# Patient Record
Sex: MALE | Race: ASIAN | Employment: OTHER | URBAN - METROPOLITAN AREA
[De-identification: names, ages, dates, MRNs, and addresses within clinical notes are randomized per-mention and may not be internally consistent; named-entity substitution may affect disease eponyms.]

---

## 2017-02-08 ENCOUNTER — OFFICE VISIT (OUTPATIENT)
Dept: FAMILY MEDICINE | Facility: CLINIC | Age: 57
End: 2017-02-08
Payer: COMMERCIAL

## 2017-02-08 VITALS
TEMPERATURE: 97.8 F | OXYGEN SATURATION: 100 % | HEART RATE: 72 BPM | DIASTOLIC BLOOD PRESSURE: 76 MMHG | WEIGHT: 167 LBS | SYSTOLIC BLOOD PRESSURE: 128 MMHG | HEIGHT: 66 IN | BODY MASS INDEX: 26.84 KG/M2

## 2017-02-08 DIAGNOSIS — F32.1 MAJOR DEPRESSIVE DISORDER, SINGLE EPISODE, MODERATE (H): ICD-10-CM

## 2017-02-08 DIAGNOSIS — J30.2 SEASONAL ALLERGIC RHINITIS, UNSPECIFIED ALLERGIC RHINITIS TRIGGER: ICD-10-CM

## 2017-02-08 DIAGNOSIS — R42 DIZZINESS: ICD-10-CM

## 2017-02-08 DIAGNOSIS — I10 ESSENTIAL HYPERTENSION WITH GOAL BLOOD PRESSURE LESS THAN 140/90: ICD-10-CM

## 2017-02-08 DIAGNOSIS — E78.5 HYPERLIPIDEMIA WITH TARGET LDL LESS THAN 130: Primary | ICD-10-CM

## 2017-02-08 DIAGNOSIS — N18.30 CKD (CHRONIC KIDNEY DISEASE) STAGE 3, GFR 30-59 ML/MIN (H): ICD-10-CM

## 2017-02-08 DIAGNOSIS — F41.9 ANXIETY: ICD-10-CM

## 2017-02-08 LAB — HGB BLD-MCNC: 13.1 G/DL (ref 13.3–17.7)

## 2017-02-08 PROCEDURE — 85018 HEMOGLOBIN: CPT | Performed by: FAMILY MEDICINE

## 2017-02-08 PROCEDURE — 80061 LIPID PANEL: CPT | Performed by: FAMILY MEDICINE

## 2017-02-08 PROCEDURE — 80053 COMPREHEN METABOLIC PANEL: CPT | Performed by: FAMILY MEDICINE

## 2017-02-08 PROCEDURE — 36415 COLL VENOUS BLD VENIPUNCTURE: CPT | Performed by: FAMILY MEDICINE

## 2017-02-08 PROCEDURE — 99214 OFFICE O/P EST MOD 30 MIN: CPT | Performed by: FAMILY MEDICINE

## 2017-02-08 RX ORDER — MECLIZINE HCL 12.5 MG 12.5 MG/1
12.5-25 TABLET ORAL 4 TIMES DAILY PRN
Qty: 30 TABLET | Refills: 3 | Status: SHIPPED | OUTPATIENT
Start: 2017-02-08 | End: 2017-06-20

## 2017-02-08 RX ORDER — TRAZODONE HYDROCHLORIDE 50 MG/1
TABLET, FILM COATED ORAL
Qty: 180 TABLET | Refills: 1 | Status: SHIPPED | OUTPATIENT
Start: 2017-02-08 | End: 2017-07-25

## 2017-02-08 RX ORDER — LISINOPRIL AND HYDROCHLOROTHIAZIDE 12.5; 2 MG/1; MG/1
2 TABLET ORAL DAILY
Qty: 180 TABLET | Refills: 3 | Status: SHIPPED | OUTPATIENT
Start: 2017-02-08 | End: 2018-01-29

## 2017-02-08 RX ORDER — PAROXETINE 30 MG/1
30 TABLET, FILM COATED ORAL AT BEDTIME
Qty: 90 TABLET | Refills: 1 | Status: SHIPPED | OUTPATIENT
Start: 2017-02-08 | End: 2017-08-22

## 2017-02-08 RX ORDER — BUSPIRONE HYDROCHLORIDE 15 MG/1
15 TABLET ORAL 2 TIMES DAILY
Qty: 180 TABLET | Refills: 1 | Status: SHIPPED | OUTPATIENT
Start: 2017-02-08 | End: 2017-08-22

## 2017-02-08 RX ORDER — SIMVASTATIN 40 MG
40 TABLET ORAL AT BEDTIME
Qty: 90 TABLET | Refills: 3 | Status: SHIPPED | OUTPATIENT
Start: 2017-02-08 | End: 2018-01-29

## 2017-02-08 RX ORDER — LISINOPRIL 40 MG/1
TABLET ORAL
COMMUNITY
Start: 2015-02-04 | End: 2017-02-08 | Stop reason: ALTCHOICE

## 2017-02-08 RX ORDER — FEXOFENADINE HCL 180 MG/1
180 TABLET ORAL DAILY
Qty: 90 TABLET | Refills: 3 | Status: SHIPPED | OUTPATIENT
Start: 2017-02-08

## 2017-02-08 ASSESSMENT — ANXIETY QUESTIONNAIRES
GAD7 TOTAL SCORE: 15
3. WORRYING TOO MUCH ABOUT DIFFERENT THINGS: MORE THAN HALF THE DAYS
6. BECOMING EASILY ANNOYED OR IRRITABLE: MORE THAN HALF THE DAYS
2. NOT BEING ABLE TO STOP OR CONTROL WORRYING: MORE THAN HALF THE DAYS
1. FEELING NERVOUS, ANXIOUS, OR ON EDGE: NEARLY EVERY DAY
5. BEING SO RESTLESS THAT IT IS HARD TO SIT STILL: MORE THAN HALF THE DAYS
IF YOU CHECKED OFF ANY PROBLEMS ON THIS QUESTIONNAIRE, HOW DIFFICULT HAVE THESE PROBLEMS MADE IT FOR YOU TO DO YOUR WORK, TAKE CARE OF THINGS AT HOME, OR GET ALONG WITH OTHER PEOPLE: EXTREMELY DIFFICULT
7. FEELING AFRAID AS IF SOMETHING AWFUL MIGHT HAPPEN: MORE THAN HALF THE DAYS

## 2017-02-08 ASSESSMENT — PATIENT HEALTH QUESTIONNAIRE - PHQ9: 5. POOR APPETITE OR OVEREATING: MORE THAN HALF THE DAYS

## 2017-02-08 NOTE — MR AVS SNAPSHOT
"              After Visit Summary   2/8/2017    Gunner Velázquez    MRN: [id number]           Patient Information     Date Of Birth          1960        Visit Information        Provider Department      2/8/2017 3:45 PM Tim Howrad Jr., MD; [place] [employer] [facility]        Today's Diagnoses     Hyperlipidemia with target LDL less than 130    -  1     Major depressive disorder, single episode, moderate (H)         Anxiety         CKD (chronic kidney disease) stage 3, GFR 30-59 ml/min         Essential hypertension with goal blood pressure less than 140/90         Dizziness         Seasonal allergic rhinitis, unspecified allergic rhinitis trigger            Follow-ups after your visit        Follow-up notes from your care team     Return in about 6 months (around 8/8/2017) for recheck depression/anxiety.      Who to contact     If you have questions or need follow up information about today's clinic visit or your schedule please contact [facility] [place] directly at [phone].  Normal or non-critical lab and imaging results will be communicated to you by MyChart, letter or phone within 4 business days after the clinic has received the results. If you do not hear from us within 7 days, please contact the clinic through [employer] or phone. If you have a critical or abnormal lab result, we will notify you by phone as soon as possible.  Submit refill requests through [employer] or call your pharmacy and they will forward the refill request to us. Please allow 3 business days for your refill to be completed.          Additional Information About Your Visit        [employer] Information     [employer] lets you send messages to your doctor, view your test results, renew your prescriptions, schedule appointments and more. To sign up, go to [email or web address] . Click on \"Log in\" on the left side of the screen, which will take you to the Welcome page. Then click on \"Sign up Now\" on " "the right side of the page.     You will be asked to enter the access code listed below, as well as some personal information. Please follow the directions to create your username and password.     Your access code is: [email or web address]  Expires: 2017  4:28 PM     Your access code will  in 90 days. If you need help or a new code, please call your [place] clinic or [phone].        Care EveryWhere ID     This is your Care EveryWhere ID. This could be used by other organizations to access your [place] medical records  [id number]        Your Vitals Were     Pulse Temperature Height BMI (Body Mass Index) Pulse Oximetry       72 97.8  F (36.6  C) (Oral) 5' 6\" (1.676 m) 26.97 kg/m2 100%        Blood Pressure from Last 3 Encounters:   17 128/76   16 128/82   16 132/82    Weight from Last 3 Encounters:   17 167 lb (75.751 kg)   16 163 lb (73.936 kg)   16 163 lb (73.936 kg)              We Performed the Following     Comprehensive metabolic panel (BMP + Alb, Alk Phos, ALT, AST, Total. Bili, TP)     Hemoglobin     Lipid panel reflex to direct LDL          Today's Medication Changes          These changes are accurate as of: 17  4:28 PM.  If you have any questions, ask your nurse or doctor.               Start taking these medicines.        Dose/Directions    fexofenadine 180 MG tablet   Commonly known as:  ALLEGRA   Used for:  Seasonal allergic rhinitis, unspecified allergic rhinitis trigger   Started by:  Tim Howard Jr., MD        Dose:  180 mg   Take 1 tablet (180 mg) by mouth daily   Quantity:  90 tablet   Refills:  3         Stop taking these medicines if you haven't already. Please contact your care team if you have questions.     lisinopril 40 MG tablet   Commonly known as:  PRINIVIL/ZESTRIL   Stopped by:  Tim Howard Jr., MD                Where to get your medicines      These medications were sent to [id number] - [place], [place] - [address] [facility] " [address] [place] [address] [facility] [address]     Phone:  [phone]    - busPIRone 15 MG tablet  - fexofenadine 180 MG tablet  - lisinopril-hydrochlorothiazide 20-12.5 MG per tablet  - meclizine 12.5 MG tablet  - PARoxetine 30 MG tablet  - simvastatin 40 MG tablet  - traZODone 50 MG tablet             Primary Care Provider Office Phone # Fax #    iTm Howard Jr., [phone]       [facility] [address]  [place] [address]        Thank you!     Thank you for choosing [facility]  for your care. Our goal is always to provide you with excellent care. Hearing back from our patients is one way we can continue to improve our services. Please take a few minutes to complete the written survey that you may receive in the mail after your visit with us. Thank you!             Your Updated Medication List - Protect others around you: Learn how to safely use, store and throw away your medicines at www.disposemymeds.org.          This list is accurate as of: 2/8/17  4:28 PM.  Always use your most recent med list.                   Brand Name Dispense Instructions for use    busPIRone 15 MG tablet    BUSPAR    180 tablet    Take 1 tablet (15 mg) by mouth 2 times daily       cholecalciferol 5000 UNITS Caps capsule    vitamin D3    100 capsule    Take 1 per day       fexofenadine 180 MG tablet    ALLEGRA    90 tablet    Take 1 tablet (180 mg) by mouth daily       fish oil-omega-3 fatty acids 1000 MG capsule     180 capsule    Take 1 capsule (1 g) by mouth 2 times daily       lisinopril-hydrochlorothiazide 20-12.5 MG per tablet    PRINZIDE/ZESTORETIC    180 tablet    Take 2 tablets by mouth daily       meclizine 12.5 MG tablet    ANTIVERT    30 tablet    Take 1-2 tablets (12.5-25 mg) by mouth 4 times daily as needed for dizziness       PARoxetine 30 MG tablet    PAXIL    90 tablet    Take 1 tablet (30 mg) by mouth At Bedtime       simvastatin 40 MG tablet    ZOCOR    90  tablet    Take 1 tablet (40 mg) by mouth At Bedtime       traZODone 50 MG tablet    DESYREL    180 tablet    Take 1 to 3 tablets 1/2 to 1 hour before bed on an empty stomach

## 2017-02-08 NOTE — NURSING NOTE
"Chief Complaint   Patient presents with     Hypertension     Depression     Anxiety     Lipids       Initial /76 mmHg  Pulse 72  Temp(Src) 97.8  F (36.6  C) (Oral)  Ht 5' 6\" (1.676 m)  Wt 167 lb (75.751 kg)  BMI 26.97 kg/m2  SpO2 100% Estimated body mass index is 26.97 kg/(m^2) as calculated from the following:    Height as of this encounter: 5' 6\" (1.676 m).    Weight as of this encounter: 167 lb (75.751 kg).  Medication Reconciliation: complete  "

## 2017-02-08 NOTE — Clinical Note
[facility] [place]     [address] [place] [address]                                           [phone]   February 10, 2017    Gunner Velázquez  [address] [place] [address]      Mr. Velázquez,    -Liver and gallbladder tests (ALT,AST, Alk phos,bilirubin) are normal.  -Kidney function (GFR) is decreased.  ADVISE: Follow up with Dr. Anderson at [facility]  -Sodium is normal.  -Potassium is normal.  -Glucose (diabetic screening test) is normal.  -Cholesterol levels are at your goal levels.  ADVISE: Continuing your medication, a regular exercise program with at least 30 minutes of aerobic exercise 3-4 days/week ( 45 minutes 4-6 days/week if weight loss needed), and a low saturated fat,/low carbohydrate diet are helpful to maintain this.  Rechecking your fasting cholesterol panel in 12 months is recommended (Lipid w/ LDL reflex).  -Hemoglobin is decreased indicating anemia.  Anemia can cause fatigue and, occasionally, light-headedness.  ADVISE: This is likely due to your kidney disease, Gunner.  Please follow up with Dr. Anderson.    If you have further questions about the interpretation of your labs, [email or web address] is a good website to check out for further information.      Please contact the clinic if you have additional questions.  Thank you.    Sincerely,    Tim Howard MD       Results for orders placed or performed in visit on 02/08/17   Comprehensive metabolic panel (BMP + Alb, Alk Phos, ALT, AST, Total. Bili, TP)   Result Value Ref Range    Sodium 138 133 - 144 mmol/L    Potassium 4.5 3.4 - 5.3 mmol/L    Chloride 102 94 - 109 mmol/L    Carbon Dioxide 27 20 - 32 mmol/L    Anion Gap 9 3 - 14 mmol/L    Glucose 109 (H) 70 - 99 mg/dL    Urea Nitrogen 36 (H) 7 - 30 mg/dL    Creatinine 2.37 (H) 0.66 - 1.25 mg/dL    GFR Estimate 28 (L) >60 mL/min/1.7m2    GFR Estimate If Black 34 (L) >60 mL/min/1.7m2    Calcium 9.8 8.5 - 10.1 mg/dL    Bilirubin Total 0.4 0.2 - 1.3 mg/dL     Albumin 4.1 3.4 - 5.0 g/dL    Protein Total 8.9 (H) 6.8 - 8.8 g/dL    Alkaline Phosphatase 83 40 - 150 U/L    ALT 53 0 - 70 U/L    AST 43 0 - 45 U/L   Lipid panel reflex to direct LDL   Result Value Ref Range    Cholesterol 206 (H) <200 mg/dL    Triglycerides 151 (H) <150 mg/dL    HDL Cholesterol 49 >39 mg/dL    LDL Cholesterol Calculated 127 (H) <100 mg/dL    Non HDL Cholesterol 157 (H) <130 mg/dL   Hemoglobin   Result Value Ref Range    Hemoglobin 13.1 (L) 13.3 - 17.7 g/dL

## 2017-02-08 NOTE — PROGRESS NOTES
"  SUBJECTIVE:                                                    Gunner Velázquez is a 56 year old male who presents to clinic today for the following health issues:      Hyperlipidemia Follow-Up      Rate your low fat/cholesterol diet?: good    Taking statin?  Yes, no muscle aches from statin    Other lipid medications/supplements?:  Fish oil/Omega 3     Hypertension Follow-up      Outpatient blood pressures are being checked at home.  Results are 117-120-135 systolic .    Low Salt Diet: low salt       Depression and Anxiety Follow-Up    Status since last visit: still up and down, nightmares/ PTSD, sleep issues both sleeping to much and sometimes can't sleep at all, lack of appetite, no motivation, still having dizziness but Meclizine helps       Complicating factors:     Significant life event: Yes, brother passed away      Current substance abuse: None    PHQ-9 SCORE 7/14/2015   Total Score 21     MOE-7 SCORE 7/14/2015   Total Score 17        PHQ-9  English      PHQ-9   Any Language     GAD7         Amount of exercise or physical activity: just a little bit    Problems taking medications regularly: needs refills    Medication side effects: wondering if one medication is giving drowsiness but unable to sleep     Diet: low salt and low fat/cholesterol        Problem list and histories reviewed & adjusted, as indicated.  Additional history: as documented    Labs reviewed in EPIC  Problem list, Medication list, Allergies, and Medical/Social/Surgical histories reviewed in [facility] and updated as appropriate.    ROS:  Constitutional, HEENT, cardiovascular, pulmonary, gi and gu systems are negative, except as otherwise noted.    OBJECTIVE:                                                    /76 mmHg  Pulse 72  Temp(Src) 97.8  F (36.6  C) (Oral)  Ht 5' 6\" (1.676 m)  Wt 167 lb (75.751 kg)  BMI 26.97 kg/m2  SpO2 100%  Body mass index is 26.97 kg/(m^2).  GENERAL: healthy, alert and no distress  NECK: no adenopathy, no " asymmetry, masses, or scars and thyroid normal to palpation  RESP: lungs clear to auscultation - no rales, rhonchi or wheezes  CV: regular rate and rhythm, normal S1 S2, no S3 or S4, no murmur, click or rub, no peripheral edema and peripheral pulses strong  ABDOMEN: soft, nontender, no hepatosplenomegaly, no masses and bowel sounds normal  MS: no gross musculoskeletal defects noted, no edema  PSYCH: mentation appears normal and affect flat    Diagnostic Test Results:  none      ASSESSMENT/PLAN:                                                            1. Hyperlipidemia with target LDL less than 130  Await lipid panel today. Tolerating simvastatin without side effects. Encouraged weight loss through regular exercise as he is able.  - simvastatin (ZOCOR) 40 MG tablet; Take 1 tablet (40 mg) by mouth At Bedtime  Dispense: 90 tablet; Refill: 3  - Lipid panel reflex to direct LDL    2. Major depressive disorder, single episode, moderate (H)  This is stable. He is tolerating his peroxide teen without side effects. He is getting improved sleep with trazodone. These are all refilled as he is no longer following with Dr. Mckay.  If he continues having problems with waxing and waning depression, could consider adding a mood stabilizer such as Lamictal or Zyprexa. Recheck this in 6 months.  - traZODone (DESYREL) 50 MG tablet; Take 1 to 3 tablets 1/2 to 1 hour before bed on an empty stomach  Dispense: 180 tablet; Refill: 1    3. Anxiety  This is likewise stable. We discussed the possibility of increasing his Paxil to 40 mg, but he would prefer to stay at the 30 mg dose.  - busPIRone (BUSPAR) 15 MG tablet; Take 1 tablet (15 mg) by mouth 2 times daily  Dispense: 180 tablet; Refill: 1  - PARoxetine (PAXIL) 30 MG tablet; Take 1 tablet (30 mg) by mouth At Bedtime  Dispense: 90 tablet; Refill: 1    4. CKD (chronic kidney disease) stage 3, GFR 30-59 ml/min  He is avoiding nonsteroidal anti-inflammatory drugs. Blood pressure is at goal  today. We will recheck his kidney function and hemoglobin as below. Continue his ACE inhibitor as he is doing.  - Comprehensive metabolic panel (BMP + Alb, Alk Phos, ALT, AST, Total. Bili, TP)  - Hemoglobin  - lisinopril-hydrochlorothiazide (PRINZIDE/ZESTORETIC) 20-12.5 MG per tablet; Take 2 tablets by mouth daily  Dispense: 180 tablet; Refill: 3    5. Essential hypertension with goal blood pressure less than 140/90  As with chronic kidney disease above.  - Comprehensive metabolic panel (BMP + Alb, Alk Phos, ALT, AST, Total. Bili, TP)  - lisinopril-hydrochlorothiazide (PRINZIDE/ZESTORETIC) 20-12.5 MG per tablet; Take 2 tablets by mouth daily  Dispense: 180 tablet; Refill: 3    6. Dizziness  This is likely a manifestation of his anxiety. This has gotten better as his anxiety control has improved. He still takes meclizine on a p.r.n. basis which is refilled today.  - meclizine (ANTIVERT) 12.5 MG tablet; Take 1-2 tablets (12.5-25 mg) by mouth 4 times daily as needed for dizziness  Dispense: 30 tablet; Refill: 3    7. Seasonal allergic rhinitis, unspecified allergic rhinitis trigger  His Allegra is renewed today for a year. This is been fairly effective in controlling his seasonal allergic rhinitis.  - fexofenadine (ALLEGRA) 180 MG tablet; Take 1 tablet (180 mg) by mouth daily  Dispense: 90 tablet; Refill: 3    Work on weight loss  Regular exercise  Follow-up in 6 months.    Jr Tim Howard MD  [facility]

## 2017-02-09 LAB
ALBUMIN SERPL-MCNC: 4.1 G/DL (ref 3.4–5)
ALP SERPL-CCNC: 83 U/L (ref 40–150)
ALT SERPL W P-5'-P-CCNC: 53 U/L (ref 0–70)
ANION GAP SERPL CALCULATED.3IONS-SCNC: 9 MMOL/L (ref 3–14)
AST SERPL W P-5'-P-CCNC: 43 U/L (ref 0–45)
BILIRUB SERPL-MCNC: 0.4 MG/DL (ref 0.2–1.3)
BUN SERPL-MCNC: 36 MG/DL (ref 7–30)
CALCIUM SERPL-MCNC: 9.8 MG/DL (ref 8.5–10.1)
CHLORIDE SERPL-SCNC: 102 MMOL/L (ref 94–109)
CHOLEST SERPL-MCNC: 206 MG/DL
CO2 SERPL-SCNC: 27 MMOL/L (ref 20–32)
CREAT SERPL-MCNC: 2.37 MG/DL (ref 0.66–1.25)
GFR SERPL CREATININE-BSD FRML MDRD: 28 ML/MIN/1.7M2
GLUCOSE SERPL-MCNC: 109 MG/DL (ref 70–99)
HDLC SERPL-MCNC: 49 MG/DL
LDLC SERPL CALC-MCNC: 127 MG/DL
NONHDLC SERPL-MCNC: 157 MG/DL
POTASSIUM SERPL-SCNC: 4.5 MMOL/L (ref 3.4–5.3)
PROT SERPL-MCNC: 8.9 G/DL (ref 6.8–8.8)
SODIUM SERPL-SCNC: 138 MMOL/L (ref 133–144)
TRIGL SERPL-MCNC: 151 MG/DL

## 2017-02-09 ASSESSMENT — PATIENT HEALTH QUESTIONNAIRE - PHQ9: SUM OF ALL RESPONSES TO PHQ QUESTIONS 1-9: 23

## 2017-02-09 ASSESSMENT — ANXIETY QUESTIONNAIRES: GAD7 TOTAL SCORE: 15

## 2017-02-10 NOTE — PROGRESS NOTES
Quick Note:    Please send the following letter:    Mr. Velázquez,    -Liver and gallbladder tests (ALT,AST, Alk phos,bilirubin) are normal.  -Kidney function (GFR) is decreased. ADVISE: Follow up with Dr. Anderson at [facility]  -Sodium is normal.  -Potassium is normal.  -Glucose (diabetic screening test) is normal.  -Cholesterol levels are at your goal levels. ADVISE: Continuing your medication, a regular exercise program with at least 30 minutes of aerobic exercise 3-4 days/week ( 45 minutes 4-6 days/week if weight loss needed), and a low saturated fat,/low carbohydrate diet are helpful to maintain this. Rechecking your fasting cholesterol panel in 12 months is recommended (Lipid w/ LDL reflex).  -Hemoglobin is decreased indicating anemia. Anemia can cause fatigue and, occasionally, light-headedness. ADVISE: This is likely due to your kidney disease, [place]. Please follow up with Dr. Anderson.    If you have further questions about the interpretation of your labs, [email or web address] is a good website to check out for further information.      Please contact the clinic if you have additional questions. Thank you.    Sincerely,    Tim Howard MD         ______

## 2017-03-17 DIAGNOSIS — F32.1 MAJOR DEPRESSIVE DISORDER, SINGLE EPISODE, MODERATE (H): ICD-10-CM

## 2017-03-17 NOTE — TELEPHONE ENCOUNTER
cholecalciferol (VITAMIN D3) 5000 UNITS CAPS capsule      Last Written Prescription Date: 5/27/2016  Last Fill Quantity: 100 capsule,  # refills: 2   Last Office Visit with [facility], [facility] or [facility] prescribing provider: 2/8/2017

## 2017-03-20 NOTE — TELEPHONE ENCOUNTER
Prescription approved per [facility] Refill Protocol.  Corrina Nayak, RN, BSN  [facility]

## 2017-03-31 ENCOUNTER — APPOINTMENT (OUTPATIENT)
Dept: GENERAL RADIOLOGY | Facility: CLINIC | Age: 57
End: 2017-03-31
Attending: EMERGENCY MEDICINE
Payer: MEDICAID

## 2017-03-31 ENCOUNTER — APPOINTMENT (OUTPATIENT)
Dept: CT IMAGING | Facility: CLINIC | Age: 57
End: 2017-03-31
Attending: EMERGENCY MEDICINE
Payer: MEDICAID

## 2017-03-31 ENCOUNTER — HOSPITAL ENCOUNTER (EMERGENCY)
Facility: CLINIC | Age: 57
Discharge: HOME | End: 2017-03-31
Attending: EMERGENCY MEDICINE | Admitting: EMERGENCY MEDICINE
Payer: MEDICAID

## 2017-03-31 VITALS
DIASTOLIC BLOOD PRESSURE: 75 MMHG | HEART RATE: 73 BPM | TEMPERATURE: 97.5 F | SYSTOLIC BLOOD PRESSURE: 115 MMHG | RESPIRATION RATE: 18 BRPM | OXYGEN SATURATION: 98 %

## 2017-03-31 DIAGNOSIS — R42 VERTIGO: ICD-10-CM

## 2017-03-31 LAB
ANION GAP SERPL CALCULATED.3IONS-SCNC: 10 MMOL/L (ref 3–14)
BASOPHILS # BLD AUTO: 0 10E9/L (ref 0–0.2)
BASOPHILS NFR BLD AUTO: 0.4 %
BUN SERPL-MCNC: 47 MG/DL (ref 7–30)
CALCIUM SERPL-MCNC: 9 MG/DL (ref 8.5–10.1)
CHLORIDE SERPL-SCNC: 103 MMOL/L (ref 94–109)
CO2 SERPL-SCNC: 24 MMOL/L (ref 20–32)
CREAT SERPL-MCNC: 2.45 MG/DL (ref 0.66–1.25)
DIFFERENTIAL METHOD BLD: ABNORMAL
EOSINOPHIL # BLD AUTO: 0.3 10E9/L (ref 0–0.7)
EOSINOPHIL NFR BLD AUTO: 4.3 %
ERYTHROCYTE [DISTWIDTH] IN BLOOD BY AUTOMATED COUNT: 13.1 % (ref 10–15)
GFR SERPL CREATININE-BSD FRML MDRD: 27 ML/MIN/1.7M2
GLUCOSE SERPL-MCNC: 99 MG/DL (ref 70–99)
HCT VFR BLD AUTO: 37.7 % (ref 40–53)
HGB BLD-MCNC: 12.4 G/DL (ref 13.3–17.7)
IMM GRANULOCYTES # BLD: 0 10E9/L (ref 0–0.4)
IMM GRANULOCYTES NFR BLD: 0.1 %
INTERPRETATION ECG - MUSE: NORMAL
LYMPHOCYTES # BLD AUTO: 3 10E9/L (ref 0.8–5.3)
LYMPHOCYTES NFR BLD AUTO: 43.3 %
MCH RBC QN AUTO: 27.6 PG (ref 26.5–33)
MCHC RBC AUTO-ENTMCNC: 32.9 G/DL (ref 31.5–36.5)
MCV RBC AUTO: 84 FL (ref 78–100)
MONOCYTES # BLD AUTO: 0.7 10E9/L (ref 0–1.3)
MONOCYTES NFR BLD AUTO: 9.4 %
NEUTROPHILS # BLD AUTO: 2.9 10E9/L (ref 1.6–8.3)
NEUTROPHILS NFR BLD AUTO: 42.5 %
NRBC # BLD AUTO: 0 10*3/UL
NRBC BLD AUTO-RTO: 0 /100
PLATELET # BLD AUTO: 219 10E9/L (ref 150–450)
POTASSIUM SERPL-SCNC: 3.8 MMOL/L (ref 3.4–5.3)
RBC # BLD AUTO: 4.49 10E12/L (ref 4.4–5.9)
SODIUM SERPL-SCNC: 137 MMOL/L (ref 133–144)
TROPONIN I BLD-MCNC: 0.01 UG/L (ref 0–0.1)
TROPONIN I SERPL-MCNC: NORMAL UG/L (ref 0–0.04)
TROPONIN I SERPL-MCNC: NORMAL UG/L (ref 0–0.04)
WBC # BLD AUTO: 6.9 10E9/L (ref 4–11)

## 2017-03-31 PROCEDURE — 85025 COMPLETE CBC W/AUTO DIFF WBC: CPT | Performed by: EMERGENCY MEDICINE

## 2017-03-31 PROCEDURE — 71020 XR CHEST 2 VW: CPT

## 2017-03-31 PROCEDURE — 96374 THER/PROPH/DIAG INJ IV PUSH: CPT

## 2017-03-31 PROCEDURE — 25000128 H RX IP 250 OP 636: Performed by: EMERGENCY MEDICINE

## 2017-03-31 PROCEDURE — 96375 TX/PRO/DX INJ NEW DRUG ADDON: CPT

## 2017-03-31 PROCEDURE — 96361 HYDRATE IV INFUSION ADD-ON: CPT

## 2017-03-31 PROCEDURE — 80048 BASIC METABOLIC PNL TOTAL CA: CPT | Performed by: EMERGENCY MEDICINE

## 2017-03-31 PROCEDURE — 70450 CT HEAD/BRAIN W/O DYE: CPT

## 2017-03-31 PROCEDURE — 84484 ASSAY OF TROPONIN QUANT: CPT | Performed by: EMERGENCY MEDICINE

## 2017-03-31 PROCEDURE — 84484 ASSAY OF TROPONIN QUANT: CPT

## 2017-03-31 PROCEDURE — 93005 ELECTROCARDIOGRAM TRACING: CPT

## 2017-03-31 PROCEDURE — 99285 EMERGENCY DEPT VISIT HI MDM: CPT | Mod: 25

## 2017-03-31 RX ORDER — SODIUM CHLORIDE 9 MG/ML
1000 INJECTION, SOLUTION INTRAVENOUS CONTINUOUS
Status: DISCONTINUED | OUTPATIENT
Start: 2017-03-31 | End: 2017-03-31 | Stop reason: HOSPADM

## 2017-03-31 RX ORDER — DIPHENHYDRAMINE HYDROCHLORIDE 50 MG/ML
25 INJECTION INTRAMUSCULAR; INTRAVENOUS ONCE
Status: COMPLETED | OUTPATIENT
Start: 2017-03-31 | End: 2017-03-31

## 2017-03-31 RX ADMIN — SODIUM CHLORIDE 1000 ML: 9 INJECTION, SOLUTION INTRAVENOUS at 10:18

## 2017-03-31 RX ADMIN — DIPHENHYDRAMINE HYDROCHLORIDE 25 MG: 50 INJECTION, SOLUTION INTRAMUSCULAR; INTRAVENOUS at 08:18

## 2017-03-31 RX ADMIN — PROCHLORPERAZINE EDISYLATE 10 MG: 5 INJECTION INTRAMUSCULAR; INTRAVENOUS at 08:18

## 2017-03-31 ASSESSMENT — ENCOUNTER SYMPTOMS
HEADACHES: 1
DIZZINESS: 1

## 2017-03-31 NOTE — ED NOTES
Patient arrives here for evaluation of headache that started a few days ago. He reports getting vertigo this morning and falling and hitting head on the floor. He is unsure if there was LOC, it was unwitnessed. Patient is having 4/10 headache, vertigo, blurry vision. Denies nausea or vomiting. Patient is AOX4, ABC's intact

## 2017-03-31 NOTE — ED PROVIDER NOTES
History     Chief Complaint:  Dizzy, Headache    HPI   Gunner Velázquez is a 56 year old male who presents to the emergency department today for evaluation of a headache, dizziness, and chest pain. The patient felt mildly dizzy yesterday but this was worse this morning with room spinning. He has falling due to his dizziness and reports a right sided headache with bilateral ear ringing. He complains of chest pain which started this morning but states it has improved since onset.     Allergies:  Rifampin     Medications:    Cholecalciferol  Zocor  Desyrel  Buspar  Lisinopril-Hydrochlorothiazide   Paxil  Antivert  Allegra      Past Medical History:    CKD  Dizziness  Dyslipidemia  Hypertension  Hyperlipidemia   Shortness of breath     Past Surgical History:    No past surgical history on file.    Family History:    Father: Heart Disease     Social History:  The patient was accompanied to the ED by family.  Smoking Status:Former Smoker  Smokeless Tobacco: Never Used  Alcohol Use: Positive  Marital Status:       Review of Systems   Cardiovascular: Positive for chest pain.   Neurological: Positive for dizziness and headaches.   All other systems reviewed and are negative.      Physical Exam     Patient Vitals for the past 24 hrs:   BP Temp Temp src Pulse Heart Rate Resp SpO2   03/31/17 1000 100/62 - - - 56 - -   03/31/17 0945 100/68 - - - 59 - -   03/31/17 0930 118/72 - - - 70 - -   03/31/17 0915 112/75 - - - 58 - -   03/31/17 0835 - - - - 79 - 100 %   03/31/17 0830 - - - - 64 - 99 %   03/31/17 0806 (!) 171/113 97.5  F (36.4  C) Oral 73 - 16 100 %   03/31/17 0758 (!) 171/113 - - - - - -        Physical Exam  Constitutional: Patient is well appearing. No distress.  Head: Atraumatic.  Mouth/Throat: Oropharynx is clear and moist. No oropharyngeal exudate.  Eyes: Conjunctivae and EOM are normal. No scleral icterus.  Neck: Normal range of motion. Neck supple.   Cardiovascular: Normal rate, regular rhythm, normal heart  sounds and intact distal pulses.   Pulmonary/Chest: Breath sounds normal. No respiratory distress.  Abdominal: Soft. Bowel sounds are normal. No distension. No tenderness. No rebound or guarding.   Musculoskeletal: Normal range of motion. No edema or tenderness.   Neurological: Alert and orientated to person, place, and time. No observable focal neuro deficit  Neuro: Alert, oriented x3, PERRL, EOMI, CN 2-7 and 9-12 intact, 5/5 grasp BUE, 5/5 elbow flexion and extension BUE, 5/5 shoulder abduction BUE, 5/5 hip flexion, knee flexion, knee extension, plantar and dorsiflexion BLE, no pronator drift, normal gait, negative romberg, no dysdiadochokinesia, normal finger-nose-finger testing     Skin: Warm and dry. No rash noted. Not diaphoretic.      Emergency Department Course   ECG:  @ 0809  Indication: headache   Vent. Rate 67 bpm. TX interval 188 ms. QRS duration 94 ms. QT/QTc 410/433 ms. P-R-T axis 29 -3 36.   Normal sinus rhythm. Normal ECG.    Read @ 0818 by Dr. Diamond.     Imaging:  CT Head w/o Contrast  Preliminary Result  IMPRESSION:  Normal head CT.    XR Chest 2 Views  Preliminary Result  IMPRESSION:  Negative.      Radiographic findings were communicated with the patient who voiced understanding of the findings.    Laboratory:  CBC:  WBC 6.9, HGB 12.4 (L),   BMP: BUN 47 (H), creatinine 2.45 (H), GFR 27 (L), otherwise WNL   (0810) Troponin I: <0.015  (1246) Troponin POCT: 0.01  (1248) Troponin I: <0.015    Interventions:  Normal Saline 1000 mL IV   Compazine 10 mg IV  Benadryl 25 mg IV    Emergency Department Course:  Nursing notes and vitals reviewed.  I performed an exam of the patient as documented above.    The above workup was undertaken.    (1345) Patient is feeling symptom free and wants to go home. Requested a work note. He has meclizine at home.   Findings and plan explained to the patient. Patient discharged home with instructions regarding supportive care, medications, and reasons to return.  The importance of close follow-up was reviewed.     Impression & Plan      Medical Decision Making:  Reviewing EMR has hx of dizziness longstanding.  Sx free in the ER and extensive workup as above normal.  Prolonged observation feels great and wants to go home.  Work note given.    All questions and concerns were answered. The patient was discharged home and recommended to follow up with his primary physician and return with any new or worsening symptoms.      Diagnosis:    ICD-10-CM    1. Vertigo R42        Disposition:  Discharge to home.     3/31/2017   [facility] EMERGENCY DEPARTMENT    Pina HERNANDEZ am serving as a scribe on 3/31/2017 at 8:18 AM to personally document services performed by Dr. Diamond based on my observations and the provider's statements to me.       Jeremie Diamond MD  03/31/17 4937

## 2017-03-31 NOTE — LETTER
[facility] EMERGENCY DEPARTMENT  [address]  [facility] [address]  [phone]  Dept: [phone]      3/31/2017    Re: Gunner Velázquez      TO WHOM IT MAY CONCERN:    Gunner Velázquez  was seen on 3.31.17.  Please excuse him  until 4.1.17 due to dizziness.    Cordially  Jeremie Diamond MD  [facility] EMERGENCY DEPARTMENT

## 2017-03-31 NOTE — ED AVS SNAPSHOT
[facility] Department    [address]    [facility] [address]    Phone:  [phone]    Fax:  [phone]                                       Gunner Velázquez   MRN: [id number]    Department:  [facility] Emergency Department   Date of Visit:  3/31/2017           After Visit Summary Signature Page     I have received my discharge instructions, and my questions have been answered. I have discussed any challenges I see with this plan with the nurse or doctor.    ..........................................................................................................................................  Patient/Patient Representative Signature      ..........................................................................................................................................  Patient Representative Print Name and Relationship to Patient    ..................................................               ................................................  Date                                            Time    ..........................................................................................................................................  Reviewed by Signature/Title    ...................................................              ..............................................  Date                                                            Time

## 2017-03-31 NOTE — ED NOTES
Patient is resting comfortably, appears to be sleeping. Breathing is unlabored and family is at bedside,.

## 2017-03-31 NOTE — ED AVS SNAPSHOT
[facility]    [address] [place] [address]    Phone:  [phone]    Fax:  [phone]                                       Gunner Velázquez   MRN: [id number]    Department:  [facility]   Date of Visit:  3/31/2017           Patient Information     Date Of Birth          1960        Your diagnoses for this visit were:     Vertigo        You were seen by Jeremie Diamond MD.      Follow-up Information     Schedule an appointment as soon as possible for a visit with Tim Howard Jr., MD.    Specialties:  Family Practice, Obstetrics    Why:  As needed, If symptoms worsen    Contact information:    [facility]  [address]  [facility] [id number] [phone]          Discharge Instructions         Managing Dizziness (Vertigo) with Medications  Although medications can't cure your problem, they can help control symptoms. Your doctor may prescribe medications for a few weeks and then taper them off.  If you have side effects from your medications, contact your healthcare provider right away.   How medications can help      Treat infection or inflammation. If you have a bacterial infection, your doctor can prescribe antibiotics.    Limit conflicting balance signals. These medications are often in pill form.    Ease nausea. Suppositories, pills, or shots may be used to reduce vomiting.    Reduce pressure in the canals. Diuretics can be used to treat Meniere's disease. These medications help rid the body of excess fluid.    Ease other symptoms. Other medications can help ease depression and anxiety caused by living with dizziness or fainting.    1452-5344 [employer]. [address] [place] [address], [place] [address]. All rights reserved. This information is not intended as a substitute for professional medical care. Always follow your healthcare professional's instructions.          Dizziness (Uncertain  Cause)  Dizziness is a common symptom. It may be described as lightheadedness, spinning, or feeling like you are going to faint. Dizziness can have many causes.  Be sure to tell the healthcare provider about:    All medicines you take, including prescription, over-the-counter, herbs, and supplements    Any other symptoms you have    Any health problems you are being treated for    Anything that causes the dizziness to get worse or better  Today's exam did not show an exact cause for your dizziness. Other tests may be needed. Follow up with your healthcare provider.  Home care    Dizziness that occurs with sudden standing may be a sign of mild dehydration. Drink extra fluids for the next few days.    If you recently started a new medicine, stopped a medicine, or had the dose of a current medicine changed, talk with the prescribing healthcare provider. Your medicine plan may need adjustment.    If dizziness lasts more than a few seconds, sit or lie down until it passes. This may help prevent injury in case you pass out.    Do not drive or use power tools or dangerous equipment until you have had no dizziness for at least 48 hours.  Follow-up care  Follow up with your healthcare provider for further evaluation within the next 7 days or as advised.  When to seek medical advice  Call your healthcare provider for any of the following:    Worsening of symptoms or new symptoms    Passing out or seizure    Repeated vomiting    Headache    Palpitations (the sense that your heart is fluttering or beating fast or hard)    Shortness of breath    Blood in vomit or stool (black or red color)    Weakness of an arm or leg or one side of the face    Vision or hearing changes    Trouble walking or speaking    Chest, arm, neck, back, or jaw pain       1976-1457 [employer]. [address] [place] [address], [place] [address]. All rights reserved. This information is not intended as a substitute for professional medical care. Always  follow your healthcare professional's instructions.          24 Hour Appointment Hotline       To make an appointment at any [facility], call [phone] ([phone]). If you don't have a family doctor or clinic, we will help you find one. [facility] clinics are conveniently located to serve the needs of you and your family.             Review of your medicines      Our records show that you are taking the medicines listed below. If these are incorrect, please call your family doctor or clinic.        Dose / Directions Last dose taken    busPIRone 15 MG tablet   Commonly known as:  BUSPAR   Dose:  15 mg   Quantity:  180 tablet        Take 1 tablet (15 mg) by mouth 2 times daily   Refills:  1        cholecalciferol 5000 UNITS Caps capsule   Commonly known as:  vitamin D3   Quantity:  100 capsule        Take 1 per day   Refills:  2        fexofenadine 180 MG tablet   Commonly known as:  ALLEGRA   Dose:  180 mg   Quantity:  90 tablet        Take 1 tablet (180 mg) by mouth daily   Refills:  3        fish oil-omega-3 fatty acids 1000 MG capsule   Dose:  1 g   Quantity:  180 capsule        Take 1 capsule (1 g) by mouth 2 times daily   Refills:  3        lisinopril-hydrochlorothiazide 20-12.5 MG per tablet   Commonly known as:  PRINZIDE/ZESTORETIC   Dose:  2 tablet   Quantity:  180 tablet        Take 2 tablets by mouth daily   Refills:  3        meclizine 12.5 MG tablet   Commonly known as:  ANTIVERT   Dose:  12.5-25 mg   Quantity:  30 tablet        Take 1-2 tablets (12.5-25 mg) by mouth 4 times daily as needed for dizziness   Refills:  3        PARoxetine 30 MG tablet   Commonly known as:  PAXIL   Dose:  30 mg   Quantity:  90 tablet        Take 1 tablet (30 mg) by mouth At Bedtime   Refills:  1        simvastatin 40 MG tablet   Commonly known as:  ZOCOR   Dose:  40 mg   Quantity:  90 tablet        Take 1 tablet (40 mg) by mouth At Bedtime   Refills:  3        traZODone 50 MG tablet   Commonly known as:  DESYREL    Quantity:  180 tablet        Take 1 to 3 tablets 1/2 to 1 hour before bed on an empty stomach   Refills:  1                Procedures and tests performed during your visit     Procedure/Test Number of Times Performed    Basic metabolic panel 1    CBC with platelets differential 1    CT Head w/o Contrast 1    EKG 12-lead, tracing only 1    Troponin I 2    Troponin POCT 1    XR Chest 2 Views 1      Orders Needing Specimen Collection     None      Pending Results     No orders found from 3/29/2017 to 4/1/2017.            Pending Culture Results     No orders found from 3/29/2017 to 4/1/2017.             Test Results from your hospital stay     3/31/2017  8:28 AM - Interface, Flexilab Results      Component Results     Component Value Ref Range & Units Status    WBC 6.9 4.0 - 11.0 10e9/L Final    RBC Count 4.49 4.4 - 5.9 10e12/L Final    Hemoglobin 12.4 (L) 13.3 - 17.7 g/dL Final    Hematocrit 37.7 (L) 40.0 - 53.0 % Final    MCV 84 78 - 100 fl Final    MCH 27.6 26.5 - 33.0 pg Final    MCHC 32.9 31.5 - 36.5 g/dL Final    RDW 13.1 10.0 - 15.0 % Final    Platelet Count 219 150 - 450 10e9/L Final    Diff Method Automated Method  Final    % Neutrophils 42.5 % Final    % Lymphocytes 43.3 % Final    % Monocytes 9.4 % Final    % Eosinophils 4.3 % Final    % Basophils 0.4 % Final    % Immature Granulocytes 0.1 % Final    Nucleated RBCs 0 0 /100 Final    Absolute Neutrophil 2.9 1.6 - 8.3 10e9/L Final    Absolute Lymphocytes 3.0 0.8 - 5.3 10e9/L Final    Absolute Monocytes 0.7 0.0 - 1.3 10e9/L Final    Absolute Eosinophils 0.3 0.0 - 0.7 10e9/L Final    Absolute Basophils 0.0 0.0 - 0.2 10e9/L Final    Abs Immature Granulocytes 0.0 0 - 0.4 10e9/L Final    Absolute Nucleated RBC 0.0  Final         3/31/2017  8:45 AM - Interface, Flexilab Results      Component Results     Component Value Ref Range & Units Status    Sodium 137 133 - 144 mmol/L Final    Potassium 3.8 3.4 - 5.3 mmol/L Final    Chloride 103 94 - 109 mmol/L Final     Carbon Dioxide 24 20 - 32 mmol/L Final    Anion Gap 10 3 - 14 mmol/L Final    Glucose 99 70 - 99 mg/dL Final    Urea Nitrogen 47 (H) 7 - 30 mg/dL Final    Creatinine 2.45 (H) 0.66 - 1.25 mg/dL Final    GFR Estimate 27 (L) >60 mL/min/1.7m2 Final    Non  GFR Calc    GFR Estimate If Black 33 (L) >60 mL/min/1.7m2 Final    African American GFR Calc    Calcium 9.0 8.5 - 10.1 mg/dL Final         3/31/2017 11:21 AM - Interface, Radiant Ib      Narrative     CHEST TWO VIEWS  3/31/2017 9:06 AM    HISTORY:  Vertigo.    COMPARISON:  None.        Impression     IMPRESSION:  Negative.     JAS ROBERTS MD         3/31/2017  8:45 AM - Interface, Flexilab Results      Component Results     Component Value Ref Range & Units Status    Troponin I ES  0.000 - 0.045 ug/L Final    <0.015  The 99th percentile for upper reference range is 0.045 ug/L.  Troponin values in   the range of 0.045 - 0.120 ug/L may be associated with risks of adverse   clinical events.           3/31/2017  9:59 AM - Interface, Radiant Ib      Narrative     CT OF THE HEAD WITHOUT CONTRAST   3/31/2017 8:59 AM     COMPARISON: Brain MR 7/10/2014.    HISTORY:  Headache, vertigo.    TECHNIQUE: Axial CT images of the head from the skull base to the  vertex were acquired without IV contrast.    FINDINGS:  The ventricles and basal cisterns are within normal limits  in configuration. There is no midline shift. There are no extra-axial  fluid collections.  Gray-white differentiation is well maintained.    No intracranial hemorrhage, mass or recent infarct.    The visualized paranasal sinuses are well-aerated. There is no  mastoiditis. There are no fractures of the visualized bones.        Impression     IMPRESSION:  Normal head CT.      Radiation dose for this scan was reduced using automated exposure  control, adjustment of the mA and/or kV according to patient size, or  iterative reconstruction technique.    ELIJAH HALL MD         3/31/2017  1:24 PM -  Interface, Flexilab Results      Component Results     Component Value Ref Range & Units Status    Troponin I ES  0.000 - 0.045 ug/L Final    <0.015  The 99th percentile for upper reference range is 0.045 ug/L.  Troponin values in   the range of 0.045 - 0.120 ug/L may be associated with risks of adverse   clinical events.           3/31/2017  1:11 PM - Interface, Flexilab Results      Component Results     Component Value Ref Range & Units Status    Troponin I 0.01 0.00 - 0.10 ug/L Final                Clinical Quality Measure: Blood Pressure Screening     Your blood pressure was checked while you were in the emergency department today. The last reading we obtained was  BP: 100/62 . Please read the guidelines below about what these numbers mean and what you should do about them.  If your systolic blood pressure (the top number) is less than 120 and your diastolic blood pressure (the bottom number) is less than 80, then your blood pressure is normal. There is nothing more that you need to do about it.  If your systolic blood pressure (the top number) is 120-139 or your diastolic blood pressure (the bottom number) is 80-89, your blood pressure may be higher than it should be. You should have your blood pressure rechecked within a year by a primary care provider.  If your systolic blood pressure (the top number) is 140 or greater or your diastolic blood pressure (the bottom number) is 90 or greater, you may have high blood pressure. High blood pressure is treatable, but if left untreated over time it can put you at risk for heart attack, stroke, or kidney failure. You should have your blood pressure rechecked by a primary care provider within the next 4 weeks.  If your provider in the emergency department today gave you specific instructions to follow-up with your doctor or provider even sooner than that, you should follow that instruction and not wait for up to 4 weeks for your follow-up visit.        Thank you for  "choosing [facility]       Thank you for choosing [facility] for your care. Our goal is always to provide you with excellent care. Hearing back from our patients is one way we can continue to improve our services. Please take a few minutes to complete the written survey that you may receive in the mail after you visit with us. Thank you!        [employer] Information     [employer] lets you send messages to your doctor, view your test results, renew your prescriptions, schedule appointments and more. To sign up, go to [email or web address] . Click on \"Log in\" on the left side of the screen, which will take you to the Welcome page. Then click on \"Sign up Now\" on the right side of the page.     You will be asked to enter the access code listed below, as well as some personal information. Please follow the directions to create your username and password.     Your access code is: [email or web address]  Expires: 2017  5:28 PM     Your access code will  in 90 days. If you need help or a new code, please call your [facility] clinic or [phone].        Care EveryWhere ID     This is your Care EveryWhere ID. This could be used by other organizations to access your [facility] medical records  [id number]        After Visit Summary       This is your record. Keep this with you and show to your community pharmacist(s) and doctor(s) at your next visit.                  "

## 2017-03-31 NOTE — ED NOTES
Discharge instructions reviewed with patient.  Patient verbalizes his understanding.   Followup care also reviewed.   DC to home per order.  All questions answered.

## 2017-03-31 NOTE — DISCHARGE INSTRUCTIONS
Managing Dizziness (Vertigo) with Medications  Although medications can't cure your problem, they can help control symptoms. Your doctor may prescribe medications for a few weeks and then taper them off.  If you have side effects from your medications, contact your healthcare provider right away.   How medications can help      Treat infection or inflammation. If you have a bacterial infection, your doctor can prescribe antibiotics.    Limit conflicting balance signals. These medications are often in pill form.    Ease nausea. Suppositories, pills, or shots may be used to reduce vomiting.    Reduce pressure in the canals. Diuretics can be used to treat Meniere's disease. These medications help rid the body of excess fluid.    Ease other symptoms. Other medications can help ease depression and anxiety caused by living with dizziness or fainting.    [id number] [employer]. [address] [place] [address], [place] [address]. All rights reserved. This information is not intended as a substitute for professional medical care. Always follow your healthcare professional's instructions.          Dizziness (Uncertain Cause)  Dizziness is a common symptom. It may be described as lightheadedness, spinning, or feeling like you are going to faint. Dizziness can have many causes.  Be sure to tell the healthcare provider about:    All medicines you take, including prescription, over-the-counter, herbs, and supplements    Any other symptoms you have    Any health problems you are being treated for    Anything that causes the dizziness to get worse or better  Today's exam did not show an exact cause for your dizziness. Other tests may be needed. Follow up with your healthcare provider.  Home care    Dizziness that occurs with sudden standing may be a sign of mild dehydration. Drink extra fluids for the next few days.    If you recently started a new medicine, stopped a medicine, or had the dose of a current medicine  changed, talk with the prescribing healthcare provider. Your medicine plan may need adjustment.    If dizziness lasts more than a few seconds, sit or lie down until it passes. This may help prevent injury in case you pass out.    Do not drive or use power tools or dangerous equipment until you have had no dizziness for at least 48 hours.  Follow-up care  Follow up with your healthcare provider for further evaluation within the next 7 days or as advised.  When to seek medical advice  Call your healthcare provider for any of the following:    Worsening of symptoms or new symptoms    Passing out or seizure    Repeated vomiting    Headache    Palpitations (the sense that your heart is fluttering or beating fast or hard)    Shortness of breath    Blood in vomit or stool (black or red color)    Weakness of an arm or leg or one side of the face    Vision or hearing changes    Trouble walking or speaking    Chest, arm, neck, back, or jaw pain       [id number] [employer]. [address] [place] [address], [place] [address]. All rights reserved. This information is not intended as a substitute for professional medical care. Always follow your healthcare professional's instructions.

## 2017-04-17 ENCOUNTER — OFFICE VISIT (OUTPATIENT)
Dept: FAMILY MEDICINE | Facility: CLINIC | Age: 57
End: 2017-04-17
Payer: MEDICAID

## 2017-04-17 VITALS
SYSTOLIC BLOOD PRESSURE: 128 MMHG | TEMPERATURE: 98.1 F | OXYGEN SATURATION: 100 % | BODY MASS INDEX: 27.12 KG/M2 | WEIGHT: 168 LBS | HEART RATE: 69 BPM | DIASTOLIC BLOOD PRESSURE: 78 MMHG

## 2017-04-17 DIAGNOSIS — R42 DIZZINESS: Primary | ICD-10-CM

## 2017-04-17 DIAGNOSIS — N18.30 CKD (CHRONIC KIDNEY DISEASE) STAGE 3, GFR 30-59 ML/MIN (H): ICD-10-CM

## 2017-04-17 DIAGNOSIS — N40.1 BENIGN PROSTATIC HYPERPLASIA WITH LOWER URINARY TRACT SYMPTOMS, UNSPECIFIED MORPHOLOGY: ICD-10-CM

## 2017-04-17 PROCEDURE — 82043 UR ALBUMIN QUANTITATIVE: CPT | Performed by: FAMILY MEDICINE

## 2017-04-17 PROCEDURE — 99213 OFFICE O/P EST LOW 20 MIN: CPT | Performed by: FAMILY MEDICINE

## 2017-04-17 PROCEDURE — 36415 COLL VENOUS BLD VENIPUNCTURE: CPT | Performed by: FAMILY MEDICINE

## 2017-04-17 PROCEDURE — 80069 RENAL FUNCTION PANEL: CPT | Performed by: FAMILY MEDICINE

## 2017-04-17 PROCEDURE — T1013 SIGN LANG/ORAL INTERPRETER: HCPCS | Mod: U3 | Performed by: FAMILY MEDICINE

## 2017-04-17 RX ORDER — TAMSULOSIN HYDROCHLORIDE 0.4 MG/1
0.4 CAPSULE ORAL
COMMUNITY
Start: 2017-02-27 | End: 2018-02-27

## 2017-04-17 NOTE — NURSING NOTE
"Chief Complaint   Patient presents with     ER F/U       Initial /78  Pulse 69  Temp 98.1  F (36.7  C) (Oral)  Wt 168 lb (76.2 kg)  SpO2 100%  BMI 27.12 kg/m2 Estimated body mass index is 27.12 kg/(m^2) as calculated from the following:    Height as of 2/8/17: 5' 6\" (1.676 m).    Weight as of this encounter: 168 lb (76.2 kg).  Medication Reconciliation: complete  "

## 2017-04-17 NOTE — MR AVS SNAPSHOT
"              After Visit Summary   4/17/2017    Gunner Velázquez    MRN: [id number]           Patient Information     Date Of Birth          1960        Visit Information        Provider Department      4/17/2017 3:45 PM Tim Howard Jr., MD; [facility] [employer] [facility]        Today's Diagnoses     Dizziness    -  1    CKD (chronic kidney disease) stage 3, GFR 30-59 ml/min        Benign prostatic hyperplasia with lower urinary tract symptoms, unspecified morphology           Follow-ups after your visit        Follow-up notes from your care team     Return in about 6 months (around 10/17/2017), or if symptoms worsen or fail to improve.      Who to contact     If you have questions or need follow up information about today's clinic visit or your schedule please contact [facility] directly at [phone].  Normal or non-critical lab and imaging results will be communicated to you by MyChart, letter or phone within 4 business days after the clinic has received the results. If you do not hear from us within 7 days, please contact the clinic through MyChart or phone. If you have a critical or abnormal lab result, we will notify you by phone as soon as possible.  Submit refill requests through [employer] or call your pharmacy and they will forward the refill request to us. Please allow 3 business days for your refill to be completed.          Additional Information About Your Visit        MyChart Information     [employer] lets you send messages to your doctor, view your test results, renew your prescriptions, schedule appointments and more. To sign up, go to [email or web address] . Click on \"Log in\" on the left side of the screen, which will take you to the Welcome page. Then click on \"Sign up Now\" on the right side of the page.     You will be asked to enter the access code listed below, as well as some personal information. Please follow the directions to create your " username and password.     Your access code is: [email or web address]  Expires: 2017  5:28 PM     Your access code will  in 90 days. If you need help or a new code, please call your [facility] clinic or [phone].        Care EveryWhere ID     This is your Care EveryWhere ID. This could be used by other organizations to access your [facility] medical records  [id number]        Your Vitals Were     Pulse Temperature Pulse Oximetry BMI (Body Mass Index)          69 98.1  F (36.7  C) (Oral) 100% 27.12 kg/m2         Blood Pressure from Last 3 Encounters:   17 128/78   17 115/75   17 128/76    Weight from Last 3 Encounters:   17 168 lb (76.2 kg)   17 167 lb (75.8 kg)   16 163 lb (73.9 kg)              We Performed the Following     Albumin Random Urine Quantitative     Renal panel        Primary Care Provider Office Phone # Fax #    Tim Howard Jr., [phone]       [facility] [address] [facility] [address]        Thank you!     Thank you for choosing [facility]  for your care. Our goal is always to provide you with excellent care. Hearing back from our patients is one way we can continue to improve our services. Please take a few minutes to complete the written survey that you may receive in the mail after your visit with us. Thank you!             Your Updated Medication List - Protect others around you: Learn how to safely use, store and throw away your medicines at www.disposemymeds.org.          This list is accurate as of: 17  4:33 PM.  Always use your most recent med list.                   Brand Name Dispense Instructions for use    busPIRone 15 MG tablet    BUSPAR    180 tablet    Take 1 tablet (15 mg) by mouth 2 times daily       cholecalciferol 5000 UNITS Caps capsule    vitamin D3    100 capsule    Take 1 per day       fexofenadine 180 MG tablet    ALLEGRA    90 tablet    Take 1 tablet (180 mg) by mouth daily        fish oil-omega-3 fatty acids 1000 MG capsule     180 capsule    Take 1 capsule (1 g) by mouth 2 times daily       lisinopril-hydrochlorothiazide 20-12.5 MG per tablet    PRINZIDE/ZESTORETIC    180 tablet    Take 2 tablets by mouth daily       meclizine 12.5 MG tablet    ANTIVERT    30 tablet    Take 1-2 tablets (12.5-25 mg) by mouth 4 times daily as needed for dizziness       PARoxetine 30 MG tablet    PAXIL    90 tablet    Take 1 tablet (30 mg) by mouth At Bedtime       simvastatin 40 MG tablet    ZOCOR    90 tablet    Take 1 tablet (40 mg) by mouth At Bedtime       tamsulosin 0.4 MG capsule    FLOMAX     Take 0.4 mg by mouth       traZODone 50 MG tablet    DESYREL    180 tablet    Take 1 to 3 tablets 1/2 to 1 hour before bed on an empty stomach

## 2017-04-17 NOTE — PROGRESS NOTES
SUBJECTIVE:                                                    Gunner Velázquez is a 56 year old male who presents to clinic today for the following health issues:      ED/UC Followup:    Facility:  [facility]   Date of visit: 3/31/17  Reason for visit: Dizziness and fall   Current Status: comes and goes, some days dizziness is very bad       This is not a new problem for Gunner, having suffered from this for years.  He's struggled to remain employed because of this and has had an extensive workup as outlined in his problem list.  Most agree that this is significantly impacted by his anxiety.  We have struggled to get his anxiety and depression well controlled despite a psychiatry consultation and trials of several different medications.    He continues to see his kidney doctor at [facility] and is avoiding NSAIDs.  His blood pressure is well controlled today and he's on an ACE inhibitor.        Problem list and histories reviewed & adjusted, as indicated.  Additional history: as documented    Labs reviewed in EPIC    Reviewed and updated as needed this visit by clinical staff  Allergies  Meds       Reviewed and updated as needed this visit by Provider         ROS:  Constitutional, HEENT, cardiovascular, pulmonary, gi and gu systems are negative, except as otherwise noted.    OBJECTIVE:                                                    /78  Pulse 69  Temp 98.1  F (36.7  C) (Oral)  Wt 168 lb (76.2 kg)  SpO2 100%  BMI 27.12 kg/m2  Body mass index is 27.12 kg/(m^2).  GENERAL: healthy, alert and no distress  NECK: no adenopathy, no asymmetry, masses, or scars and thyroid normal to palpation  RESP: lungs clear to auscultation - no rales, rhonchi or wheezes  CV: regular rate and rhythm, normal S1 S2, no S3 or S4, no murmur, click or rub, no peripheral edema and peripheral pulses strong  MS: no gross musculoskeletal defects noted, no edema    Diagnostic Test Results:  Results for orders placed or performed in  visit on 04/17/17   Renal panel   Result Value Ref Range    Sodium 139 133 - 144 mmol/L    Potassium 4.4 3.4 - 5.3 mmol/L    Chloride 105 94 - 109 mmol/L    Carbon Dioxide 25 20 - 32 mmol/L    Anion Gap 9 3 - 14 mmol/L    Glucose 88 70 - 99 mg/dL    Urea Nitrogen 37 (H) 7 - 30 mg/dL    Creatinine 2.11 (H) 0.66 - 1.25 mg/dL    GFR Estimate 33 (L) >60 mL/min/1.7m2    GFR Estimate If Black 39 (L) >60 mL/min/1.7m2    Calcium 9.4 8.5 - 10.1 mg/dL    Phosphorus 3.4 2.5 - 4.5 mg/dL    Albumin 3.8 3.4 - 5.0 g/dL   Albumin Random Urine Quantitative   Result Value Ref Range    Creatinine Urine 79 mg/dL    Albumin Urine mg/L 752 mg/L    Albumin Urine mg/g Cr 950.70 (H) 0 - 17 mg/g Cr        ASSESSMENT/PLAN:                                                            1. Dizziness  Largely a symptom of underlying depression and anxiety.  Continue meds as he's been using them.    2. CKD (chronic kidney disease) stage 3, GFR 30-59 ml/min  Renal function about the same level as it has been the past 2-3 years.  Continue present cares and outpatient Follow up with nephrology.  - Renal panel  - Albumin Random Urine Quantitative    3. Benign prostatic hyperplasia with lower urinary tract symptoms, unspecified morphology  This is better with the addition of Flomax.       See Patient Instructions    Jr Tim Howard MD  [facility]

## 2017-04-17 NOTE — LETTER
[facility]          [address] [place] [address]                                           [phone]  April 19, 2017     Gunner Lopezuangchanh  [address] [place] [address]      Dear Gunner,    The results of your recent tests were reviewed and are as follows:    -Kidney function (GFR) is decreased.  ADVISE: a little better than the prior sample Gunner.  Please continue to avoid NSAIDs like ibuprofen and continue to Follow up with your kidney doctor as scheduled.   -Sodium is normal.   -Potassium is normal.   -Glucose (diabetic screening test) is normal.   -Microalbumin (urine protein) level is elevated. This is suggestive of early damage to your kidneys from high blood pressure.  ADVISE: avoiding anti-inflamatory agents such as ibuprofen (Advil, Motrin) or naproxen (Aleve) as much as possible, keeping your blood pressure in a normal range, and continuing your medication that helps protect your kidneys.  Recheck in 1 year.     Enclosed is a copy of the results.     Thank you for choosing [facility].  We appreciate the opportunity to serve you and look forward to supporting your healthcare needs in the future.    If you have any questions or concerns, please call me or my staff at [phone].      Sincerely,    Tim Howard MD

## 2017-04-18 LAB
ALBUMIN SERPL-MCNC: 3.8 G/DL (ref 3.4–5)
ANION GAP SERPL CALCULATED.3IONS-SCNC: 9 MMOL/L (ref 3–14)
BUN SERPL-MCNC: 37 MG/DL (ref 7–30)
CALCIUM SERPL-MCNC: 9.4 MG/DL (ref 8.5–10.1)
CHLORIDE SERPL-SCNC: 105 MMOL/L (ref 94–109)
CO2 SERPL-SCNC: 25 MMOL/L (ref 20–32)
CREAT SERPL-MCNC: 2.11 MG/DL (ref 0.66–1.25)
CREAT UR-MCNC: 79 MG/DL
GFR SERPL CREATININE-BSD FRML MDRD: 33 ML/MIN/1.7M2
GLUCOSE SERPL-MCNC: 88 MG/DL (ref 70–99)
MICROALBUMIN UR-MCNC: 752 MG/L
MICROALBUMIN/CREAT UR: 950.7 MG/G CR (ref 0–17)
PHOSPHATE SERPL-MCNC: 3.4 MG/DL (ref 2.5–4.5)
POTASSIUM SERPL-SCNC: 4.4 MMOL/L (ref 3.4–5.3)
SODIUM SERPL-SCNC: 139 MMOL/L (ref 133–144)

## 2017-04-19 NOTE — PROGRESS NOTES
Please send the following letter:    Mr. Velázquez,    -Kidney function (GFR) is decreased.  ADVISE: a little better than the prior sample [place].  Please continue to avoid NSAIDs like ibuprofen and continue to Follow up with your kidney doctor as scheduled.  -Sodium is normal.  -Potassium is normal.  -Glucose (diabetic screening test) is normal.  -Microalbumin (urine protein) level is elevated. This is suggestive of early damage to your kidneys from high blood pressure.  ADVISE: avoiding anti-inflamatory agents such as ibuprofen (Advil, Motrin) or naproxen (Aleve) as much as possible, keeping your blood pressure in a normal range, and continuing your medication that helps protect your kidneys.  Recheck in 1 year.    If you have further questions about the interpretation of your labs, labtestsonline.org is a good website to check out for further information.      Please contact the clinic if you have additional questions.  Thank you.    Sincerely,    Tim Howard MD

## 2017-06-20 DIAGNOSIS — R42 DIZZINESS: ICD-10-CM

## 2017-06-20 RX ORDER — MECLIZINE HCL 12.5 MG 12.5 MG/1
12.5-25 TABLET ORAL 4 TIMES DAILY PRN
Qty: 30 TABLET | Refills: 3 | Status: SHIPPED | OUTPATIENT
Start: 2017-06-20 | End: 2017-10-26

## 2017-06-20 NOTE — TELEPHONE ENCOUNTER
Prescription approved per [facility], [facility] or [facility] refill protocol.  Lee Ann De La Rosa RN  [facility] [employer]

## 2017-06-20 NOTE — TELEPHONE ENCOUNTER
meclizine (ANTIVERT) 12.5 MG tablet      Last Written Prescription Date: 2/8/2017  Last Fill Quantity: 30 tablet,  # refills: 3   Last Office Visit with [facility], [facility] or [facility] prescribing provider: 4/17/2017

## 2017-07-25 DIAGNOSIS — F33.0 MAJOR DEPRESSIVE DISORDER, RECURRENT EPISODE, MILD (H): Primary | ICD-10-CM

## 2017-07-25 NOTE — TELEPHONE ENCOUNTER
traZODone (DESYREL) 50 MG tablet       Last Written Prescription Date: 2/8/2017  Last Fill Quantity: 180 tablet; # refills: 1  Last Office Visit with [facility], [facility] or [facility] prescribing provider:  4/17/2017        Last PHQ-9 score on record=   PHQ-9 SCORE 2/8/2017   Total Score -   Total Score 23       Lab Results   Component Value Date    AST 43 02/08/2017     Lab Results   Component Value Date    ALT 53 02/08/2017

## 2017-07-27 NOTE — TELEPHONE ENCOUNTER
PHQ-9 SCORE 7/14/2015 2/8/2017 7/27/2017   Total Score 21 - -   Total Score - 23 11       Patient states that the medications help him with depression and sleep    Has thoughts that he would be better off dead, no plan- just sad that he cannot provide for family.    Alida OwensRN  [facility]  [phone]

## 2017-07-27 NOTE — TELEPHONE ENCOUNTER
Patient returning call. Could not get ahold of a nurse. Please call patient back.    Cheyenne Colindres  Patient Rep

## 2017-07-27 NOTE — TELEPHONE ENCOUNTER
Need phq9 updated  Called home number- no answer- left message to call triage   Alida GUTIERREZ RN  [facility]  [phone]    Routing refill request to provider for review/approval because:  Patient needs to be seen because it has been more than 1 year since last office visit for depression and anxiety    05/2016:      1. Anxiety F41.9     2. Major depressive disorder, recurrent episode, mild (H) F33.0        I advised Gunner of his probable posttraumatic stress disorder related to wartime experiences in [place] as well as his anxiety and depression. We reviewed that Dr. Mckay would like him to continue on his paroxetine and BuSpar and that he added trazodone to be taken at bedtime to improve sleep. I reassured Gunner that they're otherwise did not appear to be any other explanation for his recurrent vertigo. He asked me if this was a permanent condition and I advised him that many times this will improve with medication and psychotherapy but that it may take a number of months. I recommended that he recheck out to other members of the [place] community in the [facility] to see if there may be a psychologist who specializes in posttraumatic stress disorder related to the war in [place] that occurred in the 1970s.  He advised me he would do this. He has a follow-up appointment scheduled with Dr. Mckay in about 6 weeks.

## 2017-07-28 RX ORDER — TRAZODONE HYDROCHLORIDE 50 MG/1
TABLET, FILM COATED ORAL
Qty: 180 TABLET | Refills: 1 | Status: SHIPPED | OUTPATIENT
Start: 2017-07-28 | End: 2017-12-20

## 2017-07-28 ASSESSMENT — PATIENT HEALTH QUESTIONNAIRE - PHQ9: SUM OF ALL RESPONSES TO PHQ QUESTIONS 1-9: 11

## 2017-07-28 NOTE — TELEPHONE ENCOUNTER
Gunner has longstanding anxiety & depression and symptoms such as those mentioned below are not new for him.  Chart reviewed.  Rx sent to pt's preferred pharmacy.    [id number] - [place], [place] - [address] [address]    Tim Howard MD

## 2017-08-22 ENCOUNTER — OFFICE VISIT (OUTPATIENT)
Dept: FAMILY MEDICINE | Facility: CLINIC | Age: 57
End: 2017-08-22
Payer: COMMERCIAL

## 2017-08-22 VITALS
DIASTOLIC BLOOD PRESSURE: 78 MMHG | TEMPERATURE: 97.6 F | OXYGEN SATURATION: 100 % | BODY MASS INDEX: 24.69 KG/M2 | WEIGHT: 153 LBS | HEART RATE: 74 BPM | SYSTOLIC BLOOD PRESSURE: 138 MMHG

## 2017-08-22 DIAGNOSIS — F33.0 MAJOR DEPRESSIVE DISORDER, RECURRENT EPISODE, MILD (H): Primary | ICD-10-CM

## 2017-08-22 DIAGNOSIS — F41.9 ANXIETY: ICD-10-CM

## 2017-08-22 PROCEDURE — 99214 OFFICE O/P EST MOD 30 MIN: CPT | Performed by: FAMILY MEDICINE

## 2017-08-22 RX ORDER — BUSPIRONE HYDROCHLORIDE 15 MG/1
15 TABLET ORAL 2 TIMES DAILY
Qty: 180 TABLET | Refills: 1 | Status: SHIPPED | OUTPATIENT
Start: 2017-08-22 | End: 2018-01-29 | Stop reason: ALTCHOICE

## 2017-08-22 RX ORDER — PAROXETINE 30 MG/1
30 TABLET, FILM COATED ORAL AT BEDTIME
Qty: 90 TABLET | Refills: 1 | Status: SHIPPED | OUTPATIENT
Start: 2017-08-22 | End: 2018-01-29

## 2017-08-22 ASSESSMENT — ANXIETY QUESTIONNAIRES
5. BEING SO RESTLESS THAT IT IS HARD TO SIT STILL: NEARLY EVERY DAY
6. BECOMING EASILY ANNOYED OR IRRITABLE: NEARLY EVERY DAY
7. FEELING AFRAID AS IF SOMETHING AWFUL MIGHT HAPPEN: SEVERAL DAYS
6. BECOMING EASILY ANNOYED OR IRRITABLE: NEARLY EVERY DAY
5. BEING SO RESTLESS THAT IT IS HARD TO SIT STILL: NEARLY EVERY DAY
GAD7 TOTAL SCORE: 14
IF YOU CHECKED OFF ANY PROBLEMS ON THIS QUESTIONNAIRE, HOW DIFFICULT HAVE THESE PROBLEMS MADE IT FOR YOU TO DO YOUR WORK, TAKE CARE OF THINGS AT HOME, OR GET ALONG WITH OTHER PEOPLE: SOMEWHAT DIFFICULT
1. FEELING NERVOUS, ANXIOUS, OR ON EDGE: NEARLY EVERY DAY
GAD7 TOTAL SCORE: 14
7. FEELING AFRAID AS IF SOMETHING AWFUL MIGHT HAPPEN: SEVERAL DAYS
3. WORRYING TOO MUCH ABOUT DIFFERENT THINGS: NEARLY EVERY DAY
3. WORRYING TOO MUCH ABOUT DIFFERENT THINGS: NEARLY EVERY DAY
IF YOU CHECKED OFF ANY PROBLEMS ON THIS QUESTIONNAIRE, HOW DIFFICULT HAVE THESE PROBLEMS MADE IT FOR YOU TO DO YOUR WORK, TAKE CARE OF THINGS AT HOME, OR GET ALONG WITH OTHER PEOPLE: SOMEWHAT DIFFICULT
1. FEELING NERVOUS, ANXIOUS, OR ON EDGE: NEARLY EVERY DAY
2. NOT BEING ABLE TO STOP OR CONTROL WORRYING: NOT AT ALL
2. NOT BEING ABLE TO STOP OR CONTROL WORRYING: NOT AT ALL

## 2017-08-22 ASSESSMENT — PATIENT HEALTH QUESTIONNAIRE - PHQ9
SUM OF ALL RESPONSES TO PHQ QUESTIONS 1-9: 20
5. POOR APPETITE OR OVEREATING: SEVERAL DAYS
5. POOR APPETITE OR OVEREATING: SEVERAL DAYS

## 2017-08-22 NOTE — Clinical Note
Nithin Faustin - do you have any thoughts on how we can optimize Gunner's anxiety & depression?  Abilify?  ECT candidate?  Let me know what you think.  --Brett

## 2017-08-22 NOTE — PROGRESS NOTES
SUBJECTIVE:   Gunner Velázquez is a 57 year old male who presents to clinic today for the following health issues:      Depression and Anxiety Follow-Up    Status since last visit: worse lost appetite and having trouble sleeping, hard to fall and sleep and stay a sleep, and some days wants to sleep all day, and great sadness      Significant life event: No     Current substance abuse: None    PHQ-9 SCORE 7/14/2015 2/8/2017 7/27/2017   Total Score 21 - -   Total Score - 23 11     MOE-7 SCORE 7/14/2015 2/8/2017   Total Score 17 -   Total Score - 15       PHQ-9  English  PHQ-9   Any Language  GAD7      For the most part, neither depression or anxiety has improved much. He had a prior consultation with Dr. Ramin Mckay where we had started a tricyclic antidepressant in the form of trazodone which is helped a little bit with sleep. Overall he still notes a fair amount of anxiety which sometimes manifests itself as dizziness.  He continues to remain unemployed and has not had any form of employment since I met him about 4 years ago. He also reports at times his anxiety is so paralyzing that he is unable to leave the house with his family for outings.  He tries to get some exercise by walking around the house and on occasion he is even able to walk short distances outside. He reports he is apprehensive to go for longer walks in case he develops an episode of dizziness.    Problem list and histories reviewed & adjusted, as indicated.  Additional history: as documented    Labs reviewed in EPIC    [facility] and [facility]  Meds       Reviewed and updated as needed this visit by Provider         ROS:  Constitutional, HEENT, cardiovascular, pulmonary, gi and gu systems are negative, except as otherwise noted.      OBJECTIVE:   /78  Pulse 74  Temp 97.6  F (36.4  C) (Oral)  Wt 153 lb (69.4 kg)  SpO2 100%  BMI 24.69 kg/m2  Body mass index is 24.69 kg/(m^2).  GENERAL:  healthy, alert and no distress  PSYCH: mentation appears normal, affect flat, judgement and insight intact and appearance well groomed    Diagnostic Test Results:  none     ASSESSMENT/PLAN:               ICD-10-CM    1. Major depressive disorder, recurrent episode, mild (H) F33.0    2. Anxiety F41.9 busPIRone (BUSPAR) 15 MG tablet     PARoxetine (PAXIL) 30 MG tablet     Overall no significant improvement in either depression or anxiety despite peroxide teen at 30 mg daily, BuSpar 15 mg twice daily and trazodone 50 mg at bedtime. Overall, he reports he is relatively happy with how things are going in his life, but admits that if he could have less anxiety or depression that would likely be an improvement.    I will consult with my colleague Dr. Mckay to see what further pharmaceutical changes he recommends for Gunner.  I advised Gunner that I would contact him once I heard back from Dr. Mckay with his recommendations.    See Patient Instructions    Tim Howard Jr, MD  [facility]

## 2017-08-22 NOTE — NURSING NOTE
"Chief Complaint   Patient presents with     Depression     Anxiety       Initial /78  Pulse 74  Temp 97.6  F (36.4  C) (Oral)  Wt 153 lb (69.4 kg)  SpO2 100%  BMI 24.69 kg/m2 Estimated body mass index is 24.69 kg/(m^2) as calculated from the following:    Height as of 2/8/17: 5' 6\" (1.676 m).    Weight as of this encounter: 153 lb (69.4 kg).  Medication Reconciliation: complete  "

## 2017-08-23 ASSESSMENT — ANXIETY QUESTIONNAIRES: GAD7 TOTAL SCORE: 14

## 2017-08-25 ENCOUNTER — TELEPHONE (OUTPATIENT)
Dept: PSYCHIATRY | Facility: CLINIC | Age: 57
End: 2017-08-25

## 2017-08-25 NOTE — TELEPHONE ENCOUNTER
Brett,  You could try increasing one dose of Buspar to 30 mg and other one also to 30 mg a week later (I usually go to this dose before giving up on it)  If his heart is ok you could try supplementing with a small dose of Ritalin; 5-10 mg am and noon  Would check vitamin D status     You could talk to them about ECT......but a bit tricky due to language barrier   If interested they can contact [facility] psych clinic at [phone] to make appt with Dr Aguilar. They have to make it clear that it is for ECT, not meds         Message  Received: 2 days ago       Tim Howard Jr., MD [place], MD Nithin Arroyo - do you have any thoughts on how we can optimize Gunner's anxiety & depression?  Abilify?  ECT candidate?  Let me know what you think.     --Brett

## 2017-08-28 ENCOUNTER — TELEPHONE (OUTPATIENT)
Dept: FAMILY MEDICINE | Facility: CLINIC | Age: 57
End: 2017-08-28

## 2017-08-28 DIAGNOSIS — F41.9 ANXIETY: Primary | ICD-10-CM

## 2017-08-28 RX ORDER — BUSPIRONE HYDROCHLORIDE 30 MG/1
1 TABLET ORAL 2 TIMES DAILY
Qty: 60 TABLET | Refills: 3 | Status: SHIPPED | OUTPATIENT
Start: 2017-08-28 | End: 2018-01-29

## 2017-08-28 NOTE — TELEPHONE ENCOUNTER
I have explained below to Gunner and had him explain back to me so I was sure he understood. Explained he can call his pharmacy for refill when he runs out. Informed new bottle will have new dosage and to be careful and take as directed. Again I had Gunner explain back to me to ensure understanding.     Advised Gunner to call us back in a month if after increasing his medication if he is not feeling better. He was thankful for the help and information. Peggy Tijerina R.N.

## 2017-08-28 NOTE — TELEPHONE ENCOUNTER
Please call patient. I heard back from his psychiatrist Dr. Mckay who suggested that we first try increasing his Buspar to see if this helps his anxiety.  Dr. Mckay recommended that Gunner increase his Buspar to two tablets in the morning (30 mg) for a week and then increase the afternoon dose to two tablets (30 mg total) in the afternoon.  Please instruct him to do this. He'll likely run out of this sooner than anticipated given the dosage change.  I've sent a refill on the new dosage to his pharmacy, but have instructed them not to fill it until Gunner calls and tells them he's out.    Chart reviewed.  Rx sent to pt's preferred pharmacy.    [id number] [address] [address]    Tim Howard MD

## 2017-10-26 DIAGNOSIS — R42 DIZZINESS: ICD-10-CM

## 2017-10-27 RX ORDER — MECLIZINE HCL 12.5 MG 12.5 MG/1
12.5-25 TABLET ORAL 4 TIMES DAILY PRN
Qty: 30 TABLET | Refills: 3 | Status: SHIPPED | OUTPATIENT
Start: 2017-10-27 | End: 2018-03-15

## 2017-10-27 NOTE — TELEPHONE ENCOUNTER
meclizine      Last Written Prescription Date: 6/20/17  Last Fill Quantity: 30,  # refills: 3   Last Office Visit with [facility], [facility] or [facility] prescribing provider: 8/22/17    Prescription approved per [facility] Refill Protocol.      Nighat Grady RN- Triage [employer]

## 2017-11-24 DIAGNOSIS — F32.1 MAJOR DEPRESSIVE DISORDER, SINGLE EPISODE, MODERATE (H): ICD-10-CM

## 2017-11-24 NOTE — TELEPHONE ENCOUNTER
Requested Prescriptions   Pending Prescriptions Disp Refills     cholecalciferol (VITAMIN D3) 5000 UNITS CAPS capsule  Last Written Prescription Date:  3/20/2017  Last Fill Quantity: 100 capsule,  # refills: 2   Last Office Visit with [facility] or [facility] prescribing provider:  8/22/2017   Future Office Visit:    Next 5 appointments (look out 90 days)     Feb 20, 2018  2:00 PM CST   Office Visit with Tim Howard Jr., MD   [facility]    [address] [facility] [address]   [phone]                  100 capsule 2     Sig: Take 1 per day    Vitamin Supplements (Adult) Protocol Passed    11/24/2017  9:13 AM       Passed - High dose Vitamin D not ordered       Passed - Recent or future visit with authorizing provider's specialty    Patient had office visit in the last year or has a visit in the next 30 days with authorizing provider.  See chart review.              Passed - Patient is age 18 or older

## 2017-11-28 NOTE — TELEPHONE ENCOUNTER
Prescription approved per [facility] Refill Protocol.  Corrina Nayak, RN, BSN  [facility]

## 2017-12-20 DIAGNOSIS — F33.0 MAJOR DEPRESSIVE DISORDER, RECURRENT EPISODE, MILD (H): ICD-10-CM

## 2017-12-20 NOTE — TELEPHONE ENCOUNTER
Reason for Call:  Other prescription    Detailed comments: Pt calling wondering if he will need to come in or if dr is going to refill trazadone    Phone Number Patient can be reached at: Home number on file [phone] (home)    Best Time: anytime     Can we leave a detailed message on this number? YES    Call taken on 12/20/2017 at 11:00 AM by Corrie Dickerson

## 2017-12-20 NOTE — TELEPHONE ENCOUNTER
Requested Prescriptions   Pending Prescriptions Disp Refills     traZODone (DESYREL) 50 MG tablet  Last Written Prescription Date:  7/28/2017  Last Fill Quantity: 180 tablet,  # refills: 1   Last Office Visit with [facility], [facility] or [facility] prescribing provider:  8/22/2017   Future Office Visit:    Next 5 appointments (look out 90 days)     Feb 20, 2018  2:00 PM CST   Office Visit with Tim Howard Jr., MD   [facility]    [address] [facility] [address] [phone]                180 tablet 1     Sig: Take 1 to 3 tablets 1/2 to 1 hour before bed on an empty stomach    Serotonin Modulators Passed    12/20/2017 11:01 AM       Passed - Recent or future visit with authorizing provider's specialty    Patient had office visit in the last year or has a visit in the next 30 days with authorizing provider.  See chart review.          Passed - Patient is age 18 or older

## 2017-12-21 RX ORDER — TRAZODONE HYDROCHLORIDE 50 MG/1
TABLET, FILM COATED ORAL
Qty: 180 TABLET | Refills: 0 | Status: SHIPPED | OUTPATIENT
Start: 2017-12-21 | End: 2018-03-19

## 2017-12-21 NOTE — TELEPHONE ENCOUNTER
Prescription approved per [facility] Refill Protocol.  Corrina Nayak, RN, BSN  [facility]

## 2018-01-29 ENCOUNTER — OFFICE VISIT (OUTPATIENT)
Dept: FAMILY MEDICINE | Facility: CLINIC | Age: 58
End: 2018-01-29
Payer: COMMERCIAL

## 2018-01-29 VITALS
WEIGHT: 157 LBS | HEIGHT: 66 IN | SYSTOLIC BLOOD PRESSURE: 132 MMHG | TEMPERATURE: 97.8 F | BODY MASS INDEX: 25.23 KG/M2 | OXYGEN SATURATION: 100 % | HEART RATE: 79 BPM | DIASTOLIC BLOOD PRESSURE: 82 MMHG

## 2018-01-29 DIAGNOSIS — F41.9 ANXIETY: Primary | ICD-10-CM

## 2018-01-29 DIAGNOSIS — Z23 NEED FOR PROPHYLACTIC VACCINATION AND INOCULATION AGAINST INFLUENZA: ICD-10-CM

## 2018-01-29 DIAGNOSIS — E78.5 HYPERLIPIDEMIA WITH TARGET LDL LESS THAN 130: ICD-10-CM

## 2018-01-29 DIAGNOSIS — N18.30 CKD (CHRONIC KIDNEY DISEASE) STAGE 3, GFR 30-59 ML/MIN (H): ICD-10-CM

## 2018-01-29 DIAGNOSIS — I10 ESSENTIAL HYPERTENSION WITH GOAL BLOOD PRESSURE LESS THAN 140/90: ICD-10-CM

## 2018-01-29 DIAGNOSIS — F33.0 MAJOR DEPRESSIVE DISORDER, RECURRENT EPISODE, MILD (H): ICD-10-CM

## 2018-01-29 LAB
ALBUMIN SERPL-MCNC: 3.5 G/DL (ref 3.4–5)
ALP SERPL-CCNC: 76 U/L (ref 40–150)
ALT SERPL W P-5'-P-CCNC: 36 U/L (ref 0–70)
ANION GAP SERPL CALCULATED.3IONS-SCNC: 6 MMOL/L (ref 3–14)
AST SERPL W P-5'-P-CCNC: 31 U/L (ref 0–45)
BILIRUB SERPL-MCNC: 0.3 MG/DL (ref 0.2–1.3)
BUN SERPL-MCNC: 33 MG/DL (ref 7–30)
CALCIUM SERPL-MCNC: 9.4 MG/DL (ref 8.5–10.1)
CHLORIDE SERPL-SCNC: 104 MMOL/L (ref 94–109)
CHOLEST SERPL-MCNC: 224 MG/DL
CO2 SERPL-SCNC: 26 MMOL/L (ref 20–32)
CREAT SERPL-MCNC: 2.27 MG/DL (ref 0.66–1.25)
GFR SERPL CREATININE-BSD FRML MDRD: 30 ML/MIN/1.7M2
GLUCOSE SERPL-MCNC: 92 MG/DL (ref 70–99)
HDLC SERPL-MCNC: 79 MG/DL
LDLC SERPL CALC-MCNC: 114 MG/DL
NONHDLC SERPL-MCNC: 145 MG/DL
POTASSIUM SERPL-SCNC: 4.1 MMOL/L (ref 3.4–5.3)
PROT SERPL-MCNC: 8 G/DL (ref 6.8–8.8)
SODIUM SERPL-SCNC: 136 MMOL/L (ref 133–144)
TRIGL SERPL-MCNC: 155 MG/DL

## 2018-01-29 PROCEDURE — 99214 OFFICE O/P EST MOD 30 MIN: CPT | Mod: 25 | Performed by: FAMILY MEDICINE

## 2018-01-29 PROCEDURE — 36415 COLL VENOUS BLD VENIPUNCTURE: CPT | Performed by: FAMILY MEDICINE

## 2018-01-29 PROCEDURE — 90471 IMMUNIZATION ADMIN: CPT | Performed by: FAMILY MEDICINE

## 2018-01-29 PROCEDURE — 80061 LIPID PANEL: CPT | Performed by: FAMILY MEDICINE

## 2018-01-29 PROCEDURE — 80053 COMPREHEN METABOLIC PANEL: CPT | Performed by: FAMILY MEDICINE

## 2018-01-29 PROCEDURE — 90686 IIV4 VACC NO PRSV 0.5 ML IM: CPT | Performed by: FAMILY MEDICINE

## 2018-01-29 RX ORDER — SIMVASTATIN 40 MG
40 TABLET ORAL AT BEDTIME
Qty: 90 TABLET | Refills: 3 | Status: SHIPPED | OUTPATIENT
Start: 2018-01-29 | End: 2019-02-11

## 2018-01-29 RX ORDER — BUSPIRONE HYDROCHLORIDE 30 MG/1
1 TABLET ORAL 2 TIMES DAILY
Qty: 180 TABLET | Refills: 1 | Status: SHIPPED | OUTPATIENT
Start: 2018-01-29 | End: 2018-08-15

## 2018-01-29 RX ORDER — LISINOPRIL AND HYDROCHLOROTHIAZIDE 12.5; 2 MG/1; MG/1
2 TABLET ORAL DAILY
Qty: 180 TABLET | Refills: 3 | Status: SHIPPED | OUTPATIENT
Start: 2018-01-29 | End: 2018-06-15 | Stop reason: ALTCHOICE

## 2018-01-29 RX ORDER — PAROXETINE 30 MG/1
30 TABLET, FILM COATED ORAL AT BEDTIME
Qty: 90 TABLET | Refills: 1 | Status: SHIPPED | OUTPATIENT
Start: 2018-01-29 | End: 2018-05-03 | Stop reason: ALTCHOICE

## 2018-01-29 ASSESSMENT — ANXIETY QUESTIONNAIRES
IF YOU CHECKED OFF ANY PROBLEMS ON THIS QUESTIONNAIRE, HOW DIFFICULT HAVE THESE PROBLEMS MADE IT FOR YOU TO DO YOUR WORK, TAKE CARE OF THINGS AT HOME, OR GET ALONG WITH OTHER PEOPLE: VERY DIFFICULT
5. BEING SO RESTLESS THAT IT IS HARD TO SIT STILL: NEARLY EVERY DAY
3. WORRYING TOO MUCH ABOUT DIFFERENT THINGS: NEARLY EVERY DAY
7. FEELING AFRAID AS IF SOMETHING AWFUL MIGHT HAPPEN: NEARLY EVERY DAY
GAD7 TOTAL SCORE: 21
6. BECOMING EASILY ANNOYED OR IRRITABLE: NEARLY EVERY DAY
2. NOT BEING ABLE TO STOP OR CONTROL WORRYING: NEARLY EVERY DAY
1. FEELING NERVOUS, ANXIOUS, OR ON EDGE: NEARLY EVERY DAY

## 2018-01-29 ASSESSMENT — PATIENT HEALTH QUESTIONNAIRE - PHQ9: 5. POOR APPETITE OR OVEREATING: NEARLY EVERY DAY

## 2018-01-29 NOTE — MR AVS SNAPSHOT
After Visit Summary   1/29/2018    Gunner Velázquez    MRN: [id number]           Patient Information     Date Of Birth          1960        Visit Information        Provider Department      1/29/2018 1:15 PM Tim Howard Jr., MD; [employer] [facility] [place]        Today's Diagnoses     Anxiety    -  1    Hyperlipidemia with target LDL less than 130        Essential hypertension with goal blood pressure less than 140/90        CKD (chronic kidney disease) stage 3, GFR 30-59 ml/min        Major depressive disorder, recurrent episode, mild (H)           Follow-ups after your visit        Additional Services     MENTAL HEALTH REFERRAL  - Adult; Psychiatry and Medication Management; Psychiatry; [facility] [place] Collaborative Care Psychiatry Service   [place], [place] [phone]  Medication management & future refills will be returned to [facility] PCP upon compl...       All scheduling is subject to the client's specific insurance plan & benefits, provider/location availability, and provider clinical specialities.  Please arrive 15 minutes early for your first appointment and bring your completed paperwork.    Please be aware that coverage of these services is subject to the terms and limitations of your health insurance plan.  Call member services at your health plan with any benefit or coverage questions.                            Follow-up notes from your care team     Return in about 6 months (around 7/29/2018) for recheck anxiety & depression.      Who to contact     If you have questions or need follow up information about today's clinic visit or your schedule please contact [facility] [place] directly at [phone].  Normal or non-critical lab and imaging results will be communicated to you by MyChart, letter or phone within 4 business days after the clinic has received the results. If you do not hear from us within 7 days, please contact the clinic through  [email or web address] or phone. If you have a critical or abnormal lab result, we will notify you by phone as soon as possible.  Submit refill requests through hovelstay or call your pharmacy and they will forward the refill request to us. Please allow 3 business days for your refill to be completed.          Additional Information About Your Visit        [email or web address] Information     hovelstay lets you send messages to your doctor, view your test results, renew your prescriptions, schedule appointments and more. To sign up, go to [email or web address] . Click on \"Log in\" on the left side of the screen, which will take you to the Welcome page. Then click on \"Sign up Now\" on the right side of the page.     You will be asked to enter the access code listed below, as well as some personal information. Please follow the directions to create your username and password.     Your access code is: [email or web address]  Expires: 2018  2:02 PM     Your access code will  in 90 days. If you need help or a new code, please call your [facility] clinic or [phone].        Care EveryWhere ID     This is your Care EveryWhere ID. This could be used by other organizations to access your [facility] medical records  [id number]        Your Vitals Were     Pulse Temperature Height Pulse Oximetry BMI (Body Mass Index)       79 97.8  F (36.6  C) (Oral) 5' 6\" (1.676 m) 100% 25.34 kg/m2        Blood Pressure from Last 3 Encounters:   18 132/82   17 138/78   17 128/78    Weight from Last 3 Encounters:   18 157 lb (71.2 kg)   17 153 lb (69.4 kg)   17 168 lb (76.2 kg)              We Performed the Following     Comprehensive metabolic panel (BMP + Alb, Alk Phos, ALT, AST, Total. Bili, TP)     DEPRESSION ACTION PLAN (DAP)     Lipid panel reflex to direct LDL Non-fasting     MENTAL HEALTH REFERRAL  - Adult; Psychiatry and Medication Management; [place] [facility] [place] [facility]   [place], [place] " [phone]  Medication management & future refills will be returned to [facility] PCP upon compl...          Today's Medication Changes          These changes are accurate as of 1/29/18  2:02 PM.  If you have any questions, ask your nurse or doctor.               These medicines have changed or have updated prescriptions.        Dose/Directions    BusPIRone HCl 30 MG Tabs   This may have changed:  Another medication with the same name was removed. Continue taking this medication, and follow the directions you see here.   Used for:  Anxiety        Dose:  1 tablet   Take 1 tablet by mouth 2 times daily   Quantity:  180 tablet   Refills:  1            Where to get your medicines      These medications were sent to [id number] - [place], [place] - [address] [facility] [address] [facility]  [address] [address] [address]     Phone:  [phone]     BusPIRone HCl 30 MG Tabs    lisinopril-hydrochlorothiazide 20-12.5 MG per tablet    PARoxetine 30 MG tablet    simvastatin 40 MG tablet                Primary Care Provider Office Phone # Fax #    Tim Howard Jr., [phone] [address]  [place] [address]        Equal Access to Services     [facility] AH: Hadii aad ku hadasho Soomaali, waaxda luqadaha, qaybta kaalmada adeegyada, britni abraham haysusan mccarthy . So [facility] [place] [phone].    ATENCIÓN: Si habla español, tiene a sung disposición servicios gratuitos de asistencia lingüística. Llame al [phone].    We comply with applicable federal civil rights laws and [place] laws. We do not discriminate on the basis of race, color, national origin, age, disability, sex, sexual orientation, or gender identity.            Thank you!     Thank you for choosing [facility]  for your care. Our goal is always to provide you with excellent care. Hearing back from our patients is one way we can continue to improve our services. Please take a few minutes to complete the written survey that you  may receive in the mail after your visit with us. Thank you!             Your Updated Medication List - Protect others around you: Learn how to safely use, store and throw away your medicines at www.disposemymeds.org.          This list is accurate as of 1/29/18  2:02 PM.  Always use your most recent med list.                   Brand Name Dispense Instructions for use Diagnosis    BusPIRone HCl 30 MG Tabs     180 tablet    Take 1 tablet by mouth 2 times daily    Anxiety       cholecalciferol 5000 UNITS Caps capsule    vitamin D3    100 capsule    Take 1 per day    Major depressive disorder, single episode, moderate (H)       fexofenadine 180 MG tablet    ALLEGRA    90 tablet    Take 1 tablet (180 mg) by mouth daily    Seasonal allergic rhinitis, unspecified allergic rhinitis trigger       fish oil-omega-3 fatty acids 1000 MG capsule     180 capsule    Take 1 capsule (1 g) by mouth 2 times daily    High triglycerides       lisinopril-hydrochlorothiazide 20-12.5 MG per tablet    PRINZIDE/ZESTORETIC    180 tablet    Take 2 tablets by mouth daily    Essential hypertension with goal blood pressure less than 140/90, CKD (chronic kidney disease) stage 3, GFR 30-59 ml/min       meclizine 12.5 MG tablet    ANTIVERT    30 tablet    Take 1-2 tablets (12.5-25 mg) by mouth 4 times daily as needed for dizziness    Dizziness       PARoxetine 30 MG tablet    PAXIL    90 tablet    Take 1 tablet (30 mg) by mouth At Bedtime    Anxiety, Major depressive disorder, recurrent episode, mild (H)       simvastatin 40 MG tablet    ZOCOR    90 tablet    Take 1 tablet (40 mg) by mouth At Bedtime    Hyperlipidemia with target LDL less than 130       tamsulosin 0.4 MG capsule    FLOMAX     Take 0.4 mg by mouth        traZODone 50 MG tablet    DESYREL    180 tablet    Take 1 to 3 tablets 1/2 to 1 hour before bed on an empty stomach    Major depressive disorder, recurrent episode, mild (H)

## 2018-01-29 NOTE — LETTER
January 30, 2018      Gunner Velázquez  [address] [place] [address]        Dear ,    We are writing to inform you of your test results.    -Liver and gallbladder tests (ALT,AST, Alk phos,bilirubin) are normal.   -Kidney function (GFR) is decreased.  ADVISE: This is stable for you, Gunner.  We'll continue to control your blood pressure as we have been doing.   -Sodium is normal.   -Potassium is normal.   -Glucose (diabetic screening test) is normal.   -Cholesterol levels are at your goal levels.  ADVISE: Continuing your medication, a regular exercise program with at least 30 minutes of aerobic exercise 3-4 days/week ( 45 minutes 4-6 days/week if weight loss needed), and a low saturated fat,/low carbohydrate diet are helpful to maintain this.  Rechecking your fasting cholesterol panel in 12 months is recommended (Lipid w/ LDL reflex).     If you have further questions about the interpretation of your labs, labtestBeijing iCh[employer] Online [employer].org is a good website to check out for further information.     Resulted Orders   Comprehensive metabolic panel (BMP + Alb, Alk Phos, ALT, AST, Total. Bili, TP)   Result Value Ref Range    Sodium 136 133 - 144 mmol/L    Potassium 4.1 3.4 - 5.3 mmol/L    Chloride 104 94 - 109 mmol/L    Carbon Dioxide 26 20 - 32 mmol/L    Anion Gap 6 3 - 14 mmol/L    Glucose 92 70 - 99 mg/dL    Urea Nitrogen 33 (H) 7 - 30 mg/dL    Creatinine 2.27 (H) 0.66 - 1.25 mg/dL    GFR Estimate 30 (L) >60 mL/min/1.7m2      Comment:      Non  GFR Calc    GFR Estimate If Black 36 (L) >60 mL/min/1.7m2      Comment:       GFR Calc    Calcium 9.4 8.5 - 10.1 mg/dL    Bilirubin Total 0.3 0.2 - 1.3 mg/dL    Albumin 3.5 3.4 - 5.0 g/dL    Protein Total 8.0 6.8 - 8.8 g/dL    Alkaline Phosphatase 76 40 - 150 U/L    ALT 36 0 - 70 U/L    AST 31 0 - 45 U/L   Lipid panel reflex to direct LDL Non-fasting   Result Value Ref Range    Cholesterol 224 (H) <200 mg/dL      Comment:      Desirable:        <200 mg/dl    Triglycerides 155 (H) <150 mg/dL      Comment:      Borderline high:  150-199 mg/dl  High:             200-499 mg/dl  Very high:       >499 mg/dl      HDL Cholesterol 79 >39 mg/dL    LDL Cholesterol Calculated 114 (H) <100 mg/dL      Comment:      Above desirable:  100-129 mg/dl  Borderline High:  130-159 mg/dL  High:             160-189 mg/dL  Very high:       >189 mg/dl      Non HDL Cholesterol 145 (H) <130 mg/dL      Comment:      Above Desirable:  130-159 mg/dl  Borderline high:  160-189 mg/dl  High:             190-219 mg/dl  Very high:       >219 mg/dl         If you have any questions or concerns, please call the clinic at the number listed above.       Sincerely,        Tim Howard Jr, MD

## 2018-01-29 NOTE — LETTER
My Depression Action Plan  Name: Gunner Lopezuangchanh   Date of Birth 1960  Date: 1/29/2018    My doctor: Tim Howard Jr.   My clinic: [facility] [place]  [address]  [place] [address] [phone]          GREEN    ZONE   Good Control    What it looks like:     Things are going generally well. You have normal up s and down s. You may even feel depressed from time to time, but bad moods usually last less than a day.   What you need to do:  1. Continue to care for yourself (see self care plan)  2. Check your depression survival kit and update it as needed  3. Follow your physician s recommendations including any medication.  4. Do not stop taking medication unless you consult with your physician first.           YELLOW         ZONE Getting Worse    What it looks like:     Depression is starting to interfere with your life.     It may be hard to get out of bed; you may be starting to isolate yourself from others.    Symptoms of depression are starting to last most all day and this has happened for several days.     You may have suicidal thoughts but they are not constant.   What you need to do:     1. Call your care team, your response to treatment will improve if you keep your care team informed of your progress. Yellow periods are signs an adjustment may need to be made.     2. Continue your self-care, even if you have to fake it!    3. Talk to someone in your support network    4. Open up your depression survival kit           RED    ZONE Medical Alert - Get Help    What it looks like:     Depression is seriously interfering with your life.     You may experience these or other symptoms: You can t get out of bed most days, can t work or engage in other necessary activities, you have trouble taking care of basic hygiene, or basic responsibilities, thoughts of suicide or death that will not go away, self-injurious behavior.     What you need to do:  1. Call your care team and  request a same-day appointment. If they are not available (weekends or after hours) call your local crisis line, emergency room or 911.      Electronically signed by: Tim Howard Jr, January 29, 2018    Depression Self Care Plan / Survival Kit    Self-Care for Depression  Here s the deal. Your body and mind are really not as separate as most people think.  What you do and think affects how you feel and how you feel influences what you do and think. This means if you do things that people who feel good do, it will help you feel better.  Sometimes this is all it takes.  There is also a place for medication and therapy depending on how severe your depression is, so be sure to consult with your medical provider and/ or Behavioral Health Consultant if your symptoms are worsening or not improving.     In order to better manage my stress, I will:    Exercise  Get some form of exercise, every day. This will help reduce pain and release endorphins, the  feel good  chemicals in your brain. This is almost as good as taking antidepressants!  This is not the same as joining a gym and then never going! (they count on that by the way ) It can be as simple as just going for a walk or doing some gardening, anything that will get you moving.      Hygiene   Maintain good hygiene (Get out of bed in the morning, Make your bed, Brush your teeth, Take a shower, and Get dressed like you were going to work, even if you are unemployed).  If your clothes don't fit try to get ones that do.    Diet  I will strive to eat foods that are good for me, drink plenty of water, and avoid excessive sugar, caffeine, alcohol, and other mood-altering substances.  Some foods that are helpful in depression are: complex carbohydrates, B vitamins, flaxseed, fish or fish oil, fresh fruits and vegetables.    Psychotherapy  I agree to participate in Individual Therapy (if recommended).    Medication  If prescribed medications, I agree to take them.  Missing  doses can result in serious side effects.  I understand that drinking alcohol, or other illicit drug use, may cause potential side effects.  I will not stop my medication abruptly without first discussing it with my provider.    Staying Connected With Others  I will stay in touch with my friends, family members, and my primary care provider/team.    Use your imagination  Be creative.  We all have a creative side; it doesn t matter if it s oil painting, sand castles, or mud pies! This will also kick up the endorphins.    Witness Beauty  (AKA stop and smell the roses) Take a look outside, even in mid-winter. Notice colors, textures. Watch the squirrels and birds.     Service to others  Be of service to others.  There is always someone else in need.  By helping others we can  get out of ourselves  and remember the really important things.  This also provides opportunities for practicing all the other parts of the program.    Humor  Laugh and be silly!  Adjust your TV habits for less news and crime-drama and more comedy.    Control your stress  Try breathing deep, massage therapy, biofeedback, and meditation. Find time to relax each day.     My support system    Clinic Contact:  Phone number:    Contact 1:  Phone number:    Contact 2:  Phone number:    Taoism/:  Phone number:    Therapist:  Phone number:    Local crisis center:    Phone number:    Other community support:  Phone number:

## 2018-01-29 NOTE — NURSING NOTE
"Chief Complaint   Patient presents with     Lipids     Hypertension     Anxiety     Depression       Initial /82  Pulse 79  Temp 97.8  F (36.6  C) (Oral)  Ht 5' 6\" (1.676 m)  Wt 157 lb (71.2 kg)  SpO2 100%  BMI 25.34 kg/m2 Estimated body mass index is 25.34 kg/(m^2) as calculated from the following:    Height as of this encounter: 5' 6\" (1.676 m).    Weight as of this encounter: 157 lb (71.2 kg).  Medication Reconciliation: complete  "

## 2018-01-29 NOTE — PROGRESS NOTES
SUBJECTIVE:   Gunner Velázquez is a 57 year old male who presents to clinic today for the following health issues:    Hyperlipidemia Follow-Up      Rate your low fat/cholesterol diet?: good    Taking statin?  Yes, no muscle aches from statin    Other lipid medications/supplements?:  none    Hypertension Follow-up      Outpatient blood pressures are being checked at home.  Results are 140-130 systolic and 85-95 diastolic .    Low Salt Diet: low salt    Anxiety/ Depression Follow-Up    Status since last visit: up and down there are days it is pretty bad, wants to be alone      Other associated symptoms:None    Complicating factors:   Significant life event: Yes  Current substance abuse: None  Depression symptoms: Yes    MOE-7 SCORE 2/8/2017 8/22/2017 8/22/2017   Total Score - - -   Total Score 15 14 14       MOE-7    Amount of exercise or physical activity: tries to walk around    Problems taking medications regularly: No    Medication side effects: fatigue and cloudiness     Diet: low salt and low fat/cholesterol      Anxiety/depression- Pt notes he has intermittent anxiety attacks, is scared during these episodes. His anxiety is more present than absent. His wife tries to help calm him. Pt saw psychiatrist Dr. Mckay 2 years ago, wants to see him again. Pt is on same dose of Paroxetine as he was when he was seeing Dr. Mckay, but is on increased dose of Buspirone from when he was seeing Dr. Mckay. Pt does not care where he goes, just wants to get better. He has never seen a therapist, has sought help through Youtube, but he would be open to seeing therapist.    Pt has lost almost 10 lbs since last year. He is watching his diet very carefully. He agreed to receive flu shot today.        Problem list and histories reviewed & adjusted, as indicated.  Additional history: as documented    BP Readings from Last 3 Encounters:   01/29/18 132/82   08/22/17 138/78   04/17/17 128/78    Wt Readings from Last 3 Encounters:  "  01/29/18 71.2 kg (157 lb)   08/22/17 69.4 kg (153 lb)   04/17/17 76.2 kg (168 lb)        Reviewed and updated as needed this visit by clinical staff  Allergies       Reviewed and updated as needed this visit by Provider       ROS:  Constitutional, HEENT, cardiovascular, pulmonary, gi and gu systems are negative, except as otherwise noted.    This document serves as a record of the services and decisions personally performed and made by Tim Howard MD. It was created on his behalf by Sang Gambino, a trained medical scribe. The creation of this document is based on the provider's statements to the medical scribe.  Sang Gambino 1:43 PM January 29, 2018    OBJECTIVE:     /82  Pulse 79  Temp 97.8  F (36.6  C) (Oral)  Ht 1.676 m (5' 6\")  Wt 71.2 kg (157 lb)  SpO2 100%  BMI 25.34 kg/m2  Body mass index is 25.34 kg/(m^2).  GENERAL: healthy, alert and no distress  NECK: no adenopathy, no asymmetry, masses, or scars and thyroid normal to palpation  RESP: lungs clear to auscultation - no rales, rhonchi or wheezes  CV: regular rate and rhythm, normal S1 S2, no S3 or S4, no murmur, click or rub, no peripheral edema and peripheral pulses strong  PSYCH: mentation appears normal, affect normal/bright    Diagnostic Test Results:  No results found for this or any previous visit (from the past 24 hour(s)).    ASSESSMENT/PLAN:     (F41.9) Anxiety  (primary encounter diagnosis)  Comment: His anxiety has not shown much improvement. Discussed with pt that Dr. Mckay is psychiatrist through the [facility] now, would have to refer him to one of his colleagues that works in a clinic setting. Discussed with pt that it would be beneficial to have him see psychiatrist in [place] to evaluate his anxiety/depression and decide which medications are best to pursue for pt. I also discussed with pt that it would be beneficial for him to see Dr. Oconnor here at [facility]; she will be able to offer additional resources. Pt agreed " with the plan to refill his Buspirone and Paxil, as well as schedule appointments to see psychiatrist and therapist.  Plan: BusPIRone HCl 30 MG TABS, PARoxetine (PAXIL) 30        MG tablet        Will have pt follow up in 6 months to recheck his anxiety/depression.    (E78.5) Hyperlipidemia with target LDL less than 130  Comment: Pt not fasting, but agreed to recheck his lipids today.  Plan: simvastatin (ZOCOR) 40 MG tablet, Lipid panel         reflex to direct LDL Non-fasting        Follow up based on labs.    (I10) Essential hypertension with goal blood pressure less than 140/90  Comment: BP was good today (132/82), will refill his lisinopril-HCTZ and recheck blood work today for further evaluation.  Plan: lisinopril-hydrochlorothiazide         (PRINZIDE/ZESTORETIC) 20-12.5 MG per tablet,         Comprehensive metabolic panel (BMP + Alb, Alk         Phos, ALT, AST, Total. Bili, TP)        Follow up based on labs.    (N18.3) CKD (chronic kidney disease) stage 3, GFR 30-59 ml/min  Comment: Will recheck his kidney function today for further evaluation.  Plan: lisinopril-hydrochlorothiazide         (PRINZIDE/ZESTORETIC) 20-12.5 MG per tablet,         Comprehensive metabolic panel (BMP + Alb, Alk         Phos, ALT, AST, Total. Bili, TP)        Follow up based on labs.    (F33.0) Major depressive disorder, recurrent episode, mild (H)  Comment: Will see what psychiatry in [place] and then Dr. Oconnor here at [facility] have to say about his anxiety and depression.  Plan: PARoxetine (PAXIL) 30 MG tablet        Follow up in 6 months for recheck.    The information in this document, created by the medical scribe for me, accurately reflects the services I personally performed and the decisions made by me. I have reviewed and approved this document for accuracy prior to leaving the patient care area.  January 29, 2018 1:43 PM    Tim Howard Jr, MD  [facility]    Injectable Influenza Immunization  Documentation    1.  Is the person to be vaccinated sick today?   No    2. Does the person to be vaccinated have an allergy to a component   of the vaccine?   No  Egg Allergy Algorithm Link    3. Has the person to be vaccinated ever had a serious reaction   to influenza vaccine in the past?   No    4. Has the person to be vaccinated ever had Guillain-Barré syndrome?   No    Form completed by pt with   Yanely Osei CMA

## 2018-01-30 ASSESSMENT — PATIENT HEALTH QUESTIONNAIRE - PHQ9: SUM OF ALL RESPONSES TO PHQ QUESTIONS 1-9: 20

## 2018-01-30 ASSESSMENT — ANXIETY QUESTIONNAIRES: GAD7 TOTAL SCORE: 21

## 2018-01-30 NOTE — PROGRESS NOTES
Please send the following letter:    Mr. Velázquez,    -Liver and gallbladder tests (ALT,AST, Alk phos,bilirubin) are normal.  -Kidney function (GFR) is decreased.  ADVISE: This is stable for you, Gunner.  We'll continue to control your blood pressure as we have been doing.  -Sodium is normal.  -Potassium is normal.  -Glucose (diabetic screening test) is normal.  -Cholesterol levels are at your goal levels.  ADVISE: Continuing your medication, a regular exercise program with at least 30 minutes of aerobic exercise 3-4 days/week ( 45 minutes 4-6 days/week if weight loss needed), and a low saturated fat,/low carbohydrate diet are helpful to maintain this.  Rechecking your fasting cholesterol panel in 12 months is recommended (Lipid w/ LDL reflex).    If you have further questions about the interpretation of your labs, labtestsonline.org is a good website to check out for further information.      Please contact the clinic if you have additional questions.  Thank you.    Sincerely,    Tim Howard MD

## 2018-03-15 DIAGNOSIS — R42 DIZZINESS: ICD-10-CM

## 2018-03-15 NOTE — TELEPHONE ENCOUNTER
meclizine (ANTIVERT) 12.5 MG tablet  Last Written Prescription Date:  10/27/17  Last Fill Quantity: 30,  # refills: 3   Last office visit:  with prescribing provider:  1/29/18   Future Office Visit:

## 2018-03-16 NOTE — TELEPHONE ENCOUNTER
"Requested Prescriptions   Pending Prescriptions Disp Refills     meclizine (ANTIVERT) 12.5 MG tablet  Last Written Prescription Date:  10/27/2017  Last Fill Quantity: 30 tablet,  # refills: 3   Last office visit: 1/29/2018 with prescribing provider:  Lee   Future Office Visit:       30 tablet 3     Sig: Take 1-2 tablets (12.5-25 mg) by mouth 4 times daily as needed for dizziness     Antivertigo/Antiemetic Agents Passed    3/15/2018  3:40 PM       Passed - Recent (12 mo) or future (30 days) visit within the authorizing provider's specialty    Patient had office visit in the last 12 months or has a visit in the next 30 days with authorizing provider or within the authorizing provider's specialty.  See \"Patient Info\" tab in inbasket, or \"Choose Columns\" in Meds & Orders section of the refill encounter.           Passed - Patient is 18 years of age or older          "

## 2018-03-19 DIAGNOSIS — F33.0 MAJOR DEPRESSIVE DISORDER, RECURRENT EPISODE, MILD (H): ICD-10-CM

## 2018-03-19 RX ORDER — MECLIZINE HCL 12.5 MG 12.5 MG/1
12.5-25 TABLET ORAL 4 TIMES DAILY PRN
Qty: 30 TABLET | Refills: 3 | Status: SHIPPED | OUTPATIENT
Start: 2018-03-19 | End: 2018-07-23

## 2018-03-19 NOTE — TELEPHONE ENCOUNTER
"  Requested Prescriptions   Pending Prescriptions Disp Refills     traZODone (DESYREL) 50 MG tablet  Last Written Prescription Date:  12/21/2017  Last Fill Quantity: 180 tablet,  # refills: 0   Last office visit: 1/29/2018 with prescribing provider:  Lee   Future Office Visit:       180 tablet 0     Sig: Take 1 to 3 tablets 1/2 to 1 hour before bed on an empty stomach    Serotonin Modulators Passed    3/19/2018  9:52 AM       Passed - Recent (12 mo) or future (30 days) visit within the authorizing provider's specialty    Patient had office visit in the last 12 months or has a visit in the next 30 days with authorizing provider or within the authorizing provider's specialty.  See \"Patient Info\" tab in inbasket, or \"Choose Columns\" in Meds & Orders section of the refill encounter.           Passed - Patient is age 18 or older          "

## 2018-03-19 NOTE — TELEPHONE ENCOUNTER
Prescription approved per [facility] Refill Protocol.  Corrina Nayak, RN, BSN  [facility]

## 2018-03-21 RX ORDER — TRAZODONE HYDROCHLORIDE 50 MG/1
TABLET, FILM COATED ORAL
Qty: 180 TABLET | Refills: 0 | Status: SHIPPED | OUTPATIENT
Start: 2018-03-21 | End: 2018-05-03

## 2018-03-21 NOTE — TELEPHONE ENCOUNTER
PHQ-9 SCORE 7/27/2017 8/22/2017 1/29/2018   Total Score - - -   Total Score 11 20 20       Routing refill request to provider for review/approval because:  phq9 out of range    Alida Owens,RN BSN  [facility]  [phone]

## 2018-05-03 ENCOUNTER — OFFICE VISIT (OUTPATIENT)
Dept: PSYCHIATRY | Facility: CLINIC | Age: 58
End: 2018-05-03
Attending: FAMILY MEDICINE
Payer: COMMERCIAL

## 2018-05-03 VITALS
WEIGHT: 153 LBS | HEIGHT: 66 IN | TEMPERATURE: 97.8 F | SYSTOLIC BLOOD PRESSURE: 148 MMHG | DIASTOLIC BLOOD PRESSURE: 90 MMHG | OXYGEN SATURATION: 99 % | BODY MASS INDEX: 24.59 KG/M2 | HEART RATE: 66 BPM | RESPIRATION RATE: 22 BRPM

## 2018-05-03 DIAGNOSIS — F41.1 GAD (GENERALIZED ANXIETY DISORDER): ICD-10-CM

## 2018-05-03 DIAGNOSIS — F43.10 PTSD (POST-TRAUMATIC STRESS DISORDER): Primary | ICD-10-CM

## 2018-05-03 DIAGNOSIS — F32.2 SEVERE DEPRESSION (H): ICD-10-CM

## 2018-05-03 PROCEDURE — 90792 PSYCH DIAG EVAL W/MED SRVCS: CPT | Performed by: NURSE PRACTITIONER

## 2018-05-03 PROCEDURE — 90785 PSYTX COMPLEX INTERACTIVE: CPT | Performed by: NURSE PRACTITIONER

## 2018-05-03 RX ORDER — MIRTAZAPINE 15 MG/1
15 TABLET, FILM COATED ORAL AT BEDTIME
Qty: 30 TABLET | Refills: 1 | Status: SHIPPED | OUTPATIENT
Start: 2018-05-03 | End: 2018-06-15

## 2018-05-03 ASSESSMENT — ANXIETY QUESTIONNAIRES
6. BECOMING EASILY ANNOYED OR IRRITABLE: NEARLY EVERY DAY
5. BEING SO RESTLESS THAT IT IS HARD TO SIT STILL: NEARLY EVERY DAY
3. WORRYING TOO MUCH ABOUT DIFFERENT THINGS: NEARLY EVERY DAY
2. NOT BEING ABLE TO STOP OR CONTROL WORRYING: NEARLY EVERY DAY
IF YOU CHECKED OFF ANY PROBLEMS ON THIS QUESTIONNAIRE, HOW DIFFICULT HAVE THESE PROBLEMS MADE IT FOR YOU TO DO YOUR WORK, TAKE CARE OF THINGS AT HOME, OR GET ALONG WITH OTHER PEOPLE: EXTREMELY DIFFICULT
GAD7 TOTAL SCORE: 21
7. FEELING AFRAID AS IF SOMETHING AWFUL MIGHT HAPPEN: NEARLY EVERY DAY
1. FEELING NERVOUS, ANXIOUS, OR ON EDGE: NEARLY EVERY DAY

## 2018-05-03 ASSESSMENT — PATIENT HEALTH QUESTIONNAIRE - PHQ9: 5. POOR APPETITE OR OVEREATING: NEARLY EVERY DAY

## 2018-05-03 NOTE — NURSING NOTE
"Chief Complaint   Patient presents with     Consult     referred by Dr Howard- depression and anxiety, very little help with medication        Initial /90 (BP Location: Left arm, Patient Position: Chair, Cuff Size: Adult Regular)  Pulse 66  Temp 97.8  F (36.6  C) (Oral)  Resp 22  Ht 5' 6.25\" (1.683 m)  Wt 153 lb (69.4 kg)  SpO2 99%  BMI 24.51 kg/m2 Estimated body mass index is 24.51 kg/(m^2) as calculated from the following:    Height as of this encounter: 5' 6.25\" (1.683 m).    Weight as of this encounter: 153 lb (69.4 kg).  Medication Reconciliation: complete    "

## 2018-05-03 NOTE — MR AVS SNAPSHOT
After Visit Summary   5/3/2018    Gunner Velázquez    MRN: [id number]           Patient Information     Date Of Birth          1960        Visit Information        Provider Department      5/3/2018 10:30 AM Promise Aranda NP; [facility]        Today's Diagnoses     PTSD (post-traumatic stress disorder)    -  1      Care Instructions    Treatment Plan:    Stop Desyrel/Olepto (trazodone).     Start Remeron (mirtazapine) 15 mg by mouth daily at bedtime for sleep, mood, anxiety.     Reduce Paxil (paroxetine) to 15 mg by mouth daily at bedtime for 2 weeks then stop.     Continue Buspar (buspirone) 30 mg by mouth daily in AM and PM.     Continue all other medications as reviewed per electronic medical record today.     Safety plan reviewed. To the Emergency Department as needed or call after hours crisis line at [phone] or [phone].     To schedule individual therapy, call [facility] at [phone]. Dr. Howard recommends Dr. Faye Oconnor at the [facility].     Schedule an appointment with me in 6 weeks or sooner as needed.     Follow up with primary care provider as planned or for acute medical concerns.    Call the psychiatric nurse line with medication questions or concerns at [phone].    MyChart may be used to communicate with your provider, but this is not intended to be used for emergencies.          Follow-ups after your visit        Your next 10 appointments already scheduled     Jul 23, 2018  1:20 PM CDT   SHORT with Tim Howard Jr., MD   [facility]    [address]  [facility] [address] [phone]              Who to contact     If you have questions or need follow up information about today's clinic visit or your schedule please contact [facility] directly at [phone].  Normal or non-critical lab and imaging results will be communicated to you by  "MyChart, letter or phone within 4 business days after the clinic has received the results. If you do not hear from us within 7 days, please contact the clinic through [employer] or phone. If you have a critical or abnormal lab result, we will notify you by phone as soon as possible.  Submit refill requests through [employer] or call your pharmacy and they will forward the refill request to us. Please allow 3 business days for your refill to be completed.          Additional Information About Your Visit        [employer] Information     Learn[employer] lets you send messages to your doctor, view your test results, renew your prescriptions, schedule appointments and more. To sign up, go to [email or web address] . Click on \"Log in\" on the left side of the screen, which will take you to the Welcome page. Then click on \"Sign up Now\" on the right side of the page.     You will be asked to enter the access code listed below, as well as some personal information. Please follow the directions to create your username and password.     Your access code is: [email or web address]  Expires: 2018 12:11 PM     Your access code will  in 90 days. If you need help or a new code, please call your [facility] clinic or [phone].        Care EveryWhere ID     This is your Care EveryWhere ID. This could be used by other organizations to access your [facility] medical records  [id number]        Your Vitals Were     Pulse Temperature Respirations Height Pulse Oximetry BMI (Body Mass Index)    66 97.8  F (36.6  C) (Oral) 22 5' 6.25\" (1.683 m) 99% 24.51 kg/m2       Blood Pressure from Last 3 Encounters:   18 148/90   18 132/82   17 138/78    Weight from Last 3 Encounters:   18 153 lb (69.4 kg)   18 157 lb (71.2 kg)   17 153 lb (69.4 kg)              Today, you had the following     No orders found for display         Today's Medication Changes          These changes are accurate as of 5/3/18 12:12 PM.  If you have any " questions, ask your nurse or doctor.               Start taking these medicines.        Dose/Directions    mirtazapine 15 MG tablet   Commonly known as:  REMERON   Used for:  PTSD (post-traumatic stress disorder)   Started by:  Promise Aranda NP        Dose:  15 mg   Take 1 tablet (15 mg) by mouth At Bedtime Change from Paxil (paroxetine)   Quantity:  30 tablet   Refills:  1         Stop taking these medicines if you haven't already. Please contact your care team if you have questions.     PARoxetine 30 MG tablet   Commonly known as:  PAXIL   Stopped by:  Promise Aranda NP           traZODone 50 MG tablet   Commonly known as:  DESYREL   Stopped by:  Promise Aranda NP                Where to get your medicines      These medications were sent to [id number] - [place], [place] - [address]  [address] [address] [address]     Phone:  [phone]     mirtazapine 15 MG tablet                Primary Care Provider Office Phone # Fax #    Tim Howard Jr., [phone] [address] [facility]  [place] [address]        Equal Access to Services     [facility]: Hadii aad ku hadasho Soomaali, waaxda luqadaha, qaybta kaalmada adereneeyacharbel, britni mccarthy . So [facility] [phone].    ATENCIÓN: Si habla español, tiene a sung disposición servicios gratuitos de asistencia lingüística. Carlitos al [phone].    We comply with applicable federal civil rights laws and [place] laws. We do not discriminate on the basis of race, color, national origin, age, disability, sex, sexual orientation, or gender identity.            Thank you!     Thank you for choosing [facility]  for your care. Our goal is always to provide you with excellent care. Hearing back from our patients is one way we can continue to improve our services. Please take a few minutes to complete the written survey that you may receive in the mail after your visit with us. Thank you!              Your Updated Medication List - Protect others around you: Learn how to safely use, store and throw away your medicines at www.disposemymeds.org.          This list is accurate as of 5/3/18 12:12 PM.  Always use your most recent med list.                   Brand Name Dispense Instructions for use Diagnosis    BusPIRone HCl 30 MG Tabs     180 tablet    Take 1 tablet by mouth 2 times daily    Anxiety       cholecalciferol 5000 units Caps capsule    vitamin D3    100 capsule    Take 1 per day    Major depressive disorder, single episode, moderate (H)       fexofenadine 180 MG tablet    ALLEGRA    90 tablet    Take 1 tablet (180 mg) by mouth daily    Seasonal allergic rhinitis, unspecified allergic rhinitis trigger       fish oil-omega-3 fatty acids 1000 MG capsule     180 capsule    Take 1 capsule (1 g) by mouth 2 times daily    High triglycerides       lisinopril-hydrochlorothiazide 20-12.5 MG per tablet    PRINZIDE/ZESTORETIC    180 tablet    Take 2 tablets by mouth daily    Essential hypertension with goal blood pressure less than 140/90, CKD (chronic kidney disease) stage 3, GFR 30-59 ml/min       meclizine 12.5 MG tablet    ANTIVERT    30 tablet    Take 1-2 tablets (12.5-25 mg) by mouth 4 times daily as needed for dizziness    Dizziness       mirtazapine 15 MG tablet    REMERON    30 tablet    Take 1 tablet (15 mg) by mouth At Bedtime Change from Paxil (paroxetine)    PTSD (post-traumatic stress disorder)       simvastatin 40 MG tablet    ZOCOR    90 tablet    Take 1 tablet (40 mg) by mouth At Bedtime    Hyperlipidemia with target LDL less than 130
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
## 2018-05-03 NOTE — PATIENT INSTRUCTIONS
Treatment Plan:    Stop Desyrel/Olepto (trazodone).     Start Remeron (mirtazapine) 15 mg by mouth daily at bedtime for sleep, mood, anxiety.     Reduce Paxil (paroxetine) to 15 mg by mouth daily at bedtime for 2 weeks then stop.     Continue Buspar (buspirone) 30 mg by mouth daily in AM and PM.     Continue all other medications as reviewed per electronic medical record today.     Safety plan reviewed. To the Emergency Department as needed or call after hours crisis line at [phone] or [phone].     To schedule individual therapy, call [facility] at [phone]. Dr. Howard recommends Dr. Faye [facility]connor at the [facility].     Schedule an appointment with me in 6 weeks or sooner as needed.     Follow up with primary care provider as planned or for acute medical concerns.    Call the psychiatric nurse line with medication questions or concerns at [phone].    Talentwirehart may be used to communicate with your provider, but this is not intended to be used for emergencies.

## 2018-05-03 NOTE — Clinical Note
Thank you for the Psychiatry referral to the [facility] ([facility]). Our psychiatry providers act as a specialty service for Primary Care Providers in the [facility] that seek to optimize medications for unstable patients.  Once medications have been optimized, our providers discharge the patient back to the referring Primary Care Provider for ongoing medication management.  This type of system allows our providers to serve a high volume of patients.   Please see my Impression and Plan.  If you have any questions or concerns, please let me know.  Promise

## 2018-05-04 ASSESSMENT — PATIENT HEALTH QUESTIONNAIRE - PHQ9: SUM OF ALL RESPONSES TO PHQ QUESTIONS 1-9: 25

## 2018-05-04 ASSESSMENT — ANXIETY QUESTIONNAIRES: GAD7 TOTAL SCORE: 21

## 2018-06-04 ENCOUNTER — TELEPHONE (OUTPATIENT)
Dept: FAMILY MEDICINE | Facility: CLINIC | Age: 58
End: 2018-06-04

## 2018-06-04 NOTE — TELEPHONE ENCOUNTER
Forms received by: Patient Drop Off    Last office visit: 1/29/18    Purpose of Form:  Medical Cerification    When the form is due:  Within 2 weeks    How the form needs to be returned for patient:  Patient , call [phone] or [phone]    Form currently placed: Dr Howard's box up front.    Mary Kramer  Patient Representative

## 2018-06-05 NOTE — TELEPHONE ENCOUNTER
Would you like pt to schedule an appointment to complete form. No reason on form for need for FMLA. Yanely Osei CMA

## 2018-06-06 NOTE — TELEPHONE ENCOUNTER
Given that [place] often times needs an , a clinic visit may be more appropriate compared to a phone visit.  Needs to be seen.  Please have patient make appointment to complete form.     Tim Howard MD

## 2018-06-13 ENCOUNTER — OFFICE VISIT (OUTPATIENT)
Dept: FAMILY MEDICINE | Facility: CLINIC | Age: 58
End: 2018-06-13
Payer: COMMERCIAL

## 2018-06-13 VITALS
SYSTOLIC BLOOD PRESSURE: 115 MMHG | DIASTOLIC BLOOD PRESSURE: 75 MMHG | OXYGEN SATURATION: 100 % | BODY MASS INDEX: 24.44 KG/M2 | WEIGHT: 152.6 LBS | TEMPERATURE: 97.9 F | HEART RATE: 74 BPM

## 2018-06-13 DIAGNOSIS — F43.10 PTSD (POST-TRAUMATIC STRESS DISORDER): ICD-10-CM

## 2018-06-13 DIAGNOSIS — F32.2 SEVERE DEPRESSION (H): Primary | ICD-10-CM

## 2018-06-13 DIAGNOSIS — N18.30 CKD (CHRONIC KIDNEY DISEASE) STAGE 3, GFR 30-59 ML/MIN (H): ICD-10-CM

## 2018-06-13 DIAGNOSIS — N18.4 CKD (CHRONIC KIDNEY DISEASE) STAGE 4, GFR 15-29 ML/MIN (H): ICD-10-CM

## 2018-06-13 DIAGNOSIS — I10 HTN, GOAL BELOW 140/90: ICD-10-CM

## 2018-06-13 DIAGNOSIS — F41.9 ANXIETY: ICD-10-CM

## 2018-06-13 DIAGNOSIS — Z71.89 ADVANCED DIRECTIVES, COUNSELING/DISCUSSION: ICD-10-CM

## 2018-06-13 PROCEDURE — 85025 COMPLETE CBC W/AUTO DIFF WBC: CPT | Performed by: FAMILY MEDICINE

## 2018-06-13 PROCEDURE — 99214 OFFICE O/P EST MOD 30 MIN: CPT | Performed by: FAMILY MEDICINE

## 2018-06-13 PROCEDURE — 80069 RENAL FUNCTION PANEL: CPT | Performed by: FAMILY MEDICINE

## 2018-06-13 PROCEDURE — 82043 UR ALBUMIN QUANTITATIVE: CPT | Performed by: FAMILY MEDICINE

## 2018-06-13 PROCEDURE — 36415 COLL VENOUS BLD VENIPUNCTURE: CPT | Performed by: FAMILY MEDICINE

## 2018-06-13 NOTE — LETTER
Lynne 15, 2018      Gunner Velázquez  [address] [place] [address]        Dear ,    We are writing to inform you of your test results.      -Kidney function (GFR) is decreased.  ADVISE: Follow up with Dr. Arenas  -Sodium is normal.  -Potassium is normal.  -Glucose (diabetic screening test) is normal.  -Hemoglobin is decreased indicating anemia.  Anemia can cause fatigue and, occasionally, light-headedness.  ADVISE: I think this may be due to your kidney disease, Gunner.  I advise you follow up with Dr. Arenas.  -White blood cell and platelet counts are normal.  -Microalbumin (urine protein) level is elevated. This is suggestive of early damage to your kidneys from high blood pressure.  ADVISE: avoiding anti-inflamatory agents such as ibuprofen (Advil, Motrin) or naproxen (Aleve) as much as possible, keeping your blood pressure in a normal range, and continuing your medication that helps protect your kidneys.  Recheck in 1 year.    If you have further questions about the interpretation of your labs, [email or web address] is a good website to check out for further information.    Resulted Orders   CBC with platelets differential   Result Value Ref Range    WBC 5.6 4.0 - 11.0 10e9/L    RBC Count 4.25 (L) 4.4 - 5.9 10e12/L    Hemoglobin 12.0 (L) 13.3 - 17.7 g/dL    Hematocrit 35.2 (L) 40.0 - 53.0 %    MCV 83 78 - 100 fl    MCH 28.2 26.5 - 33.0 pg    MCHC 34.1 31.5 - 36.5 g/dL    RDW 13.3 10.0 - 15.0 %    Platelet Count 253 150 - 450 10e9/L    Diff Method Automated Method     % Neutrophils 53.9 %    % Lymphocytes 32.9 %    % Monocytes 9.6 %    % Eosinophils 3.4 %    % Basophils 0.2 %    Absolute Neutrophil 3.0 1.6 - 8.3 10e9/L    Absolute Lymphocytes 1.9 0.8 - 5.3 10e9/L    Absolute Monocytes 0.5 0.0 - 1.3 10e9/L    Absolute Eosinophils 0.2 0.0 - 0.7 10e9/L    Absolute Basophils 0.0 0.0 - 0.2 10e9/L   Albumin Random Urine Quantitative with Creat Ratio   Result Value Ref Range    Creatinine Urine 87 mg/dL     Albumin Urine mg/L 309 mg/L    Albumin Urine mg/g Cr 353.55 (H) 0 - 17 mg/g Cr   Renal panel   Result Value Ref Range    Sodium 138 133 - 144 mmol/L    Potassium 4.7 3.4 - 5.3 mmol/L    Chloride 105 94 - 109 mmol/L    Carbon Dioxide 23 20 - 32 mmol/L    Anion Gap 10 3 - 14 mmol/L    Glucose 98 70 - 99 mg/dL    Urea Nitrogen 57 (H) 7 - 30 mg/dL    Creatinine 2.94 (H) 0.66 - 1.25 mg/dL    GFR Estimate 22 (L) >60 mL/min/1.7m2      Comment:      Non  GFR Calc    GFR Estimate If Black 27 (L) >60 mL/min/1.7m2      Comment:       GFR Calc    Calcium 9.2 8.5 - 10.1 mg/dL    Phosphorus 4.4 2.5 - 4.5 mg/dL    Albumin 3.3 (L) 3.4 - 5.0 g/dL       If you have any questions or concerns, please call the clinic at the number listed above.       Sincerely,        Tim Howard Jr, MD

## 2018-06-13 NOTE — PROGRESS NOTES
SUBJECTIVE:   Gunner Velázquez is a 58 year old male who presents to clinic today for the following health issues, accompanied by :    Pt is here to have forms filled out:  Purpose of Form: FMLA form for wife to be completed here in office.  How the form needs to be returned for patient: Fax  Form currently placed: SR inbox    PTSD- Pt notes this FMLA form is for his anxiety and dizziness secondary to his PTSD. He is worried about falling. Pt notes his wife is of significant psychological & emotional support for him.  She cooks for him and takes care of him because he feels tired frequently and is unable to stand for extended periods of time. He notes his anxiety has worsened over the past 1 month. Pt has seen psychiatrist a couple of months ago and last saw psychiatrist 1 month ago. Pt had his Trazodone and Paroxetine discontinued. Pt notes his wife works 40 hours per week, and his wife comes home from work periodically throughout the day when pt calls for something he needs. Pt notes his daughter also helps him. He finds that he needs help almost everyday, and his right now his daughter is not working and has greater availability to help him at home. Last time pt worked was 7/9/14. Pt is also currently applying for disability.    BP- Pt is currently on lisinopril-HCTZ for his BP and has been controlling his BP well.    CKD- Pt has appointment in September 2018 with kidney specialist for his CKD.      Problem list and histories reviewed & adjusted, as indicated.  Additional history: as documented    Reviewed and updated as needed this visit by clinical staff  Tobacco  Allergies  Meds  Problems  Med Hx  Surg Hx  Fam Hx  Soc Hx        Reviewed and updated as needed this visit by Provider  Allergies  Meds  Problems       ROS:  Constitutional, HEENT, cardiovascular, pulmonary, gi and gu systems are negative, except as otherwise noted.    This document serves as a record of the services and  decisions personally performed and made by Tim Howard MD. It was created on his behalf by Sang Gambino, a trained medical scribe. The creation of this document is based on the provider's statements to the medical scribe.  Sang Gambino 4:03 PM June 13, 2018    OBJECTIVE:     /75 (BP Location: Right arm, Patient Position: Sitting, Cuff Size: Adult Regular)  Pulse 74  Temp 97.9  F (36.6  C) (Oral)  Wt 69.2 kg (152 lb 9.6 oz)  SpO2 100%  BMI 24.44 kg/m2  Body mass index is 24.44 kg/(m^2).  GENERAL: healthy, alert and no distress  PSYCH: mentation appears normal, affect somewhat flat which is normal for Gunner.    Diagnostic Test Results:  No results found for this or any previous visit (from the past 24 hour(s)).    ASSESSMENT/PLAN:     (F32.2) Severe depression (H)  (primary encounter diagnosis)  Comment: Pt last saw psychiatry with Dr. Aranda on 5/3/18 and stopped patient's Trazodone and Paroxetine. Pt is currently on Buspirone and Remeron for his anxiety and depression; will have pt continue with both of these as prescribed. Encouraged pt follow up with psychiatry as scheduled as they are managing his anxiety, depression, and PTSD.  Plan: Follow up if needed.    (F43.10) PTSD (post-traumatic stress disorder)  Comment: See above. FMLA forms were filled out for pt today. Pt is also applying for disability; discussed with pt that in order to be approved for disability, he will need to see a physician that specializes in disability applications. Therefore, I discussed with pt that his 's office should have list of these physicians that he should see for his disability application. Lastly, encouraged pt to continue seeing psychiatry as scheduled as they are managing his anxiety, depression, and PTSD.  Plan: Follow up if needed.    (N18.3) CKD (chronic kidney disease) stage 3, GFR 30-59 ml/min  Comment: Per patient's request, will recheck blood work for further evaluation. Pt has appointment in  9/2018 with Nephrology for further evaluation of his CKD. His blood pressure also remains well controlled with his lisinopril-HCTZ.  Plan: CBC with platelets differential, Albumin Random        Urine Quantitative with Creat Ratio, Renal         panel        Follow up based on labs.    (Z71.89) Advanced directives, counseling/discussion  Comment: Information was given today for pt to develop an advanced directive; he will bring in a copy of this to the clinic when he has an advanced directive established.  Plan: Follow up if needed.    The information in this document, created by the medical scribe for me, accurately reflects the services I personally performed and the decisions made by me. I have reviewed and approved this document for accuracy prior to leaving the patient care area.  June 13, 2018 4:03 PM    Tim Howard Jr, MD  [facility]

## 2018-06-13 NOTE — MR AVS SNAPSHOT
After Visit Summary   6/13/2018    Gunner Velázquez    MRN: [id number]           Patient Information     Date Of Birth          1960        Visit Information        Provider Department      6/13/2018 3:45 PM Tim Howard Jr., MD; [employer] [facility]        Today's Diagnoses     Severe depression (H)    -  1    PTSD (post-traumatic stress disorder)        CKD (chronic kidney disease) stage 3, GFR 30-59 ml/min        Advanced directives, counseling/discussion           Follow-ups after your visit        Follow-up notes from your care team     Return in about 6 months (around 12/13/2018) for Depression/anxiety/PTSD.      Your next 10 appointments already scheduled     Rogelio 15, 2018 12:30 PM CDT   Return Visit with Promise Aranda NP   [facility]    [address]  [address]  [facility] [id number] [phone]            Jul 23, 2018  1:20 PM CDT   SHORT with Tim Howard Jr., MD   [facility]    [address] [facility] [address] [phone]              Who to contact     If you have questions or need follow up information about today's clinic visit or your schedule please contact [facility] [place] directly at [phone].  Normal or non-critical lab and imaging results will be communicated to you by MyChart, letter or phone within 4 business days after the clinic has received the results. If you do not hear from us within 7 days, please contact the clinic through MyChart or phone. If you have a critical or abnormal lab result, we will notify you by phone as soon as possible.  Submit refill requests through [employer] or call your pharmacy and they will forward the refill request to us. Please allow 3 business days for your refill to be completed.          Additional Information About Your Visit        Care EveryWhere ID     This is your Care  EveryWhere ID. This could be used by other organizations to access your [place] medical records  [id number]        Your Vitals Were     Pulse Temperature Pulse Oximetry BMI (Body Mass Index)          74 97.9  F (36.6  C) (Oral) 100% 24.44 kg/m2         Blood Pressure from Last 3 Encounters:   06/13/18 115/75   05/03/18 148/90   01/29/18 132/82    Weight from Last 3 Encounters:   06/13/18 152 lb 9.6 oz (69.2 kg)   05/03/18 153 lb (69.4 kg)   01/29/18 157 lb (71.2 kg)              We Performed the Following     Albumin Random Urine Quantitative with Creat Ratio     CBC with platelets differential     Renal panel        Primary Care Provider Office Phone # Fax #    Tim Howard Jr., [phone] [address]  [place] [address]        Equal Access to Services     [facility]: Hadii aad ku hadasho Soomaali, waaxda luqadaha, qaybta kaalmada adeegyada, britni kaurin haymary annn yara mccarthy . So [facility] [phone].    ATENCIÓN: Si habla español, tiene a sung disposición servicios gratuitos de asistencia lingüística. Llame al [phone].    We comply with applicable federal civil rights laws and [place] laws. We do not discriminate on the basis of race, color, national origin, age, disability, sex, sexual orientation, or gender identity.            Thank you!     Thank you for choosing [facility]  for your care. Our goal is always to provide you with excellent care. Hearing back from our patients is one way we can continue to improve our services. Please take a few minutes to complete the written survey that you may receive in the mail after your visit with us. Thank you!             Your Updated Medication List - Protect others around you: Learn how to safely use, store and throw away your medicines at www.disposemymeds.org.          This list is accurate as of 6/13/18  4:34 PM.  Always use your most recent med list.                   Brand Name Dispense Instructions for  use Diagnosis    BusPIRone HCl 30 MG Tabs     180 tablet    Take 1 tablet by mouth 2 times daily    Anxiety       cholecalciferol 5000 units Caps capsule    vitamin D3    100 capsule    Take 1 per day    Major depressive disorder, single episode, moderate (H)       fexofenadine 180 MG tablet    ALLEGRA    90 tablet    Take 1 tablet (180 mg) by mouth daily    Seasonal allergic rhinitis, unspecified allergic rhinitis trigger       fish oil-omega-3 fatty acids 1000 MG capsule     180 capsule    Take 1 capsule (1 g) by mouth 2 times daily    High triglycerides       lisinopril-hydrochlorothiazide 20-12.5 MG per tablet    PRINZIDE/ZESTORETIC    180 tablet    Take 2 tablets by mouth daily    Essential hypertension with goal blood pressure less than 140/90, CKD (chronic kidney disease) stage 3, GFR 30-59 ml/min       meclizine 12.5 MG tablet    ANTIVERT    30 tablet    Take 1-2 tablets (12.5-25 mg) by mouth 4 times daily as needed for dizziness    Dizziness       mirtazapine 15 MG tablet    REMERON    30 tablet    Take 1 tablet (15 mg) by mouth At Bedtime Change from Paxil (paroxetine)    PTSD (post-traumatic stress disorder)       simvastatin 40 MG tablet    ZOCOR    90 tablet    Take 1 tablet (40 mg) by mouth At Bedtime    Hyperlipidemia with target LDL less than 130

## 2018-06-14 LAB
ALBUMIN SERPL-MCNC: 3.3 G/DL (ref 3.4–5)
ANION GAP SERPL CALCULATED.3IONS-SCNC: 10 MMOL/L (ref 3–14)
BASOPHILS # BLD AUTO: 0 10E9/L (ref 0–0.2)
BASOPHILS NFR BLD AUTO: 0.2 %
BUN SERPL-MCNC: 57 MG/DL (ref 7–30)
CALCIUM SERPL-MCNC: 9.2 MG/DL (ref 8.5–10.1)
CHLORIDE SERPL-SCNC: 105 MMOL/L (ref 94–109)
CO2 SERPL-SCNC: 23 MMOL/L (ref 20–32)
CREAT SERPL-MCNC: 2.94 MG/DL (ref 0.66–1.25)
DIFFERENTIAL METHOD BLD: ABNORMAL
EOSINOPHIL # BLD AUTO: 0.2 10E9/L (ref 0–0.7)
EOSINOPHIL NFR BLD AUTO: 3.4 %
ERYTHROCYTE [DISTWIDTH] IN BLOOD BY AUTOMATED COUNT: 13.3 % (ref 10–15)
GFR SERPL CREATININE-BSD FRML MDRD: 22 ML/MIN/1.7M2
GLUCOSE SERPL-MCNC: 98 MG/DL (ref 70–99)
HCT VFR BLD AUTO: 35.2 % (ref 40–53)
HGB BLD-MCNC: 12 G/DL (ref 13.3–17.7)
LYMPHOCYTES # BLD AUTO: 1.9 10E9/L (ref 0.8–5.3)
LYMPHOCYTES NFR BLD AUTO: 32.9 %
MCH RBC QN AUTO: 28.2 PG (ref 26.5–33)
MCHC RBC AUTO-ENTMCNC: 34.1 G/DL (ref 31.5–36.5)
MCV RBC AUTO: 83 FL (ref 78–100)
MONOCYTES # BLD AUTO: 0.5 10E9/L (ref 0–1.3)
MONOCYTES NFR BLD AUTO: 9.6 %
NEUTROPHILS # BLD AUTO: 3 10E9/L (ref 1.6–8.3)
NEUTROPHILS NFR BLD AUTO: 53.9 %
PHOSPHATE SERPL-MCNC: 4.4 MG/DL (ref 2.5–4.5)
PLATELET # BLD AUTO: 253 10E9/L (ref 150–450)
POTASSIUM SERPL-SCNC: 4.7 MMOL/L (ref 3.4–5.3)
RBC # BLD AUTO: 4.25 10E12/L (ref 4.4–5.9)
SODIUM SERPL-SCNC: 138 MMOL/L (ref 133–144)
WBC # BLD AUTO: 5.6 10E9/L (ref 4–11)

## 2018-06-15 ENCOUNTER — OFFICE VISIT (OUTPATIENT)
Dept: PSYCHIATRY | Facility: CLINIC | Age: 58
End: 2018-06-15
Payer: COMMERCIAL

## 2018-06-15 VITALS
TEMPERATURE: 97.4 F | SYSTOLIC BLOOD PRESSURE: 120 MMHG | BODY MASS INDEX: 24.67 KG/M2 | WEIGHT: 154 LBS | OXYGEN SATURATION: 98 % | DIASTOLIC BLOOD PRESSURE: 70 MMHG | RESPIRATION RATE: 20 BRPM | HEART RATE: 90 BPM

## 2018-06-15 DIAGNOSIS — F43.10 PTSD (POST-TRAUMATIC STRESS DISORDER): ICD-10-CM

## 2018-06-15 LAB
CREAT UR-MCNC: 87 MG/DL
MICROALBUMIN UR-MCNC: 309 MG/L
MICROALBUMIN/CREAT UR: 353.55 MG/G CR (ref 0–17)

## 2018-06-15 PROCEDURE — 99214 OFFICE O/P EST MOD 30 MIN: CPT | Performed by: NURSE PRACTITIONER

## 2018-06-15 RX ORDER — MIRTAZAPINE 30 MG/1
30 TABLET, FILM COATED ORAL AT BEDTIME
Qty: 30 TABLET | Refills: 2 | Status: SHIPPED | OUTPATIENT
Start: 2018-06-15 | End: 2018-09-11

## 2018-06-15 RX ORDER — AMLODIPINE BESYLATE 10 MG/1
10 TABLET ORAL DAILY
Qty: 90 TABLET | Refills: 3 | COMMUNITY
Start: 2018-06-15 | End: 2019-03-15

## 2018-06-15 RX ORDER — METOPROLOL SUCCINATE 25 MG/1
25 TABLET, EXTENDED RELEASE ORAL DAILY
Qty: 90 TABLET | Refills: 1 | COMMUNITY
Start: 2018-06-15 | End: 2018-12-14

## 2018-06-15 ASSESSMENT — ANXIETY QUESTIONNAIRES
GAD7 TOTAL SCORE: 15
GAD7 TOTAL SCORE: 15
2. NOT BEING ABLE TO STOP OR CONTROL WORRYING: NEARLY EVERY DAY
3. WORRYING TOO MUCH ABOUT DIFFERENT THINGS: MORE THAN HALF THE DAYS
GAD7 TOTAL SCORE: 15
4. TROUBLE RELAXING: SEVERAL DAYS
1. FEELING NERVOUS, ANXIOUS, OR ON EDGE: NEARLY EVERY DAY
5. BEING SO RESTLESS THAT IT IS HARD TO SIT STILL: NEARLY EVERY DAY
7. FEELING AFRAID AS IF SOMETHING AWFUL MIGHT HAPPEN: MORE THAN HALF THE DAYS
6. BECOMING EASILY ANNOYED OR IRRITABLE: SEVERAL DAYS
7. FEELING AFRAID AS IF SOMETHING AWFUL MIGHT HAPPEN: MORE THAN HALF THE DAYS

## 2018-06-15 ASSESSMENT — PATIENT HEALTH QUESTIONNAIRE - PHQ9
SUM OF ALL RESPONSES TO PHQ QUESTIONS 1-9: 24
SUM OF ALL RESPONSES TO PHQ QUESTIONS 1-9: 24
10. IF YOU CHECKED OFF ANY PROBLEMS, HOW DIFFICULT HAVE THESE PROBLEMS MADE IT FOR YOU TO DO YOUR WORK, TAKE CARE OF THINGS AT HOME, OR GET ALONG WITH OTHER PEOPLE: EXTREMELY DIFFICULT

## 2018-06-15 NOTE — PROGRESS NOTES
"    Outpatient Psychiatric Progress Note    Name: Gunner Velázquez   : 1960                    Primary Care Provider: Tim Howard Jr, MD - last visit 2018  Therapist: None  Nephrologist- last visit April, next visit in August  Urologist - last visit April     PHQ-9 scores:  PHQ-9 SCORE 2018 5/3/2018 6/15/2018   Total Score 20 25 24       MOE-7 scores:  MOE-7 SCORE 2018 5/3/2018 6/15/2018   Total Score 21 21 15     Answers for HPI/ROS submitted by the patient on 6/15/2018   If you checked off any problems, how difficult have these problems made it for you to do your work, take care of things at home, or get along with other people?: Extremely difficult    Patient Identification:  Patient is a 58 year old year old,    Laotian male  who presents for return visit with me.  Patient is currently unemployed. Patient attended the session with - Tino , who they agreed to have interview with. Patient prefers to be called: \"Gunner\".    Interim History:    I last saw Gunner Velázquez for outpatient psychiatry Consultation on 5/3/2018.     During that appointment, we Stop Desyrel/Olepto (trazodone).     Start Remeron (mirtazapine) 15 mg by mouth daily at bedtime for sleep, mood, anxiety.     Reduce Paxil (paroxetine) to 15 mg by mouth daily at bedtime for 2 weeks then stop.     Continue Buspar (buspirone) 30 mg by mouth daily in AM and PM.     Current medications include:   Current Outpatient Prescriptions   Medication Sig     BusPIRone HCl 30 MG TABS Take 1 tablet by mouth 2 times daily     cholecalciferol (VITAMIN D3) 5000 UNITS CAPS capsule Take 1 per day     fexofenadine (ALLEGRA) 180 MG tablet Take 1 tablet (180 mg) by mouth daily     fish oil-omega-3 fatty acids 1000 MG capsule Take 1 capsule (1 g) by mouth 2 times daily     lisinopril-hydrochlorothiazide (PRINZIDE/ZESTORETIC) 20-12.5 MG per tablet Take 2 tablets by mouth daily     meclizine (ANTIVERT) 12.5 MG tablet Take " "1-2 tablets (12.5-25 mg) by mouth 4 times daily as needed for dizziness     mirtazapine (REMERON) 15 MG tablet Take 1 tablet (15 mg) by mouth At Bedtime Change from Paxil (paroxetine)     simvastatin (ZOCOR) 40 MG tablet Take 1 tablet (40 mg) by mouth At Bedtime     No current facility-administered medications for this visit.        The [place] Prescription Monitoring Program has been reviewed and there are no concerns about diversionary activity for controlled substances at this time.  No data for controlled substances over the last one year.     I was able to review most recent Primary Care Provider, specialty provider, and therapy visit notes that I have access to.     Gunner Lopezagustin reports mood has been: \"feel about the same- still feeling down and sad\" no hypomania or roxanne. No anger or aggression. Some episodes of irritability related to stressors- a few times per day- short lived. No psychosis noted or reported.   Focus and concentration has been a struggle at times. Memory has been \"still forgetful sometimes\".  Family has not noticed many changes in patient recently.   Anxiety has been: \"come and go\"  Sleep has been: \"more- some days can sleep during the day and some days sleep during the night\" sleeping more than usual. Nightmares are better. Appetite has been better.   PHQ9 and GAD7 scores were reviewed today. Depression score is still elevated.   Medication side effects: Denies  Current stressors include: Symptoms, Medical Comorbidities  Coping mechanisms and supports include: Self help, Family    Previous medication trials include but not limited to:  Buspar (buspirone) 30 mg BID  Paxil (paroxetine) 30 mg   Desyrel/Olepto (trazodone) 50 -150 mg   Celexa (citalopram)   Zoloft (sertraline)   Remeron (mirtazapine)     Past Medical History:   Diagnosis Date     CKD (chronic kidney disease)      Dizziness      Dyslipidemia      HTN (hypertension)      Hyperlipidaemia      Shortness of breath       has a " past medical history of CKD (chronic kidney disease); Dizziness; Dyslipidemia; HTN (hypertension); Hyperlipidaemia; and Shortness of breath.    Social History:  Current Living situation: [place], [place] with Family. Feels safe at home.  Current use of drugs or alcohol: Denies   Tobacco use: History of cigarette use- over 10 years ago quit using  Caffeine:  No    Vital Signs:   /70 (BP Location: Right arm, Patient Position: Chair, Cuff Size: Adult Regular)  Pulse 90  Temp 97.4  F (36.3  C) (Oral)  Resp 20  Wt 154 lb (69.9 kg)  SpO2 98%  BMI 24.67 kg/m2    Labs:  Most recent laboratory results reviewed and the pertinent results include:   Office Visit on 06/13/2018   Component Date Value Ref Range Status     WBC 06/13/2018 5.6  4.0 - 11.0 10e9/L Final     RBC Count 06/13/2018 4.25* 4.4 - 5.9 10e12/L Final     Hemoglobin 06/13/2018 12.0* 13.3 - 17.7 g/dL Final     Hematocrit 06/13/2018 35.2* 40.0 - 53.0 % Final     MCV 06/13/2018 83  78 - 100 fl Final     MCH 06/13/2018 28.2  26.5 - 33.0 pg Final     MCHC 06/13/2018 34.1  31.5 - 36.5 g/dL Final     RDW 06/13/2018 13.3  10.0 - 15.0 % Final     Platelet Count 06/13/2018 253  150 - 450 10e9/L Final     Diff Method 06/13/2018 Automated Method   Final     % Neutrophils 06/13/2018 53.9  % Final     % Lymphocytes 06/13/2018 32.9  % Final     % Monocytes 06/13/2018 9.6  % Final     % Eosinophils 06/13/2018 3.4  % Final     % Basophils 06/13/2018 0.2  % Final     Absolute Neutrophil 06/13/2018 3.0  1.6 - 8.3 10e9/L Final     Absolute Lymphocytes 06/13/2018 1.9  0.8 - 5.3 10e9/L Final     Absolute Monocytes 06/13/2018 0.5  0.0 - 1.3 10e9/L Final     Absolute Eosinophils 06/13/2018 0.2  0.0 - 0.7 10e9/L Final     Absolute Basophils 06/13/2018 0.0  0.0 - 0.2 10e9/L Final     Creatinine Urine 06/13/2018 87  mg/dL Final     Albumin Urine mg/L 06/13/2018 PENDING  mg/L Incomplete     Albumin Urine mg/g Cr 06/13/2018 PENDING  0 - 17 mg/g Cr Incomplete     Sodium 06/13/2018 138   133 - 144 mmol/L Final     Potassium 06/13/2018 4.7  3.4 - 5.3 mmol/L Final     Chloride 06/13/2018 105  94 - 109 mmol/L Final     Carbon Dioxide 06/13/2018 23  20 - 32 mmol/L Final     Anion Gap 06/13/2018 10  3 - 14 mmol/L Final     Glucose 06/13/2018 98  70 - 99 mg/dL Final     Urea Nitrogen 06/13/2018 57* 7 - 30 mg/dL Final     Creatinine 06/13/2018 2.94* 0.66 - 1.25 mg/dL Final     GFR Estimate 06/13/2018 22* >60 mL/min/1.7m2 Final    Non  GFR Calc     GFR Estimate If Black 06/13/2018 27* >60 mL/min/1.7m2 Final    African American GFR Calc     Calcium 06/13/2018 9.2  8.5 - 10.1 mg/dL Final     Phosphorus 06/13/2018 4.4  2.5 - 4.5 mg/dL Final     Albumin 06/13/2018 3.3* 3.4 - 5.0 g/dL Final       Review of Systems:  10 systems (general, cardiovascular, respiratory, eyes, ENT, endocrine, GI, , M/S, neurological) were reviewed. Most pertinent finding(s) is/are: chronic kidney disease, heart problems, hypertension, headaches about 2 times per week alleviated by home remedies, dizziness for the last 3-4 years still 2-3 times per day- no falls, some low back pain after eating spicy or salty foods, no blood in urine, no dry mouth. The remaining systems are all unremarkable.    Mental Status Examination:  Appearance:  awake, alert, adequately groomed, on time, and appeared stated age  Attitude:  cooperative   Eye Contact:  fair and has reading glasses  Gait and Station: Normal, No assistive Devices used, dizziness and most recent fall last year  Psychomotor Behavior:  no evidence of tardive dyskinesia, dystonia, or tics  Oriented to:  time, person, and place  Attention Span and Concentration:  Adequate  Speech:  speaks Red and uses   Mood:  anxious and depressed  Affect:  mood congruent and intensity is blunted, brighter than last visit  Associations:  no loose associations  Thought Process:  logical, linear and goal oriented  Thought Content:  no evidence of suicidal ideation or homicidal  ideation and no evidence of psychotic thought  Recent and Remote Memory:  Short term memory concerns. Continues to be forgetful. Denies long term memory concerns. Not formally assessed. No amnesia.  Fund of Knowledge: appropriate  Insight:  intact  Judgment:  intact and adequate for safety  Impulse Control:  intact     Suicide Risk Assessment:  Today Gunner Velázquez reports still struggling with depression and low mood. Reports that he has felt that life is not worth living, but denies that he wants to kill self and denies a plan. Still worries often about his health. In addition, there are notable risk factors for self-harm, including age, anxiety, comorbid medical condition of kidney disease and withdrawing. However, risk is mitigated by commitment to family, sobriety, absence of past attempts, ability to volunteer a safety plan, history of seeking help when needed, future oriented, no access to firearms or weapons, denies suicidal intent or plan, no family history of suicide and denies homicidal ideation, intent, or plan gets hope from family and reports he would be able to reach out to them or his doctor if needed in the future if he had thoughts he would harm himself. Therefore, based on all available evidence including the factors cited above, Gunner Velázquez does not appear to be at imminent risk for self-harm, does not meet criteria for a 72-hr hold, and therefore remains appropriate for ongoing outpatient level of care.  A thorough assessment of risk factors related to suicide and self-harm have been reviewed and are noted above. The patient convincingly denies acute suicidality on several occasions. Local community safety resources reviewed and printed for patient to use if needed. There was no deceit detected, and the patient presented in a manner that was believable.      DSM5  Diagnosis:  296.33 (F33.2) Major Depressive Disorder, Recurrent Episode, Severe   300.02 (F41.1) Generalized Anxiety  Disorder  309.81 (F43.10) Posttraumatic Stress Disorder Without dissociative symptoms    Medical comorbidities include:   Patient Active Problem List    Diagnosis Date Noted     Anxiety 06/13/2018     Priority: Medium     PTSD (post-traumatic stress disorder) 05/03/2018     Priority: Medium     Severe depression (H) 05/31/2016     Priority: Medium     High triglycerides 08/11/2015     Priority: Medium     Seasonal allergies 05/04/2015     Priority: Medium     MOE (generalized anxiety disorder) 08/01/2014     Priority: Medium     GERD (gastroesophageal reflux disease) 08/01/2014     Priority: Medium     Dizziness 08/01/2014     Priority: Medium     Thought secondary to anxiety.  Workup with cardiology, ENT, audiology, [facility], physical therapy, MRI, MRA all negative       Hyperlipidemia with target LDL less than 130 02/21/2014     Priority: Medium     HTN, goal below 140/90 02/21/2014     Priority: Medium     CKD (chronic kidney disease) stage 3, GFR 30-59 ml/min 02/21/2014     Priority: Medium       Psychosocial & Contextual Factors:  Occupational Difficulties, Financial Difficulties and Medical Comorbidites    Assessment:  Gunner Velázquez reports ongoing depression and anxiety.   It has been only one month on new medications.   Recommend more time for adequate trial and dosing of medications.   Another option may be an SNRI.   Medication side effects and alternatives were reviewed.   Health promotion activities recommended and reviewed today.   Symptoms continue to affect his functioning at home and has not been able to work for years.   All questions addressed. Education and counseling completed regarding risks and benefits of medications and psychotherapy options.  Continue to monitor memory.   Patient has chronic kidney disease stage 3 (most recent lab results indicate stage 4) so will need to closely monitor with medication changes. Most recent lab results showed diminished kidney  functioning. Continue to monitor.   Recommend therapy for additional support. PCP has referred for this through [facility]. Dr. Oconnor in [place].   Reviewed this again with patient as he has not scheduled yet.   May need to add Minipress (prazosin) for nightmares related to Post-traumatic stress disorder (PTSD).    There is room to increase the Remeron (mirtazapine) dosing.   Primary Care Provider has recently completed [facility] paperwork.   Patient is in the process of applying for disability due to his condition and he should continue to work with those legal specialists and disability assessors.   Reviewed I will be out on medical leave starting in a few weeks and reviewed the Collaborative Care Psychiatry Service model.   Discussed return to Primary Care Provider or referral to long term community psychiatry.   Primary Care Provider has been working with the patient after Patient initially saw psychiatry for consult in 2016.    Patient would likely benefit from working with a community psychiatry provider to continue to work on reducing symptoms that patient has been struggling with for years. Patient is hesitant to establish with another provider today. Will refer back to Primary Care Provider.     Treatment Plan:    Increase Remeron (mirtazapine) to 30 mg by mouth daily at bedtime for sleep, mood, anxiety. May consider dose increase to 45 mg daily if needed.     Continue Buspar (buspirone) 30 mg by mouth daily in AM and PM for anxiety.     Continue all other medications as reviewed per electronic medical record today.     Safety plan reviewed. To the Emergency Department as needed or call after hours crisis line at [phone] or [phone]. [place] Crisis Text Line. Text [place] to 232157 or Suicide LifeLine Chat: suicidepreventionlifeline.org/chat    To schedule individual therapy, call [facility] at [phone]. Dr. Howard recommends Dr. Faye Oconnor at the [facility].   Thank you for  our work together in the Psychiatry Collaborative Care Model at [facility]. This is our last visit and I am returning your care back to your Primary Care Provider Tim Howard Jr, MD . If you are not doing well, please contact your Primary Care Provider office.     Follow up with primary care provider as planned in July or for acute medical concerns.    Administrative Billing:   Time spent with patient and  Tino was 30 minutes and greater than 50% of time or 30 minutes was spent in counseling and coordination of care regarding above diagnoses and treatment plan. Interactive complexity due to communicating with patient using  services.     Patient Status:  The patient is being returned to the referring provider for ongoing care and medication prescribing.  The patient can be referred back to this service for further consultation as needed.    Signed:   Promise Aranda, PhD, APRN, CNP   Psychiatry

## 2018-06-15 NOTE — MR AVS SNAPSHOT
After Visit Summary   6/15/2018    Gunner Velázquez    MRN: [id number]           Patient Information     Date Of Birth          1960        Visit Information        Provider Department      6/15/2018 12:30 PM Promise Aranda NP; [facility]        Today's Diagnoses     PTSD (post-traumatic stress disorder)          Care Instructions    Treatment Plan:    Increase Remeron (mirtazapine) to 30 mg by mouth daily at bedtime for sleep, mood, anxiety. May consider dose increase if needed.     Continue Buspar (buspirone) 30 mg by mouth daily in AM and PM for anxiety.     Continue all other medications as reviewed per electronic medical record today.     Safety plan reviewed. To the Emergency Department as needed or call after hours crisis line at [phone] or [phone]. [place] Crisis Text Line. Text [place] to 931179 or Suicide LifeLine Chat: suicidepreventionMobissimoline.org/chat    To schedule individual therapy, call [facility] at [phone]. Dr. Howard recommends Dr. Faye Oconnor at the [facility].   Thank you for our work together in the Psychiatry Collaborative Care Model at [facility]. This is our last visit and I am returning your care back to your Primary Care Provider Tim Howard Jr, MD . If you are not doing well, please contact your Primary Care Provider office.     Follow up with primary care provider as planned in July or for acute medical concerns.          Follow-ups after your visit        Your next 10 appointments already scheduled     Jul 23, 2018  1:20 PM CDT   [facility] Jr., [facility]    [address]  [facility] [id number] [phone]            Dec 12, 2018  1:20 PM CST   SHORT with Tim Howard Jr., MD   [facility]    [address]  [facility] [place] [id number] [phone]              Who to  contact     If you have questions or need follow up information about today's clinic visit or your schedule please contact [facility] directly at [phone].  Normal or non-critical lab and imaging results will be communicated to you by MyChart, letter or phone within 4 business days after the clinic has received the results. If you do not hear from us within 7 days, please contact the clinic through MyChart or phone. If you have a critical or abnormal lab result, we will notify you by phone as soon as possible.  Submit refill requests through [employer] or call your pharmacy and they will forward the refill request to us. Please allow 3 business days for your refill to be completed.          Additional Information About Your Visit        Care EveryWhere ID     This is your Care EveryWhere ID. This could be used by other organizations to access your [facility] medical records  [id number]        Your Vitals Were     Pulse Temperature Respirations Pulse Oximetry BMI (Body Mass Index)       90 97.4  F (36.3  C) (Oral) 20 98% 24.67 kg/m2        Blood Pressure from Last 3 Encounters:   06/15/18 120/70   06/13/18 115/75   05/03/18 148/90    Weight from Last 3 Encounters:   06/15/18 154 lb (69.9 kg)   06/13/18 152 lb 9.6 oz (69.2 kg)   05/03/18 153 lb (69.4 kg)              Today, you had the following     No orders found for display         Today's Medication Changes          These changes are accurate as of 6/15/18  1:19 PM.  If you have any questions, ask your nurse or doctor.               These medicines have changed or have updated prescriptions.        Dose/Directions    mirtazapine 30 MG tablet   Commonly known as:  REMERON   This may have changed:    - medication strength  - how much to take  - additional instructions   Used for:  PTSD (post-traumatic stress disorder)   Changed by:  Promise Aranda NP        Dose:  30 mg   Take 1 tablet (30 mg) by mouth At Bedtime Increased dose.   Quantity:  30  tablet   Refills:  2            Where to get your medicines      These medications were sent to [id number] - [place], [place] - [address] [facility]  [address] [facility], [place] [address]     Phone:  [phone]     mirtazapine 30 MG tablet                Primary Care Provider Office Phone # Fax #    Tim Howard Jr., [phone] [address]  [place] [address]        Equal Access to Services     [facility]: Hadii aad ku hadasho Soomaali, waaxda luqadaha, qaybta kaalmada adeegyada, waxay idiin hayaan adeeg kharash la'mary annn . So [facility] [phone].    ATENCIÓN: Si habla español, tiene a sung disposición servicios gratuitos de asistencia lingüística. [facility] [phone].    We comply with applicable federal civil rights laws and [place] laws. We do not discriminate on the basis of race, color, national origin, age, disability, sex, sexual orientation, or gender identity.            Thank you!     Thank you for choosing [facility]  for your care. Our goal is always to provide you with excellent care. Hearing back from our patients is one way we can continue to improve our services. Please take a few minutes to complete the written survey that you may receive in the mail after your visit with us. Thank you!             Your Updated Medication List - Protect others around you: Learn how to safely use, store and throw away your medicines at www.disposemymeds.org.          This list is accurate as of 6/15/18  1:19 PM.  Always use your most recent med list.                   Brand Name Dispense Instructions for use Diagnosis    BusPIRone HCl 30 MG Tabs     180 tablet    Take 1 tablet by mouth 2 times daily    Anxiety       cholecalciferol 5000 units Caps capsule    vitamin D3    100 capsule    Take 1 per day    Major depressive disorder, single episode, moderate (H)       fexofenadine 180 MG tablet    ALLEGRA    90 tablet    Take 1 tablet (180 mg) by mouth daily     Seasonal allergic rhinitis, unspecified allergic rhinitis trigger       fish oil-omega-3 fatty acids 1000 MG capsule     180 capsule    Take 1 capsule (1 g) by mouth 2 times daily    High triglycerides       lisinopril-hydrochlorothiazide 20-12.5 MG per tablet    PRINZIDE/ZESTORETIC    180 tablet    Take 2 tablets by mouth daily    Essential hypertension with goal blood pressure less than 140/90, CKD (chronic kidney disease) stage 3, GFR 30-59 ml/min       meclizine 12.5 MG tablet    ANTIVERT    30 tablet    Take 1-2 tablets (12.5-25 mg) by mouth 4 times daily as needed for dizziness    Dizziness       mirtazapine 30 MG tablet    REMERON    30 tablet    Take 1 tablet (30 mg) by mouth At Bedtime Increased dose.    PTSD (post-traumatic stress disorder)       simvastatin 40 MG tablet    ZOCOR    90 tablet    Take 1 tablet (40 mg) by mouth At Bedtime    Hyperlipidemia with target LDL less than 130

## 2018-06-15 NOTE — PROGRESS NOTES
Please send the following letter:    Mr. Velázquez,    -Kidney function (GFR) is decreased.  ADVISE: Follow up with Dr. Arenas  -Sodium is normal.  -Potassium is normal.  -Glucose (diabetic screening test) is normal.  -Hemoglobin is decreased indicating anemia.  Anemia can cause fatigue and, occasionally, light-headedness.  ADVISE: I think this may be due to your kidney disease, Gunner.  I advise you follow up with Dr. Arenas.  -White blood cell and platelet counts are normal.  -Microalbumin (urine protein) level is elevated. This is suggestive of early damage to your kidneys from high blood pressure.  ADVISE: avoiding anti-inflamatory agents such as ibuprofen (Advil, Motrin) or naproxen (Aleve) as much as possible, keeping your blood pressure in a normal range, and continuing your medication that helps protect your kidneys.  Recheck in 1 year.    If you have further questions about the interpretation of your labs, labtestsonline.org is a good website to check out for further information.      Please contact the clinic if you have additional questions.  Thank you.    Sincerely,    Tim Howard MD

## 2018-06-15 NOTE — NURSING NOTE
"Chief Complaint   Patient presents with     RECHECK     pt here with , still having anxiety/depression symptoms come and go, not much change per pt.      initial /70 (BP Location: Right arm, Patient Position: Chair, Cuff Size: Adult Regular)  Pulse 90  Temp 97.4  F (36.3  C) (Oral)  Resp 20  Wt 154 lb (69.9 kg)  SpO2 98%  BMI 24.67 kg/m2 Estimated body mass index is 24.67 kg/(m^2) as calculated from the following:    Height as of 5/3/18: 5' 6.25\" (1.683 m).    Weight as of this encounter: 154 lb (69.9 kg)..  bp completed using cuff size regular    "

## 2018-06-15 NOTE — PATIENT INSTRUCTIONS
Treatment Plan:    Increase Remeron (mirtazapine) to 30 mg by mouth daily at bedtime for sleep, mood, anxiety. May consider dose increase if needed.     Continue Buspar (buspirone) 30 mg by mouth daily in AM and PM for anxiety.     Continue all other medications as reviewed per electronic medical record today.     Safety plan reviewed. To the Emergency Department as needed or call after hours crisis line at [phone] or [phone]. [place] Crisis Text Line. Text [place] to 253601 or Suicide LifeLine Chat: suicidepreventionlifeline.org/chat    To schedule individual therapy, call [facility] at [phone]. Dr. Howard recommends Dr. Faye Oconnor at the [facility].   Thank you for our work together in the Psychiatry Collaborative Care Model at [facility]. This is our last visit and I am returning your care back to your Primary Care Provider Tim Howard Jr, MD . If you are not doing well, please contact your Primary Care Provider office.     Follow up with primary care provider as planned in July or for acute medical concerns.

## 2018-06-16 ASSESSMENT — ANXIETY QUESTIONNAIRES: GAD7 TOTAL SCORE: 15

## 2018-06-16 ASSESSMENT — PATIENT HEALTH QUESTIONNAIRE - PHQ9: SUM OF ALL RESPONSES TO PHQ QUESTIONS 1-9: 24

## 2018-07-23 DIAGNOSIS — R42 DIZZINESS: ICD-10-CM

## 2018-07-23 NOTE — TELEPHONE ENCOUNTER
"Requested Prescriptions   Pending Prescriptions Disp Refills     meclizine (ANTIVERT) 12.5 MG tablet  Last Written Prescription Date:  3/19/2018  Last Fill Quantity: 30 tablet,  # refills: 3   Last office visit: 6/13/2018 with prescribing provider:  Lee   Future Office Visit:       30 tablet 3     Sig: Take 1-2 tablets (12.5-25 mg) by mouth 4 times daily as needed for dizziness     Antivertigo/Antiemetic Agents Passed    7/23/2018 10:23 AM       Passed - Recent (12 mo) or future (30 days) visit within the authorizing provider's specialty    Patient had office visit in the last 12 months or has a visit in the next 30 days with authorizing provider or within the authorizing provider's specialty.  See \"Patient Info\" tab in inbasket, or \"Choose Columns\" in Meds & Orders section of the refill encounter.           Passed - Patient is 18 years of age or older          "

## 2018-07-25 RX ORDER — MECLIZINE HCL 12.5 MG 12.5 MG/1
12.5-25 TABLET ORAL 4 TIMES DAILY PRN
Qty: 30 TABLET | Refills: 3 | Status: SHIPPED | OUTPATIENT
Start: 2018-07-25 | End: 2018-12-03

## 2018-07-25 NOTE — TELEPHONE ENCOUNTER
Prescription approved per [facility] RN Refill Protocol. Peggy Tijerina R.N.

## 2018-07-27 ENCOUNTER — OFFICE VISIT (OUTPATIENT)
Dept: BEHAVIORAL HEALTH | Facility: CLINIC | Age: 58
End: 2018-07-27
Payer: COMMERCIAL

## 2018-07-27 DIAGNOSIS — F34.1 DYSTHYMIA: ICD-10-CM

## 2018-07-27 DIAGNOSIS — F41.1 GAD (GENERALIZED ANXIETY DISORDER): Primary | ICD-10-CM

## 2018-07-27 PROCEDURE — 90837 PSYTX W PT 60 MINUTES: CPT | Performed by: COUNSELOR

## 2018-07-27 NOTE — Clinical Note
Nithin Howard-  My name is Corrina Farooq and I am a new therapist with [facility]. Today I saw Gunner for an initial therapy intake. Gunner presented with symptoms of depression. We created a safety plan in session due to him score a 1 on the PHQ9, #9. I encouraged him to make a follow up appointment with his . I look forward to collaborating with you on this client and any in the future. Thank you for the referral!

## 2018-07-27 NOTE — MR AVS SNAPSHOT
"                  MRN:[id number]                      After Visit Summary   7/27/2018    Gunner Velázquez    MRN: [id number]           Visit Information        Provider Department      7/27/2018 1:00 PM Corrina Farooq [facility]; [employer] [facility] Service [facility] Generic      Your next 10 appointments already scheduled     Dec 12, 2018  1:20 PM CST   SHORT with Tim Howard Jr., MD   [facility]    [address]  [facility] [place], [place] [id number] [phone]              Care Instructions                                               Gunner Velázquez     SAFETY PLAN:  Step 1: Warning signs / cues (Thoughts, images, mood, situation, behavior) that a crisis may be developing:    Thoughts: \"I don't know how to kill myself but I just want to die\"\"I want to die because I have lost hope\" \"I cannot work or help my family\" \" I have no income\"    Images: obsessive thoughts of death or dying: rumination    Thinking Processes: ruminations (can't stop thinking about my problems): family, money, work, mood, racing thoughts, intrusive thoughts (bothersome, unwanted thoughts that come out of nowhere): wanting to die and highly critical and negative thoughts: negative thought patterns    Mood: worsening depression, hopelessness, helplessness, intense anger and intense worry    Behaviors: isolating/withdrawing , can't stop crying, not taking care of myself and not taking care of my responsibilities    Situations: trauma , financial stress and medical condition / diagnosis: kidney disease   Step 2: Coping strategies - Things I can do to take my mind off of my problems without contacting another person (relaxation technique, physical activity):    Distress Tolerance Strategies:  listen to positive and upbeat music: ., watch a funny movie: . and talking with my wife and daughter    Physical Activities: go for a walk, meditation, deep breathing and stretching " "    Focus on helpful thoughts:  \"I have a supportive family\" \"I have three young kids that need me\"   Step 3: People and social settings that provide distraction:   Name: Wife Phone: [phone]   Name: Daughter Phone:    Name: Daughter Phone:     park and Holiness   Step 4: Remind myself of people and things that are important to me and worth living for:  I want to live for my family, my wife and daughters, 23 month old son.       Step 5: When I am in crisis, I can ask these people to help me use my safety plan:   Name: Wife Phone: [phone]   Name: Daughter Phone:    Name: Mauricio Phone:   Step 6: Making the environment safe:     remove alcohol, remove drugs, secure medications: put them somewhere high  and be around others  Step 7: Professionals or agencies I can contact during a crisis:    [facility] Daytime and After Hours Crisis Number: [phone]    Suicide Prevention Lifeline: 0-632-853-TALK (4577)    Crisis Text Line Service (available 24 hours a day, 7 days a week): Text [place] to 875560  [facility] Crisis Services: [phone]    Call 911 or go to my nearest emergency department.   I helped develop this safety plan and agree to use it when needed.  I have been given a copy of this plan.      Client signature _________________________________________________________________  Today s date:  7/27/2018  Adapted from Safety Plan Template 2008 Laura Jacobson and Eb Ureña is reprinted with the express permission of the authors.  No portion of the Safety Plan Template may be reproduced without the express, written permission.  You can contact the authors at [email or web address] or [email or web address].                                                         Gunner Lopezuangchanh     SAFETY PLAN:  Step 1: Warning signs / cues (Thoughts, images, mood, situation, behavior) that a crisis may be developing:    Thoughts: \"I don't know how to kill myself but I just want to die\"\"I want to die " "because I have lost hope\" \"I cannot work or help my family\" \" I have no income\"    Images: obsessive thoughts of death or dying: rumination    Thinking Processes: ruminations (can't stop thinking about my problems): family, money, work, mood, racing thoughts, intrusive thoughts (bothersome, unwanted thoughts that come out of nowhere): wanting to die and highly critical and negative thoughts: negative thought patterns    Mood: worsening depression, hopelessness, helplessness, intense anger and intense worry    Behaviors: isolating/withdrawing , can't stop crying, not taking care of myself and not taking care of my responsibilities    Situations: trauma , financial stress and medical condition / diagnosis: kidney disease   Step 2: Coping strategies - Things I can do to take my mind off of my problems without contacting another person (relaxation technique, physical activity):    Distress Tolerance Strategies:  listen to positive and upbeat music: ., watch a funny movie: . and talking with my wife and daughter    Physical Activities: go for a walk, meditation, deep breathing and stretching     Focus on helpful thoughts:  \"I have a supportive family\" \"I have three young kids that need me\"   Step 3: People and social settings that provide distraction:   Name: Wife Phone: [phone]   Name: Daughter Phone:    Name: Daughter Phone:     park and Scientologist   Step 4: Remind myself of people and things that are important to me and worth living for:  I want to live for my family, my wife and daughters, 23 month old son.       Step 5: When I am in crisis, I can ask these people to help me use my safety plan:   Name: Wife Phone: [phone]   Name: Daughter Phone:    Name: Mariana Phone:   Step 6: Making the environment safe:     remove alcohol, remove drugs, secure medications: put them somewhere high  and be around others  Step 7: Professionals or agencies I can contact during a crisis:    [facility] Daytime " and After Hours Crisis Number: [phone]    Suicide Prevention Lifeline: 7-285-982-TALK (6931)    Crisis Text Line Service (available 24 hours a day, 7 days a week): Text [place] to [address]  [facility] Crisis Services: [phone]    Call 911 or go to my nearest emergency department.   I helped develop this safety plan and agree to use it when needed.  I have been given a copy of this plan.      Client signature _________________________________________________________________  Today s date:  7/27/2018  Adapted from Safety Plan Template 2008 Laura Jacobson and Eb Ureña is reprinted with the express permission of the authors.  No portion of the Safety Plan Template may be reproduced without the express, written permission.  You can contact the authors at [email or web address] or [email or web address].             Care EveryWhere ID     This is your Care EveryWhere ID. This could be used by other organizations to access your [place] medical records  [id number]        Equal Access to Services     MOUNIKA WEEKS : Enzo Paz, christiana willett, rosa maria chapman, britni godinez. So [facility] [phone].    ATENCIÓN: Si habla español, tiene a sung disposición servicios gratuitos de asistencia lingüística. Llame al [phone].    We comply with applicable federal civil rights laws and [place] laws. We do not discriminate on the basis of race, color, national origin, age, disability, sex, sexual orientation, or gender identity.

## 2018-07-27 NOTE — PATIENT INSTRUCTIONS
"                                           Gunner Lopezuangchanh     SAFETY PLAN:  Step 1: Warning signs / cues (Thoughts, images, mood, situation, behavior) that a crisis may be developing:    Thoughts: \"I don't know how to kill myself but I just want to die\"\"I want to die because I have lost hope\" \"I cannot work or help my family\" \" I have no income\"    Images: obsessive thoughts of death or dying: rumination    Thinking Processes: ruminations (can't stop thinking about my problems): family, money, work, mood, racing thoughts, intrusive thoughts (bothersome, unwanted thoughts that come out of nowhere): wanting to die and highly critical and negative thoughts: negative thought patterns    Mood: worsening depression, hopelessness, helplessness, intense anger and intense worry    Behaviors: isolating/withdrawing , can't stop crying, not taking care of myself and not taking care of my responsibilities    Situations: trauma , financial stress and medical condition / diagnosis: kidney disease   Step 2: Coping strategies - Things I can do to take my mind off of my problems without contacting another person (relaxation technique, physical activity):    Distress Tolerance Strategies:  listen to positive and upbeat music: ., watch a funny movie: . and talking with my wife and daughter    Physical Activities: go for a walk, meditation, deep breathing and stretching     Focus on helpful thoughts:  \"I have a supportive family\" \"I have three young kids that need me\"   Step 3: People and social settings that provide distraction:   Name: Wife Phone: [phone]   Name: Daughter Phone:    Name: Daughter Phone:     park and Confucianism   Step 4: Remind myself of people and things that are important to me and worth living for:  I want to live for my family, my wife and daughters, 23 month old son.       Step 5: When I am in crisis, I can ask these people to help me use my safety plan:   Name: Wife Phone: [phone]   Name: Daughter Phone: " "   Name: Mauricio Phone:   Step 6: Making the environment safe:     remove alcohol, remove drugs, secure medications: put them somewhere high  and be around others  Step 7: Professionals or agencies I can contact during a crisis:    [facility] Daytime and After Hours Crisis Number: [phone]    Suicide Prevention Lifeline: 3-514-599-TALK (4492)    Crisis Text Line Service (available 24 hours a day, 7 days a week): Text [place] to 348373  [facility] Crisis Services: [phone]    Call 911 or go to my nearest emergency department.   I helped develop this safety plan and agree to use it when needed.  I have been given a copy of this plan.      Client signature _________________________________________________________________  Today s date:  7/27/2018  Adapted from Safety Plan Template 2008 Laura Jacobson and Eb Ureña is reprinted with the express permission of the authors.  No portion of the Safety Plan Template may be reproduced without the express, written permission.  You can contact the authors at [email or web address] or [email or web address].                                                         Gunner Lopezuangchanh     SAFETY PLAN:  Step 1: Warning signs / cues (Thoughts, images, mood, situation, behavior) that a crisis may be developing:    Thoughts: \"I don't know how to kill myself but I just want to die\"\"I want to die because I have lost hope\" \"I cannot work or help my family\" \" I have no income\"    Images: obsessive thoughts of death or dying: rumination    Thinking Processes: ruminations (can't stop thinking about my problems): family, money, work, mood, racing thoughts, intrusive thoughts (bothersome, unwanted thoughts that come out of nowhere): wanting to die and highly critical and negative thoughts: negative thought patterns    Mood: worsening depression, hopelessness, helplessness, intense anger and intense worry    Behaviors: isolating/withdrawing , can't stop crying, not taking " "care of myself and not taking care of my responsibilities    Situations: trauma , financial stress and medical condition / diagnosis: kidney disease   Step 2: Coping strategies - Things I can do to take my mind off of my problems without contacting another person (relaxation technique, physical activity):    Distress Tolerance Strategies:  listen to positive and upbeat music: ., watch a funny movie: . and talking with my wife and daughter    Physical Activities: go for a walk, meditation, deep breathing and stretching     Focus on helpful thoughts:  \"I have a supportive family\" \"I have three young kids that need me\"   Step 3: People and social settings that provide distraction:   Name: Wife Phone: [phone]   Name: Daughter Phone:    Name: Daughter Phone:     park and Hoahaoism   Step 4: Remind myself of people and things that are important to me and worth living for:  I want to live for my family, my wife and daughters, 23 month old son.       Step 5: When I am in crisis, I can ask these people to help me use my safety plan:   Name: Wife Phone: [phone]   Name: Daughter Phone:    Name: Mauricio Phone:   Step 6: Making the environment safe:     remove alcohol, remove drugs, secure medications: put them somewhere high  and be around others  Step 7: Professionals or agencies I can contact during a crisis:    [facility] Daytime and After Hours Crisis Number: [phone]    Suicide Prevention Lifeline: 2-329-127-TALK (8255)    Crisis Text Line Service (available 24 hours a day, 7 days a week): Text [place] to 394906  Local Crisis Services: [phone]) [phone]    Call 911 or go to my nearest emergency department.   I helped develop this safety plan and agree to use it when needed.  I have been given a copy of this plan.      Client signature _________________________________________________________________  Today s date:  7/27/2018  Adapted from Safety Plan Template 2008 Laura Jacobson and Eb Ureña " is reprinted with the express permission of the authors.  No portion of the Safety Plan Template may be reproduced without the express, written permission.  You can contact the authors at [email or web address] or [email or web address].

## 2018-07-27 NOTE — PROGRESS NOTES
Progress Note - Initial Session    Client Name:  Gunner Velázquez Date: 7/27/2018           Service Type: Individual      Session Start Time: 1:30pm  Session End Time: 2:30 pm      Session Length: 53 - 60      Session #: 1     Attendees: Client and          Diagnostic Assessment in progress.  Unable to complete documentation at the conclusion of the first session due to needing to make a safety plan and interactive complexity of using an .       Mental Status Assessment:  Appearance:   Appropriate   Eye Contact:   Fair   Psychomotor Behavior: Retarded (Slowed)   Attitude:   Cooperative   Orientation:   All  Speech   Rate / Production: Normal    Volume:  Normal   Mood:    Depressed   Affect:    Flat   Thought Content:  Clear   Thought Form:  Coherent  Logical   Insight:    Poor       Safety Issues and Plan for Safety and Risk Management:  Client denies current fears or concerns for personal safety.  Client reports the following current or recent suicidal ideation or behaviors: thoughts of death but no plan to harm himself.  Client denies current or recent homicidal ideation or behaviors.  Client denies current or recent self injurious behavior or ideation.  Client denies other safety concerns.  A safety and risk management plan has been developed including: Client consented to co-developed safety plan.  [facility]'s safety and risk management plan was completed.  Client agreed to use safety plan should any safety concerns arise.  A copy was given to the patient.  Client reports there are no firearms in the house.      Diagnostic Criteria:  A. Excessive anxiety and worry about a number of events or activities (such as work or school performance).   B. The person finds it difficult to control the worry.  C. Select 3 or more symptoms (required for diagnosis). Only one item is required in children.   - Restlessness or feeling keyed up or on edge.    - Being easily fatigued.    - Difficulty  concentrating or mind going blank.    - Sleep disturbance (difficulty falling or staying asleep, or restless unsatisfying sleep).   D. The focus of the anxiety and worry is not confined to features of an Axis I disorder.  E. The anxiety, worry, or physical symptoms cause clinically significant distress or impairment in social, occupational, or other important areas of functioning.   F. The disturbance is not due to the direct physiological effects of a substance (e.g., a drug of abuse, a medication) or a general medical condition (e.g., hyperthyroidism) and does not occur exclusively during a Mood Disorder, a Psychotic Disorder, or a Pervasive Developmental Disorder.  A. Depressed mood for most of the day, for more days than not, as indicated either by subjective account or observation by others, for at least 2 years. Note: In children and adolescents, mood can be irritable and duration must be at least 1 year.   B. Presence, while depressed, of two (or more) of the following:        - poor appetite or overeating        - insomnia or hypersomnia       - low energy or fatigue        - low self-esteem        - poor concentration or difficulty making decisions        - feelings of hopelessness   C. During the 2-year period (1 year for children or adolescents) of the disturbance, the person has never been without the symptoms in Criteria A and B for more than 2 months at a time.  D. Criteria for a major depressive disorder may be continously present for 2 years  E. There has never been a Manic Episode, a Mixed Episode, or a Hypomanic Episode, and criteria have never been met for Cyclothymic Disorder.   F. The disturbance is not better explained by a persistent schizoaffective disorder, schizophrenia, delusional disorder, or other specified or unspecified schizophrenia spectrum and other psychotic disorder  G. The symptoms are not attributable to the physiological effects of a substance (e.g., a drug of abuse, a  medication) or another medical condition (e.g., hypothyroidism).   H. The symptoms cause clinically significant distress or impairment in social, occupational, or other important areas of functioning.        - Late Onset: if onset is age 21 years or older         DSM5 Diagnoses: (Sustained by DSM5 Criteria Listed Above)  Diagnoses: 300.4 (F34.1) Persistent Depressive Disorder, Late onset, With persistent major depressive episode and Severe  300.02 (F41.1) Generalized Anxiety Disorder  Psychosocial & Contextual Factors: health problems that have lead to anxiety and depression  WHODAS 2.0 (12 item)To be completed next session               Collateral Reports Completed:  Routed note to PCP      PLAN: (Homework, other):  Client stated that he may follow up for ongoing services with [facility].  Client will call to make a follow up appointment with his  as he would like to have her again.       Corrina Farooq, [facility] 7/27/2018

## 2018-07-30 ASSESSMENT — ANXIETY QUESTIONNAIRES
5. BEING SO RESTLESS THAT IT IS HARD TO SIT STILL: SEVERAL DAYS
6. BECOMING EASILY ANNOYED OR IRRITABLE: SEVERAL DAYS
7. FEELING AFRAID AS IF SOMETHING AWFUL MIGHT HAPPEN: SEVERAL DAYS
1. FEELING NERVOUS, ANXIOUS, OR ON EDGE: SEVERAL DAYS
3. WORRYING TOO MUCH ABOUT DIFFERENT THINGS: NEARLY EVERY DAY
2. NOT BEING ABLE TO STOP OR CONTROL WORRYING: SEVERAL DAYS
IF YOU CHECKED OFF ANY PROBLEMS ON THIS QUESTIONNAIRE, HOW DIFFICULT HAVE THESE PROBLEMS MADE IT FOR YOU TO DO YOUR WORK, TAKE CARE OF THINGS AT HOME, OR GET ALONG WITH OTHER PEOPLE: SOMEWHAT DIFFICULT
GAD7 TOTAL SCORE: 11

## 2018-07-30 ASSESSMENT — PATIENT HEALTH QUESTIONNAIRE - PHQ9: 5. POOR APPETITE OR OVEREATING: NEARLY EVERY DAY

## 2018-07-31 ASSESSMENT — PATIENT HEALTH QUESTIONNAIRE - PHQ9: SUM OF ALL RESPONSES TO PHQ QUESTIONS 1-9: 19

## 2018-07-31 ASSESSMENT — ANXIETY QUESTIONNAIRES: GAD7 TOTAL SCORE: 11

## 2018-08-15 DIAGNOSIS — F41.9 ANXIETY: ICD-10-CM

## 2018-08-15 RX ORDER — BUSPIRONE HYDROCHLORIDE 30 MG/1
TABLET ORAL 2 TIMES DAILY
Status: CANCELLED | OUTPATIENT
Start: 2018-08-15

## 2018-08-15 NOTE — TELEPHONE ENCOUNTER
Epic notes reviewed from last OV--to continue on this medication.     Routing refill request to provider for review/approval because:  Medication contraindicated based on patient information warning comes up.     Peggy Tijerina R.N.

## 2018-08-15 NOTE — TELEPHONE ENCOUNTER
"Requested Prescriptions   Pending Prescriptions Disp Refills     BusPIRone HCl 30 MG TABS  Last Written Prescription Date:  1/29/2018  Last Fill Quantity: 180 tablet,  # refills: 1   Last office visit: 6/13/2018 with prescribing provider:  Lee     Future Office Visit:   Next 5 appointments (look out 90 days)     Aug 17, 2018  2:15 PM CDT   Return Visit with Corrina Farooq, [facility], [employer]   [facility] ([facility])    [address]  [facility] [id number] [phone]                   PHQ-9 SCORE 5/3/2018 6/15/2018 7/30/2018   Total Score - - -   Total Score MyChart - 24 (Severe depression) -   Total Score 25 24 19     MOE-7 SCORE 5/3/2018 6/15/2018 7/30/2018   Total Score - - -   Total Score - 15 (severe anxiety) -   Total Score 21 15 11            Sig: Take by mouth 2 times daily    Atypical Antidepressants Protocol Passed    8/15/2018  9:18 AM       Passed - Recent (12 mo) or future (30 days) visit within the authorizing provider's specialty    Patient had office visit in the last 12 months or has a visit in the next 30 days with authorizing provider or within the authorizing provider's specialty.  See \"Patient Info\" tab in inbasket, or \"Choose Columns\" in Meds & Orders section of the refill encounter.           Passed - Patient is age 18 or older          "

## 2018-08-16 NOTE — TELEPHONE ENCOUNTER
Acute High Blood Pressure: Care Instructions  Your Care Instructions    Acute high blood pressure is very high blood pressure. It's a serious problem. Very high blood pressure can damage your brain, heart, eyes, and kidneys. You may have been given medicines to lower your blood pressure. You may have gotten them as pills or through a needle in one of your veins. This is called an IV. And maybe you were given other medicines too. These can be needed when high blood pressure causes other problems. To keep your blood pressure at a lower level, you may need to make healthy lifestyle changes. And you will probably need to take medicines. Be sure to follow up with your doctor about your blood pressure and what you can do about it. Follow-up care is a key part of your treatment and safety. Be sure to make and go to all appointments, and call your doctor if you are having problems. It's also a good idea to know your test results and keep a list of the medicines you take. How can you care for yourself at home? · See your doctor as often as he or she recommends. This is to make sure your blood pressure is under control. You will probably go at least 2 times a year. But it may be more often at first.  · Take your blood pressure medicine exactly as prescribed. You may take one or more types. They include diuretics, beta-blockers, ACE inhibitors, calcium channel blockers, and angiotensin II receptor blockers. Call your doctor if you think you are having a problem with your medicine. · If you take blood pressure medicine, talk to your doctor before you take decongestants or anti-inflammatory medicine, such as ibuprofen. These can raise blood pressure. · Learn how to check your blood pressure at home. Check it often. · Ask your doctor if it's okay to drink alcohol. · Talk to your doctor about lifestyle changes that can help blood pressure. These include being active and not smoking.   When should you call for Attempted to call  services to have Sami  assist with calling patient. No phone  was available at this time. Will need to attempt again at a later time.  Corrina Nayak RN, BSN  [facility]   help?  Call 911 anytime you think you may need emergency care. This may mean having symptoms that suggest that your blood pressure is causing a serious heart or blood vessel problem. Your blood pressure may be over 180/110. ? For example, call 911 if:  ? · You have symptoms of a heart attack. These may include:  ¨ Chest pain or pressure, or a strange feeling in the chest.  ¨ Sweating. ¨ Shortness of breath. ¨ Nausea or vomiting. ¨ Pain, pressure, or a strange feeling in the back, neck, jaw, or upper belly or in one or both shoulders or arms. ¨ Lightheadedness or sudden weakness. ¨ A fast or irregular heartbeat. ? · You have symptoms of a stroke. These may include:  ¨ Sudden numbness, tingling, weakness, or loss of movement in your face, arm, or leg, especially on only one side of your body. ¨ Sudden vision changes. ¨ Sudden trouble speaking. ¨ Sudden confusion or trouble understanding simple statements. ¨ Sudden problems with walking or balance. ¨ A sudden, severe headache that is different from past headaches. ? · You have severe back or belly pain. ?Do not wait until your blood pressure comes down on its own. Get help right away. ?Call your doctor now or seek immediate care if:  ? · Your blood pressure is much higher than normal (such as 180/110 or higher), but you don't have symptoms. ? · You think high blood pressure is causing symptoms, such as:  ¨ Severe headache. ¨ Blurry vision. ? Watch closely for changes in your health, and be sure to contact your doctor if:  ? · Your blood pressure measures 140/90 or higher at least 2 times. That means the top number is 140 or higher or the bottom number is 90 or higher, or both. ? · You think you may be having side effects from your blood pressure medicine. ? · Your blood pressure is usually normal, but it goes above normal at least 2 times. Where can you learn more? Go to http://cira-radha.info/.   Enter O749 in the search box to learn more about \"Acute High Blood Pressure: Care Instructions. \"  Current as of: September 21, 2016  Content Version: 11.4  © 4564-5778 [employer], [employer]. Care instructions adapted under license by [employer] (which disclaims liability or warranty for this information). If you have questions about a medical condition or this instruction, always ask your healthcare professional. [place] [address] any warranty or liability for your use of this information.

## 2018-08-16 NOTE — TELEPHONE ENCOUNTER
"Buspirone is contraindicated at any dose in a patient with \"severe\" renal insufficiency.  Gunner has stage 4 CKD and therefore falls into this category.  I will remove buspirone from his medications; please instruct him to stop taking this.  There is no need to taper off this medication - he can simply stop taking it.    Tim Howard MD'    "

## 2018-08-17 ENCOUNTER — OFFICE VISIT (OUTPATIENT)
Dept: BEHAVIORAL HEALTH | Facility: CLINIC | Age: 58
End: 2018-08-17
Payer: COMMERCIAL

## 2018-08-17 DIAGNOSIS — F43.10 PTSD (POST-TRAUMATIC STRESS DISORDER): Primary | ICD-10-CM

## 2018-08-17 DIAGNOSIS — F41.1 GAD (GENERALIZED ANXIETY DISORDER): ICD-10-CM

## 2018-08-17 DIAGNOSIS — F32.2 SEVERE DEPRESSION (H): ICD-10-CM

## 2018-08-17 PROCEDURE — 90791 PSYCH DIAGNOSTIC EVALUATION: CPT | Performed by: COUNSELOR

## 2018-08-17 NOTE — MR AVS SNAPSHOT
MRN:[id number]                      After Visit Summary   8/17/2018    Gunner Velázquez    MRN: [id number]           Visit Information        Provider Department      8/17/2018 2:15 PM [facility] [employer] [facility] Generic      Your next 10 appointments already scheduled     Sep 28, 2018 12:30 PM CDT   Return Visit with WHITNEY Matos   [facility] ([facility])    [address]  [facility] [id number] [phone]            Dec 12, 2018  1:20 PM CST   SHORT with Tim Howard Jr., MD   [facility]    [address]  [facility] [address] [phone]              Care EveryWhere ID     This is your Care EveryWhere ID. This could be used by other organizations to access your [place] medical records  [id number]        Equal Access to Services     MOUNIKA WEEKS AH: Hadii aad ku hadasho Soomaali, waaxda luqadaha, qaybta kaalmada adeegyada, waxay idiin haymary annn yara godinez. So [facility] [phone].    ATENCIÓN: Si habla español, tiene a sung disposición servicios gratuitos de asistencia lingüística. Llame al [phone].    We comply with applicable federal civil rights laws and [place] laws. We do not discriminate on the basis of race, color, national origin, age, disability, sex, sexual orientation, or gender identity.
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
## 2018-08-20 NOTE — TELEPHONE ENCOUNTER
[facility] was able to get a hold of patient along with [facility] . I spoke with patient and advised him to stop the Buspar due to his kidney function.    Patient verbalized understanding and agrees with plan.    Will call back if further questions or concerns.    Corrina Nayak RN, BSN

## 2018-08-21 ENCOUNTER — PATIENT OUTREACH (OUTPATIENT)
Dept: CARE COORDINATION | Facility: CLINIC | Age: 58
End: 2018-08-21

## 2018-08-21 ASSESSMENT — ACTIVITIES OF DAILY LIVING (ADL): DEPENDENT_IADLS:: TRANSPORTATION

## 2018-08-21 NOTE — PROGRESS NOTES
Clinic Care Coordination Contact    Clinic Care Coordination Contact  OUTREACH    Referral Information:  Referral Source: Behavioral Health Clinician - WHITNEY Lee. Request to assist patient with applying for disability.     Primary Diagnosis: Behavioral Health - Anxiety, PTSD, Severe Depression    Chief Complaint   Patient presents with     Clinic Care Coordination - Initial     Resource Management       Universal Utilization: Patient established at [facility] for psychiatry services with Promise Aranda NP, at [facility] [place] for counseling services with WHITNEY Lee, at [place] [facility] with Dr. Kathleen Arenas, and at [place] [facility] with DAVE Barber.   Difficulty keeping appointments:: No  Utilization    Last refreshed: 8/21/2018  8:07 AM:  No Show Count (past year) 0       Last refreshed: 8/21/2018  8:07 AM:  ED visits 0       Last refreshed: 8/21/2018  8:07 AM:  Hospital admissions 0          Current as of: 8/21/2018  8:07 AM           Clinical Concerns:  Current Medical and Behavioral Concerns:      Patient Active Problem List   Diagnosis     Hyperlipidemia with target LDL less than 130     HTN, goal below 140/90     CKD (chronic kidney disease) stage 4, GFR 15-29 ml/min (H)     MOE (generalized anxiety disorder)     GERD (gastroesophageal reflux disease)     Dizziness     Seasonal allergies     High triglycerides     Severe depression (H)     PTSD (post-traumatic stress disorder)     Anxiety     Education Provided to patient: None at this time.   Pain: No  Health Maintenance Reviewed: Due/Overdue     Medication Management:  Medications reviewed with the assistance of Corrina Nayak RN Triage during outreach with patient. Per Dr. Howard, patient to discontinue Buspirone. Patient verbalized understanding and no further concerns reported regarding medications.     Functional Status:  Dependent ADLs:: Independent  Dependent IADLs::  Transportation  Bed or wheelchair confined:: No  Mobility Status: Independent  Fallen 2 or more times in the past year?: No  Any fall with injury in the past year?: No    Living Situation:  Current living arrangement:: I live in a private home with family  Type of residence:: Private home - stairs    Diet/Exercise/Sleep:  Diet:: Regular  Inadequate nutrition: No  Food Insecurity: No  Tube Feeding: No  Inadequate activity/exercise: No  Significant changes in sleep pattern: No    Transportation:  Transportation concerns: Yes, patient stated that he does not have access to a vehicle. Patient requested information on how to get transportation. [employer] contact information provided to patient.   Transportation means:: Friend - patient stated that if an urgent matter arises, patient can utilize a friend if they are available at the time of need.     Psychosocial:  Jewish or spiritual beliefs that impact treatment:: No  Mental health DX:: Yes - Anxiety, PTSD, Severe Depression  Mental health DX how managed:: Medication, Outpatient Counseling, Psychiatrist (SEE ABOVE UNIVERSAL UTILIZATION)  Mental health management concern: No  Informal Support system:: Family  Financial/Insurance: BLUE [employer] [place]   Financial/Insurance concerns: Yes, patient attempted to apply for disability x 2 and was denied. Due to patient's mental health diagnoses, patient feels that he is unable to work due to stress. During outreach, patient also reported that he has concerns regarding his finances and would like assistance with them.      Resources and Interventions:  Current Resources: Patient established with psychiatry and counseling services through [facility].    Community Resources: None - Patient reported that he previously was receiving services through [facility]. Patient was unable to explain why services were discontinued.   Supplies used at home:: None  Equipment Currently Used at Home: none  Advanced Care  Plans/Directives on file:: No    Referrals Placed: [facility] to assist with referral to [facility].     Future Appointments              In 1 month Corrina Farooq, [facility]    In 3 months Vinnie Brown MD [place], [place]        Plan: Patient plans to appeal denial from SSD. Patient to contact his  and provide [facility] contact information to obtain necessary medical records for disability determinations. During outreach, patient reports need for assistance with finances, disability, home care, transportation, and medication assistance. Discussed referral to [facility] for assistance. Patient expressed confusion regarding referral process and requested [facility] assist with referral. Patient speaks Bahamian only and would need assistance from an  if self-referral to have been completed. [facility] to outreach to patient in 6-8 business days.     Patient/Caregiver understanding: Patient reports understanding of Clinic Care Coordination consult and denies any further concerns. Patient has [facility] contact information should any questions arise prior to next outreach.    PAYTON Young   Clinic Care Coordinator  [phone]  [email or web address]

## 2018-08-29 ENCOUNTER — TELEPHONE (OUTPATIENT)
Dept: BEHAVIORAL HEALTH | Facility: CLINIC | Age: 58
End: 2018-08-29

## 2018-08-29 NOTE — TELEPHONE ENCOUNTER
Provider used  service return clients calls. Client left two VM while provider was away on vacation. Client asked about medication in the Vm. Provider let client know I cannot prescribe medication. Client reported he is feeling very anxious and cannot cope. Provider encouraged client to present to ER if necessary and discuss meds with PCP. Client agreed.

## 2018-09-04 ENCOUNTER — OFFICE VISIT (OUTPATIENT)
Dept: BEHAVIORAL HEALTH | Facility: CLINIC | Age: 58
End: 2018-09-04
Payer: COMMERCIAL

## 2018-09-04 DIAGNOSIS — F34.1 DYSTHYMIA: Primary | ICD-10-CM

## 2018-09-04 DIAGNOSIS — F43.10 PTSD (POST-TRAUMATIC STRESS DISORDER): ICD-10-CM

## 2018-09-04 DIAGNOSIS — F41.1 GAD (GENERALIZED ANXIETY DISORDER): ICD-10-CM

## 2018-09-04 PROCEDURE — 90834 PSYTX W PT 45 MINUTES: CPT | Performed by: COUNSELOR

## 2018-09-04 ASSESSMENT — ANXIETY QUESTIONNAIRES
7. FEELING AFRAID AS IF SOMETHING AWFUL MIGHT HAPPEN: NEARLY EVERY DAY
2. NOT BEING ABLE TO STOP OR CONTROL WORRYING: NEARLY EVERY DAY
6. BECOMING EASILY ANNOYED OR IRRITABLE: NEARLY EVERY DAY
3. WORRYING TOO MUCH ABOUT DIFFERENT THINGS: NEARLY EVERY DAY
1. FEELING NERVOUS, ANXIOUS, OR ON EDGE: MORE THAN HALF THE DAYS
GAD7 TOTAL SCORE: 17
5. BEING SO RESTLESS THAT IT IS HARD TO SIT STILL: SEVERAL DAYS
IF YOU CHECKED OFF ANY PROBLEMS ON THIS QUESTIONNAIRE, HOW DIFFICULT HAVE THESE PROBLEMS MADE IT FOR YOU TO DO YOUR WORK, TAKE CARE OF THINGS AT HOME, OR GET ALONG WITH OTHER PEOPLE: EXTREMELY DIFFICULT

## 2018-09-04 ASSESSMENT — PATIENT HEALTH QUESTIONNAIRE - PHQ9: 5. POOR APPETITE OR OVEREATING: MORE THAN HALF THE DAYS

## 2018-09-04 NOTE — Clinical Note
Nithin Houser-  Today I had an appointment with Gunner. He reported being asked to discontinue use of his medications via Dr. Howard but the client was unsure why. Client reported his  would be appealing his denial of social security.

## 2018-09-04 NOTE — Clinical Note
Nithin Brown-  Today I saw Gunner for a therapy session. He stated he will be switching to you for primary care. Gunner was asked to stop taking his psychiatric medications via his current PCP but the client is unsure the reasoning. Client is in need of new psychiatric prescriptions and attempted to make an appointment with the psychiatric nurse he has seen in the past but they stated he needed a referral. Talk therapy is a slow progression due to language barriers. Client appears to have trouble navigating the health system due to this as well. I look forward to collaborating with you on this client to help meet his needs. Thank you!

## 2018-09-04 NOTE — MR AVS SNAPSHOT
MRN:[id number]                      After Visit Summary   9/4/2018    Gunner Velázquez    MRN: [id number]           Visit Information        Provider Department      9/4/2018 1:15 PM Corrina Farooq LPCC; [employer] [facility] Generic      Your next 10 appointments already scheduled     Sep 11, 2018  1:25 PM CDT   [facility] Brown MD   [facility]    [address]  [facility] [address]   [phone]            Sep 28, 2018 12:15 PM CDT   Return Visit with WHITNEY Matos   [facility] ([facility]    [address]  [facility] [address]   [phone]            Dec 14, 2018  1:25 PM CST   Office Visit with Vinnie Brown MD   [facility]    [address]  [facility] [address]   [phone]           Bring a current list of meds and any records pertaining to this visit. For Physicals, please bring immunization records and any forms needing to be filled out. Please arrive 10 minutes early to complete paperwork.              Care EveryWhere ID     This is your Care EveryWhere ID. This could be used by other organizations to access your [place] medical records  [id number]        Equal Access to Services     MOUNIKA WEEKS AH: Hadii susan ku hadasho Soomaali, waaxda luqadaha, qaybta kaalmada yarayada, britni godinez. So [facility] [phone].    ATENCIÓN: Si habla español, tiene a sung disposición servicios gratuitos de asistencia lingüística. Carlitos al [phone].    We comply with applicable federal civil rights laws and [place] laws. We do not discriminate on the basis of race, color, national origin, age, disability, sex, sexual orientation, or gender identity.

## 2018-09-04 NOTE — PROGRESS NOTES
Progress Note    Client Name: Gunner Whytegcdonald  Date: 9/4/2018           Service Type: Individual      Session Start Time: 1:30pm  Session End Time: 2:20 pm      Session Length: 50 mins      Session #: 2     Attendees: Client and     PHQ-9 / MOE-7 : PHQ9=21 and GAD7=17     DATA           Current / Ongoing Stressors and Concerns:   Finances, family, social security, severity of mental health, lack of resources and trouble navigating health system        Intervention:   CBT: thought labeling and CBT triangle         ASSESSMENT: Current Emotional / Mental Status (status of significant symptoms):   Risk status (Self / Other harm or suicidal ideation)   Client denies current fears or concerns for personal safety.   Client reports the following current or recent suicidal ideation or behaviors: thoughts of death.   Client denies current or recent homicidal ideation or behaviors.   Client denies current or recent self injurious behavior or ideation.   Client denies other safety concerns.   Client Client reports there has been no change in risk factors since their last session.     Client Client reports there has been no change in protective factors since their last session.     A safety and risk management plan has been developed including: Client consented to co-developed safety plan.  [facility]'s safety and risk management plan was completed.  Client agreed to use safety plan should any safety concerns arise.  A copy was given to the patient.     Appearance:   Appropriate  Disheveled    Eye Contact:   Fair    Psychomotor Behavior: Retarded (Slowed)    Attitude:   Cooperative    Orientation:   All   Speech    Rate / Production: Monotone     Volume:  Normal    Mood:    Depressed    Affect:    Flat    Thought Content:  Perservative  Rumination    Thought Form:  Circumstantial   Insight:    Poor      Medication Review:   Changes to psychiatric medications, see updated  Medication List in EPIC.  Unsure of the reason for discontinue      Medication Compliance:   Yes     Changes in Health Issues:   Yes: kidneys, Associated Psychological Distress     Chemical Use Review:   Substance Use: Chemical use reviewed, no active concerns identified      Tobacco Use: No current tobacco use.       Collateral Reports Completed:   Routed note to Care Team Member(s)  Routed note to PCP    PLAN: (Client Tasks / Therapist Tasks / Other)  Client reported he was taken off of all of his medications and put on something new. Client reported he is not sure why this happened. Client reported that he feels he needs medication. Client reported that his depression and anxiety are worse without the medication. Provider encouraged client to bring this up in his upcoming visit with a new PCP. Client reported his  will be appealing the social security and disability claim since it was denied a second time. Client reported he has been having a lot of nightmares related to trauma he experienced in [place]. Client reported that he attempted to call and make an appointment with his established psychiatrist but they stated he needs a referral from is PCP. Provider educated client on CBT triangle and how our thoughts are connected to our emotions and behaviors. Provider educated client on labeling thoughts helpful vs unhelpful to help him become more aware of thought patterns. Provider educated client on using deep breathing for pain and anxiety. Provider modeled deep breathing for client and then practiced together in session. Provider assigned client to practice thought labeling and breathing between sessions.         Corrina Farooq, [facility] 9/4/2018

## 2018-09-06 ASSESSMENT — PATIENT HEALTH QUESTIONNAIRE - PHQ9: SUM OF ALL RESPONSES TO PHQ QUESTIONS 1-9: 21

## 2018-09-06 ASSESSMENT — ANXIETY QUESTIONNAIRES: GAD7 TOTAL SCORE: 17

## 2018-09-09 DIAGNOSIS — F43.10 PTSD (POST-TRAUMATIC STRESS DISORDER): ICD-10-CM

## 2018-09-10 NOTE — TELEPHONE ENCOUNTER
Patient was returned to referring provider for ongoing medication management after appointment on 06.15.2018.  Will forward to PCP to review and address refill.

## 2018-09-11 ENCOUNTER — OFFICE VISIT (OUTPATIENT)
Dept: INTERNAL MEDICINE | Facility: CLINIC | Age: 58
End: 2018-09-11
Payer: COMMERCIAL

## 2018-09-11 VITALS
BODY MASS INDEX: 24.11 KG/M2 | WEIGHT: 150 LBS | HEART RATE: 72 BPM | TEMPERATURE: 97.7 F | SYSTOLIC BLOOD PRESSURE: 114 MMHG | HEIGHT: 66 IN | DIASTOLIC BLOOD PRESSURE: 64 MMHG | OXYGEN SATURATION: 100 %

## 2018-09-11 DIAGNOSIS — I10 HTN, GOAL BELOW 140/90: ICD-10-CM

## 2018-09-11 DIAGNOSIS — E78.5 HYPERLIPIDEMIA WITH TARGET LDL LESS THAN 130: ICD-10-CM

## 2018-09-11 DIAGNOSIS — F43.10 PTSD (POST-TRAUMATIC STRESS DISORDER): ICD-10-CM

## 2018-09-11 DIAGNOSIS — Z23 NEED FOR PROPHYLACTIC VACCINATION AND INOCULATION AGAINST INFLUENZA: ICD-10-CM

## 2018-09-11 DIAGNOSIS — Z12.5 SCREENING FOR PROSTATE CANCER: Primary | ICD-10-CM

## 2018-09-11 DIAGNOSIS — F51.01 PRIMARY INSOMNIA: ICD-10-CM

## 2018-09-11 DIAGNOSIS — F33.41 RECURRENT MAJOR DEPRESSIVE DISORDER, IN PARTIAL REMISSION (H): ICD-10-CM

## 2018-09-11 DIAGNOSIS — R30.0 DYSURIA: ICD-10-CM

## 2018-09-11 DIAGNOSIS — N18.4 CKD (CHRONIC KIDNEY DISEASE) STAGE 4, GFR 15-29 ML/MIN (H): ICD-10-CM

## 2018-09-11 LAB
ALBUMIN UR-MCNC: ABNORMAL MG/DL
APPEARANCE UR: CLEAR
BILIRUB UR QL STRIP: NEGATIVE
COLOR UR AUTO: YELLOW
GLUCOSE UR STRIP-MCNC: NEGATIVE MG/DL
HGB UR QL STRIP: ABNORMAL
KETONES UR STRIP-MCNC: NEGATIVE MG/DL
LEUKOCYTE ESTERASE UR QL STRIP: NEGATIVE
NITRATE UR QL: NEGATIVE
PH UR STRIP: 6 PH (ref 5–7)
RBC #/AREA URNS AUTO: NORMAL /HPF
SOURCE: ABNORMAL
SP GR UR STRIP: <=1.005 (ref 1–1.03)
UROBILINOGEN UR STRIP-ACNC: 0.2 EU/DL (ref 0.2–1)
WBC #/AREA URNS AUTO: NORMAL /HPF

## 2018-09-11 PROCEDURE — 90686 IIV4 VACC NO PRSV 0.5 ML IM: CPT | Performed by: INTERNAL MEDICINE

## 2018-09-11 PROCEDURE — 81001 URINALYSIS AUTO W/SCOPE: CPT | Performed by: INTERNAL MEDICINE

## 2018-09-11 PROCEDURE — G0103 PSA SCREENING: HCPCS | Performed by: INTERNAL MEDICINE

## 2018-09-11 PROCEDURE — 90471 IMMUNIZATION ADMIN: CPT | Performed by: INTERNAL MEDICINE

## 2018-09-11 PROCEDURE — 99214 OFFICE O/P EST MOD 30 MIN: CPT | Mod: 25 | Performed by: INTERNAL MEDICINE

## 2018-09-11 RX ORDER — TRAZODONE HYDROCHLORIDE 50 MG/1
50 TABLET, FILM COATED ORAL
Qty: 30 TABLET | Refills: 1 | Status: SHIPPED | OUTPATIENT
Start: 2018-09-11 | End: 2018-10-31

## 2018-09-11 RX ORDER — MIRTAZAPINE 30 MG/1
TABLET, FILM COATED ORAL
Qty: 90 TABLET | Refills: 1 | Status: SHIPPED | OUTPATIENT
Start: 2018-09-11 | End: 2018-10-31

## 2018-09-11 RX ORDER — MIRTAZAPINE 30 MG/1
30 TABLET, FILM COATED ORAL AT BEDTIME
Qty: 30 TABLET | Refills: 3 | Status: SHIPPED | OUTPATIENT
Start: 2018-09-11 | End: 2018-10-31

## 2018-09-11 NOTE — LETTER
September 13, 2018      Gunner Velázquez  [address] [place] [address]        Dear ,    We are writing to inform you of your test results.    Normal results.    Resulted Orders   Prostate spec antigen screen   Result Value Ref Range    PSA 0.40 0 - 4 ug/L      Comment:      Assay Method:  Chemiluminescence using Siemens Vista analyzer   **UA reflex to Microscopic FUTURE anytime   Result Value Ref Range    Color Urine Yellow     Appearance Urine Clear     Glucose Urine Negative NEG^Negative mg/dL    Bilirubin Urine Negative NEG^Negative    Ketones Urine Negative NEG^Negative mg/dL    Specific Gravity Urine <=1.005 1.003 - 1.035    Blood Urine Trace (A) NEG^Negative    pH Urine 6.0 5.0 - 7.0 pH    Protein Albumin Urine Trace (A) NEG^Negative mg/dL    Urobilinogen Urine 0.2 0.2 - 1.0 EU/dL    Nitrite Urine Negative NEG^Negative    Leukocyte Esterase Urine Negative NEG^Negative    Source Midstream Urine    Urine Microscopic   Result Value Ref Range    WBC Urine 0 - 5 OTO5^0 - 5 /HPF    RBC Urine O - 2 OTO2^O - 2 /HPF       If you have any questions or concerns, please call the clinic at the number listed above.       Sincerely,        Vinnie Brown MD

## 2018-09-11 NOTE — TELEPHONE ENCOUNTER
Chart reviewed.  Rx sent to pt's preferred pharmacy.    [id number] - [place], [place] - [address] [address]    Tim Howard MD

## 2018-09-11 NOTE — PROGRESS NOTES
SUBJECTIVE:   Gunner Velázquez is a 58 year old male who presents to clinic today for the following health issues:      Depression/anxiety attacks, insomnia:    Has h/o anxiety and depression. 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 Has paranoia, stress, insomnia, night chery. On medical treatment, not well controlled, no side effects. Treatment with Paxil was stopped because of worsening kidney function. Also stopped Trazone for insomnia. Has significant anxiety, irritability, hostile mood and depressive symptoms , no suicidal ideation. Seeing counseling. Has seen psychiatry in the past, needs follow up.     Has h/o CRF. Progressive to stage 4.  Symptoms include fatigue. Monitoring BP, BG, medications, avoiding OTC NSAIDs. Needs periodic recheck of kidney function. Seen by nephrology.   Has h/o HTN. on medical treatment. BP has been controlled. No side effects from medications. No CP, HA, dizziness. good compliance with medications and low salt diet.  Has H/O hyperlipidemia. On medical treatment and diet. No side effects. No muscle weakness, myalgias or upset stomach.     Discussed preventive measures.           Problem list and histories reviewed & adjusted, as indicated.  Additional history: as documented    Patient Active Problem List   Diagnosis     Hyperlipidemia with target LDL less than 130     HTN, goal below 140/90     CKD (chronic kidney disease) stage 4, GFR 15-29 ml/min (H)     MOE (generalized anxiety disorder)     GERD (gastroesophageal reflux disease)     Dizziness     Seasonal allergies     High triglycerides     Severe depression (H)     PTSD (post-traumatic stress disorder)     Anxiety     History reviewed. No pertinent surgical history.    Social History   Substance Use Topics     Smoking status: Former Smoker     Smokeless tobacco: Never Used     Alcohol use No     Family History   Problem Relation Age of Onset     HEART DISEASE Father 70     tb     Family History Negative No family hx of      "     Current Outpatient Prescriptions   Medication Sig Dispense Refill     ACE/ARB/ARNI NOT PRESCRIBED, INTENTIONAL, Please choose reason not prescribed, below       amLODIPine (NORVASC) 10 MG tablet Take 1 tablet (10 mg) by mouth daily 90 tablet 3     cholecalciferol (VITAMIN D3) 5000 UNITS CAPS capsule Take 1 per day 100 capsule 1     fexofenadine (ALLEGRA) 180 MG tablet Take 1 tablet (180 mg) by mouth daily 90 tablet 3     fish oil-omega-3 fatty acids 1000 MG capsule Take 1 capsule (1 g) by mouth 2 times daily 180 capsule 3     meclizine (ANTIVERT) 12.5 MG tablet Take 1-2 tablets (12.5-25 mg) by mouth 4 times daily as needed for dizziness 30 tablet 3     metoprolol succinate (TOPROL-XL) 25 MG 24 hr tablet Take 1 tablet (25 mg) by mouth daily 90 tablet 1     mirtazapine (REMERON) 30 MG tablet Take 1 tablet (30 mg) by mouth At Bedtime Increased dose. 30 tablet 3     simvastatin (ZOCOR) 40 MG tablet Take 1 tablet (40 mg) by mouth At Bedtime 90 tablet 3     traZODone (DESYREL) 50 MG tablet Take 1 tablet (50 mg) by mouth nightly as needed for sleep 30 tablet 1     mirtazapine (REMERON) 30 MG tablet Take 1 tablet (30 mg) by mouth At Bedtime 90 tablet 1     [DISCONTINUED] mirtazapine (REMERON) 30 MG tablet Take 1 tablet (30 mg) by mouth At Bedtime Increased dose. 30 tablet 2       Reviewed and updated as needed this visit by clinical staff  Tobacco  Allergies  Meds  Med Hx  Surg Hx  Fam Hx  Soc Hx      Reviewed and updated as needed this visit by Provider         ROS:  Constitutional, HEENT, cardiovascular, pulmonary, GI, , musculoskeletal, neuro, skin, endocrine and psych systems are negative, except as otherwise noted.    OBJECTIVE:     /64  Pulse 72  Temp 97.7  F (36.5  C) (Oral)  Ht 5' 6.25\" (1.683 m)  Wt 150 lb (68 kg)  SpO2 100%  BMI 24.03 kg/m2  Body mass index is 24.03 kg/(m^2).   GENERAL: healthy, alert and no distress  EYES: Eyes grossly normal to inspection, PERRL and conjunctivae and " sclerae normal  HENT: ear canals and TM's normal, nose and mouth without ulcers or lesions  NECK: no adenopathy, no asymmetry, masses, or scars and thyroid normal to palpation  RESP: lungs clear to auscultation - no rales, rhonchi or wheezes  CV: regular rate and rhythm, normal S1 S2, no S3 or S4, no murmur, click or rub, no peripheral edema and peripheral pulses strong  ABDOMEN: soft, nontender, no hepatosplenomegaly, no masses and bowel sounds normal  MS: no gross musculoskeletal defects noted, no edema  SKIN: no suspicious lesions or rashes  NEURO: Normal strength and tone, mentation intact and speech normal  PSYCH: mentation appears normal, affect withdrawn , lack of facial expression     Diagnostic Test Results:  none     ASSESSMENT/PLAN:     Problem List Items Addressed This Visit     Hyperlipidemia with target LDL less than 130    HTN, goal below 140/90    CKD (chronic kidney disease) stage 4, GFR 15-29 ml/min (H)    PTSD (post-traumatic stress disorder)    Relevant Medications    mirtazapine (REMERON) 30 MG tablet      Other Visit Diagnoses     Screening for prostate cancer    -  Primary    Relevant Orders    Prostate spec antigen screen (Completed)    **UA reflex to Microscopic FUTURE anytime (Completed)    Dysuria        Relevant Orders    **UA reflex to Microscopic FUTURE anytime (Completed)    Recurrent major depressive disorder, in partial remission (H)        Relevant Medications    mirtazapine (REMERON) 30 MG tablet    traZODone (DESYREL) 50 MG tablet    Other Relevant Orders    MENTAL HEALTH REFERRAL  - Adult; Psychiatry and Medication Management; Psychiatry; [facility]: [facility] [phone].  Medication management & future refills will be returned to [facility] PCP upon completion of evaluation; Donny shannon...    Primary insomnia        Relevant Medications    traZODone (DESYREL) 50 MG tablet    Need for prophylactic vaccination and inoculation against influenza        Relevant Orders     FLU VACCINE, SPLIT VIRUS, IM (QUADRIVALENT) [58768]- >3 YRS (Completed)    Vaccine Administration, Initial [86232] (Completed)           Assess lab work  Refer to psychiatry   Cont treatment   Monitor renal function   Start on low dose Trazodone for insomnia   Immunized     Follow-Up:in 3 months     Vinnie Brown MD  [facility]      Injectable Influenza Immunization Documentation    1.  Is the person to be vaccinated sick today?   No    2. Does the person to be vaccinated have an allergy to a component   of the vaccine?   No  Egg Allergy Algorithm Link    3. Has the person to be vaccinated ever had a serious reaction   to influenza vaccine in the past?   No    4. Has the person to be vaccinated ever had Guillain-Barré syndrome?   No    Form completed by Laura Oviedo MA             Injectable Influenza Immunization Documentation    1.  Is the person to be vaccinated sick today?   No    2. Does the person to be vaccinated have an allergy to a component   of the vaccine?   No  Egg Allergy Algorithm Link    3. Has the person to be vaccinated ever had a serious reaction   to influenza vaccine in the past?   No    4. Has the person to be vaccinated ever had Guillain-Barré syndrome?   No    Form completed by Vinnie Brown MD

## 2018-09-11 NOTE — NURSING NOTE
"Vital signs:  Temp: 97.7  F (36.5  C) Temp src: Oral BP: 114/64 Pulse: 72     SpO2: 100 %     Height: 5' 6.25\" (168.3 cm) Weight: 150 lb (68 kg)  Estimated body mass index is 24.03 kg/(m^2) as calculated from the following:    Height as of this encounter: 5' 6.25\" (1.683 m).    Weight as of this encounter: 150 lb (68 kg).          "

## 2018-09-11 NOTE — MR AVS SNAPSHOT
After Visit Summary   9/11/2018    Gunner Velázquez    MRN: [id number]           Patient Information     Date Of Birth          1960        Visit Information        Provider Department      9/11/2018 1:25 PM Vinnie Brown MD; [employer] [facility]        Today's Diagnoses     Screening for prostate cancer    -  1    PTSD (post-traumatic stress disorder)        Dysuria        Recurrent major depressive disorder, in partial remission (H)        Primary insomnia           Follow-ups after your visit        Additional Services     MENTAL HEALTH REFERRAL  - Adult; Psychiatry and Medication Management; Psychiatry; [facility]: [facility]ry [facility] [phone].  Medication management & future refills will be returned to G PCP upon completion of evaluation; We mirtha...       All scheduling is subject to the client's specific insurance plan & benefits, provider/location availability, and provider clinical specialities.  Please arrive 15 minutes early for your first appointment and bring your completed paperwork.    Please be aware that coverage of these services is subject to the terms and limitations of your health insurance plan.  Call member services at your health plan with any benefit or coverage questions.                            Follow-up notes from your care team     Return in about 3 months (around 12/11/2018).      Your next 10 appointments already scheduled     Sep 28, 2018 12:15 PM CDT   [facility]   [facility] ([facility])    [address]  [facility] [address] [phone]            Dec 14, 2018  1:25 PM CST   Office Visit with Vinnie Brown MD   [facility] ([facility])    [address]  [facility] [address] [phone]           Bring a current list of meds and any records pertaining to this visit. For  "Physicals, please bring immunization records and any forms needing to be filled out. Please arrive 10 minutes early to complete paperwork.              Who to contact     If you have questions or need follow up information about today's clinic visit or your schedule please contact [facility] directly at [phone].  Normal or non-critical lab and imaging results will be communicated to you by MyChart, letter or phone within 4 business days after the clinic has received the results. If you do not hear from us within 7 days, please contact the clinic through MyChart or phone. If you have a critical or abnormal lab result, we will notify you by phone as soon as possible.  Submit refill requests through mytrax or call your pharmacy and they will forward the refill request to us. Please allow 3 business days for your refill to be completed.          Additional Information About Your Visit        Care EveryWhere ID     This is your Care EveryWhere ID. This could be used by other organizations to access your [place] medical records  [id number]        Your Vitals Were     Pulse Temperature Height Pulse Oximetry BMI (Body Mass Index)       72 97.7  F (36.5  C) (Oral) 5' 6.25\" (1.683 m) 100% 24.03 kg/m2        Blood Pressure from Last 3 Encounters:   09/11/18 114/64   06/15/18 120/70   06/13/18 115/75    Weight from Last 3 Encounters:   09/11/18 150 lb (68 kg)   06/15/18 154 lb (69.9 kg)   06/13/18 152 lb 9.6 oz (69.2 kg)              We Performed the Following     **UA reflex to Microscopic FUTURE anytime     MENTAL HEALTH REFERRAL  - Adult; Psychiatry and Medication Management; Psychiatry; [facility]: [facility] [phone].  Medication management & future refills will be returned to G PCP upon completion of evaluation; We mirtha...     Prostate spec antigen screen          Today's Medication Changes          These changes are accurate as of 9/11/18  2:17 PM.  If you have any " questions, ask your nurse or doctor.               Start taking these medicines.        Dose/Directions    traZODone 50 MG tablet   Commonly known as:  DESYREL   Used for:  Primary insomnia   Started by:  Vinnie Brown MD        Dose:  50 mg   Take 1 tablet (50 mg) by mouth nightly as needed for sleep   Quantity:  30 tablet   Refills:  1            Where to get your medicines      These medications were sent to [id number] - [facility] [address]  [address] [address] [address]     Phone:  [phone]     mirtazapine 30 MG tablet    traZODone 50 MG tablet                Primary Care Provider Office Phone # Fax #    Tim Howard Jr., [phone] [address]  [place] [address]        Equal Access to Services     [facility] MICKY : Hadii susan rosas hadasho Soomaali, waaxda luqadaha, qaybta kaalmada adeegyada, britni mccarthy . So [facility] [phone].    ATENCIÓN: Si habla español, tiene a sung disposición servicios gratuitos de asistencia lingüística. Ll[facility] [phone].    We comply with applicable federal civil rights laws and [place] laws. We do not discriminate on the basis of race, color, national origin, age, disability, sex, sexual orientation, or gender identity.            Thank you!     Thank you for choosing [facility]  for your care. Our goal is always to provide you with excellent care. Hearing back from our patients is one way we can continue to improve our services. Please take a few minutes to complete the written survey that you may receive in the mail after your visit with us. Thank you!             Your Updated Medication List - Protect others around you: Learn how to safely use, store and throw away your medicines at www.disposemymeds.org.          This list is accurate as of 9/11/18  2:17 PM.  Always use your most recent med list.                   Brand Name Dispense Instructions for use  Diagnosis    ACE/ARB/ARNI NOT PRESCRIBED (INTENTIONAL)      Please choose reason not prescribed, below    CKD (chronic kidney disease) stage 4, GFR 15-29 ml/min (H), CKD (chronic kidney disease) stage 3, GFR 30-59 ml/min       amLODIPine 10 MG tablet    NORVASC    90 tablet    Take 1 tablet (10 mg) by mouth daily    HTN, goal below 140/90       cholecalciferol 5000 units Caps capsule    vitamin D3    100 capsule    Take 1 per day    Major depressive disorder, single episode, moderate (H)       fexofenadine 180 MG tablet    ALLEGRA    90 tablet    Take 1 tablet (180 mg) by mouth daily    Seasonal allergic rhinitis, unspecified allergic rhinitis trigger       fish oil-omega-3 fatty acids 1000 MG capsule     180 capsule    Take 1 capsule (1 g) by mouth 2 times daily    High triglycerides       meclizine 12.5 MG tablet    ANTIVERT    30 tablet    Take 1-2 tablets (12.5-25 mg) by mouth 4 times daily as needed for dizziness    Dizziness       metoprolol succinate 25 MG 24 hr tablet    TOPROL-XL    90 tablet    Take 1 tablet (25 mg) by mouth daily    HTN, goal below 140/90       mirtazapine 30 MG tablet    REMERON    30 tablet    Take 1 tablet (30 mg) by mouth At Bedtime Increased dose.    PTSD (post-traumatic stress disorder)       simvastatin 40 MG tablet    ZOCOR    90 tablet    Take 1 tablet (40 mg) by mouth At Bedtime    Hyperlipidemia with target LDL less than 130       traZODone 50 MG tablet    DESYREL    30 tablet    Take 1 tablet (50 mg) by mouth nightly as needed for sleep    Primary insomnia

## 2018-09-12 LAB — PSA SERPL-ACNC: 0.4 UG/L (ref 0–4)

## 2018-09-19 ENCOUNTER — OFFICE VISIT (OUTPATIENT)
Dept: BEHAVIORAL HEALTH | Facility: CLINIC | Age: 58
End: 2018-09-19
Payer: COMMERCIAL

## 2018-09-19 DIAGNOSIS — F32.2 SEVERE DEPRESSION (H): ICD-10-CM

## 2018-09-19 DIAGNOSIS — F41.9 ANXIETY: Primary | ICD-10-CM

## 2018-09-19 DIAGNOSIS — F43.10 PTSD (POST-TRAUMATIC STRESS DISORDER): ICD-10-CM

## 2018-09-19 PROCEDURE — 90837 PSYTX W PT 60 MINUTES: CPT | Performed by: COUNSELOR

## 2018-09-19 ASSESSMENT — ANXIETY QUESTIONNAIRES
6. BECOMING EASILY ANNOYED OR IRRITABLE: NEARLY EVERY DAY
5. BEING SO RESTLESS THAT IT IS HARD TO SIT STILL: MORE THAN HALF THE DAYS
2. NOT BEING ABLE TO STOP OR CONTROL WORRYING: MORE THAN HALF THE DAYS
1. FEELING NERVOUS, ANXIOUS, OR ON EDGE: NEARLY EVERY DAY
GAD7 TOTAL SCORE: 18
IF YOU CHECKED OFF ANY PROBLEMS ON THIS QUESTIONNAIRE, HOW DIFFICULT HAVE THESE PROBLEMS MADE IT FOR YOU TO DO YOUR WORK, TAKE CARE OF THINGS AT HOME, OR GET ALONG WITH OTHER PEOPLE: VERY DIFFICULT
3. WORRYING TOO MUCH ABOUT DIFFERENT THINGS: MORE THAN HALF THE DAYS
7. FEELING AFRAID AS IF SOMETHING AWFUL MIGHT HAPPEN: NEARLY EVERY DAY

## 2018-09-19 ASSESSMENT — PATIENT HEALTH QUESTIONNAIRE - PHQ9: 5. POOR APPETITE OR OVEREATING: NEARLY EVERY DAY

## 2018-09-19 NOTE — Clinical Note
Nithin Virgen-  I know you have seen [place] in the past and wanted to update you. It looks as though he had tried trazodone previously with you and then discontinued use as well. Let me know if you have any questions.

## 2018-09-19 NOTE — MR AVS SNAPSHOT
"                  MRN:[id number]                      After Visit Summary   2018    Gunner Velázquez    MRN: [id number]           Visit Information        Provider Department      2018 4:30 PM Corrina Farooq LPCC; [employer] [facility]      Your next 10 appointments already scheduled     Sep 28, 2018 12:15 PM CDT   Return Visit with WHITNEY Matos   [facility] ([facility]    [address] [facility] [address]   [phone]            Oct 19, 2018  1:15 PM CDT   (Arrive by 1:00 PM)   New Visit with Promise Aranda NP   [facility]    [address]  [address]  [facility] [address]   [phone]            Dec 14, 2018  1:25 PM CST   Office Visit with Vinnie Brown MD   [facility]    [address] [place] [address] [phone]           Bring a current list of meds and any records pertaining to this visit. For Physicals, please bring immunization records and any forms needing to be filled out. Please arrive 10 minutes early to complete paperwork.              [employer] Information     [employer] lets you send messages to your doctor, view your test results, renew your prescriptions, schedule appointments and more. To sign up, go to [email or web address] . Click on \"Log in\" on the left side of the screen, which will take you to the Welcome page. Then click on \"Sign up Now\" on the right side of the page.     You will be asked to enter the access code listed below, as well as some personal information. Please follow the directions to create your username and password.     Your access code is: [email or web address]  Expires: 2018  5:34 PM     Your access code will  in 90 days. If you need help or a new code, please call your [facility] or [phone].      "   Care EveryWhere ID     This is your Care EveryWhere ID. This could be used by other organizations to access your [place] medical records  [id number]        Equal Access to Services     MOUNIKA WEEKS : Enzo Paz, christiana willett, rosa maria chapman, britni godinez. So [facility] [phone].    ATENCIÓN: Si habla español, tiene a sung disposición servicios gratuitos de asistencia lingüística. Llame al [phone].    We comply with applicable federal civil rights laws and [place] laws. We do not discriminate on the basis of race, color, national origin, age, disability, sex, sexual orientation, or gender identity.

## 2018-09-19 NOTE — PROGRESS NOTES
Progress Note    Client Name: Gunner Whytegchanh  Date: 9/19/2018           Service Type: Individual      Session Start Time: 4:30pm  Session End Time: 5:20pm      Session Length: 50 mins      Session #: 4     Attendees: Client and     PHQ-9 / MOE-7 : No change      DATA      Progress Since Last Session (Related to Symptoms / Goals / Homework):   Symptoms: No change Client PHQ9 and GAD7 scores are unchanged     Homework: Partially completed      Episode of Care Goals: No improvement - CONTEMPLATION (Considering change and yet undecided); Intervened by assessing the negative and positive thinking (ambivalence) about behavior change     Current / Ongoing Stressors and Concerns:   Health, family, finances, inability to work      Treatment Objective(s) Addressed in This Session:   track and record at least 1 pleasant exchanges with family  Continue thought labeling       Intervention:   CBT: thought labeling  Interpersonal Therapy: support and encouragement        ASSESSMENT: Current Emotional / Mental Status (status of significant symptoms):   Risk status (Self / Other harm or suicidal ideation)   Client denies current fears or concerns for personal safety.   Client reports the following current or recent suicidal ideation or behaviors: thoughts of being better of dead but does not want to kill himself or hurt himself .   Client denies current or recent homicidal ideation or behaviors.   Client denies current or recent self injurious behavior or ideation.   Client denies other safety concerns.   Client Client reports there has been no change in risk factors since their last session.     Client Client reports there has been no change in protective factors since their last session.     A safety and risk management plan has been developed including: Client consented to co-developed safety plan.  [facility]'s safety and risk management plan was completed.  Client agreed to  use safety plan should any safety concerns arise.  A copy was given to the patient.     Appearance:   Appropriate    Eye Contact:   Fair    Psychomotor Behavior: Catatonic    Attitude:   Cooperative    Orientation:   All   Speech    Rate / Production: Monotone  Slow     Volume:  Normal    Mood:    Depressed  Sad    Affect:    Flat    Thought Content:  Perservative  Rumination  flashbacks    Thought Form:  Coherent    Insight:    Poor      Medication Review:   Changes to psychiatric medications, see updated Medication List in EPIC. Client reported PCP placed him on Trazadone and he is having side effects he      Medication Compliance:   Yes     Changes in Health Issues:   Yes: Many medical issues      Chemical Use Review:   Substance Use: Chemical use reviewed, no active concerns identified      Tobacco Use: No current tobacco use.       Collateral Reports Completed:   Communicated with: Promise-sent this note and note about med change     PLAN: (Client Tasks / Therapist Tasks / Other)  Client reported he started seeing a new PCP and likes him.Client reported the PCP gave him trazodone to help with sleep and the client is reporting negative sidee ffects.  Client reported he has 60 days to file his appeal for social security. Client reported PCP suggested he ask the psychiatrist for a letter for sliding scale and offered to write one if they refuse. Provider encouraged client to follow up on this. Client reported he will be taking a class next week to teach him about dialysis. Client reported there is a lack of positive exchanged with his family because he isolates himself due to not feeling well. Provider educated client about how isolation and avoidance exacterbates mental health issues. Provider encouraged client to make an effort to do what he is able with his family. Client reported he tried the thought labeling and stated many of his thoughts are flashbacks from trauma in [place] and [place]. Provider assigned  client to continue thought labeling.         Corrina Farooq, [facility] 9/19/2018                                                           ________________________________________________________________________    Treatment Plan    Client's Name: Gunner Velázquez  YOB: 1960    Date: 9/19/2018      DSM-V Diagnoses: 296.33 (F33.2) Major Depressive Disorder, Recurrent Episode, Severe _, 300.02 (F41.1) Generalized Anxiety Disorder or 309.81 (F43.10) Posttraumatic Stress Disorder (includes Posttraumatic Stress Disorder for Children 6 Years and Younger)  With delayed expression  Psychosocial / Contextual Factors: trauma from [place], medical issues, finances, work , relationships   WHODAS: 54    Referral / Collaboration:  The following referral(s) was/were discussed but client declines follow up at this time. day treatment, partial hospitalization. Clients level of need presents as very high.     Anticipated number of session or this episode of care: 12-18      MeasurableTreatment Goal(s) related to diagnosis / functional impairment(s)  Goal 1: Client will score less than 15 on PHQ9.    I will know I've met my goal when everything feels better and good. I would socialize more with my kids and my wife      Objective #A (Client Action)    Client will Identify negative self-talk and behaviors: challenge core beliefs, myths, and actions.  Status: New - Date: 9/19/18     Intervention(s)  Therapist will teach thought labeling helpful or unhelpful. .    Objective #B  Client will Decrease thoughts that you'd be better off dead or of suicide / self-harm.  Status: New - Date: 9/19/18     Intervention(s)  Therapist will teach emotional regulation skills. This will include positive thinking, deep breathing and fact checking. .          Goal 2: Client will score less than 15 on GAD7.    I will know I've met my goal when everything feels better and good.       Objective #A (Client Action)    Status: New - Date: 9/19/18      Client will use thought-stopping strategy daily to reduce intrusive thoughts.    Intervention(s)  Therapist will teach emotional recognition/identification. This will include using thought labeling to indentify distorted thought patterns.    Objective #B  Client will track and record at least 1 pleasant exchanges with family members.    Status: New - Date: 9/19/18     Intervention(s)  Therapist will give client examples of ways he can interact with his family. Provider will check in each session to ask about client efforts on this. .      Client has reviewed and agreed to the above plan.      Corrina Farooq, [facility]  September 19, 2018

## 2018-09-20 ASSESSMENT — ANXIETY QUESTIONNAIRES: GAD7 TOTAL SCORE: 18

## 2018-09-20 ASSESSMENT — PATIENT HEALTH QUESTIONNAIRE - PHQ9: SUM OF ALL RESPONSES TO PHQ QUESTIONS 1-9: 21

## 2018-09-20 NOTE — PROGRESS NOTES
Thanks for the update.  Not sure what side effects he is having from Desyrel/Olepto (trazodone). He was on this in the past.  I do not see him until next month.

## 2018-09-26 ENCOUNTER — TELEPHONE (OUTPATIENT)
Dept: INTERNAL MEDICINE | Facility: CLINIC | Age: 58
End: 2018-09-26

## 2018-09-26 NOTE — TELEPHONE ENCOUNTER
calling, states patient needs letter for social security. Was denied and is appealing.  Saw Promise Aranda and she wants PCP to write the letter. Please call patient back through [employer] () [phone] - Letter is due by 10/15/2018.

## 2018-09-27 ENCOUNTER — FCC EXTENDED DOCUMENTATION (OUTPATIENT)
Dept: BEHAVIORAL HEALTH | Facility: CLINIC | Age: 58
End: 2018-09-27

## 2018-09-27 NOTE — PROGRESS NOTES
"                Case Consultation Record       Client Name: Gunner Velázquez   Date:  9/25/18    Diagnosis: PTSD, Depression, Anxiety     Therapist: Corrina Farooq       Team Members Present:  Christie Anderson, Jonathan Dwyer, Yuan Almanza, Tari Hernandez, Kimberley Winston, Faye Oconnor     Purpose:   Risk Management and Cultural barriers    Recommendations:  EMDR with [place] provider or   Reach out to Martiniquais provider within [facility] to discuss culture  \"The Spirit Catches You\" education reading   [email or web address]   "

## 2018-09-28 ENCOUNTER — OFFICE VISIT (OUTPATIENT)
Dept: BEHAVIORAL HEALTH | Facility: CLINIC | Age: 58
End: 2018-09-28
Payer: COMMERCIAL

## 2018-09-28 DIAGNOSIS — F32.2 SEVERE DEPRESSION (H): Primary | ICD-10-CM

## 2018-09-28 DIAGNOSIS — F41.9 ANXIETY: ICD-10-CM

## 2018-09-28 DIAGNOSIS — F43.10 PTSD (POST-TRAUMATIC STRESS DISORDER): ICD-10-CM

## 2018-09-28 PROCEDURE — 90834 PSYTX W PT 45 MINUTES: CPT | Performed by: COUNSELOR

## 2018-09-28 NOTE — MR AVS SNAPSHOT
"                  MRN:[id number]                      After Visit Summary   2018    Gunner Velázquez    MRN: [id number]           Visit Information        Provider Department      2018 12:15 PM Corrina Farooq LPCC; [employer] [facility] Generic      Your next 10 appointments already scheduled     Oct 19, 2018 11:30 AM CDT   Return Visit with WHITNEY Mtaos   [facility]    [address] [facility] [address]   [phone]            Oct 19, 2018  1:15 PM CDT   (Arrive by 1:00 PM)   New Visit with Promise Aranda NP   [facility]    [address]  [address]  [facility] [address]   [phone]            Dec 14, 2018  1:25 PM CST   Office Visit with Vinnie Brown MD   [facility]    [address] [facility] [address] [phone]           Bring a current list of meds and any records pertaining to this visit. For Physicals, please bring immunization records and any forms needing to be filled out. Please arrive 10 minutes early to complete paperwork.              [employer] Information     [employer] lets you send messages to your doctor, view your test results, renew your prescriptions, schedule appointments and more. To sign up, go to [email or web address] . Click on \"Log in\" on the left side of the screen, which will take you to the Welcome page. Then click on \"Sign up Now\" on the right side of the page.     You will be asked to enter the access code listed below, as well as some personal information. Please follow the directions to create your username and password.     Your access code is: [email or web address]  Expires: 2018  5:34 PM     Your access code will  in 90 days. If you need help or a new code, please call your [facility] or [phone].      "   Care EveryWhere ID     This is your Care EveryWhere ID. This could be used by other organizations to access your [place] medical records  [id number]        Equal Access to Services     MOUNIKA WEEKS : Enzo Paz, christiana willett, rosa maria chapman, britni godinez. So [facility] [phone].    ATENCIÓN: Si habla español, tiene a sung disposición servicios gratuitos de asistencia lingüística. Llame al [phone].    We comply with applicable federal civil rights laws and [place] laws. We do not discriminate on the basis of race, color, national origin, age, disability, sex, sexual orientation, or gender identity.

## 2018-09-28 NOTE — PROGRESS NOTES
Progress Note    Client Name: Gunner Whytegchanh  Date: 9/28/2018           Service Type: Individual      Session Start Time: 12:30pm  Session End Time: 1:10pm      Session Length: 40 mins      Session #: 5     Attendees: Client attended alone    Treatment Plan Last Reviewed: 9/19/18  PHQ-9 / MOE-7 : Review session 5     DATA      Progress Since Last Session (Related to Symptoms / Goals / Homework):   Symptoms: Improving Client reported some increased sleep. Reported some interaction with family    Homework: Achieved / completed to satisfaction      Episode of Care Goals: Minimal progress - PREPARATION (Decided to change - considering how); Intervened by negotiating a change plan and determining options / strategies for behavior change, identifying triggers, exploring social supports, and working towards setting a date to begin behavior change     Current / Ongoing Stressors and Concerns:   Health, work, finances, lack of support, navigating health system, sleep. appetite     Treatment Objective(s) Addressed in This Session:   complete ADLs daily (maintain personal hygiene, wears clean clothes, eats regular meals, etc.) 3x day per week  2 meals per day     Intervention:   CBT: training the brain with repetitive practice of ADLs        ASSESSMENT: Current Emotional / Mental Status (status of significant symptoms):   Risk status (Self / Other harm or suicidal ideation)   Client denies current fears or concerns for personal safety.   Client reports the following current or recent suicidal ideation or behaviors: thought of being better off dead. denies plan.   Client denies current or recent homicidal ideation or behaviors.   Client denies current or recent self injurious behavior or ideation.   Client denies other safety concerns.   Client Client reports there has been no change in risk factors since their last session.     Client Client reports there has been no change in  protective factors since their last session.     A safety and risk management plan has been developed including: Client consented to co-developed safety plan.  [facility]'s safety and risk management plan was completed.  Client agreed to use safety plan should any safety concerns arise.  A copy was given to the patient.     Appearance:   Appropriate    Eye Contact:   Fair    Psychomotor Behavior: Normal  Restless    Attitude:   Cooperative    Orientation:   All   Speech    Rate / Production: Normal     Volume:  Normal    Mood:    Depressed  Sad    Affect:    Flat    Thought Content:  Clear  Perservative  Rumination  flashbacks    Thought Form:  Coherent  Logical    Insight:    Poor      Medication Review:   No changes to current psychiatric medication(s)     Medication Compliance:   Yes     Changes in Health Issues:   Yes: Pain, Associated Psychological Distress- Kidney pain      Chemical Use Review:   Substance Use: Chemical use reviewed, no active concerns identified      Tobacco Use: No current tobacco use.       Collateral Reports Completed:   Routed note to PCP    PLAN: (Client Tasks / Therapist Tasks / Other)  Client reported he attempted to communicate more often with his family this week and it went well. Client reported he thought it would be a good idea to eat dinner with his family. Provider affirmed this idea as a good way for the client to connect with his family. Client continues to report flashbacks related to the war but reported he has been trying to remember good memories from [place] as well. Client reported he attended a dialysis class as he may need to be on dialysis soon. Client reported he tends to be irritable to his family members and then feels guilty about it afterwards. When asked about ADLs client reported he showers 1-2x a week and eats 1-2 meals a day with little exercise. Client continues to reported trouble with sleep but believes the recent medication has been helping slightly. Provider  discussed ADLs and how they relate to our mental health. Provider discussed the practice of ADLs is important for self care. Provider assigned client to aim for showering 3x per week and eating 3 meals a day. Provider encouraged the client to sit near open windows in his home if it is too cold to walk outside with his wife. Provider encouraged client to think of ADLs as a schedule he follows which can help with the client feeling productive.         Corrina Farooq, [facility] 9/28/2018                     ________________________________________________________________________     Treatment Plan     Client's Name: Gunner Velázquez                                    YOB: 1960     Date: 9/19/2018        DSM-V Diagnoses: 296.33 (F33.2) Major Depressive Disorder, Recurrent Episode, Severe _, 300.02 (F41.1) Generalized Anxiety Disorder or 309.81 (F43.10) Posttraumatic Stress Disorder (includes Posttraumatic Stress Disorder for Children 6 Years and Younger)  With delayed expression  Psychosocial / Contextual Factors: trauma from [place], medical issues, finances, work , relationships   WHODAS: 54     Referral / Collaboration:  The following referral(s) was/were discussed but client declines follow up at this time. day treatment, partial hospitalization. Clients level of need presents as very high.      Anticipated number of session or this episode of care: 12-18        MeasurableTreatment Goal(s) related to diagnosis / functional impairment(s)  Goal 1: Client will score less than 15 on PHQ9.    I will know I've met my goal when everything feels better and good. I would socialize more with my kids and my wife       Objective #A (Client Action)                                    Client will Identify negative self-talk and behaviors: challenge core beliefs, myths, and actions.  Status: New - Date: 9/19/18      Intervention(s)  Therapist will teach thought labeling helpful or unhelpful. .     Objective  #B  Client will Decrease thoughts that you'd be better off dead or of suicide / self-harm.  Status: New - Date: 9/19/18      Intervention(s)  Therapist will teach emotional regulation skills. This will include positive thinking, deep breathing and fact checking. .              Goal 2: Client will score less than 15 on GAD7.    I will know I've met my goal when everything feels better and good.        Objective #A (Client Action)                                    Status: New - Date: 9/19/18      Client will use thought-stopping strategy daily to reduce intrusive thoughts.     Intervention(s)  Therapist will teach emotional recognition/identification. This will include using thought labeling to indentify distorted thought patterns.     Objective #B  Client will track and record at least 1 pleasant exchanges with family members.                                      Status: New - Date: 9/19/18      Intervention(s)  Therapist will give client examples of ways he can interact with his family. Provider will check in each session to ask about client efforts on this. .        Client has reviewed and agreed to the above plan.        Corrina Farooq, [facility]                                      September 19, 2018

## 2018-09-28 NOTE — Clinical Note
Nithin Brown-  Today I saw Gunner for a therapy session. He reported he had asked you to fill out social security paperwork for him. I wanted to send you this note so you are you aware of his involvement in mental health therapy with me. Let me know if you have any questions for me. Thank you!

## 2018-10-04 ENCOUNTER — TELEPHONE (OUTPATIENT)
Dept: FAMILY MEDICINE | Facility: CLINIC | Age: 58
End: 2018-10-04

## 2018-10-04 NOTE — TELEPHONE ENCOUNTER
Jordan call from [facility] Nephrology department as pt was seen there today and he dropped off FMLA paperwork asking them to complete this.  The FMLA leave is mental health related so they called pt's psychiatrist to let them know this needs to be done, but they told [facility] that they defer this to the primary care provider.  Dr. Howard, are you ok doing FMLA forms for a mental health reason when pt I cami the care pf psych?  If so, please route message back to me and I will call [place] and ask him to drop the forms off to us.  Please advise.  Promise Abbott

## 2018-10-05 NOTE — TELEPHONE ENCOUNTER
Also routing to provider patient sees for mental health.  Who would be best suited to complete these forms for Gunner?  Promise Abbott

## 2018-10-05 NOTE — TELEPHONE ENCOUNTER
Per note from mental health provider, she does defer to PCP to complete forms.  She also mentioned that Gunner now sees Dr. Brown.  Routing to Dr. Stephanie Abbott

## 2018-10-08 NOTE — TELEPHONE ENCOUNTER
As [place] is [place] a patient in [place], please have [facility] [place] [facility] contact patient and let him know where to drop off form.    Promise Abbott

## 2018-10-09 ENCOUNTER — OFFICE VISIT (OUTPATIENT)
Dept: INTERNAL MEDICINE | Facility: CLINIC | Age: 58
End: 2018-10-09
Payer: COMMERCIAL

## 2018-10-09 ENCOUNTER — PATIENT OUTREACH (OUTPATIENT)
Dept: CARE COORDINATION | Facility: CLINIC | Age: 58
End: 2018-10-09

## 2018-10-09 VITALS
DIASTOLIC BLOOD PRESSURE: 68 MMHG | SYSTOLIC BLOOD PRESSURE: 112 MMHG | TEMPERATURE: 97.6 F | RESPIRATION RATE: 18 BRPM | WEIGHT: 148.1 LBS | HEIGHT: 66 IN | BODY MASS INDEX: 23.8 KG/M2 | OXYGEN SATURATION: 99 % | HEART RATE: 67 BPM

## 2018-10-09 DIAGNOSIS — F43.10 PTSD (POST-TRAUMATIC STRESS DISORDER): ICD-10-CM

## 2018-10-09 DIAGNOSIS — F41.1 GAD (GENERALIZED ANXIETY DISORDER): Primary | ICD-10-CM

## 2018-10-09 DIAGNOSIS — N18.4 CKD (CHRONIC KIDNEY DISEASE) STAGE 4, GFR 15-29 ML/MIN (H): ICD-10-CM

## 2018-10-09 DIAGNOSIS — F32.2 SEVERE DEPRESSION (H): ICD-10-CM

## 2018-10-09 DIAGNOSIS — I10 HTN, GOAL BELOW 140/90: ICD-10-CM

## 2018-10-09 PROCEDURE — 99214 OFFICE O/P EST MOD 30 MIN: CPT | Performed by: INTERNAL MEDICINE

## 2018-10-09 NOTE — LETTER
[place] [address]  [facility] [address]  [phone]          October 9, 2018    RE:  Gunner Velázquez                                                                                                                                                       [address] [place] [address]            To whom it may concern:    Gunner Velázquez is under my professional care for    MOE (generalized anxiety disorder)  Severe depression (H)  PTSD (post-traumatic stress disorder)  HTN, goal below 140/90  CKD (chronic kidney disease) stage 4, GFR 15-29 ml/min (H)   Because of the above medical conditions Mr Velázquez is UNABLE to  work currently.         Sincerely,        Vinnie Brown MD

## 2018-10-09 NOTE — PROGRESS NOTES
Clinic Care Coordination Contact    Situation: Patient chart reviewed by care coordinator.    Background: Care Coordination referral placed to assist patient with applying for disability. Please refer to [facility] Patient Outreach note 8/21/18 for further information.     Universal Utilization:     [facility] - Dr. Vinnie Brown Internal Medicine    [facility] - Promise Aranda, [facility] Psychiat[facility]     [facility] - Corrina Farooq, [facility]phrology - Dr. Kathleen Arenas    [facility] - DAVE Barber    Assessment: Per chart review, during patient's follow-up with counseling services on 9/4/18, patient had hired a  to appeal his social security and disability claim as he was denied twice. As of 10/9/18, patient was seen by primary care provider who assessed patient for functional status and will write patient a letter regarding patient's ability to work.    Next 5 appointments (look out 90 days)     Oct 19, 2018 11:30 AM CDT   Return Visit with Corrina Farooq, [facility] ([facility])    [address]  [facility] [address]   [phone]            Dec 14, 2018  1:25 PM CST   Office Visit with Vinnie Brown MD, [facility]   [facility] ([facility])    [address]  [facility] [address]   [phone]              Plan/Recommendations: As patient has moved care to a new primary care provider in [place], is closely followed by behavioral health services, and is working with a law office to assist with disability, no further outreach will be made at this time. Patient's enrollment status has been changed from POTENTIAL to NOT A CANDIDATE. Patient has this writer's contact information if any future concerns arise.     PAYTON Young   Clinic Care Coordinator  [phone]  [email or web address]

## 2018-10-09 NOTE — MR AVS SNAPSHOT
After Visit Summary   10/9/2018    Gunner Velázquez    MRN: [id number]           Patient Information     Date Of Birth          1960        Visit Information        Provider Department      10/9/2018 8:00 AM Vinnie Brown MD; [facility]        Today's Diagnoses     MOE (generalized anxiety disorder)    -  1    Severe depression (H)        PTSD (post-traumatic stress disorder)        HTN, goal below 140/90        CKD (chronic kidney disease) stage 4, GFR 15-29 ml/min (H)           Follow-ups after your visit        Your next 10 appointments already scheduled     Oct 19, 2018 11:30 AM CDT   Return Visit with Corrina Farooq [facility] ([facility]    [address] [place] [address]   [phone]            Oct 19, 2018  1:15 PM CDT   (Arrive by 1:00 PM)   New Visit with Promise Aranda NP   [facility] ([facility])    [address]  [facility] [address] [phone]            Dec 14, 2018  1:25 PM CST   Office Visit with Vinnie Brown MD   [facility] ([facility])    [address] [place] [address] [phone]           Bring a current list of meds and any records pertaining to this visit. For Physicals, please bring immunization records and any forms needing to be filled out. Please arrive 10 minutes early to complete paperwork.              Who to contact     If you have questions or need follow up information about today's clinic visit or your schedule please contact [facility] directly at [phone].  Normal or non-critical lab and imaging results will be communicated to you by MyChart, letter or phone within 4 business days after the clinic has received the results. If you do not hear from us within 7 days, please contact the clinic through  "[employer] or phone. If you have a critical or abnormal lab result, we will notify you by phone as soon as possible.  Submit refill requests through Face++ or call your pharmacy and they will forward the refill request to us. Please allow 3 business days for your refill to be completed.          Additional Information About Your Visit        [facility] Information     Face++ lets you send messages to your doctor, view your test results, renew your prescriptions, schedule appointments and more. To sign up, go to [email or web address] . Click on \"Log in\" on the left side of the screen, which will take you to the Welcome page. Then click on \"Sign up Now\" on the right side of the page.     You will be asked to enter the access code listed below, as well as some personal information. Please follow the directions to create your username and password.     Your access code is: [email or web address]  Expires: 2018  5:34 PM     Your access code will  in 90 days. If you need help or a new code, please call your [facility] clinic or [phone].        Care EveryWhere ID     This is your Care EveryWhere ID. This could be used by other organizations to access your [facility] medical records  [id number]        Your Vitals Were     Pulse Temperature Respirations Height Pulse Oximetry BMI (Body Mass Index)    67 97.6  F (36.4  C) (Oral) 18 5' 6.25\" (1.683 m) 99% 23.72 kg/m2       Blood Pressure from Last 3 Encounters:   10/09/18 112/68   18 114/64   06/15/18 120/70    Weight from Last 3 Encounters:   10/09/18 148 lb 1.6 oz (67.2 kg)   18 150 lb (68 kg)   06/15/18 154 lb (69.9 kg)              Today, you had the following     No orders found for display       Primary Care Provider Office Phone # Fax #    Tim Howard Jr., [phone] [address]  [place] [address]        Equal Access to Services     HILARY WEEKS AH: Enzo Paz, waaxda rosa maria willett, " britni solaresrenee bhardwaj'aan ah. So [facility] [phone].    ATENCIÓN: Si pialrla delmi, tiene a sung disposición servicios gratuitos de asistencia lingüística. Carlitos izquierdo [phone].    We comply with applicable federal civil rights laws and [place] laws. We do not discriminate on the basis of race, color, national origin, age, disability, sex, sexual orientation, or gender identity.            Thank you!     Thank you for choosing [facility]  for your care. Our goal is always to provide you with excellent care. Hearing back from our patients is one way we can continue to improve our services. Please take a few minutes to complete the written survey that you may receive in the mail after your visit with us. Thank you!             Your Updated Medication List - Protect others around you: Learn how to safely use, store and throw away your medicines at www.disposemymeds.org.          This list is accurate as of 10/9/18  9:39 AM.  Always use your most recent med list.                   Brand Name Dispense Instructions for use Diagnosis    ACE/ARB/ARNI NOT PRESCRIBED (INTENTIONAL)      Please choose reason not prescribed, below    CKD (chronic kidney disease) stage 4, GFR 15-29 ml/min (H), CKD (chronic kidney disease) stage 3, GFR 30-59 ml/min (H)       amLODIPine 10 MG tablet    NORVASC    90 tablet    Take 1 tablet (10 mg) by mouth daily    HTN, goal below 140/90       cholecalciferol 5000 units Caps capsule    vitamin D3    100 capsule    Take 1 per day    Major depressive disorder, single episode, moderate (H)       fexofenadine 180 MG tablet    ALLEGRA    90 tablet    Take 1 tablet (180 mg) by mouth daily    Seasonal allergic rhinitis, unspecified allergic rhinitis trigger       fish oil-omega-3 fatty acids 1000 MG capsule     180 capsule    Take 1 capsule (1 g) by mouth 2 times daily    High triglycerides       meclizine 12.5 MG tablet    ANTIVERT    30 tablet    Take 1-2 tablets (12.5-25  mg) by mouth 4 times daily as needed for dizziness    Dizziness       metoprolol succinate 25 MG 24 hr tablet    TOPROL-XL    90 tablet    Take 1 tablet (25 mg) by mouth daily    HTN, goal below 140/90       * mirtazapine 30 MG tablet    REMERON    90 tablet    Take 1 tablet (30 mg) by mouth At Bedtime    PTSD (post-traumatic stress disorder)       * mirtazapine 30 MG tablet    REMERON    30 tablet    Take 1 tablet (30 mg) by mouth At Bedtime Increased dose.    PTSD (post-traumatic stress disorder)       simvastatin 40 MG tablet    ZOCOR    90 tablet    Take 1 tablet (40 mg) by mouth At Bedtime    Hyperlipidemia with target LDL less than 130       traZODone 50 MG tablet    DESYREL    30 tablet    Take 1 tablet (50 mg) by mouth nightly as needed for sleep    Primary insomnia       * Notice:  This list has 2 medication(s) that are the same as other medications prescribed for you. Read the directions carefully, and ask your doctor or other care provider to review them with you.

## 2018-10-09 NOTE — PROGRESS NOTES
SUBJECTIVE:   Gunner Velázquez is a 58 year old male who presents to clinic today for the following health issues:    Patient is here with interpretor, follow up on chronic medical conditions and needs assessment for disability.   Has not worked since 2014. Related to history of anxiety, depression, PTSD. All rooting back to his participation in the war in [facility].   Has papers from disability assessment with negative results, not approved to be disabled.     Hypertension Follow-up  Controlled HTN on current treatment     Outpatient blood pressures are being checked at doctor's visits.  Results are controlled.    Low Salt Diet: no added salt    Depression and Anxiety Follow-Up    Status since last visit: No change    Other associated symptoms:None    Complicating factors:     Significant life event: No     Current substance abuse: None    Reported persistent depressed mood, no interest in any activities, feels tired.   Has episodes of irritability, anger, flashbacks from the war, unable to handle social communications.   Has had insomnia, night terrors, roxanne for his safety.   Seeing psychiatry and psychology/ counseling.     PHQ 7/30/2018 9/4/2018 9/19/2018   PHQ-9 Total Score 19 21 21   Q9: Suicide Ideation Several days Several days Several days   F/U: Thoughts of suicide or self-harm No Yes Yes   F/U: Self harm-plan - Yes Yes   F/U: Self-harm action - No No   F/U: Safety concerns No No No     MOE-7 SCORE 7/30/2018 9/4/2018 9/19/2018   Total Score - - -   Total Score - - -   Total Score 11 17 18     In the past two weeks have you had thoughts of suicide or self-harm?  No.    Do you have concerns about your personal safety or the safety of others?   No  PHQ-9  English  PHQ-9   Any Language  MOE-7  Suicide Assessment Five-step Evaluation and Treatment (SAFE-T)  Chronic Kidney Disease Follow-up      Current NSAID use?  No      Amount of exercise or physical activity: None    Problems taking medications regularly:  No    Medication side effects: none    Diet: low salt      Has progressive CKD, seeing nephrology , plan for dialysis in the near future and kidney transplant.     Problem list and histories reviewed & adjusted, as indicated.  Additional history: as documented    Patient Active Problem List   Diagnosis     Hyperlipidemia with target LDL less than 130     HTN, goal below 140/90     CKD (chronic kidney disease) stage 4, GFR 15-29 ml/min (H)     MOE (generalized anxiety disorder)     GERD (gastroesophageal reflux disease)     Dizziness     Seasonal allergies     High triglycerides     Severe depression (H)     PTSD (post-traumatic stress disorder)     Anxiety     History reviewed. No pertinent surgical history.    Social History   Substance Use Topics     Smoking status: Former Smoker     Smokeless tobacco: Never Used     Alcohol use No     Family History   Problem Relation Age of Onset     HEART DISEASE Father 70     tb     Family History Negative No family hx of          Current Outpatient Prescriptions   Medication Sig Dispense Refill     ACE/ARB/ARNI NOT PRESCRIBED, INTENTIONAL, Please choose reason not prescribed, below       amLODIPine (NORVASC) 10 MG tablet Take 1 tablet (10 mg) by mouth daily 90 tablet 3     cholecalciferol (VITAMIN D3) 5000 UNITS CAPS capsule Take 1 per day 100 capsule 1     fexofenadine (ALLEGRA) 180 MG tablet Take 1 tablet (180 mg) by mouth daily 90 tablet 3     fish oil-omega-3 fatty acids 1000 MG capsule Take 1 capsule (1 g) by mouth 2 times daily 180 capsule 3     meclizine (ANTIVERT) 12.5 MG tablet Take 1-2 tablets (12.5-25 mg) by mouth 4 times daily as needed for dizziness 30 tablet 3     metoprolol succinate (TOPROL-XL) 25 MG 24 hr tablet Take 1 tablet (25 mg) by mouth daily 90 tablet 1     mirtazapine (REMERON) 30 MG tablet Take 1 tablet (30 mg) by mouth At Bedtime 90 tablet 1     mirtazapine (REMERON) 30 MG tablet Take 1 tablet (30 mg) by mouth At Bedtime Increased dose. 30 tablet 3  "    simvastatin (ZOCOR) 40 MG tablet Take 1 tablet (40 mg) by mouth At Bedtime 90 tablet 3     traZODone (DESYREL) 50 MG tablet Take 1 tablet (50 mg) by mouth nightly as needed for sleep 30 tablet 1       Reviewed and updated as needed this visit by clinical staff  Tobacco  Allergies  Meds  Med Hx  Surg Hx  Fam Hx  Soc Hx      Reviewed and updated as needed this visit by Provider         ROS:  Constitutional, HEENT, cardiovascular, pulmonary, GI, , musculoskeletal, neuro, skin, endocrine and psych systems are negative, except as otherwise noted.    OBJECTIVE:     /68 (BP Location: Left arm, Patient Position: Chair, Cuff Size: Adult Regular)  Pulse 67  Temp 97.6  F (36.4  C) (Oral)  Resp 18  Ht 5' 6.25\" (1.683 m)  Wt 148 lb 1.6 oz (67.2 kg)  SpO2 99%  BMI 23.72 kg/m2  Body mass index is 23.72 kg/(m^2).   GENERAL: frail appearing, alert and no distress  NECK: no adenopathy, no asymmetry, masses, or scars and thyroid normal to palpation  RESP: lungs clear to auscultation - no rales, rhonchi or wheezes  CV: regular rate and rhythm, normal S1 S2, no S3 or S4, no murmur, click or rub, no peripheral edema and peripheral pulses strong  ABDOMEN: soft, nontender, no hepatosplenomegaly, no masses and bowel sounds normal  MS: no gross musculoskeletal defects noted, no edema  Psych: no delusional thoughts, appears anxious, flat affect     Diagnostic Test Results:  none     ASSESSMENT/PLAN:     Problem List Items Addressed This Visit     HTN, goal below 140/90    CKD (chronic kidney disease) stage 4, GFR 15-29 ml/min (H)    MOE (generalized anxiety disorder) - Primary    Severe depression (H)    PTSD (post-traumatic stress disorder)           Continue treatment   Continue counseling   With current symptoms of anxiety, depressed mood, anger, progressive kidney disease creates more anxiety.   Unable to work currently because of mental health problems   Letter will be done for opinion on ability to work "     Follow-Up:in 3 months     Vinnie Brown MD  [facility]

## 2018-10-16 DIAGNOSIS — F32.1 MAJOR DEPRESSIVE DISORDER, SINGLE EPISODE, MODERATE (H): ICD-10-CM

## 2018-10-16 NOTE — TELEPHONE ENCOUNTER
"Requested Prescriptions   Pending Prescriptions Disp Refills     cholecalciferol (VITAMIN D3) 5000 units CAPS capsule  Last Written Prescription Date:  11/28/2017  Last Fill Quantity: 100 capsule,  # refills: 1   Last office visit: 6/13/2018 with prescribing provider:  Lee     Future Office Visit:   Next 5 appointments (look out 90 days)     Oct 19, 2018 11:30 AM CDT   Return Visit with Corrina Farooq [facility] [place] [facility]    [address] [facility] [address]   [phone]            Dec 14, 2018  1:25 PM CST   Office Visit with Vinnie Brown MD, [facility]   [facility]    [address]  [facility] [address]   [phone]                    100 capsule 1     Sig: Take 1 per day    Vitamin Supplements (Adult) Protocol Passed    10/16/2018  8:58 AM       Passed - High dose Vitamin D not ordered       Passed - Recent (12 mo) or future (30 days) visit within the authorizing provider's specialty    Patient had office visit in the last 12 months or has a visit in the next 30 days with authorizing provider or within the authorizing provider's specialty.  See \"Patient Info\" tab in inbasket, or \"Choose Columns\" in Meds & Orders section of the refill encounter.              "

## 2018-10-17 NOTE — TELEPHONE ENCOUNTER
Prescription approved per [facility] Refill [facility]. Peggy Tijerina R.N.

## 2018-10-19 ENCOUNTER — OFFICE VISIT (OUTPATIENT)
Dept: BEHAVIORAL HEALTH | Facility: CLINIC | Age: 58
End: 2018-10-19
Payer: COMMERCIAL

## 2018-10-19 DIAGNOSIS — F32.2 SEVERE DEPRESSION (H): ICD-10-CM

## 2018-10-19 DIAGNOSIS — F43.10 PTSD (POST-TRAUMATIC STRESS DISORDER): ICD-10-CM

## 2018-10-19 DIAGNOSIS — F41.9 ANXIETY: Primary | ICD-10-CM

## 2018-10-19 PROCEDURE — 90832 PSYTX W PT 30 MINUTES: CPT | Performed by: COUNSELOR

## 2018-10-19 PROCEDURE — 90785 PSYTX COMPLEX INTERACTIVE: CPT | Performed by: COUNSELOR

## 2018-10-19 ASSESSMENT — ANXIETY QUESTIONNAIRES
3. WORRYING TOO MUCH ABOUT DIFFERENT THINGS: MORE THAN HALF THE DAYS
2. NOT BEING ABLE TO STOP OR CONTROL WORRYING: NEARLY EVERY DAY
7. FEELING AFRAID AS IF SOMETHING AWFUL MIGHT HAPPEN: MORE THAN HALF THE DAYS
GAD7 TOTAL SCORE: 18
6. BECOMING EASILY ANNOYED OR IRRITABLE: NEARLY EVERY DAY
1. FEELING NERVOUS, ANXIOUS, OR ON EDGE: NEARLY EVERY DAY
5. BEING SO RESTLESS THAT IT IS HARD TO SIT STILL: MORE THAN HALF THE DAYS

## 2018-10-19 ASSESSMENT — PATIENT HEALTH QUESTIONNAIRE - PHQ9: 5. POOR APPETITE OR OVEREATING: NEARLY EVERY DAY

## 2018-10-19 NOTE — MR AVS SNAPSHOT
MRN:[id number]                      After Visit Summary   10/19/2018    Gunner Velázquez    MRN: [id number]           Visit Information        Provider Department      10/19/2018 11:30 AM Corrina Farooq [facility]; [facility]      Your next 10 appointments already scheduled     Oct 31, 2018  2:15 PM CDT   (Arrive by 1:45 PM)   [facility]    [address]  [address]  [facility] [address]   [phone]            Nov 26, 2018  5:30 PM CST   Return Visit with WHITNEY Matos   [facility] ([facility]    [address] [place] [address]   [phone]            Dec 14, 2018  1:25 PM CST   [facility]innie Brown [facility]   [facility]    [address] [place] [id number] [phone]           Bring a current list of meds and any records pertaining to this visit. For Physicals, please bring immunization records and any forms needing to be filled out. Please arrive 10 minutes early to complete paperwork.              Care EveryWhere ID     This is your Care EveryWhere ID. This could be used by other organizations to access your [facility] medical records  [id number]        Equal Access to Services     MOUNIKA WEEKS AH: Hadii aad ku hadasho Soomaali, waaxda luqadaha, qaybta kaalmada adeegyada, waxandrew jarad haysusan mccarthy . So [facility] [phone].    ATENCIÓN: Si habla español, tiene a sung disposición servicios gratuitos de asistencia lingüística. Llame al [phone].    We comply with applicable federal civil rights laws and [place] laws. We do not discriminate on the basis of race, color, national origin, age, disability, sex, sexual orientation, or gender identity.

## 2018-10-19 NOTE — PROGRESS NOTES
Progress Note    Client Name: Gunner Whytegcmorenitah  Date: 10/19/2018           Service Type: Individual      Session Start Time: 11:45am  Session End Time: 12:15pm      Session Length: 30 mins      Session #: 6     Attendees: Client and     Treatment Plan Last Reviewed: 9/19/18  PHQ-9 / MOE-7 : increased      DATA      Progress Since Last Session (Related to Symptoms / Goals / Homework):   Symptoms: Improving Client reported some improvement in ADLS and mood, Reported he is still having issues with sleep . Clients assessment scores have increase so provider and client discussed higher LOS being an option     Homework: Partially completed      Episode of Care Goals: Minimal progress - CONTEMPLATION (Considering change and yet undecided); Intervened by assessing the negative and positive thinking (ambivalence) about behavior change     Current / Ongoing Stressors and Concerns:   Health, finances, transportation, home life      Treatment Objective(s) Addressed in This Session:   complete ADLs daily (maintain personal hygiene, wears clean clothes, eats regular meals, etc.) 3x day per week  Irritability check on caffeine, diet, sleep and exercise      Intervention:   CBT: thought patterns        ASSESSMENT: Current Emotional / Mental Status (status of significant symptoms):   Risk status (Self / Other harm or suicidal ideation)   Client denies current fears or concerns for personal safety.   Client reports the following current or recent suicidal ideation or behaviors: thoughts of death but denies plan .   Client denies current or recent homicidal ideation or behaviors.   Client denies current or recent self injurious behavior or ideation.   Client denies other safety concerns.   Client Client reports there has been no change in risk factors since their last session.     Client Client reports there has been no change in protective factors since their last session.      A safety and risk management plan has been developed including: Client consented to co-developed safety plan.  [facility]'s safety and risk management plan was completed.  Client agreed to use safety plan should any safety concerns arise.  A copy was given to the patient.     Appearance:   Appropriate    Eye Contact:   Good    Psychomotor Behavior: Normal    Attitude:   Cooperative    Orientation:   All   Speech    Rate / Production: Monotone  Slow     Volume:  Normal    Mood:    Depressed    Affect:    Appropriate  Flat    Thought Content:  Perservative  Rumination    Thought Form:  Coherent  Logical    Insight:    Poor      Medication Review:   No changes to current psychiatric medication(s)- Stated meds for sleep are not helping      Medication Compliance:   Yes     Changes in Health Issues:   None reported     Chemical Use Review:   Substance Use: Chemical use reviewed, no active concerns identified      Tobacco Use: No current tobacco use.       Collateral Reports Completed:   Not Applicable    PLAN: (Client Tasks / Therapist Tasks / Other)          Corrina Farooq, [facility]                                                       ________________________________________________________________________      Treatment Plan      Client's Name: Gunner Velázquez                                    YOB: 1960      Date: 9/19/2018          DSM-V Diagnoses: 296.33 (F33.2) Major Depressive Disorder, Recurrent Episode, Severe _, 300.02 (F41.1) Generalized Anxiety Disorder or 309.81 (F43.10) Posttraumatic Stress Disorder (includes Posttraumatic Stress Disorder for Children 6 Years and Younger)  With delayed expression  Psychosocial / Contextual Factors: trauma from [place], medical issues, finances, work , relationships   WHODAS: 54      Referral / Collaboration:  The following referral(s) was/were discussed but client declines follow up at this time. day treatment, partial hospitalization. Clients level of  need presents as very high.       Anticipated number of session or this episode of care: 12-18          MeasurableTreatment Goal(s) related to diagnosis / functional impairment(s)  Goal 1: Client will score less than 15 on PHQ9.    I will know I've met my goal when everything feels better and good. I would socialize more with my kids and my wife        Objective #A (Client Action)                                    Client will Identify negative self-talk and behaviors: challenge core beliefs, myths, and actions.  Status: New - Date: 9/19/18       Intervention(s)  Therapist will teach thought labeling helpful or unhelpful. .      Objective #B  Client will Decrease thoughts that you'd be better off dead or of suicide / self-harm.  Status: New - Date: 9/19/18       Intervention(s)  Therapist will teach emotional regulation skills. This will include positive thinking, deep breathing and fact checking. .                  Goal 2: Client will score less than 15 on GAD7.    I will know I've met my goal when everything feels better and good.         Objective #A (Client Action)                                    Status: New - Date: 9/19/18       Client will use thought-stopping strategy daily to reduce intrusive thoughts.      Intervention(s)  Therapist will teach emotional recognition/identification. This will include using thought labeling to indentify distorted thought patterns.      Objective #B  Client will track and record at least 1 pleasant exchanges with family members.                                      Status: New - Date: 9/19/18       Intervention(s)  Therapist will give client examples of ways he can interact with his family. Provider will check in each session to ask about client efforts on this. .          Client has reviewed and agreed to the above plan.          Corrina Farooq, [facility]                                      September 19, 2018

## 2018-10-20 ASSESSMENT — PATIENT HEALTH QUESTIONNAIRE - PHQ9: SUM OF ALL RESPONSES TO PHQ QUESTIONS 1-9: 23

## 2018-10-20 ASSESSMENT — ANXIETY QUESTIONNAIRES: GAD7 TOTAL SCORE: 18

## 2018-10-31 ENCOUNTER — OFFICE VISIT (OUTPATIENT)
Dept: PSYCHIATRY | Facility: CLINIC | Age: 58
End: 2018-10-31
Attending: INTERNAL MEDICINE
Payer: COMMERCIAL

## 2018-10-31 VITALS
WEIGHT: 149 LBS | SYSTOLIC BLOOD PRESSURE: 140 MMHG | OXYGEN SATURATION: 100 % | RESPIRATION RATE: 22 BRPM | BODY MASS INDEX: 23.95 KG/M2 | HEART RATE: 65 BPM | HEIGHT: 66 IN | TEMPERATURE: 97.8 F | DIASTOLIC BLOOD PRESSURE: 80 MMHG

## 2018-10-31 DIAGNOSIS — F43.10 PTSD (POST-TRAUMATIC STRESS DISORDER): Primary | ICD-10-CM

## 2018-10-31 DIAGNOSIS — F32.2 SEVERE DEPRESSION (H): ICD-10-CM

## 2018-10-31 PROCEDURE — 90792 PSYCH DIAG EVAL W/MED SRVCS: CPT | Performed by: NURSE PRACTITIONER

## 2018-10-31 RX ORDER — TRAZODONE HYDROCHLORIDE 150 MG/1
150 TABLET ORAL
Qty: 90 TABLET | Refills: 0 | Status: SHIPPED | OUTPATIENT
Start: 2018-10-31 | End: 2018-12-21

## 2018-10-31 RX ORDER — PRAZOSIN HYDROCHLORIDE 1 MG/1
1 CAPSULE ORAL AT BEDTIME
Qty: 90 CAPSULE | Refills: 0 | Status: SHIPPED | OUTPATIENT
Start: 2018-10-31 | End: 2018-12-14

## 2018-10-31 RX ORDER — MIRTAZAPINE 30 MG/1
30 TABLET, FILM COATED ORAL AT BEDTIME
Qty: 90 TABLET | Refills: 0 | Status: SHIPPED | OUTPATIENT
Start: 2018-10-31 | End: 2018-12-21

## 2018-10-31 ASSESSMENT — PATIENT HEALTH QUESTIONNAIRE - PHQ9
10. IF YOU CHECKED OFF ANY PROBLEMS, HOW DIFFICULT HAVE THESE PROBLEMS MADE IT FOR YOU TO DO YOUR WORK, TAKE CARE OF THINGS AT HOME, OR GET ALONG WITH OTHER PEOPLE: EXTREMELY DIFFICULT
SUM OF ALL RESPONSES TO PHQ QUESTIONS 1-9: 21
SUM OF ALL RESPONSES TO PHQ QUESTIONS 1-9: 21

## 2018-10-31 ASSESSMENT — ANXIETY QUESTIONNAIRES
7. FEELING AFRAID AS IF SOMETHING AWFUL MIGHT HAPPEN: MORE THAN HALF THE DAYS
7. FEELING AFRAID AS IF SOMETHING AWFUL MIGHT HAPPEN: MORE THAN HALF THE DAYS
6. BECOMING EASILY ANNOYED OR IRRITABLE: SEVERAL DAYS
GAD7 TOTAL SCORE: 13
4. TROUBLE RELAXING: MORE THAN HALF THE DAYS
GAD7 TOTAL SCORE: 13
1. FEELING NERVOUS, ANXIOUS, OR ON EDGE: MORE THAN HALF THE DAYS
3. WORRYING TOO MUCH ABOUT DIFFERENT THINGS: NEARLY EVERY DAY
2. NOT BEING ABLE TO STOP OR CONTROL WORRYING: MORE THAN HALF THE DAYS
5. BEING SO RESTLESS THAT IT IS HARD TO SIT STILL: SEVERAL DAYS
GAD7 TOTAL SCORE: 13

## 2018-10-31 NOTE — PROGRESS NOTES
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 professional  was 60 minutes and greater than 50% of time or 40 minutes was spent in counseling and coordination of care regarding above diagnoses and treatment plan.    Patient Status:  Patient will continue to be seen for ongoing consultation and stabilization.    Signed:   Promise Aranda, PhD, APRN, CNP  Psychiatry

## 2018-10-31 NOTE — MR AVS SNAPSHOT
After Visit Summary   10/31/2018    Gunner Velázquez    MRN: [id number]           Patient Information     Date Of Birth          1960        Visit Information        Provider Department      10/31/2018 1:45 PM Promise Aranda NP; [employer] [facility]        Today's Diagnoses     PTSD (post-traumatic stress disorder)    -  1    Severe depression (H)          Care Instructions    Treatment Plan:    Start Minipress (prazosin) 1 mg by mouth daily at bedtime for nightmares.     Increase Desyrel/Olepto (trazodone) to 150 mg by mouth daily at bedtime for sleep.     Continue Remeron (mirtazapine) 30 mg by mouth daily at bedtime for sleep, mood, anxiety.     Continue all other medications as reviewed per electronic medical record today.     Safety plan reviewed. To the Emergency Department as needed or call after hours crisis line at [phone] or [phone]. [place] Crisis Text Line: Text [place] to 370659  or  Suicide LifeLine Chat: suicidepreventionlifeline.org/chat    Continue individual therapy as planned with Corrina.    Schedule an appointment with me in 4 weeks or sooner as needed. Call [facility] at [phone] to schedule.    Follow up with primary care provider as planned or for acute medical concerns.    Call the psychiatric nurse line with medication questions or concerns at [phone].    MyChart may be used to communicate with your provider, but this is not intended to be used for emergencies.          Follow-ups after your visit        Follow-up notes from your care team     Return in about 4 weeks (around 11/28/2018).      Your next 10 appointments already scheduled     Nov 26, 2018  5:30 PM CST   Return Visit with Corrina Farooq [facility] ([facility])    [address]  [facility] [address] [phone]            Dec 14, 2018  1:25 PM CST   Office Visit with Vinnie IVEY  "MD Stephanie   [facility]    [address]  [facility] [address] [phone]           Bring a current list of meds and any records pertaining to this visit. For Physicals, please bring immunization records and any forms needing to be filled out. Please arrive 10 minutes early to complete paperwork.              Who to contact     If you have questions or need follow up information about today's clinic visit or your schedule please contact [facility] directly at [phone].  Normal or non-critical lab and imaging results will be communicated to you by MyChart, letter or phone within 4 business days after the clinic has received the results. If you do not hear from us within 7 days, please contact the clinic through MyChart or phone. If you have a critical or abnormal lab result, we will notify you by phone as soon as possible.  Submit refill requests through [employer] or call your pharmacy and they will forward the refill request to us. Please allow 3 business days for your refill to be completed.          Additional Information About Your Visit        Care EveryWhere ID     This is your Care EveryWhere ID. This could be used by other organizations to access your [place] medical records  [id number]        Your Vitals Were     Pulse Temperature Respirations Height Pulse Oximetry BMI (Body Mass Index)    65 97.8  F (36.6  C) (Oral) 22 1.676 m (5' 6\") 100% 24.05 kg/m2       Blood Pressure from Last 3 Encounters:   10/31/18 140/80   10/09/18 112/68   09/11/18 114/64    Weight from Last 3 Encounters:   10/31/18 67.6 kg (149 lb)   10/09/18 67.2 kg (148 lb 1.6 oz)   09/11/18 68 kg (150 lb)              Today, you had the following     No orders found for display         Today's Medication Changes          These changes are accurate as of 10/31/18  3:10 PM.  If you have any questions, ask your nurse or doctor.               Start taking these " medicines.        Dose/Directions    prazosin 1 MG capsule   Commonly known as:  MINIPRESS   Used for:  PTSD (post-traumatic stress disorder)   Started by:  Promise Aranda NP        Dose:  1 mg   Take 1 capsule (1 mg) by mouth At Bedtime For nightmares.   Quantity:  90 capsule   Refills:  0         These medicines have changed or have updated prescriptions.        Dose/Directions    mirtazapine 30 MG tablet   Commonly known as:  REMERON   This may have changed:  additional instructions   Used for:  PTSD (post-traumatic stress disorder)   Changed by:  Promise Aranda NP        Dose:  30 mg   Take 1 tablet (30 mg) by mouth At Bedtime   Quantity:  90 tablet   Refills:  0       traZODone 150 MG tablet   Commonly known as:  DESYREL   This may have changed:    - medication strength  - how much to take  - additional instructions   Used for:  Severe depression (H)   Changed by:  Promise Aranda NP        Dose:  150 mg   Take 1 tablet (150 mg) by mouth nightly as needed for sleep Increased dose.   Quantity:  90 tablet   Refills:  0            Where to get your medicines      These medications were sent to [id number] [address] [address]  [id number] [place] [address]     Phone:  [phone]     mirtazapine 30 MG tablet    prazosin 1 MG capsule    traZODone 150 MG tablet                Primary Care Provider Office Phone # Fax #    Vinnie Brown [phone]       [address] [facility] [address]        Equal Access to Services     [facility] AH: Hadii susan ku hadasho Soomaali, waaxda luqadaha, qaybta kaalmada adeegyada, britni godinez. So [facility] [phone].    ATENCIÓN: Si habla español, tiene a sung disposición servicios gratuitos de asistencia lingüística. Llame al [phone].    We comply with applicable federal civil rights laws and [place] laws. We do not discriminate on the basis of race, color, national origin, age,  disability, sex, sexual orientation, or gender identity.            Thank you!     Thank you for choosing [facility]  for your care. Our goal is always to provide you with excellent care. Hearing back from our patients is one way we can continue to improve our services. Please take a few minutes to complete the written survey that you may receive in the mail after your visit with us. Thank you!             Your Updated Medication List - Protect others around you: Learn how to safely use, store and throw away your medicines at www.disposemymeds.org.          This list is accurate as of 10/31/18  3:10 PM.  Always use your most recent med list.                   Brand Name Dispense Instructions for use Diagnosis    ACE/ARB/ARNI NOT PRESCRIBED (INTENTIONAL)      Please choose reason not prescribed, below    CKD (chronic kidney disease) stage 4, GFR 15-29 ml/min (H), CKD (chronic kidney disease) stage 3, GFR 30-59 ml/min (H)       amLODIPine 10 MG tablet    NORVASC    90 tablet    Take 1 tablet (10 mg) by mouth daily    HTN, goal below 140/90       cholecalciferol 5000 units Caps capsule    vitamin D3    100 capsule    Take 1 per day    Major depressive disorder, single episode, moderate (H)       fexofenadine 180 MG tablet    ALLEGRA    90 tablet    Take 1 tablet (180 mg) by mouth daily    Seasonal allergic rhinitis, unspecified allergic rhinitis trigger       fish oil-omega-3 fatty acids 1000 MG capsule     180 capsule    Take 1 capsule (1 g) by mouth 2 times daily    High triglycerides       meclizine 12.5 MG tablet    ANTIVERT    30 tablet    Take 1-2 tablets (12.5-25 mg) by mouth 4 times daily as needed for dizziness    Dizziness       metoprolol succinate 25 MG 24 hr tablet    TOPROL-XL    90 tablet    Take 1 tablet (25 mg) by mouth daily    HTN, goal below 140/90       mirtazapine 30 MG tablet    REMERON    90 tablet    Take 1 tablet (30 mg) by mouth At Bedtime    PTSD (post-traumatic stress  disorder)       prazosin 1 MG capsule    MINIPRESS    90 capsule    Take 1 capsule (1 mg) by mouth At Bedtime For nightmares.    PTSD (post-traumatic stress disorder)       simvastatin 40 MG tablet    ZOCOR    90 tablet    Take 1 tablet (40 mg) by mouth At Bedtime    Hyperlipidemia with target LDL less than 130       traZODone 150 MG tablet    DESYREL    90 tablet    Take 1 tablet (150 mg) by mouth nightly as needed for sleep Increased dose.    Severe depression (H)

## 2018-10-31 NOTE — PATIENT INSTRUCTIONS
Treatment Plan:    Start Minipress (prazosin) 1 mg by mouth daily at bedtime for nightmares.     Increase Desyrel/Olepto (trazodone) to 150 mg by mouth daily at bedtime for sleep.     Continue Remeron (mirtazapine) 30 mg by mouth daily at bedtime for sleep, mood, anxiety.     Continue all other medications as reviewed per electronic medical record today.     Safety plan reviewed. To the Emergency Department as needed or call after hours crisis line at [phone] or [phone]. [place] Crisis Text Line: Text [place] to 258196  or  Suicide LifeLine Chat: suicidepreventionlifeline.org/chat    Continue individual therapy as planned with Corrina.    Schedule an appointment with me in 4 weeks or sooner as needed. Call [facility] at [phone] to schedule.    Follow up with primary care provider as planned or for acute medical concerns.    Call the psychiatric nurse line with medication questions or concerns at [phone].    "[employer]"hart may be used to communicate with your provider, but this is not intended to be used for emergencies.

## 2018-10-31 NOTE — LETTER
[facility]  [address]  [address]   [place]  [address]  Phone  [phone]  Fax [phone]      October 31, 2018      To whom it may concern:    I have been working with Mr. Gunner Velázquez, Date Of Birth 1960, since May 2018. He carries diagnoses of Generalized Anxiety Disorder (MOE), Post-traumatic stress disorder (PTSD), and Major Depressive Disorder Severe, Recurrent. He also has medical conditions including Chronic Kidney Disease and Chronic Dizziness.  His symptoms have caused him significant distress and difficulty functioning for over 4 years. He has not been able to work. We continue to work together to work towards improvement and reduction of symptoms. He has also been active in his care with his other specialists, primary care provider, and therapist. I saw Mr. Velázquez for a follow up appointment today and we will continue to work together in outpatient psychiatry.   For more details, I would recommend you formally request Mr. Velázquez's medical records.     Please contact me with any questions or concerns.    Thank you.    Sincerely,        Promise Aranda, PhD, APRN, CNP  Psychiatry Nurse Practitioner

## 2018-10-31 NOTE — NURSING NOTE
"Chief Complaint   Patient presents with     Consult     re:admit last seen 6/15/18, sleep issues trouble falling asleep, nervous, anxious and worrying symptoms ongoing for 3 months getting worse. Pt wanting a letter that pt was seen today for depression.      initial /80 (BP Location: Right arm, Patient Position: Chair, Cuff Size: Adult Regular)  Pulse 65  Temp 97.8  F (36.6  C) (Oral)  Resp 22  Ht 1.676 m (5' 6\")  Wt 67.6 kg (149 lb)  SpO2 100%  BMI 24.05 kg/m2 Estimated body mass index is 24.05 kg/(m^2) as calculated from the following:    Height as of this encounter: 1.676 m (5' 6\").    Weight as of this encounter: 67.6 kg (149 lb)..  bp completed using cuff size regular    "

## 2018-11-01 ASSESSMENT — PATIENT HEALTH QUESTIONNAIRE - PHQ9: SUM OF ALL RESPONSES TO PHQ QUESTIONS 1-9: 21

## 2018-11-01 ASSESSMENT — ANXIETY QUESTIONNAIRES: GAD7 TOTAL SCORE: 13

## 2018-12-03 DIAGNOSIS — R42 DIZZINESS: ICD-10-CM

## 2018-12-03 RX ORDER — MECLIZINE HCL 12.5 MG 12.5 MG/1
12.5-25 TABLET ORAL 4 TIMES DAILY PRN
Qty: 30 TABLET | Refills: 0 | Status: SHIPPED | OUTPATIENT
Start: 2018-12-03 | End: 2018-12-31

## 2018-12-03 NOTE — TELEPHONE ENCOUNTER
"Requested Prescriptions   Pending Prescriptions Disp Refills     meclizine (ANTIVERT) 12.5 MG tablet  Last Written Prescription Date:  7/25/2018  Last Fill Quantity: 30 tablet,  # refills: 3   Last office visit: 6/13/2018 with prescribing provider:  Lee     Future Office Visit:   Next 5 appointments (look out 90 days)     Dec 14, 2018  1:25 PM CST   Office Visit with Vinnie Brown MD, [employer]   [facility]    [address] [place] [address]   [phone]            Dec 21, 2018 12:45 PM CST   Return Visit with Promise Aranda NP   [facility]    [address]  [address]  [facility] [id number] [phone]            Dec 21, 2018  1:30 PM CST   Return Visit with Corrina Farooq [facility] ([facility])    [address] [place] [id number] [phone]                    30 tablet 3     Sig: Take 1-2 tablets (12.5-25 mg) by mouth 4 times daily as needed for dizziness     Antivertigo/Antiemetic Agents Passed    12/3/2018  9:34 AM       Passed - Recent (12 mo) or future (30 days) visit within the authorizing provider's specialty    Patient had office visit in the last 12 months or has a visit in the next 30 days with authorizing provider or within the authorizing provider's specialty.  See \"Patient Info\" tab in inbasket, or \"Choose Columns\" in Meds & Orders section of the refill encounter.             Passed - Patient is 18 years of age or older          "

## 2018-12-14 ENCOUNTER — OFFICE VISIT (OUTPATIENT)
Dept: INTERNAL MEDICINE | Facility: CLINIC | Age: 58
End: 2018-12-14
Payer: COMMERCIAL

## 2018-12-14 VITALS
HEART RATE: 89 BPM | SYSTOLIC BLOOD PRESSURE: 134 MMHG | WEIGHT: 155 LBS | HEIGHT: 66 IN | TEMPERATURE: 97.9 F | BODY MASS INDEX: 24.91 KG/M2 | DIASTOLIC BLOOD PRESSURE: 88 MMHG

## 2018-12-14 DIAGNOSIS — F32.2 SEVERE DEPRESSION (H): ICD-10-CM

## 2018-12-14 DIAGNOSIS — I10 HTN, GOAL BELOW 140/90: ICD-10-CM

## 2018-12-14 DIAGNOSIS — F43.10 PTSD (POST-TRAUMATIC STRESS DISORDER): ICD-10-CM

## 2018-12-14 DIAGNOSIS — F41.1 GAD (GENERALIZED ANXIETY DISORDER): ICD-10-CM

## 2018-12-14 DIAGNOSIS — Z12.11 SPECIAL SCREENING FOR MALIGNANT NEOPLASMS, COLON: ICD-10-CM

## 2018-12-14 DIAGNOSIS — N18.4 CHRONIC KIDNEY DISEASE, STAGE 4 (SEVERE) (H): Primary | ICD-10-CM

## 2018-12-14 DIAGNOSIS — K21.9 GASTROESOPHAGEAL REFLUX DISEASE WITHOUT ESOPHAGITIS: ICD-10-CM

## 2018-12-14 LAB
ALBUMIN UR-MCNC: >=300 MG/DL
APPEARANCE UR: CLEAR
BILIRUB UR QL STRIP: NEGATIVE
COLOR UR AUTO: YELLOW
ERYTHROCYTE [DISTWIDTH] IN BLOOD BY AUTOMATED COUNT: 13.7 % (ref 10–15)
GLUCOSE UR STRIP-MCNC: NEGATIVE MG/DL
HCT VFR BLD AUTO: 37.7 % (ref 40–53)
HGB BLD-MCNC: 12.6 G/DL (ref 13.3–17.7)
HGB UR QL STRIP: ABNORMAL
KETONES UR STRIP-MCNC: NEGATIVE MG/DL
LEUKOCYTE ESTERASE UR QL STRIP: NEGATIVE
MCH RBC QN AUTO: 27.9 PG (ref 26.5–33)
MCHC RBC AUTO-ENTMCNC: 33.4 G/DL (ref 31.5–36.5)
MCV RBC AUTO: 84 FL (ref 78–100)
NITRATE UR QL: NEGATIVE
PH UR STRIP: 5.5 PH (ref 5–7)
PLATELET # BLD AUTO: 219 10E9/L (ref 150–450)
RBC # BLD AUTO: 4.51 10E12/L (ref 4.4–5.9)
RBC #/AREA URNS AUTO: ABNORMAL /HPF
SOURCE: ABNORMAL
SP GR UR STRIP: 1.01 (ref 1–1.03)
UROBILINOGEN UR STRIP-ACNC: 0.2 EU/DL (ref 0.2–1)
WBC # BLD AUTO: 7.7 10E9/L (ref 4–11)
WBC #/AREA URNS AUTO: ABNORMAL /HPF

## 2018-12-14 PROCEDURE — 81001 URINALYSIS AUTO W/SCOPE: CPT | Performed by: INTERNAL MEDICINE

## 2018-12-14 PROCEDURE — 99214 OFFICE O/P EST MOD 30 MIN: CPT | Performed by: INTERNAL MEDICINE

## 2018-12-14 PROCEDURE — 85027 COMPLETE CBC AUTOMATED: CPT | Performed by: INTERNAL MEDICINE

## 2018-12-14 PROCEDURE — 36415 COLL VENOUS BLD VENIPUNCTURE: CPT | Performed by: INTERNAL MEDICINE

## 2018-12-14 PROCEDURE — 80048 BASIC METABOLIC PNL TOTAL CA: CPT | Performed by: INTERNAL MEDICINE

## 2018-12-14 RX ORDER — PRAZOSIN HYDROCHLORIDE 1 MG/1
1 CAPSULE ORAL 2 TIMES DAILY
Qty: 180 CAPSULE | Refills: 1 | Status: SHIPPED | OUTPATIENT
Start: 2018-12-14 | End: 2019-03-15

## 2018-12-14 RX ORDER — METOPROLOL SUCCINATE 25 MG/1
25 TABLET, EXTENDED RELEASE ORAL DAILY
Qty: 90 TABLET | Refills: 1 | Status: SHIPPED | OUTPATIENT
Start: 2018-12-14 | End: 2019-04-29

## 2018-12-14 ASSESSMENT — MIFFLIN-ST. JEOR: SCORE: 1465.83

## 2018-12-14 NOTE — NURSING NOTE
"Vital signs:  Temp: 97.9  F (36.6  C) Temp src: Oral BP: 134/88 Pulse: 89           Height: 167.6 cm (5' 6\") Weight: 70.3 kg (155 lb)  Estimated body mass index is 25.02 kg/m  as calculated from the following:    Height as of this encounter: 1.676 m (5' 6\").    Weight as of this encounter: 70.3 kg (155 lb).          "

## 2018-12-14 NOTE — LETTER
December 17, 2018      Gunner Velázquez  [address] [place] [address]        Dear ,    Your lab results came back with decreased kidney function and mild anemia, these are chronic. We will follow up in 3 months.     Resulted Orders   CBC with platelets   Result Value Ref Range    WBC 7.7 4.0 - 11.0 10e9/L    RBC Count 4.51 4.4 - 5.9 10e12/L    Hemoglobin 12.6 (L) 13.3 - 17.7 g/dL    Hematocrit 37.7 (L) 40.0 - 53.0 %    MCV 84 78 - 100 fl    MCH 27.9 26.5 - 33.0 pg    MCHC 33.4 31.5 - 36.5 g/dL    RDW 13.7 10.0 - 15.0 %    Platelet Count 219 150 - 450 10e9/L   Basic metabolic panel   Result Value Ref Range    Sodium 136 133 - 144 mmol/L    Potassium 4.0 3.4 - 5.3 mmol/L    Chloride 104 94 - 109 mmol/L    Carbon Dioxide 26 20 - 32 mmol/L    Anion Gap 6 3 - 14 mmol/L    Glucose 94 70 - 99 mg/dL    Urea Nitrogen 35 (H) 7 - 30 mg/dL    Creatinine 2.47 (H) 0.66 - 1.25 mg/dL    GFR Estimate 27 (L) >60 mL/min/1.7m2      Comment:      Non  GFR Calc    GFR Estimate If Black 33 (L) >60 mL/min/1.7m2      Comment:       GFR Calc    Calcium 9.4 8.5 - 10.1 mg/dL   *UA reflex to Microscopic   Result Value Ref Range    Color Urine Yellow     Appearance Urine Clear     Glucose Urine Negative NEG^Negative mg/dL    Bilirubin Urine Negative NEG^Negative    Ketones Urine Negative NEG^Negative mg/dL    Specific Gravity Urine 1.010 1.003 - 1.035    Blood Urine Moderate (A) NEG^Negative    pH Urine 5.5 5.0 - 7.0 pH    Protein Albumin Urine >=300 (A) NEG^Negative mg/dL    Urobilinogen Urine 0.2 0.2 - 1.0 EU/dL    Nitrite Urine Negative NEG^Negative    Leukocyte Esterase Urine Negative NEG^Negative    Source Midstream Urine    Urine Microscopic   Result Value Ref Range    WBC Urine 0 - 5 OTO5^0 - 5 /HPF    RBC Urine 5-10 (A) OTO2^O - 2 /HPF       If you have any questions or concerns, please call the clinic at the number listed above.       Sincerely,        Vinnie Brown,  MD

## 2018-12-14 NOTE — PROGRESS NOTES
SUBJECTIVE:   Gunner Velázquez is a 58 year old male who presents to clinic today for the following health issues:      F/U on depression and anxiety:  CKD  HTN    Patient is seen for a follow up visit.  Inter[employer]or phone service used.     Patient with history of severe depression and anxiety related to PTSD. Follows with psychiatry and psychology. On treatment. Reports improved symptoms of anxiety. Still feel hopeless , depressed, no suicidal ideation. Family, children need him.   Compliant with treatment.   Has stage 4 CKD. Gradually worsening renal function. No leg edema. No skin changes, no confusion. No change in urine color, no burning or frequency. Has had back pain on and off. Considered for transplant. Needs colonoscopy done.   Has h/o HTN. on medical treatment. BP has been controlled. No side effects from medications. No CP, HA, dizziness. good compliance with medications and low salt diet.  Discussed preventive measures.         Problem list and histories reviewed & adjusted, as indicated.  Additional history: as documented    Patient Active Problem List   Diagnosis     Hyperlipidemia with target LDL less than 130     HTN, goal below 140/90     CKD (chronic kidney disease) stage 4, GFR 15-29 ml/min (H)     MOE (generalized anxiety disorder)     GERD (gastroesophageal reflux disease)     Dizziness     Seasonal allergies     High triglycerides     Severe depression (H)     PTSD (post-traumatic stress disorder)     Anxiety     History reviewed. No pertinent surgical history.    Social History     Tobacco Use     Smoking status: Former Smoker     Smokeless tobacco: Never Used   Substance Use Topics     Alcohol use: No     Alcohol/week: 0.0 oz     Family History   Problem Relation Age of Onset     Heart Disease Father 70        tb     Family History Negative No family hx of          Current Outpatient Medications   Medication Sig Dispense Refill     amLODIPine (NORVASC) 10 MG tablet Take 1 tablet (10 mg) by  "mouth daily 90 tablet 3     cholecalciferol (VITAMIN D3) 5000 units CAPS capsule Take 1 per day 100 capsule 1     fexofenadine (ALLEGRA) 180 MG tablet Take 1 tablet (180 mg) by mouth daily 90 tablet 3     fish oil-omega-3 fatty acids 1000 MG capsule Take 1 capsule (1 g) by mouth 2 times daily 180 capsule 3     meclizine (ANTIVERT) 12.5 MG tablet Take 1-2 tablets (12.5-25 mg) by mouth 4 times daily as needed for dizziness 30 tablet 0     metoprolol succinate ER (TOPROL-XL) 25 MG 24 hr tablet Take 1 tablet (25 mg) by mouth daily 90 tablet 1     mirtazapine (REMERON) 30 MG tablet Take 1 tablet (30 mg) by mouth At Bedtime 90 tablet 0     prazosin (MINIPRESS) 1 MG capsule Take 1 capsule (1 mg) by mouth 2 times daily For nightmares. 180 capsule 1     simvastatin (ZOCOR) 40 MG tablet Take 1 tablet (40 mg) by mouth At Bedtime 90 tablet 3     traZODone (DESYREL) 150 MG tablet Take 1 tablet (150 mg) by mouth nightly as needed for sleep Increased dose. 90 tablet 0     ACE/ARB/ARNI NOT PRESCRIBED, INTENTIONAL, Please choose reason not prescribed, below         Reviewed and updated as needed this visit by clinical staff       Reviewed and updated as needed this visit by Provider         ROS:  Constitutional, HEENT, cardiovascular, pulmonary, GI, , musculoskeletal, neuro, skin, endocrine and psych systems are negative, except as otherwise noted.    OBJECTIVE:     /88   Pulse 89   Temp 97.9  F (36.6  C) (Oral)   Ht 1.676 m (5' 6\")   Wt 70.3 kg (155 lb)   BMI 25.02 kg/m    Body mass index is 25.02 kg/m .   GENERAL:frail, anxious, alert and no distress  EYES: Eyes grossly normal to inspection, PERRL and conjunctivae and sclerae normal  HENT: ear canals and TM's normal, nose and mouth without ulcers or lesions  NECK: no adenopathy, no asymmetry, masses, or scars and thyroid normal to palpation  RESP: lungs clear to auscultation - no rales, rhonchi or wheezes  CV: regular rate and rhythm, normal S1 S2, no S3 or S4, no " murmur, click or rub, no peripheral edema and peripheral pulses strong  ABDOMEN: soft, nontender, no hepatosplenomegaly, no masses and bowel sounds normal  MS: no gross musculoskeletal defects noted, no edema  SKIN: no suspicious lesions or rashes  NEURO: Normal strength and tone, mentation intact and speech normal  PSYCH: mentation appears normal, affect normal/bright    Diagnostic Test Results:  none     ASSESSMENT/PLAN:     Problem List Items Addressed This Visit     HTN, goal below 140/90    Relevant Medications    metoprolol succinate ER (TOPROL-XL) 25 MG 24 hr tablet    prazosin (MINIPRESS) 1 MG capsule    Other Relevant Orders    CBC with platelets (Completed)    Basic metabolic panel (Completed)    MOE (generalized anxiety disorder)    GERD (gastroesophageal reflux disease)    Severe depression (H)    PTSD (post-traumatic stress disorder)    Relevant Medications    prazosin (MINIPRESS) 1 MG capsule      Other Visit Diagnoses     Chronic kidney disease, stage 4 (severe) (H)    -  Primary    Relevant Orders    CBC with platelets (Completed)    Basic metabolic panel (Completed)    *UA reflex to Microscopic (Completed)    Special screening for malignant neoplasms, colon        Relevant Orders    GASTROENTEROLOGY ADULT REF PROCEDURE ONLY    GASTROENTEROLOGY ADULT REF PROCEDURE ONLY Marianne Melvin [phone]; [facility]           BP not well controlled, increased Minipress to 1 mg bid and restarted on Metoprolol, cont Amlodipine. Monitor BP  Assess renal function and UA - concern for possible UTI, back pain reported, weakness   Refer to GI for colonoscopy   Cont rest of medications   Continue psychiatry treatment     Follow-Up:with test results , reassess if change of symptoms     Vinnie Brown MD  [facility]

## 2018-12-15 LAB
ANION GAP SERPL CALCULATED.3IONS-SCNC: 6 MMOL/L (ref 3–14)
BUN SERPL-MCNC: 35 MG/DL (ref 7–30)
CALCIUM SERPL-MCNC: 9.4 MG/DL (ref 8.5–10.1)
CHLORIDE SERPL-SCNC: 104 MMOL/L (ref 94–109)
CO2 SERPL-SCNC: 26 MMOL/L (ref 20–32)
CREAT SERPL-MCNC: 2.47 MG/DL (ref 0.66–1.25)
GFR SERPL CREATININE-BSD FRML MDRD: 27 ML/MIN/1.7M2
GLUCOSE SERPL-MCNC: 94 MG/DL (ref 70–99)
POTASSIUM SERPL-SCNC: 4 MMOL/L (ref 3.4–5.3)
SODIUM SERPL-SCNC: 136 MMOL/L (ref 133–144)

## 2018-12-20 NOTE — PROGRESS NOTES
"    Outpatient Psychiatric Progress Note    Name: Gunner Velázquez   : 1960                    Primary Care Provider: Vinnie Brown MD - last visit 2018  Therapist: Corrina Farooq - last visit 10/19/2018 and after this visit today  Nephrologist Dr. Marie - last visit 10/4/2018  Urologist  [facility]  Janay Luis - fax [phone]  Client identified their preferred language to be Cape Verdean.  Client does need the assistance of an .    PHQ-9 scores:  PHQ-9 SCORE 10/19/2018 10/31/2018 2018   PHQ-9 Total Score MyChart 23 21 (Severe depression) 8 (Mild depression)       MOE-7 scores:  MOE-7 SCORE 10/19/2018 10/31/2018 2018   Total Score 18 13 (moderate anxiety) 13 (moderate anxiety)       Answers for HPI/ROS submitted by the patient on 2018   If you checked off any problems, how difficult have these problems made it for you to do your work, take care of things at home, or get along with other people?: Very difficult  PHQ9 TOTAL SCORE: 8  MOE 7 TOTAL SCORE: 13      Patient Identification:  Patient is a 58 year old,    Laotian male  who presents for initial visit with me.  Patient is currently unemployed. Patient attended the session with David MERCADO-  , who they agreed to have interview with. Consent to communicate signed for Jack, patient's Spouse/Partner. Patient prefers to be called: \"Gunner\"    Interim History:    I last saw Gunner Velázquez for outpatient psychiatry Return Visit on 10/31/2018.     During that appointment, we Start Minipress (prazosin) 1 mg by mouth daily at bedtime for nightmares.     Increase Desyrel/Olepto (trazodone) to 150 mg by mouth daily at bedtime for sleep.     Continue Remeron (mirtazapine) 30 mg by mouth daily at bedtime for sleep, mood, anxiety.      Current medications include:   Current Outpatient Medications   Medication Sig     ACE/ARB/ARNI NOT PRESCRIBED, INTENTIONAL, Please choose reason not prescribed, " "below     amLODIPine (NORVASC) 10 MG tablet Take 1 tablet (10 mg) by mouth daily     cholecalciferol (VITAMIN D3) 5000 units CAPS capsule Take 1 per day     fexofenadine (ALLEGRA) 180 MG tablet Take 1 tablet (180 mg) by mouth daily     fish oil-omega-3 fatty acids 1000 MG capsule Take 1 capsule (1 g) by mouth 2 times daily     meclizine (ANTIVERT) 12.5 MG tablet Take 1-2 tablets (12.5-25 mg) by mouth 4 times daily as needed for dizziness     metoprolol succinate ER (TOPROL-XL) 25 MG 24 hr tablet Take 1 tablet (25 mg) by mouth daily     mirtazapine (REMERON) 30 MG tablet Take 1 tablet (30 mg) by mouth At Bedtime     prazosin (MINIPRESS) 1 MG capsule Take 1 capsule (1 mg) by mouth 2 times daily For nightmares.     simvastatin (ZOCOR) 40 MG tablet Take 1 tablet (40 mg) by mouth At Bedtime     traZODone (DESYREL) 150 MG tablet Take 1 tablet (150 mg) by mouth nightly as needed for sleep Increased dose.     No current facility-administered medications for this visit.        The [place] Prescription Monitoring Program has been reviewed and there are no concerns about diversionary activity for controlled substances at this time.  No data.     I was able to review most recent Primary Care Provider, specialty provider, and therapy visit notes that I have access to.     Primary Care Provider increased Minipress (prazosin) to 1 mg BID and restarted metoprolol.  Patient notes he could not follow up earlier due to insurance change.     Gunner Velázquez reports mood has been: \"sometimes pretty bad- also on off and on- sometimes good\" Patient also notes that he can be angry and irritable and aggressive towards family. Grandchildren sometimes do not understand and try to leave him alone.   Anxiety has been: \"still have anxiety\" has flashbacks and worse symptoms related to movies and fighting.   Sleep has been: \"not good- off and on- sometimes do not fall asleep even though tired\" sometimes naps during the day.   Nightmares were " "happening about 6-7 times per month. Now 4-5 times per month.   Appetite and eating has been sometimes low appetite.   PHQ9 and GAD7 scores were reviewed today. Anxiety score has maintained.   Medication side effects: [place]  Continues to work with nephrologist for CKD. Endoscopy / Colonscopy recently done too.   Current stressors include: Symptoms, Medical Comorbidities, Financial Difficulties - recent insurance change, Relationship Difficulties and hard to communicate  Coping mechanisms and supports include: Self help, Family, Therapy just started    Previous medication trials include but not limited to:  Buspar (buspirone) 30 mg BID  Paxil (paroxetine) 30 mg   Desyrel/Olepto (trazodone) 50 -150 mg   Celexa (citalopram)   Zoloft (sertraline)   Remeron (mirtazapine)  Minipress (prazosin)       Past Medical History:   Diagnosis Date     Anxiety      CKD (chronic kidney disease)      Depression      Dizziness      Dyslipidemia      HTN (hypertension)      Hyperlipidaemia      Shortness of breath       has a past medical history of Anxiety, CKD (chronic kidney disease), Depression, Dizziness, Dyslipidemia, HTN (hypertension), Hyperlipidaemia, and Shortness of breath.    Social History:  Current Living situation:  [place], [place] with Family. Feels safe at home.  Current use of drugs or alcohol: Denies - history of alcohol last used 10 years ago. Denies other drug use.   Tobacco use: History of cigarette use- over 10 years ago   Caffeine: No    Vital Signs:   /70 (BP Location: Left arm, Patient Position: Chair, Cuff Size: Adult Regular)   Pulse 61   Temp 97.8  F (36.6  C) (Oral)   Resp 21   Ht 1.676 m (5' 6\")   Wt 70.8 kg (156 lb)   SpO2 100%   BMI 25.18 kg/m      Labs:  Most recent laboratory results reviewed and the pertinent results include:   Office Visit on 12/14/2018   Component Date Value Ref Range Status     WBC 12/14/2018 7.7  4.0 - 11.0 10e9/L Final     RBC Count 12/14/2018 4.51  4.4 - 5.9 10e12/L " Final     Hemoglobin 12/14/2018 12.6* 13.3 - 17.7 g/dL Final     Hematocrit 12/14/2018 37.7* 40.0 - 53.0 % Final     MCV 12/14/2018 84  78 - 100 fl Final     MCH 12/14/2018 27.9  26.5 - 33.0 pg Final     MCHC 12/14/2018 33.4  31.5 - 36.5 g/dL Final     RDW 12/14/2018 13.7  10.0 - 15.0 % Final     Platelet Count 12/14/2018 219  150 - 450 10e9/L Final     Sodium 12/14/2018 136  133 - 144 mmol/L Final     Potassium 12/14/2018 4.0  3.4 - 5.3 mmol/L Final     Chloride 12/14/2018 104  94 - 109 mmol/L Final     Carbon Dioxide 12/14/2018 26  20 - 32 mmol/L Final     Anion Gap 12/14/2018 6  3 - 14 mmol/L Final     Glucose 12/14/2018 94  70 - 99 mg/dL Final     Urea Nitrogen 12/14/2018 35* 7 - 30 mg/dL Final     Creatinine 12/14/2018 2.47* 0.66 - 1.25 mg/dL Final     GFR Estimate 12/14/2018 27* >60 mL/min/1.7m2 Final    Non  GFR Calc     GFR Estimate If Black 12/14/2018 33* >60 mL/min/1.7m2 Final    African American GFR Calc     Calcium 12/14/2018 9.4  8.5 - 10.1 mg/dL Final     Color Urine 12/14/2018 Yellow   Final     Appearance Urine 12/14/2018 Clear   Final     Glucose Urine 12/14/2018 Negative  NEG^Negative mg/dL Final     Bilirubin Urine 12/14/2018 Negative  NEG^Negative Final     Ketones Urine 12/14/2018 Negative  NEG^Negative mg/dL Final     Specific Gravity Urine 12/14/2018 1.010  1.003 - 1.035 Final     Blood Urine 12/14/2018 Moderate* NEG^Negative Final     pH Urine 12/14/2018 5.5  5.0 - 7.0 pH Final     Protein Albumin Urine 12/14/2018 >=300* NEG^Negative mg/dL Final     Urobilinogen Urine 12/14/2018 0.2  0.2 - 1.0 EU/dL Final     Nitrite Urine 12/14/2018 Negative  NEG^Negative Final     Leukocyte Esterase Urine 12/14/2018 Negative  NEG^Negative Final     Source 12/14/2018 Midstream Urine   Final     WBC Urine 12/14/2018 0 - 5  OTO5^0 - 5 /HPF Final     RBC Urine 12/14/2018 5-10* OTO2^O - 2 /HPF Final         Review of Systems:  10 systems (general, cardiovascular, respiratory, eyes, ENT,  endocrine, GI, , M/S, neurological) were reviewed. Most pertinent finding(s) is/are: chronic kidney disease, heart problems- rare chest tightness when anxious and have anxiety attacks, hypertension, no shortness of breath, headaches about 2 times per week alleviated by home remedies and rest, dizziness for the last 3-4 years still 2-3 times per day and can catch himself and sits down- no falls, history of low back pain- worse when eats spicy foods, no blood in urine, peripheral edema over one week ago,  dry mouth, trouble with urinating and starting, itching, frequent urination. The remaining systems are all unremarkable.    Mental Status Examination:  Appearance:  awake, alert, adequate grooming, on time, mild distress, and appeared stated age  Attitude:  cooperative   Eye Contact:  fair and has reading glasses  Gait and Station: Normal, No assistive Devices used, dizziness and most recent fall few months ago at home- no injuries  Psychomotor Behavior:  no evidence of tardive dyskinesia, dystonia, or tics  Oriented to:  time, person, and place  Attention Span and Concentration:  Adequate  Speech:  speaks Mauritian and uses   Mood:  anxious and depressed  Affect:  mood congruent and intensity is blunted  Associations:  no loose associations  Thought Process:  logical, linear and goal oriented  Thought Content:  no evidence of suicidal ideation or homicidal ideation and no evidence of psychotic thought  Recent and Remote Memory:  Short term memory concerns. Continues to be forgetful. Denies long term memory concerns. Not formally assessed. No amnesia.  Fund of Knowledge: appropriate  Insight:  intact  Judgment:  intact and adequate for safety  Impulse Control:  intact      Suicide Risk Assessment:  Today Gunner Velázquez reports still struggling with depression and low mood. Reports that he has felt that life is not worth living, but denies that he wants to kill self and denies a plan. Still worries often about  his health and financial difficulties. In addition, there are notable risk factors for self-harm, including age, anxiety, comorbid medical condition of kidney disease and withdrawing. However, risk is mitigated by commitment to family, sobriety, absence of past attempts, ability to volunteer a safety plan, history of seeking help when needed, future oriented, no access to firearms or weapons, denies suicidal intent or plan, no family history of suicide and denies homicidal ideation, intent, or plan gets hope from family and reports he would be able to reach out to them or his doctor if needed in the future if he had thoughts he would harm himself. Therefore, based on all available evidence including the factors cited above, Gunner Velázquez does not appear to be at imminent risk for self-harm, does not meet criteria for a 72-hr hold, and therefore remains appropriate for ongoing outpatient level of care.  A thorough assessment of risk factors related to suicide and self-harm have been reviewed and are noted above. The patient convincingly denies acute suicidality on several occasions. Local community safety resources reviewed and printed for patient to use if needed. There was no deceit detected, and the patient presented in a manner that was believable.       DSM5  Diagnosis:  296.33 (F33.2) Major Depressive Disorder, Recurrent Episode, Severe   300.02 (F41.1) Generalized Anxiety Disorder  309.81 (F43.10) Posttraumatic Stress Disorder Without dissociative symptoms      Medical comorbidities include:   Patient Active Problem List    Diagnosis Date Noted     Anxiety 06/13/2018     Priority: Medium     PTSD (post-traumatic stress disorder) 05/03/2018     Priority: Medium     Severe depression (H) 05/31/2016     Priority: Medium     High triglycerides 08/11/2015     Priority: Medium     Seasonal allergies 05/04/2015     Priority: Medium     MOE (generalized anxiety disorder) 08/01/2014     Priority: Medium     GERD  (gastroesophageal reflux disease) 08/01/2014     Priority: Medium     Dizziness 08/01/2014     Priority: Medium     Thought secondary to anxiety.  Workup with cardiology, ENT, audiology, [facility], physical therapy, MRI, MRA all negative       Hyperlipidemia with target LDL less than 130 02/21/2014     Priority: Medium     HTN, goal below 140/90 02/21/2014     Priority: Medium     CKD (chronic kidney disease) stage 4, GFR 15-29 ml/min (H) 02/21/2014     Priority: Medium     Nephrologist Dr. Arenas at [facility]         Psychosocial & Contextual Factors:  Occupational Difficulties, Financial Difficulties and Medical Comorbidites    Assessment:  Gunner Velázquez reports ongoing depression, less nightmares but ongoing flashbacks, irritability. Medication side effects and alternatives were reviewed. Health promotion activities recommended and reviewed today. All questions addressed. Education and counseling completed regarding risks and benefits of medications and psychotherapy options. Therapy should continue.     Symptoms continue to affect his functioning at home and has not been able to work for years.   Continue to monitor memory- short term memory concerns specifically. Wife sets up alarms and helps with medications.   Patient has chronic kidney disease stage 4 so will need to closely monitor with medication changes. Most recent lab results showed diminished kidney functioning. Continue to monitor. Patient may need to start dialysis and consider kidney transplant.   Patient is in the process of applying for disability due to his condition and he should continue to work with those legal specialists and disability assessors.  Patient will also have no more insurance coverage for transportation.   He would benefit from care coordination.   Patient has made limited  improvements with medication changes.   Patient notes ongoing worries about finances- his co-pays for insurance now will increase  from $15 to $25 and this is too expensive.   Can increase Remeron (mirtazapine) dosing before considering changing medication.   Another option may be an SNRI such as Effexor (venlafaxine) or Cymbalta (duloxetine).    Seroquel (quetiapine) would likely help with Post-traumatic stress disorder (PTSD) and severe insomnia, but can affect his kidney functioning.   Will increase Desyrel/Olepto (trazodone) dosing.   Patient did go to court and will hear back in January or February.     Treatment Plan:    Increase Remeron (mirtazapine) to 45 mg by mouth daily at bedtime for sleep, mood, anxiety.     Increase Desyrel/Olepto (trazodone) to 200 mg by mouth daily at bedtime for sleep.     Continue Minipress (prazosin) 1 mg by mouth daily in AM and PM for nightmares.     Continue all other medications as reviewed per electronic medical record today.     Safety plan reviewed. To the Emergency Department as needed or call after hours crisis line at [phone] or [phone]. [place] Crisis Text Line. Text [place] to 649665 or Suicide LifeLine Chat: suicidepreventionlifeline.org/chat    Continue individual therapy as planned with Corrina.    Schedule an appointment with me in 3 months or sooner as needed. Call [facility] at [phone] to schedule.    Follow up with primary care provider as planned or for acute medical concerns.    Call the psychiatric nurse line with medication questions or concerns at [phone].    Composeright may be used to communicate with your provider, but this is not intended to be used for emergencies.    Administrative Billing:   Time spent with patient and  was 30 minutes and greater than 50% of time or 30 minutes was spent in counseling and coordination of care regarding above diagnoses and treatment plan. Interactive complexity due to communicating with  and coordinating additional referrals and care for comorbid stressors and health conditions.     Patient  Status:  Patient will continue to be seen for ongoing consultation and stabilization.    Signed:   Promise Aranda, PhD, APRN, CNP   Psychiatry

## 2018-12-21 ENCOUNTER — OFFICE VISIT (OUTPATIENT)
Dept: PSYCHIATRY | Facility: CLINIC | Age: 58
End: 2018-12-21
Payer: COMMERCIAL

## 2018-12-21 ENCOUNTER — OFFICE VISIT (OUTPATIENT)
Dept: BEHAVIORAL HEALTH | Facility: CLINIC | Age: 58
End: 2018-12-21
Payer: COMMERCIAL

## 2018-12-21 VITALS
WEIGHT: 156 LBS | DIASTOLIC BLOOD PRESSURE: 70 MMHG | OXYGEN SATURATION: 100 % | BODY MASS INDEX: 25.07 KG/M2 | HEIGHT: 66 IN | HEART RATE: 61 BPM | RESPIRATION RATE: 21 BRPM | TEMPERATURE: 97.8 F | SYSTOLIC BLOOD PRESSURE: 132 MMHG

## 2018-12-21 DIAGNOSIS — F41.1 GAD (GENERALIZED ANXIETY DISORDER): Primary | ICD-10-CM

## 2018-12-21 DIAGNOSIS — F43.10 PTSD (POST-TRAUMATIC STRESS DISORDER): ICD-10-CM

## 2018-12-21 DIAGNOSIS — F32.2 SEVERE DEPRESSION (H): ICD-10-CM

## 2018-12-21 PROCEDURE — 99214 OFFICE O/P EST MOD 30 MIN: CPT | Performed by: NURSE PRACTITIONER

## 2018-12-21 PROCEDURE — 90832 PSYTX W PT 30 MINUTES: CPT | Performed by: COUNSELOR

## 2018-12-21 RX ORDER — TRAZODONE HYDROCHLORIDE 100 MG/1
200 TABLET ORAL
Qty: 180 TABLET | Refills: 1 | Status: SHIPPED | OUTPATIENT
Start: 2018-12-21 | End: 2019-03-15

## 2018-12-21 RX ORDER — MIRTAZAPINE 45 MG/1
45 TABLET, FILM COATED ORAL AT BEDTIME
Qty: 90 TABLET | Refills: 1 | Status: SHIPPED | OUTPATIENT
Start: 2018-12-21 | End: 2019-03-15

## 2018-12-21 ASSESSMENT — ANXIETY QUESTIONNAIRES
7. FEELING AFRAID AS IF SOMETHING AWFUL MIGHT HAPPEN: SEVERAL DAYS
4. TROUBLE RELAXING: NEARLY EVERY DAY
1. FEELING NERVOUS, ANXIOUS, OR ON EDGE: SEVERAL DAYS
5. BEING SO RESTLESS THAT IT IS HARD TO SIT STILL: NEARLY EVERY DAY
GAD7 TOTAL SCORE: 13
2. NOT BEING ABLE TO STOP OR CONTROL WORRYING: SEVERAL DAYS
7. FEELING AFRAID AS IF SOMETHING AWFUL MIGHT HAPPEN: SEVERAL DAYS
3. WORRYING TOO MUCH ABOUT DIFFERENT THINGS: SEVERAL DAYS
GAD7 TOTAL SCORE: 13
GAD7 TOTAL SCORE: 13
6. BECOMING EASILY ANNOYED OR IRRITABLE: NEARLY EVERY DAY

## 2018-12-21 ASSESSMENT — MIFFLIN-ST. JEOR: SCORE: 1470.36

## 2018-12-21 ASSESSMENT — PAIN SCALES - GENERAL: PAINLEVEL: NO PAIN (0)

## 2018-12-21 ASSESSMENT — PATIENT HEALTH QUESTIONNAIRE - PHQ9
SUM OF ALL RESPONSES TO PHQ QUESTIONS 1-9: 8
SUM OF ALL RESPONSES TO PHQ QUESTIONS 1-9: 8
10. IF YOU CHECKED OFF ANY PROBLEMS, HOW DIFFICULT HAVE THESE PROBLEMS MADE IT FOR YOU TO DO YOUR WORK, TAKE CARE OF THINGS AT HOME, OR GET ALONG WITH OTHER PEOPLE: VERY DIFFICULT

## 2018-12-21 NOTE — PATIENT INSTRUCTIONS
Treatment Plan:    Increase Remeron (mirtazapine) to 45 mg by mouth daily at bedtime for sleep, mood, anxiety.     Increase Desyrel/Olepto (trazodone) to 200 mg by mouth daily at bedtime for sleep.     Continue Minipress (prazosin) 1 mg by mouth daily in AM and PM for nightmares.     Continue all other medications as reviewed per electronic medical record today.     Safety plan reviewed. To the Emergency Department as needed or call after hours crisis line at [phone] or [phone]. [place] Crisis Text Line. Text [place] to 961210 or Suicide LifeLine Chat: suicidepreventionlifeline.org/chat    Continue individual therapy as planned with Corrina.    Schedule an appointment with me in 3 months or sooner as needed. Call [facility] at [phone] to schedule.    Follow up with primary care provider as planned or for acute medical concerns.    Call the psychiatric nurse line with medication questions or concerns at [phone].    MEDSEEKhart may be used to communicate with your provider, but this is not intended to be used for emergencies.

## 2018-12-21 NOTE — NURSING NOTE
"Chief Complaint   Patient presents with     Recheck Medication     very little improvment, minipress is helping with nightmares.      initial /70 (BP Location: Left arm, Patient Position: Chair, Cuff Size: Adult Regular)   Pulse 61   Temp 97.8  F (36.6  C) (Oral)   Resp 21   Ht 1.676 m (5' 6\")   Wt 70.8 kg (156 lb)   SpO2 100%   BMI 25.18 kg/m   Estimated body mass index is 25.18 kg/m  as calculated from the following:    Height as of this encounter: 1.676 m (5' 6\").    Weight as of this encounter: 70.8 kg (156 lb)..  bp completed using cuff size regular    "

## 2018-12-21 NOTE — PROGRESS NOTES
Progress Note    Client Name: Gunner Whytegchanh  Date: 12/21/2018           Service Type: Individual      Session Start Time: 1:32pm  Session End Time: 2:07pm       Session Length: 35 mins      Session #: 7     Attendees: Client and     Treatment Plan Last Reviewed: 9/19/18  PHQ-9 / MOE-7 : PHQ9=8 GAD7=13     DATA      Progress Since Last Session (Related to Symptoms / Goals / Homework):   Symptoms: Improving Client reported symptoms have improved some due to medication     Homework: Partially completed      Episode of Care Goals: Minimal progress - CONTEMPLATION (Considering change and yet undecided); Intervened by assessing the negative and positive thinking (ambivalence) about behavior change     Current / Ongoing Stressors and Concerns:   Health, finances, past trauma, mood concerns      Treatment Objective(s) Addressed in This Session:   Finding a positive for each day to help create positive thinking habits  Use known distractions when negative thoughts or flashbacks surface  Use deep breathing for flashbacks and pain      Intervention:   CBT: breathing, CBT triangle         ASSESSMENT: Current Emotional / Mental Status (status of significant symptoms):   Risk status (Self / Other harm or suicidal ideation)   Client denies current fears or concerns for personal safety.   Client denies current or recent suicidal ideation or behaviors.   Client denies current or recent homicidal ideation or behaviors.   Client denies current or recent self injurious behavior or ideation.   Client denies other safety concerns.   Client Client reports there has been no change in risk factors since their last session.     Client Client reports there has been no change in protective factors since their last session.     A safety and risk management plan has been developed including: Client consented to co-developed safety plan.  [facility]'s safety and risk management plan was  completed.  Client agreed to use safety plan should any safety concerns arise.  A copy was given to the patient.     Appearance:   Appropriate    Eye Contact:   Fair    Psychomotor Behavior: Catatonic    Attitude:   Cooperative    Orientation:   All   Speech    Rate / Production: Monotone  Slow     Volume:  Soft    Mood:    Depressed    Affect:    Flat    Thought Content:  Clear  Rumination    Thought Form:  Coherent    Insight:    Poor      Medication Review:   No changes to current psychiatric medication(s)     Medication Compliance:   Yes     Changes in Health Issues:   Yes: Pain, Associated Psychological Distress     Chemical Use Review:   Substance Use: Chemical use reviewed, no active concerns identified      Tobacco Use: No current tobacco use.       Collateral Reports Completed:   Not Applicable    PLAN: (Client Tasks / Therapist Tasks / Other)  Client reported some improvement with his medications  Client stated he sometimes has good weeks where he eats 3x per day and showers 3x per week and other weeks are not so good  Provider reminded client about CBT triangle and encouraged client to use known distractions when flashbacks and negative thoughts surface  Provider assigned client to think of one positive for each day to help positive thinking become habit  Provider modeled dep breathing in session for client  Provider and client practiced deep breathing in session to help with flashbacks and pain management         Corrina Farooq, [facility] 12/21/2018                                                                                                         ________________________________________________________________________      Treatment Plan      Client's Name: Gunner Velázquez                                    YOB: 1960      Date: 9/19/2018          DSM-V Diagnoses: 296.33 (F33.2) Major Depressive Disorder, Recurrent Episode, Severe _, 300.02 (F41.1) Generalized Anxiety  Disorder or 309.81 (F43.10) Posttraumatic Stress Disorder (includes Posttraumatic Stress Disorder for Children 6 Years and Younger)  With delayed expression  Psychosocial / Contextual Factors: trauma from [place], medical issues, finances, work , relationships   WHODAS: 54      Referral / Collaboration:  The following referral(s) was/were discussed but client declines follow up at this time. day treatment, partial hospitalization. Clients level of need presents as very high.       Anticipated number of session or this episode of care: 12-18          MeasurableTreatment Goal(s) related to diagnosis / functional impairment(s)  Goal 1: Client will score less than 15 on PHQ9.    I will know I've met my goal when everything feels better and good. I would socialize more with my kids and my wife        Objective #A (Client Action)                                    Client will Identify negative self-talk and behaviors: challenge core beliefs, myths, and actions.  Status: New - Date: 9/19/18       Intervention(s)  Therapist will teach thought labeling helpful or unhelpful. .      Objective #B  Client will Decrease thoughts that you'd be better off dead or of suicide / self-harm.  Status: New - Date: 9/19/18       Intervention(s)  Therapist will teach emotional regulation skills. This will include positive thinking, deep breathing and fact checking. .                  Goal 2: Client will score less than 15 on GAD7.    I will know I've met my goal when everything feels better and good.         Objective #A (Client Action)                                    Status: New - Date: 9/19/18       Client will use thought-stopping strategy daily to reduce intrusive thoughts.      Intervention(s)  Therapist will teach emotional recognition/identification. This will include using thought labeling to indentify distorted thought patterns.      Objective #B  Client will track and record at least 1 pleasant exchanges with family  members.                                      Status: New - Date: 9/19/18       Intervention(s)  Therapist will give client examples of ways he can interact with his family. Provider will check in each session to ask about client efforts on this. .          Client has reviewed and agreed to the above plan.          Corrina Farooq, [facility]                                      September 19, 2018

## 2018-12-22 ASSESSMENT — PATIENT HEALTH QUESTIONNAIRE - PHQ9: SUM OF ALL RESPONSES TO PHQ QUESTIONS 1-9: 8

## 2018-12-22 ASSESSMENT — ANXIETY QUESTIONNAIRES: GAD7 TOTAL SCORE: 13

## 2018-12-26 ENCOUNTER — PATIENT OUTREACH (OUTPATIENT)
Dept: CARE COORDINATION | Facility: CLINIC | Age: 58
End: 2018-12-26

## 2018-12-26 ASSESSMENT — ACTIVITIES OF DAILY LIVING (ADL): DEPENDENT_IADLS:: TRANSPORTATION

## 2018-12-26 NOTE — LETTER
[facility]  Complex Care Plan  About Me  Patient Name:  Gunner Velázquez    YOB: 1960  Age:     58 year old   [place] MRN:   [id number] Telephone Information:  Home Phone [phone]   Mobile [phone]       Address:    [address] [place] [address] Email address:  No e-mail address on record      Emergency Contact(s)  Name Relationship Lgl Grd Work Phone Home Phone Mobile Phone   Marcell. MILLY, AWAIS* Spouse   [phone]           Primary language:  Slovak     needed? Yes   [place] [place] [facility]:  [phone] op. 1  Other communication barriers: English as a second language, Language barrier  Preferred Method of Communication:  Mail  Current living arrangement: I live in a private home with family  Mobility Status/ Medical Equipment:      Health Maintenance  Health Maintenance Reviewed: Due/Overdue     My Access Plan  Medical Emergency 911   Primary Clinic Line [facility] [place]  [phone]   24 Hour Appointment Line [phone] or  [phone]) (toll-free)   24 Hour Nurse Line [phone] (toll-free)   Preferred Urgent Care [facility], [phone]   Preferred Hospital [facility]  [phone]   Preferred Pharmacy [id number]  [place], [place] - [address] Behavioral Health Crisis Line The National Suicide Prevention Lifeline at 1-846.460.6342 or 911     My Care Team Members    Patient Care Team       Relationship Specialty Notifications Start End    Vinnie Brown MD PCP - General Internal Medicine  10/9/18     Phone: [phone] Pager: [phone] Fax: [phone]        [address] [facility] [address]    Vinnie Brown MD PCP - Assigned PCP   10/21/18     Phone: [phone] Pager: [phone] Fax: [phone]        [address] [facility] [address]    Keisha Paris LGSW Lead Care Coordinator   12/26/18             My  Care Plans  Self Management and Treatment Plan  Goals and (Comments)  Goals        General    Financial Wellbeing (pt-stated)     Notes - Note created  12/26/2018  1:29 PM by Keisha Paris LGSW    Goal Statement: I want to access financial resources from the [facility].  Measure of Success: Application for assistance submitted to [facility].  Supportive Steps to Achieve: Complete intake with [facility], submit application, and accept resources.   Barriers: Unclear if Pt is eligible, English as a second language, persistent mental health needs.  Strengths: Recognition of need, strong support system.  Date to Achieve By: 3.1.2019  Patient expressed understanding of goal: Pt reports understanding and denies any additional questions or concerns at this times. CRISTINA CC engaged in AIDET communication during encounter.                 Action Plans on File:            Depression          Advance Care Plans/Directives Type:        My Medical and Care Information  Problem List   Patient Active Problem List   Diagnosis     Hyperlipidemia with target LDL less than 130     HTN, goal below 140/90     CKD (chronic kidney disease) stage 4, GFR 15-29 ml/min (H)     MOE (generalized anxiety disorder)     GERD (gastroesophageal reflux disease)     Dizziness     Seasonal allergies     High triglycerides     Severe depression (H)     PTSD (post-traumatic stress disorder)     Anxiety      Current Medications and Allergies:  See printed Medication Report.    Care Coordination Start Date: 12/26/2018   Frequency of Care Coordination:     Form Last Updated: 12/26/2018

## 2018-12-26 NOTE — PROGRESS NOTES
Clinic Care Coordination Contact    Clinic Care Coordination Contact  OUTREACH    Referral Information:  Referral Source: Behavioral Health Clinician    Primary Diagnosis: Behavioral Health    Chief Complaint   Patient presents with     Clinic Care Coordination - Initial     Resource navigation      Universal Utilization: Appropriate per chart review. No noted concerns.    Clinic Utilization  Difficulty keeping appointments:: No  Utilization    Last refreshed: 12/21/2018  2:50 PM:  Hospital Admissions 0           Last refreshed: 12/21/2018  2:50 PM:  ED Visits 0           Last refreshed: 12/21/2018  2:50 PM:  No Show Count (past year) 0              Current as of: 12/21/2018  2:50 PM            Clinical Concerns:  Current Medical Concerns:  CKD and HTN.    Current Behavioral Concerns: Patient has diagnoses of Generalized anxiety disorder, PSTD, and Severe depression.  Education Provided to patient: Role of CRISTINA ARMENTA.   Health Maintenance Reviewed: Due/Overdue    Medication Management:  Independent.     Functional Status:  Dependent ADLs: Independent  Dependent IADLs: Transportation    Living Situation:  Current living arrangement: I live in a private home with family  Type of residence: Private home - stairs    Diet/Exercise/Sleep:  Diet: Regular  Inadequate nutrition: No  Food Insecurity: No  Tube Feeding: No  Exercise: Currently not exercising  Inadequate activity/exercise: No  Significant changes in sleep pattern: No    Transportation:  Transportation means: Family, Friend  Patient endorses that he will be losing access to transportation through his insurance next year due to a change in insurance. However, Pt reports that his new insurance will be [employer], which should cover the cost of transportation to/from medical appointments. Patient plans to contact his new insurer to clarify. Pt reports that his wife would drive him, but she works.      Psychosocial:  Hoahaoism or spiritual beliefs that impact treatment:  No  Mental health DX: Yes  Informal Support system: Family  Patient is seeing both [place] [facility] NP and [facility]. Pt reports that he is having difficulty getting out of the house and spends most of his time at home and doesn't see other people.    Patient states that he is in the process of reapplying for SSDI. He has previously been denied SSDI two times. Patient is interested in general assistance, financial assistance, transportation resources, and prescription assistance.     Financial/Insurance:   Financial/Insurance concerns: Yes  Pt reports that his insurance will be changing next year. He will clarify benefits by calling his new insurer.      Resources and Interventions:  Community Resources: OP Mental Health  Supplies used at home: None    SW CC will send Pt a letter in the mail with the information to access financial resources through [facility], transportation options in [facility], [facility], and the  services line.     Advance Care Plan/Directive  Advanced Care Plans/Directives on file: No  Advanced Care Plan/Directive Status: Considering Options     Goals:   Goals        General    Financial Wellbeing (pt-stated)     Notes - Note created  12/26/2018  1:29 PM by Keisha Paris LGSW    Goal Statement: I want to access financial resources from the [facility].  Measure of Success: Application for assistance submitted to [facility].  Supportive Steps to Achieve: Complete intake with [facility], submit application, and accept resources.   Barriers: Unclear if Pt is eligible, English as a second language, persistent mental health needs.  Strengths: Recognition of need, strong support system.  Date to Achieve By: 3.1.2019  Patient expressed understanding of goal: Pt reports understanding and denies any additional questions or concerns at this times. CRISTINA CC engaged in AIDET communication during encounter.              Patient/Caregiver understanding: Pt reports  understanding and denies any additional questions or concerns at this times. SW CC engaged in AIDET communication during encounter.    Future Appointments              In 2 months [facility] [place]    In 2 months Promise Aranda, NP [place], [place]    In 2 months Vinnie Brown MD [place], [place]        Plan: CRISTINA CC will follow-up with Pt in one month to determine if Pt has been able to access any resources and clarify transportation availability through his 2019 insurer.    Keisha Campa, [facility]  Clinic Care Coordinator  Ph. [phone]  [email or web address]

## 2018-12-26 NOTE — LETTER
[facility]  [address]  [facility] [address]  December 26, 2018    Gunner Douangchanh  [address] [place] [address]      Dear Gunner,    I am a clinic care coordinator who works with Vinnie Brown MD at the [facility]. Thank you for spending the time to talk with me.  I wanted to introduce myself and provide you with my contact information so that you can call me with questions or concerns about your health care. I have also included a flyer about clinic care coordination and how I can further assist you.     I have included various resources I think would be helpful. Please use the free interpreting line ([phone]) to contact these resources and set up appointments.     Please feel free to contact me at [phone], with any questions or concerns. We at [facility] are focused on providing you with the highest-quality healthcare experience possible and that all starts with you.     Sincerely,     Keisha Campa, [facility]  Clinic Care Coordinator  Ph. [phone]  [email or web address]    Enclosed: I have enclosed a copy of the Complex Care Plan. This has helpful information and goals that we have talked about. Please keep this in an easy to access place to use as needed. and I have enclosed helpful educational material. Please review and call me with any questions.

## 2018-12-31 DIAGNOSIS — R42 DIZZINESS: ICD-10-CM

## 2018-12-31 NOTE — TELEPHONE ENCOUNTER
"Requested Prescriptions   Pending Prescriptions Disp Refills     meclizine (ANTIVERT) 12.5 MG tablet [Pharmacy Med Name: Meclizine HCl Oral Tablet 12.5 MG]  Last Written Prescription Date:  12/3/2018  Last Fill Quantity: 30 tablet,  # refills: 0   Last office visit: 6/13/2018 with prescribing provider:  Lee     Future Office Visit:   Next 5 appointments (look out 90 days)    Mar 15, 2019 11:30 AM CDT  [facility] ([facility]) [address] [place] [address]  [phone]   Mar 15, 2019 12:45 PM CDT  Return Visit with Promise Aranda NP  [facility] [address]  [facility] [address] [phone]   Mar 15, 2019  1:40 PM CDT  SHORT with Vinnie Brown MD  [facility] ([facility] [place]) [address] [place] [address] [phone]            30 tablet 0     Sig: Take 1-2 tablets (12.5-25 mg) by mouth 4 times daily as needed for dizziness     Antivertigo/Antiemetic Agents Passed - 12/31/2018 10:15 AM       Passed - Recent (12 mo) or future (30 days) visit within the authorizing provider's specialty    Patient had office visit in the last 12 months or has a visit in the next 30 days with authorizing provider or within the authorizing provider's specialty.  See \"Patient Info\" tab in inbasket, or \"Choose Columns\" in Meds & Orders section of the refill encounter.             Passed - Patient is 18 years of age or older          "

## 2019-01-02 RX ORDER — MECLIZINE HCL 12.5 MG 12.5 MG/1
12.5-25 TABLET ORAL 4 TIMES DAILY PRN
Qty: 30 TABLET | Refills: 0 | Status: SHIPPED | OUTPATIENT
Start: 2019-01-02 | End: 2019-02-01

## 2019-02-01 DIAGNOSIS — F32.2 SEVERE DEPRESSION (H): ICD-10-CM

## 2019-02-01 DIAGNOSIS — R42 DIZZINESS: ICD-10-CM

## 2019-02-01 RX ORDER — TRAZODONE HYDROCHLORIDE 150 MG/1
150 TABLET ORAL
Qty: 30 TABLET | Refills: 0 | OUTPATIENT
Start: 2019-02-01

## 2019-02-01 NOTE — TELEPHONE ENCOUNTER
Writer called pharmacy to inquire about available refills. According to our records, patient's initial trazodone Rx had  which should last 90 days and 1 refill on that Rx. Pharmacy staff confirmed patient has refill available.     Refusing request.    Gely Chairez RN on 2/1/2019 at 2:09 PM

## 2019-02-01 NOTE — TELEPHONE ENCOUNTER
"Requested Prescriptions   Pending Prescriptions Disp Refills     meclizine (ANTIVERT) 12.5 MG tablet [Pharmacy Med Name: Meclizine HCl Oral Tablet 12.5 MG] 30 tablet 0    Last Written Prescription Date:  01/02/2019  Last Fill Quantity: 30,  # refills: 0   Last office visit: 12/14/2018 with prescribing provider:     Future Office Visit:   Next 5 appointments (look out 90 days)    Mar 15, 2019 11:30 AM CDT  [facility]  [facility] [address] [place] [address]  [phone]   Mar 15, 2019 12:45 PM CDT  Return Visit with Promise Aranda NP  [facility] [address]  [address]  [facility] [address]  [phone]   Mar 15, 2019  1:40 PM CDT  SHORT with Vinnie Brown MD  [facility] ([facility] [place]) [address] [place] [address] [phone]        Sig: Take 1-2 tablets (12.5-25 mg) by mouth 4 times daily as needed for dizziness     Antivertigo/Antiemetic Agents Passed - 2/1/2019 12:15 PM       Passed - Recent (12 mo) or future (30 days) visit within the authorizing provider's specialty    Patient had office visit in the last 12 months or has a visit in the next 30 days with authorizing provider or within the authorizing provider's specialty.  See \"Patient Info\" tab in inbasket, or \"Choose Columns\" in Meds & Orders section of the refill encounter.             Passed - Medication is active on med list       Passed - Patient is 18 years of age or older        "

## 2019-02-05 RX ORDER — MECLIZINE HCL 12.5 MG 12.5 MG/1
12.5-25 TABLET ORAL 4 TIMES DAILY PRN
Qty: 30 TABLET | Refills: 5 | Status: SHIPPED | OUTPATIENT
Start: 2019-02-05 | End: 2019-11-25

## 2019-02-08 DIAGNOSIS — E78.5 HYPERLIPIDEMIA WITH TARGET LDL LESS THAN 130: ICD-10-CM

## 2019-02-08 NOTE — TELEPHONE ENCOUNTER
"Requested Prescriptions   Pending Prescriptions Disp Refills     simvastatin (ZOCOR) 40 MG tablet  Last Written Prescription Date:  1/29/18  Last Fill Quantity: 90,  # refills: 3   Last office visit: 12/14/2018 with prescribing provider:  Stephanie   Future Office Visit:   Next 5 appointments (look out 90 days)    Mar 15, 2019 11:30 AM CDT  Return Visit [facility] Corrina Farooq [facility] [address] [facility] [address] [phone]   Mar 15, 2019 12:45 PM CDT  Return Visit with Promise [facility]anda NP  [facility] [address]  [address]  [facility] [address] [phone]   Mar 15, 2019  1:40 PM CDT  SHORT with Vinnie Brown MD  [facility] [address] [place] [address] [phone]          90 tablet 3     Sig: Take 1 tablet (40 mg) by mouth At Bedtime    Statins Protocol Failed - 2/8/2019 10:44 AM       Failed - LDL on file in past 12 months    Recent Labs   Lab Test 01/29/18  1420   *            Passed - No abnormal creatine kinase in past 12 months    No lab results found.            Passed - Recent (12 mo) or future (30 days) visit within the authorizing provider's specialty    Patient had office visit in the last 12 months or has a visit in the next 30 days with authorizing provider or within the authorizing provider's specialty.  See \"Patient Info\" tab in inbasket, or \"Choose Columns\" in Meds & Orders section of the refill encounter.             Passed - Medication is active on med list       Passed - Patient is age 18 or older          "

## 2019-02-11 RX ORDER — SIMVASTATIN 40 MG
40 TABLET ORAL AT BEDTIME
Qty: 90 TABLET | Refills: 0 | Status: SHIPPED | OUTPATIENT
Start: 2019-02-11 | End: 2019-03-15

## 2019-02-11 NOTE — TELEPHONE ENCOUNTER
Future visit scheduled.  Prescription approved per [facility] Refill Protocol.  Tierney James RN

## 2019-03-02 DIAGNOSIS — F43.10 PTSD (POST-TRAUMATIC STRESS DISORDER): ICD-10-CM

## 2019-03-04 ENCOUNTER — PATIENT OUTREACH (OUTPATIENT)
Dept: CARE COORDINATION | Facility: CLINIC | Age: 59
End: 2019-03-04

## 2019-03-04 RX ORDER — MIRTAZAPINE 30 MG/1
30 TABLET, FILM COATED ORAL AT BEDTIME
Qty: 30 TABLET | Refills: 0 | OUTPATIENT
Start: 2019-03-04

## 2019-03-04 ASSESSMENT — ACTIVITIES OF DAILY LIVING (ADL): DEPENDENT_IADLS:: TRANSPORTATION

## 2019-03-04 NOTE — TELEPHONE ENCOUNTER
"Refill for: mirtazipine    Last Appointment: 12/21/18    Next Appointment: 3/15/19    No Shows/Cancellations since last appointment:  none    Last Refill in Epic (date and amount/how many days):    Disp Refills Start End JOSE   mirtazapine (REMERON) 45 MG tablet 90 tablet 1 12/21/2018  No   Sig - Route: Take 1 tablet (45 mg) by mouth At Bedtime Increased dose. For mood, sleep, and anxiety. - Oral     Refill request refused as dosage is not what patient is currently taking.    As of 12/21/18 patient is taking \"Remeron (mirtazapine) to 45 mg by mouth daily at bedtime for sleep, mood, anxiety.\"      Gely Chairez RN on 3/4/2019 at 8:21 AM    "

## 2019-03-04 NOTE — PROGRESS NOTES
Clinic Care Coordination Contact    Clinic Care Coordination Contact  OUTREACH    Call placed to Patient with the assistance of interpretive services. Patient reports that he was denied for county assistance d/t Pt and spouse making too much money. Pt reports that instead of being [place], Pt has a [employer] insurance. CRISTINA CC instructed Pt to bring his insurance card in on 3/15 for his appointment with PCP so that his insurance card can be scanned. Pt is in agreement with this plan.     Pt's previous concern with attending appointments was difficulty getting transportation. Pt reports that his spouse is taking the day off on 3/15 so she can drive Pt to his appointment.     Pt continues to appeal and apply for SSDI.    Goals:   Goals        General    Psychosocial (pt-stated)     Notes - Note created  3/4/2019 11:46 AM by Keisha Paris LGSW    Goal Statement: I want to get SSDI.  Measure of Success: Be approved for SSDI and certified disabled.  Supportive Steps to Achieve: Continue to apply for SSDI, SMRT assessment, appeals if appropriate.  Barriers: Unclear if Pt is eligible as he has been denied SSDI X2. Pt's primary language is not english.  Strengths: Support system of family and care team. Motivated to apply and appeal for services if necessary.  Date to Achieve By: 6.30.2019  Patient expressed understanding of goal: Pt reports understanding and denies any additional questions or concerns at this times. CRISTINA CC engaged in AIDET communication during encounter.               Future Appointments              In 1 week Corrina Farooq [facility] [place]    In 1 week Promise Aranda NP [place], [place]    In 1 week Vinnie Brown MD [place]          Plan: CRISTINA ARMENTA will follow-up with Pt in one month.    SOBIA Callejas  Clinic Care Coordinator  Ph. [phone]  [email or web address]

## 2019-03-14 NOTE — PROGRESS NOTES
"    Outpatient Psychiatric Progress Note    Name: Gunner Velázquez   : 1960                    Primary Care Provider: Vinnie Brown MD - last visit 2018  Therapist: Corrina Farooq - last visit 3/15/2019  [facility] Kathleen Arenas - last visit 2019  [facility]  Janay Luis - fax [phone]  Client identified their preferred language to be Luxembourgish.  Client needs the assistance of an - this was not scheduled for the client and we needed to attempt professional phone interpretation  [facility] last outreach 3/4/2019  [facility] denied for financial assistance to due income level    PHQ-9 scores:  PHQ-9 SCORE 10/31/2018 2018 3/15/2019   PHQ-9 Total Score 21 8 19       MOE-7 scores:  MOE-7 SCORE 10/31/2018 2018 3/15/2019   Total Score 13 13 17       Patient Identification:  Patient is a 58 year old,    Laotian male  who presents for initial visit with me.  Patient is currently unemployed. Patient attended the session alone. Consent to communicate signed for Jack, patient's Spouse/Partner. Patient prefers to be called: \"Gunner\"    Interim History:    I last saw Gunner Velázquez for outpatient psychiatry Return Visit on 2018.     During that appointment, we Increase Remeron (mirtazapine) to 45 mg by mouth daily at bedtime for sleep, mood, anxiety.     Increase Desyrel/Olepto (trazodone) to 200 mg by mouth daily at bedtime for sleep.     Continue Minipress (prazosin) 1 mg by mouth daily in AM and PM for nightmares.      Current medications include:   Current Outpatient Medications   Medication Sig     ACE/ARB/ARNI NOT PRESCRIBED, INTENTIONAL, Please choose reason not prescribed, below     amLODIPine (NORVASC) 10 MG tablet Take 1 tablet (10 mg) by mouth daily     cholecalciferol (VITAMIN D3) 5000 units CAPS capsule Take 1 per day     fexofenadine (ALLEGRA) 180 MG tablet Take 1 tablet (180 mg) by mouth daily     fish " "oil-omega-3 fatty acids 1000 MG capsule Take 1 capsule (1 g) by mouth 2 times daily     meclizine (ANTIVERT) 12.5 MG tablet Take 1-2 tablets (12.5-25 mg) by mouth 4 times daily as needed for dizziness     metoprolol succinate ER (TOPROL-XL) 25 MG 24 hr tablet Take 1 tablet (25 mg) by mouth daily     mirtazapine (REMERON) 45 MG tablet Take 1 tablet (45 mg) by mouth At Bedtime Increased dose. For mood, sleep, and anxiety.     prazosin (MINIPRESS) 1 MG capsule Take 1 capsule in AM and 2 capsules in PM. For nightmares.     simvastatin (ZOCOR) 40 MG tablet Take 1 tablet (40 mg) by mouth At Bedtime     tolterodine (DETROL) 1 MG tablet      traZODone (DESYREL) 100 MG tablet Take 2 tablets (200 mg) by mouth nightly as needed for sleep Increased dose.     No current facility-administered medications for this visit.        The [place] Prescription Monitoring Program has been reviewed and there are no concerns about diversionary activity for controlled substances at this time.      I was able to review most recent Primary Care Provider, specialty provider, and therapy visit notes that I have access to.   No in person  was available for this appointment, so needed to call  services via phone.     Gunner Velázquez reports mood has been: \"better\"  Anxiety has been: \"okay\"  Sleep has been: \"better and longer\" nightmares continue  PHQ9 and GAD7 scores were reviewed today.   Medication side effects: dizziness and no falls  Current stressors include: Symptoms, Medical Comorbidities, Financial Difficulties - recent insurance change, Relationship Difficulties and hard to communicate  Coping mechanisms and supports include: Self help, Family, Therapy just started     Previous medication trials include but not limited to:  Buspar (buspirone) 30 mg BID  Paxil (paroxetine) 30 mg   Desyrel/Olepto (trazodone) 50 -150 mg   Celexa (citalopram)   Zoloft (sertraline)   Remeron (mirtazapine)  Minipress (prazosin)     Past " Medical History:   Diagnosis Date     Anxiety      CKD (chronic kidney disease)      Depression      Dizziness      Dyslipidemia      HTN (hypertension)      Hyperlipidaemia      Shortness of breath       has a past medical history of Anxiety, CKD (chronic kidney disease), Depression, Dizziness, Dyslipidemia, HTN (hypertension), Hyperlipidaemia, and Shortness of breath.    Social History:  Current Living situation:  [place], [place] with Family. Feels safe at home.  Current use of drugs or alcohol: Denies - history of alcohol last used 10 years ago. Denies other drug use.   Tobacco use: History of cigarette use- over 10 years ago   Caffeine: No    Vital Signs:   Reviewed per Epic.    Labs:  Most recent laboratory results reviewed and the pertinent results reviewed per [facility] care everywhere.     Review of Systems:  10 systems (general, cardiovascular, respiratory, eyes, ENT, endocrine, GI, , M/S, neurological) were reviewed. Most pertinent finding(s) is/are: chronic kidney disease, heart problems- rare chest tightness when anxious and have anxiety attacks, hypertension, no shortness of breath, headaches about 2 times per week alleviated by home remedies and rest, dizziness for the last 3-4 years still 2-3 times per day and can catch himself and sits down- no falls, history of low back pain- worse when eats spicy foods, no blood in urine, peripheral edema over one week ago,  dry mouth, trouble with urinating and starting, itching, frequent urination. The remaining systems are all unremarkable.     Mental Status Examination:  Appearance:  awake, alert, adequate grooming, on time, no apparent distress, and appeared stated age  Attitude:  cooperative   Eye Contact:  fair and has reading glasses  Gait and Station: Normal, No assistive Devices used, dizziness and most recent fall few months ago at home- no injuries  Psychomotor Behavior:  no evidence of tardive dyskinesia, dystonia, or tics  Oriented to:  time, person, and  place  Attention Span and Concentration:  Adequate  Speech:  speaks Polish and uses phone   Mood:  anxious and depressed  Affect:  mood congruent and intensity is blunted  Associations:  no loose associations  Thought Process:  logical, linear and goal oriented  Thought Content:  no evidence of suicidal ideation or homicidal ideation and no evidence of psychotic thought  Recent and Remote Memory:  Short term memory concerns. Continues to be forgetful. Denies long term memory concerns. Not formally assessed. No amnesia.  Fund of Knowledge: appropriate  Insight:  intact  Judgment:  intact and adequate for safety  Impulse Control:  intact      Suicide Risk Assessment:  Today Gunner Velázquez reports still struggling with depression and low mood. Reports that he has felt that life is not worth living, but denies that he wants to kill self and denies a plan. Still worries often about his health and financial difficulties. No thoughts to die or harm self or others. In addition, there are notable risk factors for self-harm, including age, anxiety, comorbid medical condition of kidney disease and withdrawing. However, risk is mitigated by commitment to family, sobriety, absence of past attempts, ability to volunteer a safety plan, history of seeking help when needed, future oriented, no access to firearms or weapons, denies suicidal intent or plan, no family history of suicide and denies homicidal ideation, intent, or plan gets hope from family and reports he would be able to reach out to them or his doctor if needed in the future if he had thoughts he would harm himself. Therefore, based on all available evidence including the factors cited above, Gunner Velázquez does not appear to be at imminent risk for self-harm, does not meet criteria for a 72-hr hold, and therefore remains appropriate for ongoing outpatient level of care.  A thorough assessment of risk factors related to suicide and self-harm have been reviewed  and are noted above.  Local community safety resources reviewed and printed for patient to use if needed. There was no deceit detected, and the patient presented in a manner that was believable.       DSM5  Diagnosis:  296.33 (F33.2) Major Depressive Disorder, Recurrent Episode, Severe   300.02 (F41.1) Generalized Anxiety Disorder  309.81 (F43.10) Posttraumatic Stress Disorder Without dissociative symptoms    Medical comorbidities include:   Patient Active Problem List    Diagnosis Date Noted     Anxiety 06/13/2018     Priority: Medium     PTSD (post-traumatic stress disorder) 05/03/2018     Priority: Medium     Severe depression (H) 05/31/2016     Priority: Medium     High triglycerides 08/11/2015     Priority: Medium     Seasonal allergies 05/04/2015     Priority: Medium     MOE (generalized anxiety disorder) 08/01/2014     Priority: Medium     GERD (gastroesophageal reflux disease) 08/01/2014     Priority: Medium     Dizziness 08/01/2014     Priority: Medium     Thought secondary to anxiety.  Workup with cardiology, ENT, audiology, [facility], physical therapy, MRI, MRA all negative       Hyperlipidemia with target LDL less than 130 02/21/2014     Priority: Medium     HTN, goal below 140/90 02/21/2014     Priority: Medium     CKD (chronic kidney disease) stage 4, GFR 15-29 ml/min (H) 02/21/2014     Priority: Medium     Nephrologist Dr. Arenas at [facility]         Psychosocial & Contextual Factors:  Occupational Difficulties, Financial Difficulties and Medical Comorbidites    Assessment:  Gunner Velázquez reports feeling better, has made limited  improvements with medication changes per PHQ9 and GAD7.  Medication side effects and alternatives were reviewed. Health promotion activities recommended and reviewed today. All questions addressed. Education and counseling completed regarding risks and benefits of medications and psychotherapy options. Therapy should continue. Symptoms continue to  affect his functioning at home and has not been able to work for years. Continue to monitor memory- short term memory concerns specifically. Wife sets up alarms and helps with medications. Patient has chronic kidney disease stage 4 so will need to closely monitor with medication changes. Most recent lab results showed diminished kidney functioning. Continue to monitor. Patient may need to start dialysis and consider kidney transplant. Patient is in the process of applying for disability due to his condition and he should continue to work with those legal specialists and disability assessors.Patient notes ongoing worries about finances- his co-pays for insurance now will increase from $15 to $25 and this is too expensive.     Another option for depression may be an SNRI such as Effexor (venlafaxine) or Cymbalta (duloxetine).    Seroquel (quetiapine) would likely help with Post-traumatic stress disorder (PTSD) and severe insomnia, but can affect his kidney functioning.     Patient notes mood and anxiety has been better and able to sleep longer, but nightmares still occur and he would like to increase dose if possible. Will increase Minipress (prazosin). Also mentioned this to Primary Care Provider because Patient is also taking metoprolol. Patient also notes some dizziness.     Will see Primary Care Provider today.     Treatment Plan:    Increase Minipress (prazosin) to 1 mg daily in AM and 2 mg daily in PM for Post-traumatic stress disorder (PTSD) and nightmares.    Continue Desyrel/Olepto (trazodone) 200 mg by mouth daily at bedtime for sleep.     Continue Remeron (mirtazapine) 45 mg by mouth daily at bedtime for sleep, mood, anxiety.     Continue all other medications as reviewed per electronic medical record today.     Safety plan reviewed. To the Emergency Department as needed or call after hours crisis line at [phone] or [phone]. [place] Crisis Text Line. Text [place] to 739948 or Suicide LifeLine Chat:  suicidepreventionlifeline.org/chat    Continue individual therapy as planned with Corrina Farooq.    Schedule an appointment with me on May 10th or sooner as needed.     Call the psychiatric nurse line with medication questions or concerns at [phone].    Administrative Billing:   Time spent with patient was 30 minutes and greater than 50% of time or 20 minutes was spent in counseling and coordination of care regarding above diagnoses and treatment plan.    Patient Status:  Patient will continue to be seen for ongoing consultation and stabilization.    Signed:   Promise Aranda, PhD, APRN, CNP   Psychiatry

## 2019-03-15 ENCOUNTER — OFFICE VISIT (OUTPATIENT)
Dept: INTERNAL MEDICINE | Facility: CLINIC | Age: 59
End: 2019-03-15
Payer: COMMERCIAL

## 2019-03-15 ENCOUNTER — OFFICE VISIT (OUTPATIENT)
Dept: PSYCHIATRY | Facility: CLINIC | Age: 59
End: 2019-03-15
Payer: COMMERCIAL

## 2019-03-15 ENCOUNTER — OFFICE VISIT (OUTPATIENT)
Dept: BEHAVIORAL HEALTH | Facility: CLINIC | Age: 59
End: 2019-03-15
Payer: COMMERCIAL

## 2019-03-15 VITALS
WEIGHT: 163 LBS | RESPIRATION RATE: 18 BRPM | OXYGEN SATURATION: 99 % | DIASTOLIC BLOOD PRESSURE: 90 MMHG | SYSTOLIC BLOOD PRESSURE: 154 MMHG | TEMPERATURE: 97.8 F | HEART RATE: 91 BPM | BODY MASS INDEX: 26.31 KG/M2

## 2019-03-15 VITALS
HEART RATE: 68 BPM | RESPIRATION RATE: 17 BRPM | WEIGHT: 162 LBS | HEIGHT: 66 IN | SYSTOLIC BLOOD PRESSURE: 142 MMHG | DIASTOLIC BLOOD PRESSURE: 86 MMHG | OXYGEN SATURATION: 99 % | TEMPERATURE: 97.6 F | BODY MASS INDEX: 26.03 KG/M2

## 2019-03-15 DIAGNOSIS — F41.1 GAD (GENERALIZED ANXIETY DISORDER): Primary | ICD-10-CM

## 2019-03-15 DIAGNOSIS — F32.2 SEVERE DEPRESSION (H): ICD-10-CM

## 2019-03-15 DIAGNOSIS — E78.5 HYPERLIPIDEMIA WITH TARGET LDL LESS THAN 130: ICD-10-CM

## 2019-03-15 DIAGNOSIS — N18.4 CKD (CHRONIC KIDNEY DISEASE) STAGE 4, GFR 15-29 ML/MIN (H): ICD-10-CM

## 2019-03-15 DIAGNOSIS — I10 HTN, GOAL BELOW 140/90: ICD-10-CM

## 2019-03-15 DIAGNOSIS — F43.10 PTSD (POST-TRAUMATIC STRESS DISORDER): ICD-10-CM

## 2019-03-15 DIAGNOSIS — N32.81 OVERACTIVE BLADDER: Primary | ICD-10-CM

## 2019-03-15 LAB
ALBUMIN SERPL-MCNC: 3.6 G/DL (ref 3.4–5)
ALP SERPL-CCNC: 88 U/L (ref 40–150)
ALT SERPL W P-5'-P-CCNC: 35 U/L (ref 0–70)
ANION GAP SERPL CALCULATED.3IONS-SCNC: 7 MMOL/L (ref 3–14)
AST SERPL W P-5'-P-CCNC: 37 U/L (ref 0–45)
BILIRUB SERPL-MCNC: 0.3 MG/DL (ref 0.2–1.3)
BUN SERPL-MCNC: 46 MG/DL (ref 7–30)
CALCIUM SERPL-MCNC: 9.5 MG/DL (ref 8.5–10.1)
CHLORIDE SERPL-SCNC: 105 MMOL/L (ref 94–109)
CO2 SERPL-SCNC: 26 MMOL/L (ref 20–32)
CREAT SERPL-MCNC: 2.78 MG/DL (ref 0.66–1.25)
ERYTHROCYTE [DISTWIDTH] IN BLOOD BY AUTOMATED COUNT: 13.7 % (ref 10–15)
GFR SERPL CREATININE-BSD FRML MDRD: 24 ML/MIN/{1.73_M2}
GLUCOSE SERPL-MCNC: 98 MG/DL (ref 70–99)
HCT VFR BLD AUTO: 38.6 % (ref 40–53)
HGB BLD-MCNC: 13.1 G/DL (ref 13.3–17.7)
MCH RBC QN AUTO: 27.9 PG (ref 26.5–33)
MCHC RBC AUTO-ENTMCNC: 33.9 G/DL (ref 31.5–36.5)
MCV RBC AUTO: 82 FL (ref 78–100)
PLATELET # BLD AUTO: 207 10E9/L (ref 150–450)
POTASSIUM SERPL-SCNC: 4.4 MMOL/L (ref 3.4–5.3)
PROT SERPL-MCNC: 8.5 G/DL (ref 6.8–8.8)
RBC # BLD AUTO: 4.69 10E12/L (ref 4.4–5.9)
SODIUM SERPL-SCNC: 138 MMOL/L (ref 133–144)
TSH SERPL DL<=0.005 MIU/L-ACNC: 2.18 MU/L (ref 0.4–4)
WBC # BLD AUTO: 6.7 10E9/L (ref 4–11)

## 2019-03-15 PROCEDURE — 90832 PSYTX W PT 30 MINUTES: CPT | Performed by: COUNSELOR

## 2019-03-15 PROCEDURE — 90785 PSYTX COMPLEX INTERACTIVE: CPT | Performed by: COUNSELOR

## 2019-03-15 PROCEDURE — 84443 ASSAY THYROID STIM HORMONE: CPT | Performed by: INTERNAL MEDICINE

## 2019-03-15 PROCEDURE — 99214 OFFICE O/P EST MOD 30 MIN: CPT | Performed by: NURSE PRACTITIONER

## 2019-03-15 PROCEDURE — 85027 COMPLETE CBC AUTOMATED: CPT | Performed by: INTERNAL MEDICINE

## 2019-03-15 PROCEDURE — 80053 COMPREHEN METABOLIC PANEL: CPT | Performed by: INTERNAL MEDICINE

## 2019-03-15 PROCEDURE — 99214 OFFICE O/P EST MOD 30 MIN: CPT | Performed by: INTERNAL MEDICINE

## 2019-03-15 PROCEDURE — 36415 COLL VENOUS BLD VENIPUNCTURE: CPT | Performed by: INTERNAL MEDICINE

## 2019-03-15 RX ORDER — SIMVASTATIN 40 MG
40 TABLET ORAL AT BEDTIME
Qty: 90 TABLET | Refills: 1 | Status: SHIPPED | OUTPATIENT
Start: 2019-03-15 | End: 2019-09-20

## 2019-03-15 RX ORDER — TOLTERODINE TARTRATE 1 MG/1
TABLET, EXTENDED RELEASE ORAL
Refills: 10 | COMMUNITY
Start: 2019-03-03 | End: 2019-03-15

## 2019-03-15 RX ORDER — TRAZODONE HYDROCHLORIDE 100 MG/1
200 TABLET ORAL
Qty: 180 TABLET | Refills: 1 | Status: SHIPPED | OUTPATIENT
Start: 2019-03-15 | End: 2019-05-10 | Stop reason: ALTCHOICE

## 2019-03-15 RX ORDER — PRAZOSIN HYDROCHLORIDE 1 MG/1
CAPSULE ORAL
Qty: 270 CAPSULE | Refills: 0 | Status: SHIPPED | OUTPATIENT
Start: 2019-03-15 | End: 2019-03-15

## 2019-03-15 RX ORDER — METOPROLOL SUCCINATE 25 MG/1
25 TABLET, EXTENDED RELEASE ORAL DAILY
Qty: 90 TABLET | Refills: 1 | Status: CANCELLED | OUTPATIENT
Start: 2019-03-15

## 2019-03-15 RX ORDER — MIRTAZAPINE 45 MG/1
45 TABLET, FILM COATED ORAL AT BEDTIME
Qty: 90 TABLET | Refills: 1 | Status: SHIPPED | OUTPATIENT
Start: 2019-03-15 | End: 2019-08-30

## 2019-03-15 RX ORDER — AMLODIPINE BESYLATE 10 MG/1
10 TABLET ORAL DAILY
Qty: 90 TABLET | Refills: 3 | Status: SHIPPED | OUTPATIENT
Start: 2019-03-15 | End: 2020-03-25

## 2019-03-15 RX ORDER — TOLTERODINE TARTRATE 1 MG/1
1 TABLET, EXTENDED RELEASE ORAL AT BEDTIME
Qty: 90 TABLET | Refills: 3 | Status: SHIPPED | OUTPATIENT
Start: 2019-03-15 | End: 2020-03-25

## 2019-03-15 RX ORDER — PRAZOSIN HYDROCHLORIDE 1 MG/1
CAPSULE ORAL
Qty: 270 CAPSULE | Refills: 3 | Status: SHIPPED | OUTPATIENT
Start: 2019-03-15 | End: 2019-04-29

## 2019-03-15 RX ORDER — METOPROLOL SUCCINATE 50 MG/1
50 TABLET, EXTENDED RELEASE ORAL DAILY
Qty: 90 TABLET | Refills: 3 | Status: SHIPPED | OUTPATIENT
Start: 2019-03-15 | End: 2019-05-29

## 2019-03-15 ASSESSMENT — ANXIETY QUESTIONNAIRES
IF YOU CHECKED OFF ANY PROBLEMS ON THIS QUESTIONNAIRE, HOW DIFFICULT HAVE THESE PROBLEMS MADE IT FOR YOU TO DO YOUR WORK, TAKE CARE OF THINGS AT HOME, OR GET ALONG WITH OTHER PEOPLE: EXTREMELY DIFFICULT
5. BEING SO RESTLESS THAT IT IS HARD TO SIT STILL: SEVERAL DAYS
7. FEELING AFRAID AS IF SOMETHING AWFUL MIGHT HAPPEN: NEARLY EVERY DAY
2. NOT BEING ABLE TO STOP OR CONTROL WORRYING: NEARLY EVERY DAY
6. BECOMING EASILY ANNOYED OR IRRITABLE: NEARLY EVERY DAY
1. FEELING NERVOUS, ANXIOUS, OR ON EDGE: SEVERAL DAYS
3. WORRYING TOO MUCH ABOUT DIFFERENT THINGS: NEARLY EVERY DAY
GAD7 TOTAL SCORE: 17

## 2019-03-15 ASSESSMENT — PATIENT HEALTH QUESTIONNAIRE - PHQ9
SUM OF ALL RESPONSES TO PHQ QUESTIONS 1-9: 19
5. POOR APPETITE OR OVEREATING: NEARLY EVERY DAY

## 2019-03-15 ASSESSMENT — MIFFLIN-ST. JEOR: SCORE: 1497.58

## 2019-03-15 NOTE — NURSING NOTE
"Chief Complaint   Patient presents with     Recheck Medication     increse in medication is helping some, pt feels he may need more. No  scheduled for this appointment.      initial /90 (BP Location: Right arm, Patient Position: Chair, Cuff Size: Adult Regular)   Pulse 91   Temp 97.8  F (36.6  C) (Oral)   Resp 18   Wt 73.9 kg (163 lb)   SpO2 99%   BMI 26.31 kg/m   Estimated body mass index is 26.31 kg/m  as calculated from the following:    Height as of 12/21/18: 1.676 m (5' 6\").    Weight as of this encounter: 73.9 kg (163 lb)..  bp completed using cuff size regular    "

## 2019-03-15 NOTE — PROGRESS NOTES
Progress Note    Client Name: Gunner Whytegcmorenitah  Date: 3/15/2019           Service Type: Individual  Video Visit: No     Session Start Time: 11:30am  Session End Time: 12:03pm     Session Length: 33 mins     Session #: 8    Attendees: Client and      Treatment Plan Last Reviewed: 9/19/18  PHQ-9 / MOE-7 : in flowsheets     DATA22  Interactive Complexity: -Use of play equipment, physical devices,  or  to overcome barriers to diagnostic or therapeutic interaction with a patient who is not fluent in the same language or who has not developed or lost expressive or receptive language skills to use or understand typical language  Crisis: No       Progress Since Last Session (Related to Symptoms / Goals / Homework):   Symptoms: No change      Homework: Partially completed      Episode of Care Goals: Minimal progress - PREPARATION (Decided to change - considering how); Intervened by negotiating a change plan and determining options / strategies for behavior change, identifying triggers, exploring social supports, and working towards setting a date to begin behavior change     Current / Ongoing Stressors and Concerns:    Health, finances, past trauma, mood concerns      Treatment Objective(s) Addressed in This Session:   current stressors  ADL check in  Family relationships      Intervention:   Emotion Focused Therapy: discussing emotions connected to dealing with health issues        ASSESSMENT: Current Emotional / Mental Status (status of significant symptoms):   Risk status (Self / Other harm or suicidal ideation)   Client denies current fears or concerns for personal safety.   Client reports the following current or recent suicidal ideation or behaviors: Thought of being better off dead but no plan or intent.   Client denies current or recent homicidal ideation or behaviors.   Client denies current or recent self injurious behavior or  ideation.   Client denies other safety concerns.   Client Client reports there has been a change in risk factors since their last session.  change in insurance converage   Client Client reports there has been no change in protective factors since their last session.     A safety and risk management plan has been developed including: Client consented to co-developed safety plan.  [facility] facility's safety and risk management plan was completed.  Client agreed to use safety plan should any safety concerns arise.  A copy was given to the patient.     Appearance:   Appropriate  Disheveled    Eye Contact:   Fair    Psychomotor Behavior: Retarded (Slowed)    Attitude:   Cooperative    Orientation:   All   Speech    Rate / Production: Monotone  Slow     Volume:  Soft    Mood:    Depressed    Affect:    Flat    Thought Content:  Rumination    Thought Form:  Coherent  Logical    Insight:    Fair      Medication Review:   Changes to psychiatric medications, see updated Medication List in EPIC.      Medication Compliance:   Yes     Changes in Health Issues:   Yes: Pain, Associated Psychological Distress     Chemical Use Review:   Substance Use: Chemical use reviewed, no active concerns identified      Tobacco Use: No current tobacco use.      Diagnosis:  1. MOE (generalized anxiety disorder)    2. PTSD (post-traumatic stress disorder)    3. Severe depression (H)        Collateral Reports Completed:   Communicated with:  about ride siutation     PLAN: (Client Tasks / Therapist Tasks / Other)  Client reported insurance has changed and it has been a stressor as they no longer provide rides   Client reported he is worried his kidneys are worsening and he will need dialysis  Client stated he has been attempting daily ADLs but it can be difficult  Provider encouraged client to use deep breathing and cognitive restructuring we have discussed in previous sessions   Provider assigned client to discuss health issues with doctor and have  them print out his appointments for him         Corrina GARDNERYinka Farooq, [facility] 3/15/2019                                                           ____                                     ________________________________________________________________________      Treatment Plan      Client's Name: Gunner Velázquez                                    YOB: 1960      Date: 9/19/2018          DSM-V Diagnoses: 296.33 (F33.2) Major Depressive Disorder, Recurrent Episode, Severe _, 300.02 (F41.1) Generalized Anxiety Disorder or 309.81 (F43.10) Posttraumatic Stress Disorder (includes Posttraumatic Stress Disorder for Children 6 Years and Younger)  With delayed expression  Psychosocial / Contextual Factors: trauma from [place], medical issues, finances, work , relationships   WHODAS: 54      Referral / Collaboration:  The following referral(s) was/were discussed but client declines follow up at this time. day treatment, partial hospitalization. Clients level of need presents as very high.       Anticipated number of session or this episode of care: 12-18          MeasurableTreatment Goal(s) related to diagnosis / functional impairment(s)  Goal 1: Client will score less than 15 on PHQ9.    I will know I've met my goal when everything feels better and good. I would socialize more with my kids and my wife        Objective #A (Client Action)                                    Client will Identify negative self-talk and behaviors: challenge core beliefs, myths, and actions.  Status: New - Date: 9/19/18       Intervention(s)  Therapist will teach thought labeling helpful or unhelpful. .      Objective #B  Client will Decrease thoughts that you'd be better off dead or of suicide / self-harm.  Status: New - Date: 9/19/18       Intervention(s)  Therapist will teach emotional regulation skills. This will include positive thinking, deep breathing and fact checking. .                  Goal 2: Client will score less  than 15 on GAD7.    I will know I've met my goal when everything feels better and good.         Objective #A (Client Action)                                    Status: New - Date: 9/19/18       Client will use thought-stopping strategy daily to reduce intrusive thoughts.      Intervention(s)  Therapist will teach emotional recognition/identification. This will include using thought labeling to indentify distorted thought patterns.      Objective #B  Client will track and record at least 1 pleasant exchanges with family members.                                      Status: New - Date: 9/19/18       Intervention(s)  Therapist will give client examples of ways he can interact with his family. Provider will check in each session to ask about client efforts on this. .          Client has reviewed and agreed to the above plan.          Corrina Farooq, [facility]                                      September 19, 2018

## 2019-03-15 NOTE — Clinical Note
Nithin Jalloh-I saw Keisha had been working with this client as well. His [place] and his insurance paid rides have discontinued. Due to this he is not always attending appointments that are necessary. I am not sure if you have resources for this but wanted you to be aware. Thanks!

## 2019-03-15 NOTE — PATIENT INSTRUCTIONS
Treatment Plan:    Increase Minipress (prazosin) to 1 mg daily in AM and 2 mg daily in PM for Post-traumatic stress disorder (PTSD) and nightmares.    Continue Desyrel/Olepto (trazodone) 200 mg by mouth daily at bedtime for sleep.     Continue Remeron (mirtazapine) 45 mg by mouth daily at bedtime for sleep, mood, anxiety.     Continue all other medications as reviewed per electronic medical record today.     Safety plan reviewed. To the Emergency Department as needed or call after hours crisis line at [phone] or [phone]. [place] Crisis Text Line. Text [place] to 576720 or Suicide LifeLine Chat: suicidepreventionlifeline.org/chat    Continue individual therapy as planned with Corrina Farooq.    Schedule an appointment with me on May 10th or sooner as needed.     Call the psychiatric nurse line with medication questions or concerns at [phone].

## 2019-03-15 NOTE — NURSING NOTE
"/86   Pulse 68   Temp 97.6  F (36.4  C) (Oral)   Resp 17   Ht 1.676 m (5' 6\")   Wt 73.5 kg (162 lb)   SpO2 99%   BMI 26.15 kg/m    Patient in for follow up on HTN and lipids.  Karin Rondon, FOR[place]    "

## 2019-03-15 NOTE — PROGRESS NOTES
SUBJECTIVE:   Gunner Velázquez is a 58 year old male who presents to clinic today for the following health issues:  Interpretor used through phone.   Patient is seen for a follow up visit.  No acute complaints, no medication change or new medical conditions.  Has h/o CRF. Symptoms include fatigue. Monitoring BP, BG, medications, avoiding OTC NSAIDs. Needs periodic recheck of kidney function.  Has H/O hyperlipidemia. On medical treatment and diet. No side effects. No muscle weakness, myalgias or upset stomach.   Has h/o depression. On medical treatment, controlled, no side effects. No depressive symptoms or suicidal ideation.      Hyperlipidemia Follow-Up      Rate your low fat/cholesterol diet?: good    Taking statin?  Yes, possible muscle aches from statin    Other lipid medications/supplements?:  none    Hypertension Follow-up  Has h/o HTN. on medical treatment. BP has been controlled. No side effects from medications. No CP, HA, dizziness. good compliance with medications and low salt diet.      Outpatient blood pressures are being checked at home.  Results are 130//105.    Low Salt Diet: no added salt      Amount of exercise or physical activity: None    Problems taking medications regularly: No    Medication side effects: none    Diet: low salt            Problem list and histories reviewed & adjusted, as indicated.  Additional history: as documented    Patient Active Problem List   Diagnosis     Hyperlipidemia with target LDL less than 130     HTN, goal below 140/90     CKD (chronic kidney disease) stage 4, GFR 15-29 ml/min (H)     MOE (generalized anxiety disorder)     GERD (gastroesophageal reflux disease)     Dizziness     Seasonal allergies     High triglycerides     Severe depression (H)     PTSD (post-traumatic stress disorder)     Anxiety     History reviewed. No pertinent surgical history.    Social History     Tobacco Use     Smoking status: Former Smoker     Smokeless tobacco: Never Used    Substance Use Topics     Alcohol use: No     Alcohol/week: 0.0 oz     Family History   Problem Relation Age of Onset     Heart Disease Father 70        tb     Family History Negative No family hx of          Current Outpatient Medications   Medication Sig Dispense Refill     ACE/ARB/ARNI NOT PRESCRIBED, INTENTIONAL, Please choose reason not prescribed, below       amLODIPine (NORVASC) 10 MG tablet Take 1 tablet (10 mg) by mouth daily 90 tablet 3     cholecalciferol (VITAMIN D3) 5000 units CAPS capsule Take 1 per day 100 capsule 1     fexofenadine (ALLEGRA) 180 MG tablet Take 1 tablet (180 mg) by mouth daily 90 tablet 3     fish oil-omega-3 fatty acids 1000 MG capsule Take 1 capsule (1 g) by mouth 2 times daily 180 capsule 3     meclizine (ANTIVERT) 12.5 MG tablet Take 1-2 tablets (12.5-25 mg) by mouth 4 times daily as needed for dizziness 30 tablet 5     metoprolol succinate ER (TOPROL-XL) 25 MG 24 hr tablet Take 1 tablet (25 mg) by mouth daily 90 tablet 1     metoprolol succinate ER (TOPROL-XL) 50 MG 24 hr tablet Take 1 tablet (50 mg) by mouth daily 90 tablet 3     mirtazapine (REMERON) 45 MG tablet Take 1 tablet (45 mg) by mouth At Bedtime Increased dose. For mood, sleep, and anxiety. 90 tablet 1     prazosin (MINIPRESS) 1 MG capsule Take 1 capsule in AM and 2 capsules in PM. For nightmares. 270 capsule 3     simvastatin (ZOCOR) 40 MG tablet Take 1 tablet (40 mg) by mouth At Bedtime 90 tablet 1     tolterodine (DETROL) 1 MG tablet Take 1 tablet (1 mg) by mouth At Bedtime 90 tablet 3     traZODone (DESYREL) 100 MG tablet Take 2 tablets (200 mg) by mouth nightly as needed for sleep Increased dose. 180 tablet 1     Allergies   Allergen Reactions     Rifampin Other (See Comments)     HA, dizziness       Reviewed and updated as needed this visit by clinical staff  Tobacco  Allergies  Meds  Med Hx  Surg Hx  Fam Hx  Soc Hx      Reviewed and updated as needed this visit by Provider         ROS:  Constitutional,  "HEENT, cardiovascular, pulmonary, gi and gu systems are negative, except as otherwise noted.    OBJECTIVE:     /86   Pulse 68   Temp 97.6  F (36.4  C) (Oral)   Resp 17   Ht 1.676 m (5' 6\")   Wt 73.5 kg (162 lb)   SpO2 99%   BMI 26.15 kg/m    Body mass index is 26.15 kg/m .   GENERAL: healthy, alert and no distress  EYES: Eyes grossly normal to inspection, PERRL and conjunctivae and sclerae normal  HENT: ear canals and TM's normal, nose and mouth without ulcers or lesions  NECK: no adenopathy, no asymmetry, masses, or scars and thyroid normal to palpation  RESP: lungs clear to auscultation - no rales, rhonchi or wheezes  CV: regular rate and rhythm, normal S1 S2, no S3 or S4, no murmur, click or rub, no peripheral edema and peripheral pulses strong  ABDOMEN: soft, nontender, no hepatosplenomegaly, no masses and bowel sounds normal  MS: no gross musculoskeletal defects noted, no edema  NEURO: Normal strength and tone, mentation intact and speech normal    Diagnostic Test Results:  none     ASSESSMENT/PLAN:     Problem List Items Addressed This Visit     Hyperlipidemia with target LDL less than 130    Relevant Medications    simvastatin (ZOCOR) 40 MG tablet    HTN, goal below 140/90    Relevant Medications    amLODIPine (NORVASC) 10 MG tablet    prazosin (MINIPRESS) 1 MG capsule    metoprolol succinate ER (TOPROL-XL) 50 MG 24 hr tablet    Other Relevant Orders    CBC with platelets (Completed)    Comprehensive metabolic panel (Completed)    TSH with free T4 reflex (Completed)    CKD (chronic kidney disease) stage 4, GFR 15-29 ml/min (H)    Severe depression (H)    Relevant Medications    mirtazapine (REMERON) 45 MG tablet    traZODone (DESYREL) 100 MG tablet    PTSD (post-traumatic stress disorder)    Relevant Medications    mirtazapine (REMERON) 45 MG tablet    traZODone (DESYREL) 100 MG tablet    prazosin (MINIPRESS) 1 MG capsule      Other Visit Diagnoses     Overactive bladder    -  Primary    Relevant " Medications    tolterodine (DETROL) 1 MG tablet           Assess lab work  Cont treatment   Increased Metoprolol to 50 mg   Monitor BP  Follow up with nephrology     Follow-Up:in 6 months     Vinnie Brown MD  [facility]

## 2019-03-16 ASSESSMENT — ANXIETY QUESTIONNAIRES: GAD7 TOTAL SCORE: 17

## 2019-03-18 ENCOUNTER — PATIENT OUTREACH (OUTPATIENT)
Dept: CARE COORDINATION | Facility: CLINIC | Age: 59
End: 2019-03-18

## 2019-03-18 NOTE — LETTER
[place] [facility]ARE [facility]ORDINATION  [address]  [facility] [address]  March 18, 2019    Gunner Douangchanh  [address] [place] [address]      Dear Gunner,    I am a clinic care coordinator who works with Vinnie Brown MD at the [facility]. Your counselor mentioned that you would be interested in transportation options because you lost your transportation benefit through your insurance. I have included information for transportation in [facility].    Please feel free to contact me at [phone], with any questions or concerns. We at [facility] are focused on providing you with the highest-quality healthcare experience possible and that all starts with you.     Sincerely,     Keisha Campa, [facility]  Clinic Care Coordinator  Ph. [phone]  [email or web address]    Enclosed: I have enclosed helpful educational material. Please review and call me with any questions.

## 2019-03-18 NOTE — PROGRESS NOTES
Clinic Care Coordination Contact  CRISTINA ARMENTA Follow-Up    In-basket message received from [facility] stating that Pt has been struggling with transportation since losing his insurance-funded transportation benefit to/from appointments. When CRISTINA ARMENTA last spoke with Pt, he planned for his wife to take off work on the days of his appointments so that she can drive him. Per chart review, Pt has continued to bundle scheduled appointments on the same day so he does not need to go to the clinic on separate instances.    CRISTINA ARMENTA previously mailed transportation resources in [facility] to Patient on 12.26.2018. CRISTINA ARMENTA will resent the [employer] resource along with Laotian translation to Pt. CRISTINA ARMENTA will also include the phone number for Interpretive Services.    Plan: Will mail Pt the appropriate resources. CRISTINA ARMENTA will follow-up with Pt in 3 weeks.    Keisha Campa, [facility]  Clinic Care Coordinator  Ph. [phone]  [email or web address]

## 2019-04-02 ENCOUNTER — TELEPHONE (OUTPATIENT)
Dept: PSYCHIATRY | Facility: CLINIC | Age: 59
End: 2019-04-02

## 2019-04-02 NOTE — TELEPHONE ENCOUNTER
Reason for Call:  Other call back    Detailed comments: Patient called and communicated that he was denied Social Security due to not having enough detail regarding his mental health. Patient requested that Promise Aranda write a document/ letter to provide additional information for his Social Security Claim. Patient communicated that he had been given a letter from his PCP, but there was not enough information to support why he cannot work. Please review and get back to patient.     Patient needs an .     Phone Number Patient can be reached at: Cell number on file:    Telephone Information:   Mobile [phone]         Can we leave a detailed message on this number? YES    Call taken on 4/2/2019 at 1:25 PM by Glenda Vega

## 2019-04-02 NOTE — TELEPHONE ENCOUNTER
"I made an outreach call to patient with the assistance from  services. ID # [id number]    RN was able to relay provider's responses and recommendations. Patient voiced his concern that he will be denied disability forever because he was told there \"was not enough information\" to support disability.      Patient stated he will let his attorneys know.   "

## 2019-04-02 NOTE — TELEPHONE ENCOUNTER
Please see my past notes.  He has brought this up to me numerous times.   He was told to request my records.   I believe he said he had them.  I won't write any letters. All details can be found in all of my past progress notes. I recommend an CHARLOTTE is signed to release those to his disability assessment team/    Patient speaks Croatian and needs an .

## 2019-04-24 ENCOUNTER — APPOINTMENT (OUTPATIENT)
Dept: CT IMAGING | Facility: CLINIC | Age: 59
End: 2019-04-24
Attending: EMERGENCY MEDICINE
Payer: COMMERCIAL

## 2019-04-24 ENCOUNTER — APPOINTMENT (OUTPATIENT)
Dept: MRI IMAGING | Facility: CLINIC | Age: 59
End: 2019-04-24
Attending: EMERGENCY MEDICINE
Payer: COMMERCIAL

## 2019-04-24 ENCOUNTER — HOSPITAL ENCOUNTER (EMERGENCY)
Facility: CLINIC | Age: 59
Discharge: HOME | End: 2019-04-24
Attending: EMERGENCY MEDICINE | Admitting: EMERGENCY MEDICINE
Payer: COMMERCIAL

## 2019-04-24 VITALS
SYSTOLIC BLOOD PRESSURE: 150 MMHG | HEART RATE: 51 BPM | HEIGHT: 66 IN | DIASTOLIC BLOOD PRESSURE: 100 MMHG | OXYGEN SATURATION: 98 % | WEIGHT: 159.6 LBS | RESPIRATION RATE: 16 BRPM | BODY MASS INDEX: 25.65 KG/M2 | TEMPERATURE: 98 F

## 2019-04-24 DIAGNOSIS — R42 DIZZINESS: ICD-10-CM

## 2019-04-24 LAB
ANION GAP SERPL CALCULATED.3IONS-SCNC: 6 MMOL/L (ref 3–14)
APTT PPP: 34 SEC (ref 22–37)
BASOPHILS # BLD AUTO: 0 10E9/L (ref 0–0.2)
BASOPHILS NFR BLD AUTO: 0.4 %
BUN SERPL-MCNC: 30 MG/DL (ref 7–30)
CALCIUM SERPL-MCNC: 9 MG/DL (ref 8.5–10.1)
CHLORIDE SERPL-SCNC: 106 MMOL/L (ref 94–109)
CO2 SERPL-SCNC: 27 MMOL/L (ref 20–32)
CREAT SERPL-MCNC: 2.58 MG/DL (ref 0.66–1.25)
DIFFERENTIAL METHOD BLD: NORMAL
EOSINOPHIL # BLD AUTO: 0.4 10E9/L (ref 0–0.7)
EOSINOPHIL NFR BLD AUTO: 5.5 %
ERYTHROCYTE [DISTWIDTH] IN BLOOD BY AUTOMATED COUNT: 13.3 % (ref 10–15)
GFR SERPL CREATININE-BSD FRML MDRD: 26 ML/MIN/{1.73_M2}
GLUCOSE BLDC GLUCOMTR-MCNC: 87 MG/DL (ref 70–99)
GLUCOSE SERPL-MCNC: 95 MG/DL (ref 70–99)
HCT VFR BLD AUTO: 41.1 % (ref 40–53)
HGB BLD-MCNC: 13.4 G/DL (ref 13.3–17.7)
IMM GRANULOCYTES # BLD: 0 10E9/L (ref 0–0.4)
IMM GRANULOCYTES NFR BLD: 0.3 %
INR PPP: 0.9 (ref 0.86–1.14)
LYMPHOCYTES # BLD AUTO: 2.8 10E9/L (ref 0.8–5.3)
LYMPHOCYTES NFR BLD AUTO: 37.7 %
MCH RBC QN AUTO: 27.7 PG (ref 26.5–33)
MCHC RBC AUTO-ENTMCNC: 32.6 G/DL (ref 31.5–36.5)
MCV RBC AUTO: 85 FL (ref 78–100)
MONOCYTES # BLD AUTO: 0.6 10E9/L (ref 0–1.3)
MONOCYTES NFR BLD AUTO: 8.1 %
NEUTROPHILS # BLD AUTO: 3.5 10E9/L (ref 1.6–8.3)
NEUTROPHILS NFR BLD AUTO: 48 %
NRBC # BLD AUTO: 0 10*3/UL
NRBC BLD AUTO-RTO: 0 /100
PLATELET # BLD AUTO: 250 10E9/L (ref 150–450)
POTASSIUM SERPL-SCNC: 4 MMOL/L (ref 3.4–5.3)
RBC # BLD AUTO: 4.84 10E12/L (ref 4.4–5.9)
SODIUM SERPL-SCNC: 139 MMOL/L (ref 133–144)
TROPONIN I SERPL-MCNC: <0.015 UG/L (ref 0–0.04)
WBC # BLD AUTO: 7.3 10E9/L (ref 4–11)

## 2019-04-24 PROCEDURE — 00000146 ZZHCL STATISTIC GLUCOSE BY METER IP

## 2019-04-24 PROCEDURE — 25000128 H RX IP 250 OP 636: Performed by: EMERGENCY MEDICINE

## 2019-04-24 PROCEDURE — 0042T CT HEAD PERFUSION WITH CONTRAST: CPT

## 2019-04-24 PROCEDURE — A9585 GADOBUTROL INJECTION: HCPCS | Performed by: EMERGENCY MEDICINE

## 2019-04-24 PROCEDURE — 93005 ELECTROCARDIOGRAM TRACING: CPT

## 2019-04-24 PROCEDURE — 70450 CT HEAD/BRAIN W/O DYE: CPT | Mod: XS

## 2019-04-24 PROCEDURE — 84484 ASSAY OF TROPONIN QUANT: CPT | Performed by: EMERGENCY MEDICINE

## 2019-04-24 PROCEDURE — 85610 PROTHROMBIN TIME: CPT | Performed by: EMERGENCY MEDICINE

## 2019-04-24 PROCEDURE — 70551 MRI BRAIN STEM W/O DYE: CPT

## 2019-04-24 PROCEDURE — 80048 BASIC METABOLIC PNL TOTAL CA: CPT | Performed by: EMERGENCY MEDICINE

## 2019-04-24 PROCEDURE — 70498 CT ANGIOGRAPHY NECK: CPT

## 2019-04-24 PROCEDURE — 25000132 ZZH RX MED GY IP 250 OP 250 PS 637: Performed by: EMERGENCY MEDICINE

## 2019-04-24 PROCEDURE — 99285 EMERGENCY DEPT VISIT HI MDM: CPT | Mod: 25

## 2019-04-24 PROCEDURE — 96374 THER/PROPH/DIAG INJ IV PUSH: CPT | Mod: 59

## 2019-04-24 PROCEDURE — 85730 THROMBOPLASTIN TIME PARTIAL: CPT | Performed by: EMERGENCY MEDICINE

## 2019-04-24 PROCEDURE — 25500064 ZZH RX 255 OP 636: Performed by: EMERGENCY MEDICINE

## 2019-04-24 PROCEDURE — 85025 COMPLETE CBC W/AUTO DIFF WBC: CPT | Performed by: EMERGENCY MEDICINE

## 2019-04-24 RX ORDER — MECLIZINE HYDROCHLORIDE 25 MG/1
25 TABLET ORAL ONCE
Status: COMPLETED | OUTPATIENT
Start: 2019-04-24 | End: 2019-04-24

## 2019-04-24 RX ORDER — LORAZEPAM 0.5 MG/1
0.25-0.5 TABLET ORAL EVERY 8 HOURS PRN
Qty: 10 TABLET | Refills: 0 | Status: SHIPPED | OUTPATIENT
Start: 2019-04-24 | End: 2019-05-10

## 2019-04-24 RX ORDER — ONDANSETRON 2 MG/ML
4 INJECTION INTRAMUSCULAR; INTRAVENOUS ONCE
Status: COMPLETED | OUTPATIENT
Start: 2019-04-24 | End: 2019-04-24

## 2019-04-24 RX ORDER — IOPAMIDOL 755 MG/ML
500 INJECTION, SOLUTION INTRAVASCULAR ONCE
Status: COMPLETED | OUTPATIENT
Start: 2019-04-24 | End: 2019-04-24

## 2019-04-24 RX ORDER — LIDOCAINE 40 MG/G
CREAM TOPICAL
Status: DISCONTINUED | OUTPATIENT
Start: 2019-04-24 | End: 2019-04-24 | Stop reason: HOSPADM

## 2019-04-24 RX ORDER — LORAZEPAM 2 MG/ML
0.5 INJECTION INTRAMUSCULAR
Status: DISCONTINUED | OUTPATIENT
Start: 2019-04-24 | End: 2019-04-24 | Stop reason: HOSPADM

## 2019-04-24 RX ORDER — LORAZEPAM 2 MG/ML
0.5 INJECTION INTRAMUSCULAR ONCE
Status: DISCONTINUED | OUTPATIENT
Start: 2019-04-24 | End: 2019-04-24 | Stop reason: HOSPADM

## 2019-04-24 RX ORDER — GADOBUTROL 604.72 MG/ML
7.5 INJECTION INTRAVENOUS ONCE
Status: DISCONTINUED | OUTPATIENT
Start: 2019-04-24 | End: 2019-04-24

## 2019-04-24 RX ADMIN — ONDANSETRON 4 MG: 2 INJECTION INTRAMUSCULAR; INTRAVENOUS at 14:47

## 2019-04-24 RX ADMIN — SODIUM CHLORIDE 80 ML: 9 INJECTION, SOLUTION INTRAVENOUS at 14:08

## 2019-04-24 RX ADMIN — MECLIZINE HYDROCHLORIDE 25 MG: 25 TABLET ORAL at 14:47

## 2019-04-24 RX ADMIN — IOPAMIDOL 120 ML: 755 INJECTION, SOLUTION INTRAVENOUS at 14:08

## 2019-04-24 ASSESSMENT — ENCOUNTER SYMPTOMS
NECK PAIN: 0
HEADACHES: 0
FACIAL ASYMMETRY: 1
NAUSEA: 1
CONFUSION: 1
DIZZINESS: 1
SHORTNESS OF BREATH: 1
VOMITING: 1

## 2019-04-24 ASSESSMENT — MIFFLIN-ST. JEOR: SCORE: 1486.69

## 2019-04-24 NOTE — ED PROVIDER NOTES
History     Chief Complaint:  Dizziness    HPI   Gunner Velázquez is a 58 year old male with a history of anxiety, dizziness, and hypertension who presents with dizziness. The patient reports he has had dizziness for the past 2-3 days in which he has felt like someone was trying to push him over. Today around 10/10:30 am (about 3.5-4 hours ago) the dizziness became worse and he felt unable to walk. This was accompanied by nausea and 2 episodes of vomiting. He states she also developed chest pain and shortness of breath, but this lasted for about 20 minutes and felt like past panic attacks. He called his daughter who came to pick him up and bring him to the ED for evaluation. His daughter notes that the patient also appeared to have a slight facial droop and was confused. She states he did not remember what month it is. The patient denies having any recent headache or neck pain.    Allergies:  Rifampin    Medications:    ACE/ARB/ARNI not prescribed, intentional  Norvasc  Cholecalciferol  Allegra  Meclizine  Metoprolol succinate  Remeron   Prazosin (Minipress)  Zocor  Tolterodine (Detrol)  Trazodone    Past Medical History:    Anxiety  Chronic kidney disease (CKD)  Depression  Dizziness  Gastroesophageal reflux disease  Hyperlipidemia  Hypertension  Post-traumatic stress disorder    Past Surgical History:    History reviewed. No pertinent surgical history.    Family History:    Heart disease    Social History:  The patient presents to the ED with his daughter and grandchildren.  Marital status:   Smoking status: Former Smoker  Alcohol use: No  Drug use: No  PCP Clinic: [facility]    Review of Systems   Respiratory: Positive for shortness of breath.    Cardiovascular: Positive for chest pain.   Gastrointestinal: Positive for nausea and vomiting.   Musculoskeletal: Positive for gait problem. Negative for neck pain.   Neurological: Positive for dizziness and facial asymmetry. Negative for headaches.  "  Psychiatric/Behavioral: Positive for confusion.   All other systems reviewed and are negative.    Physical Exam     Patient Vitals for the past 24 hrs:   BP Temp Temp src Pulse Heart Rate Resp SpO2 Height Weight   04/24/19 1515 (!) 152/105 -- -- 55 63 -- 99 % -- --   04/24/19 1500 (!) 142/95 -- -- 54 54 -- -- -- --   04/24/19 1430 (!) 155/103 -- -- 58 57 -- 100 % -- --   04/24/19 1400 -- -- -- -- -- -- -- 1.676 m (5' 6\") 72.4 kg (159 lb 9.6 oz)   04/24/19 1338 (!) 167/120 98  F (36.7  C) Temporal 58 58 16 100 % -- --       Physical Exam  Nursing note and vitals reviewed.    Constitutional: Pleasant and well groomed.          HENT:    Mouth/Throat: Oropharynx is without swelling or erythema. Oral mucosa moist.    Eyes: Conjunctivae are normal. No scleral icterus.    Neck: Neck supple.   Cardiovascular: Normal rate, regular rhythm and intact distal pulses.    Pulmonary/Chest: Effort normal and breath sounds normal.   Abdominal: Soft.  No distension. There is no tenderness.   Musculoskeletal:  No edema, No calf tenderness  Neurological:Alert oriented to month, location but stated that it was 2018. Subtle facial asymmetry with smile otherwise CN II-XII intact. Possible subtle right upper extremity weakness however No ulnar drift. Lower extremity strength 5/5 bilaterally. Finger-Nose-Finger without dysmetria. Light touch sensation intact throughout. No nystagmus.   Skin: Skin is warm and dry.   Psychiatric: Normal mood and affect.     Emergency Department Course   ECG (14:01:39):  Rate 60 bpm. HI interval 180. QRS duration 106. QT/QTc 452/452. P-R-T axes 33 -14 30. Normal sinus rhythm. Normal ECG. Interpreted at 1408 by Isabel Giraldo MD.    Imaging:  Radiographic findings were communicated with the patient and his daughter who voiced understanding of the findings.    CT HEAD W/O CONTRAST:  IMPRESSION:  Normal CT scan of the head.   As read by Radiology.    CTA HEAD NECK WITH CONTRAST:  IMPRESSION:  1. " Minimal calcific atherosclerotic disease involving the proximal  left subclavian artery without stenosis.  2. No evidence for stenosis, dissection, thromboembolism, or aneurysm.  As read by Radiology.    CT HEAD PERFUSION W CONTRAST:  IMPRESSION: Normal CT brain perfusion imaging.  As read by Radiology.     MR BRAIN W/O CONTRAST:  In process- refer to addendum by Dr. Gan for final results.     Laboratory:  CBC: WNL (WBC 7.3, HGB 13.4, )  BMP: Creatinine 2.58 (H), GFR Estimate 26 (L), o/w WNL  Troponin: <0.015  PTT: 34  INR: 0.90  Glucose by Meter: 87    Interventions:  1447: Meclizine 25 mg PO  1447: Zofran 4 mg IV  1507: Ativan 0.5 mg IV    Emergency Department Course:  Past medical records, nursing notes, and vitals reviewed.  1352: I performed an exam of the patient and obtained history, as documented above.     1358: CODE STROKE CALLED    IV inserted and blood samples were collected and sent for laboratory testing, findings above.    The patient was sent for stroke CT imaging while in the emergency department, findings above.    1401: I spoke to Dr. Rodriguez of stroke neurology regarding the patient.    1425: I spoke to Dr. Gomez of radiology regarding the patient's imaging.    1437: I spoke to Dr. Rodriguez regarding the patient's CT imaging.    1505: I rechecked the patient. He states he still feels dizzy.  The patient was sent for a brain MRI while in the emergency department.    Signed out to incoming ED physician, Dr. Gan pending results of brain MRI  And repeat evaluation/ assessment of symptoms.     Impression & Plan      CMS Diagnoses: The patient has stroke symptoms:           ED Stroke specific documentation           NIHSS PDF          Protocol PDF     Patient last known well time: 2 days ago  ED Provider first to bedside at: 1352  CT Results received at:1425     tPA:   Not given due to outside the time window.    National Institutes of Health Stroke Scale (Baseline)  Time Performed: 9621       Score    Level of consciousness: (0)   Alert, keenly responsive    LOC questions: (0)   Answers both questions correctly    LOC commands: (0)   Performs both tasks correctly    Best gaze: (0)   Normal    Visual: (0)   No visual loss    Facial palsy: (1)   Minor paralysis (flat nasolabial fold, smile asymmetry)    Motor arm (left): (0)   No drift    Motor arm (right): (0)   No drift    Motor leg (left): (0)   No drift    Motor leg (right): (0)   No drift    Limb ataxia: (0)   Absent    Sensory: (0)   Normal- no sensory loss    Best language: (0)   Normal- no aphasia    Dysarthria: (0)   Normal    Extinction and inattention: (0)   No abnormality        Total Score:  1        Stroke Mimics were considered (including migraine headache, seizure disorder, hypoglycemia (or hyperglycemia), head or spinal trauma, CNS infection, Toxin ingestion and shock state (e.g. sepsis) .       Medical Decision Making:  Gunner Velázquez is a 58 year old male who presents with a couple days of vertigo that worsened this morning and then possible facial droop. He was rapidly evaluated and code stroke was called due to the possible sudden facial droop on the right. He was expedited to CT imaging and stroke neurology was consulted. I consulted with stroke neurologist who recommended MRI which is currently being obtained and continuing to work on vertigo-type symptoms. Initial treatment for his underlying vertigo symptoms was not successful. . If MRI is negative and there is no concern for true weakness and his symptoms resolve it is possible he could be discharged to home, but plan to have a low threshold for transfer for observation for ongoing evaluation and management. I have spoken with Dr. Gan who accepts the patient pending the MRI result and repeat evaluation.    Preliminary Diagnosis:  1. Vertigo  2. Confusion      Disposition:  Signed out to incoming ED physician, Dr. Gan pending results of brain MRI      Maryjane  Yaw  4/24/2019   [facility] EMERGENCY DEPARTMENT  I, Maryjane Yaw, am serving as a scribe at 1:52 PM on 4/24/2019 to document services personally performed by Isabel Giraldo MD based on my observations and the provider's statements to me.        Isabel Giraldo MD  04/24/19 2124

## 2019-04-24 NOTE — ED PROVIDER NOTES
This patient was signed out by Dr. Giraldo. Please see initial provider note for full details.     In brief, patient presents to ED for evaluation of two days of vertigo. Has history of vertigo. Initial concern for possible facial droop. His initial labs and EKG are unremarkable. He is waiting in MRI for which I am to follow up with. If patient continues to be symptomatic after ativan, have low threshold, to bring patient into observation.     MRI is normal without sings of stroke. The patient states improvement with Ativan and was able to ambulate without difficulty. At this time with clinical certainty he is safe for discharge home with close follow up with primary regarding ED visit. He was in agreement with this. He agrees to take Meclizine that he has been prescribed in the past and also take Ativan as needed. He will return for any new or worsening symptoms discussed at bedside.    Alejo Gan MD  Emergency Medicine       Elvia, Alejo Davis MD  04/25/19 3446

## 2019-04-24 NOTE — ED TRIAGE NOTES
"Patient reports he has dizziness \"like someone is pushing me over\" for 2 days. Worse today. Today he has nausea as well and has vomited twice.   "

## 2019-04-25 ENCOUNTER — PATIENT OUTREACH (OUTPATIENT)
Dept: CARE COORDINATION | Facility: CLINIC | Age: 59
End: 2019-04-25

## 2019-04-25 LAB — INTERPRETATION ECG - MUSE: NORMAL

## 2019-04-25 NOTE — PROGRESS NOTES
Clinic Care Coordination Contact    Clinic Care Coordination Contact  OUTREACH    Referral Information: ED Follow-Up    Primary Diagnosis: Dizziness    Chief Complaint   Patient presents with     Clinic Care Coordination - Follow-up     ED Follow-Up        Universal Utilization: Appropriate per chart review. No noted concerns at this time.   Difficulty keeping appointments: No  Utilization    Last refreshed: 4/24/2019 10:44 PM:  Hospital Admissions 0           Last refreshed: 4/24/2019 10:44 PM:  ED Visits 1           Last refreshed: 4/24/2019 10:44 PM:  No Show Count (past year) 0              Current as of: 4/24/2019 10:44 PM            Clinical Concerns:  Current Medical Concerns:  Patient presented to the ED on 4.24.2019 with Dizziness.    Current Behavioral Concerns: None noted at this time.     Education Provided to patient: Role of CRISTINA ARMENTA.    Pain: No  Health Maintenance Reviewed: Not assessed  Clinical Pathway: None    Patient reports that he is feeling less dizzy since his visit to the ED yesterday. Patient endorses feeling more anxiety and depression. Pt reports that he does have an appointment with PCP next week. Patient requests an in-person  for his appointment with PCP next week. Pt reports that he will need to do paperwork, and therefore would prefer an in-person  to help with writing.    Patient reports that he has been denied Social Security X2, and that he is in the process of appealing it. Pt requests the  CC provide this update to PCP.      Goals:   Goals        General    Psychosocial (pt-stated)     Notes - Note created  3/4/2019 11:46 AM by Keisha Paris LGSW    Goal Statement: I want to get SSDI.  Measure of Success: Be approved for SSDI and certified disabled.  Supportive Steps to Achieve: Continue to apply for SSDI, SMRT assessment, appeals if appropriate.  Barriers: Unclear if Pt is eligible as he has been denied SSDI X2. Pt's primary language is not  english.  Strengths: Support system of family and care team. Motivated to apply and appeal for services if necessary.  Date to Achieve By: 6.30.2019  Patient expressed understanding of goal: Pt reports understanding and denies any additional questions or concerns at this times. CRISTINA CC engaged in AIDET communication during encounter.            1. As of today's date 4/25/2019 goal is met at 0 - 25%.   Goal Status:  Ongoing    Outreach Frequency: monthly  Future Appointments              In 4 days Vinnie Brown MD [place], [place]    In 1 week Corrina Farooq, [facility]    In 2 weeks Promise Aranda, NP [place], [place]    In 4 months Vinnie Brown MD [place], [place]          Plan: CRISTINA CC called the interpreting line and requested an in-person  for Pt's Monday appointment with PCP. CRISTINA CC will follow-up with Patient in one month, and will remain available to address questions and/or concerns.     Keisha Campa, [facility]  Clinic Care Coordinator  Ph. [phone]  [email or web address]

## 2019-04-29 ENCOUNTER — OFFICE VISIT (OUTPATIENT)
Dept: INTERNAL MEDICINE | Facility: CLINIC | Age: 59
End: 2019-04-29
Payer: COMMERCIAL

## 2019-04-29 VITALS
TEMPERATURE: 97.4 F | HEART RATE: 61 BPM | OXYGEN SATURATION: 99 % | HEIGHT: 66 IN | BODY MASS INDEX: 25.83 KG/M2 | WEIGHT: 160.7 LBS | DIASTOLIC BLOOD PRESSURE: 90 MMHG | SYSTOLIC BLOOD PRESSURE: 158 MMHG | RESPIRATION RATE: 20 BRPM

## 2019-04-29 DIAGNOSIS — R42 DIZZINESS: ICD-10-CM

## 2019-04-29 DIAGNOSIS — F32.2 SEVERE DEPRESSION (H): ICD-10-CM

## 2019-04-29 DIAGNOSIS — I10 HTN, GOAL BELOW 140/90: ICD-10-CM

## 2019-04-29 DIAGNOSIS — F43.10 PTSD (POST-TRAUMATIC STRESS DISORDER): ICD-10-CM

## 2019-04-29 DIAGNOSIS — Z09 HOSPITAL DISCHARGE FOLLOW-UP: Primary | ICD-10-CM

## 2019-04-29 DIAGNOSIS — N18.4 CKD (CHRONIC KIDNEY DISEASE) STAGE 4, GFR 15-29 ML/MIN (H): ICD-10-CM

## 2019-04-29 DIAGNOSIS — F41.9 ANXIETY: ICD-10-CM

## 2019-04-29 PROCEDURE — 99214 OFFICE O/P EST MOD 30 MIN: CPT | Performed by: INTERNAL MEDICINE

## 2019-04-29 RX ORDER — PRAZOSIN HYDROCHLORIDE 1 MG/1
CAPSULE ORAL
Qty: 360 CAPSULE | Refills: 3 | Status: SHIPPED | OUTPATIENT
Start: 2019-04-29 | End: 2020-05-27

## 2019-04-29 ASSESSMENT — MIFFLIN-ST. JEOR: SCORE: 1491.68

## 2019-04-29 NOTE — NURSING NOTE
"/90 (BP Location: Left arm, Patient Position: Sitting, Cuff Size: Adult Regular)   Pulse 61   Temp 97.4  F (36.3  C) (Oral)   Resp 20   Ht 5' 6\" (1.676 m)   Wt 160 lb 11.2 oz (72.9 kg)   SpO2 99%   BMI 25.94 kg/m    Patient here for ER/ follow up for anxiety and would like consultation for disability.  "

## 2019-04-29 NOTE — PROGRESS NOTES
SUBJECTIVE:   Gunner Velázquez is a 58 year old male who presents to clinic today for the following   health issues:        ED/UC Followup:    Facility:  [facility] [place]  Date of visit: 04/24/19  Reason for visit: Dizziness  Current Status: Patient says he still has some dizziness and worries constantly. Has insomnia     Patient is seen for a follow up visit.  Seen in ED for symptoms of dizziness, SOB, anxiety. Lab work , brain MRI , EKG were normal. Symptoms improved with lorazepam, fluids.   Has h/o severe depression, PTSD, unable to function, not able to work. On medical treatment and follows with psychiatry. Has difficulty sleeping, night terrors, fatigue, panic attacks.   Has h/o HTN. on medical treatment. BP has been controlled. No side effects from medications. No CP, HA, dizziness. good compliance with medications and low salt diet.  Has h/o CRF. Symptoms include fatigue . Monitoring BP, BG, medications, avoiding OTC NSAIDs. Needs periodic recheck of kidney function.          Additional history: as documented    Reviewed  and updated as needed this visit by clinical staff         Reviewed and updated as needed this visit by Provider         Patient Active Problem List   Diagnosis     Hyperlipidemia with target LDL less than 130     HTN, goal below 140/90     CKD (chronic kidney disease) stage 4, GFR 15-29 ml/min (H)     MOE (generalized anxiety disorder)     GERD (gastroesophageal reflux disease)     Dizziness     Seasonal allergies     High triglycerides     Severe depression (H)     PTSD (post-traumatic stress disorder)     Anxiety     History reviewed. No pertinent surgical history.    Social History     Tobacco Use     Smoking status: Former Smoker     Smokeless tobacco: Never Used   Substance Use Topics     Alcohol use: No     Alcohol/week: 0.0 oz     Family History   Problem Relation Age of Onset     Heart Disease Father 70        tb     Family History Negative No family hx of          Current Outpatient  "Medications   Medication Sig Dispense Refill     ACE/ARB/ARNI NOT PRESCRIBED, INTENTIONAL, Please choose reason not prescribed, below       amLODIPine (NORVASC) 10 MG tablet Take 1 tablet (10 mg) by mouth daily 90 tablet 3     cholecalciferol (VITAMIN D3) 5000 units CAPS capsule Take 1 per day 100 capsule 1     fexofenadine (ALLEGRA) 180 MG tablet Take 1 tablet (180 mg) by mouth daily 90 tablet 3     fish oil-omega-3 fatty acids 1000 MG capsule Take 1 capsule (1 g) by mouth 2 times daily 180 capsule 3     LORazepam (ATIVAN) 0.5 MG tablet Take 0.5-1 tablets (0.25-0.5 mg) by mouth every 8 hours as needed (Dizziness) 10 tablet 0     meclizine (ANTIVERT) 12.5 MG tablet Take 1-2 tablets (12.5-25 mg) by mouth 4 times daily as needed for dizziness 30 tablet 5     metoprolol succinate ER (TOPROL-XL) 50 MG 24 hr tablet Take 1 tablet (50 mg) by mouth daily 90 tablet 3     mirtazapine (REMERON) 45 MG tablet Take 1 tablet (45 mg) by mouth At Bedtime Increased dose. For mood, sleep, and anxiety. 90 tablet 1     prazosin (MINIPRESS) 1 MG capsule Take 2 capsule in AM and 2 capsules in PM. 360 capsule 3     simvastatin (ZOCOR) 40 MG tablet Take 1 tablet (40 mg) by mouth At Bedtime 90 tablet 1     tolterodine (DETROL) 1 MG tablet Take 1 tablet (1 mg) by mouth At Bedtime 90 tablet 3     traZODone (DESYREL) 100 MG tablet Take 2 tablets (200 mg) by mouth nightly as needed for sleep Increased dose. 180 tablet 1       ROS:  Constitutional, HEENT, cardiovascular, pulmonary, gi and gu systems are negative, except as otherwise noted.    OBJECTIVE:     /90 (BP Location: Left arm, Patient Position: Sitting, Cuff Size: Adult Regular)   Pulse 61   Temp 97.4  F (36.3  C) (Oral)   Resp 20   Ht 5' 6\" (1.676 m)   Wt 160 lb 11.2 oz (72.9 kg)   SpO2 99%   BMI 25.94 kg/m    Body mass index is 25.94 kg/m .   GENERAL: healthy, alert and no distress  NECK: no adenopathy, no asymmetry, masses, or scars and thyroid normal to palpation  RESP: " lungs clear to auscultation - no rales, rhonchi or wheezes  CV: regular rate and rhythm, normal S1 S2, no S3 or S4, no murmur, click or rub, no peripheral edema and peripheral pulses strong  ABDOMEN: soft, nontender, no hepatosplenomegaly, no masses and bowel sounds normal  MS: no gross musculoskeletal defects noted, no edema  Psych : appears anxious, depressed, appropriate speech     Diagnostic Test Results:  none     ASSESSMENT/PLAN:     Problem List Items Addressed This Visit     HTN, goal below 140/90    Relevant Medications    prazosin (MINIPRESS) 1 MG capsule    CKD (chronic kidney disease) stage 4, GFR 15-29 ml/min (H)    Dizziness    Severe depression (H)    PTSD (post-traumatic stress disorder)    Relevant Medications    prazosin (MINIPRESS) 1 MG capsule    Anxiety      Other Visit Diagnoses     Hospital discharge follow-up    -  Primary           Continue psychiatry follow up and treatment   Monitor renal function   Increased minipress dose for better BP control.     Follow-Up:in 1 month     Vinnie Brown MD  [facility]

## 2019-04-30 ASSESSMENT — PATIENT HEALTH QUESTIONNAIRE - PHQ9: SUM OF ALL RESPONSES TO PHQ QUESTIONS 1-9: 23

## 2019-05-06 ENCOUNTER — OFFICE VISIT (OUTPATIENT)
Dept: BEHAVIORAL HEALTH | Facility: CLINIC | Age: 59
End: 2019-05-06
Payer: COMMERCIAL

## 2019-05-06 DIAGNOSIS — F43.10 PTSD (POST-TRAUMATIC STRESS DISORDER): ICD-10-CM

## 2019-05-06 DIAGNOSIS — F41.1 GAD (GENERALIZED ANXIETY DISORDER): Primary | ICD-10-CM

## 2019-05-06 DIAGNOSIS — F32.2 SEVERE DEPRESSION (H): ICD-10-CM

## 2019-05-06 PROCEDURE — 90785 PSYTX COMPLEX INTERACTIVE: CPT | Performed by: COUNSELOR

## 2019-05-06 PROCEDURE — 90834 PSYTX W PT 45 MINUTES: CPT | Performed by: COUNSELOR

## 2019-05-06 NOTE — PROGRESS NOTES
Progress Note    Client Name: Gunner Whytegchanh  Date: 5/6/2019           Service Type: Individual  Video Visit: No     Session Start Time: 6:30pm  Session End Time: 7:11pm     Session Length: 41 mins     Session #: 9    Attendees: Client and      Treatment Plan Last Reviewed: patricia 5/6/2019    PHQ-9 / MOE-7 : in flowsheet     DATA  Interactive Complexity: -Use of play equipment, physical devices,  or  to overcome barriers to diagnostic or therapeutic interaction with a patient who is not fluent in the same language or who has not developed or lost expressive or receptive language skills to use or understand typical language  Crisis: No       Progress Since Last Session (Related to Symptoms / Goals / Homework):   Symptoms: No change      Homework: Achieved / completed to satisfaction      Episode of Care Goals: No improvement - PREPARATION (Decided to change - considering how); Intervened by negotiating a change plan and determining options / strategies for behavior change, identifying triggers, exploring social supports, and working towards setting a date to begin behavior change     Current / Ongoing Stressors and Concerns:   Functionality, physical health, navigating SSI     Treatment Objective(s) Addressed in This Session:   Navigating medical system  [facility] provider benefits  [facility] for social security navigation assistance      Intervention:   Solution Focused: difficulties navigating health system due to language barriers        ASSESSMENT: Current Emotional / Mental Status (status of significant symptoms):   Risk status (Self / Other harm or suicidal ideation)   Client denies current fears or concerns for personal safety.   Client reports the following current or recent suicidal ideation or behaviors: thoughts but denies any plan or intent.   Client denies current or recent homicidal ideation or behaviors.   Client denies current  or recent self injurious behavior or ideation.   Client denies other safety concerns.   Client Client reports there has been a change in risk factors since their last session.  2nd SSI denial   Client Client reports there has been no change in protective factors since their last session.     A safety and risk management plan has been developed including: Client consented to co-developed safety plan.  [facility]'s safety and risk management plan was completed.  Client agreed to use safety plan should any safety concerns arise.  A copy was given to the patient.     Appearance:   Appropriate    Eye Contact:   Good    Psychomotor Behavior: Retarded (Slowed)    Attitude:   Cooperative    Orientation:   All   Speech    Rate / Production: Monotone  Slow     Volume:  Normal    Mood:    Depressed    Affect:    Flat    Thought Content:  Rumination    Thought Form:  Coherent  Logical    Insight:    Poor      Medication Review:   No changes to current psychiatric medication(s)     Medication Compliance:   Yes     Changes in Health Issues:   Yes: Pain, Associated Psychological Distress     Chemical Use Review:   Substance Use: Chemical use reviewed, no active concerns identified      Tobacco Use: No current tobacco use.      Diagnosis:  1. MOE (generalized anxiety disorder)    2. PTSD (post-traumatic stress disorder)    3. Severe depression (H)        Collateral Reports Completed:   Not Applicable    PLAN: (Client Tasks / Therapist Tasks / Other)  Client reported he was denied social security for the second time  Client showed provider letter and it stated client sent information that was outside the time frame of assessment  Provider discussed how the clients language barrier may be the thing preventing him from obtaining SSDI and referred him to the [place] as the website states it assists with navigating this   Provider assigned client to contact [place] [facility] for assistance with SSDI at [phone]    Corrina Farooq,  [facility] 5/6/2019                                                                                        ________________________________________________________________________      Treatment Plan      Client's Name: Gunner Velázquez                                    YOB: 1960      Date: 9/19/2018 cont 5/6/2019 due to one every 6 weeks             DSM-V Diagnoses: 296.33 (F33.2) Major Depressive Disorder, Recurrent Episode, Severe _, 300.02 (F41.1) Generalized Anxiety Disorder or 309.81 (F43.10) Posttraumatic Stress Disorder (includes Posttraumatic Stress Disorder for Children 6 Years and Younger)  With delayed expression  Psychosocial / Contextual Factors: trauma from [place], medical issues, finances, work , relationships   WHODAS: 54      Referral / Collaboration:  The following referral(s) was/were discussed but client declines follow up at this time. day treatment, partial hospitalization. Clients level of need presents as very high.       Anticipated number of session or this episode of care: 12-18          MeasurableTreatment Goal(s) related to diagnosis / functional impairment(s)  Goal 1: Client will score less than 15 on PHQ9.    I will know I've met my goal when everything feels better and good. I would socialize more with my kids and my wife        Objective #A (Client Action)                                    Client will Identify negative self-talk and behaviors: challenge core beliefs, myths, and actions.  Status:cont 5/6/2019        Intervention(s)  Therapist will teach thought labeling helpful or unhelpful. .      Objective #B  Client will Decrease thoughts that you'd be better off dead or of suicide / self-harm.  Status: cont 5/6/2019        Intervention(s)  Therapist will teach emotional regulation skills. This will include positive thinking, deep breathing and fact checking. .                  Goal 2: Client will score less than 15 on GAD7.    I will know I've met my goal  when everything feels better and good.         Objective #A (Client Action)                                    Status: 5/6/2019        Client will use thought-stopping strategy daily to reduce intrusive thoughts.      Intervention(s)  Therapist will teach emotional recognition/identification. This will include using thought labeling to indentify distorted thought patterns.      Objective #B  Client will track and record at least 1 pleasant exchanges with family members.                                      Status: cont 5/6/2019        Intervention(s)  Therapist will give client examples of ways he can interact with his family. Provider will check in each session to ask about client efforts on this. .          Client has reviewed and agreed to the above plan.          Corrina Farooq, [facility]                                    5/6/2019

## 2019-05-09 NOTE — PROGRESS NOTES
"    Outpatient Psychiatric Progress Note    Name: Gunner Velázquez   : 1960                    Primary Care Provider: Vinnie Brown MD - last visit 2019  Therapist: Corrina Farooq - last visit 2019  [facility] Kathleen Arenas - next visit later this month  [facility]  Janay Luis - fax [phone]  Client identified their preferred language to be Bangladeshi.  Client needs the assistance of an   [facility]  Patient has been denied disability x2 and is in the process of appeal    PHQ-9 scores:  PHQ-9 SCORE 3/15/2019 2019 5/10/2019   PHQ-9 Total Score 19 23 20       MOE-7 scores:  MOE-7 SCORE 2018 3/15/2019 5/10/2019   Total Score 13 17 17       Patient Identification:  Patient is a 58 year old,    Laotian male  who presents for return visit with me.  Patient is currently unemployed. Patient attended the session with  , who they agreed to have interview with. Consent to communicate signed for Jack, patient's Spouse/Partner. Patient prefers to be called: \"Gunner\"      Interim History:    I last saw Gunner Velázquez for outpatient psychiatry Return Visit on 3/15/2019.     During that appointment, we Increase Minipress (prazosin) to 1 mg daily in AM and 2 mg daily in PM for Post-traumatic stress disorder (PTSD) and nightmares.    Continue Desyrel/Olepto (trazodone) 200 mg by mouth daily at bedtime for sleep.     Continue Remeron (mirtazapine) 45 mg by mouth daily at bedtime for sleep, mood, anxiety.     Current medications include:   Current Outpatient Medications   Medication Sig     ACE/ARB/ARNI NOT PRESCRIBED, INTENTIONAL, Please choose reason not prescribed, below     amLODIPine (NORVASC) 10 MG tablet Take 1 tablet (10 mg) by mouth daily     cholecalciferol (VITAMIN D3) 5000 units CAPS capsule Take 1 per day     fexofenadine (ALLEGRA) 180 MG tablet Take 1 tablet (180 mg) by mouth daily     fish oil-omega-3 fatty " acids 1000 MG capsule Take 1 capsule (1 g) by mouth 2 times daily     LORazepam (ATIVAN) 0.5 MG tablet Take 0.5-1 tablets (0.25-0.5 mg) by mouth every 8 hours as needed (Dizziness)     meclizine (ANTIVERT) 12.5 MG tablet Take 1-2 tablets (12.5-25 mg) by mouth 4 times daily as needed for dizziness     metoprolol succinate ER (TOPROL-XL) 50 MG 24 hr tablet Take 1 tablet (50 mg) by mouth daily     mirtazapine (REMERON) 45 MG tablet Take 1 tablet (45 mg) by mouth At Bedtime Increased dose. For mood, sleep, and anxiety.     prazosin (MINIPRESS) 1 MG capsule Take 2 capsule in AM and 2 capsules in PM.     simvastatin (ZOCOR) 40 MG tablet Take 1 tablet (40 mg) by mouth At Bedtime     tolterodine (DETROL) 1 MG tablet Take 1 tablet (1 mg) by mouth At Bedtime     traZODone (DESYREL) 100 MG tablet Take 2 tablets (200 mg) by mouth nightly as needed for sleep Increased dose.     No current facility-administered medications for this visit.        The [place] Prescription Monitoring Program has been reviewed and there are no concerns about diversionary activity for controlled substances at this time.      PRESCRIPTIONS  Total Prescriptions: 1  Total Private Pay: 0  Fill Date ID Written Drug Qty Days Prescriber Rx # Pharmacy Refill Daily Dose * Pymt Type   04/25/2019 1 04/24/2019 Lorazepam 0.5 Mg Tablet   10 4 Ed Aus [id number] Sup (6284) 0 1.25 E Medicaid [place]    I was able to review most recent Primary Care Provider, specialty provider, and therapy visit notes that I have access to.   Patient contacted our clinic on 4/2/2019 stating he was denied disability for social security because they did not have his medical records. Reviewed he should let his lawayer know and make sure he has an CHARLOTTE for our notes together.     Patient was in the Emergency Department for dizziness on 4/24/2019.   Patient was given Ativan (lorazepam) from Primary Care Provider provider.   Primary Care Provider increased metoprolol and Minipress (prazosin)  "dosing 2 weeks ago.     Gunner Velázquez reports mood has been: \"people think I am crazy\" continues to be depressed and sad. He is isolating self. He also notes feeling more irritable and angry at times that can be related to his anxiety.   Anxiety has been: \"worse\" has panic attacks with shortness of breath, dizziness, sweating, fear, chest pain, nausea with vomiting  Low appetite and loss of interest in mood and activities.   Sleep has been: \"sometimes I can sleep a lot and sometimes I cannot sleep at all\" last night he had a nightmare about going to the hospital and having a surgery and someone told him he was a ghost. These continue to scare him. Somedays he has to nap.   PHQ9 and GAD7 scores were reviewed today.   Medication side effects: continues to have dizziness  Current stressors include: Symptoms, Medical Comorbidities, Financial Difficulties, Relationship Difficulties  Coping mechanisms and supports include: Self help, Family, Therapy    Previous medication trials include but not limited to:  Buspar (buspirone) 30 mg BID  Paxil (paroxetine) 30 mg   Desyrel/Olepto (trazodone) 50 -150 mg   Celexa (citalopram)   Zoloft (sertraline)   Remeron (mirtazapine)  Minipress (prazosin)   Ativan (lorazepam)     Past Medical History:   Diagnosis Date     Anxiety      CKD (chronic kidney disease)      Depression      Dizziness      Dyslipidemia      HTN (hypertension)      Hyperlipidaemia      Shortness of breath       has a past medical history of Anxiety, CKD (chronic kidney disease), Depression, Dizziness, Dyslipidemia, HTN (hypertension), Hyperlipidaemia, and Shortness of breath.    Social History:  Current Living situation:  [place], [place] with Family. Feels safe at home.  Current use of drugs or alcohol: Denies - history of alcohol last used 10 years ago. Denies other drug use. No cravings.   Tobacco use: History of cigarette use- over 10 years ago   Caffeine: No    Vital Signs:   /80 (BP Location: Right arm, " "Patient Position: Chair, Cuff Size: Adult Regular)   Pulse 77   Temp 98  F (36.7  C) (Oral)   Resp 22   Ht 1.676 m (5' 6\")   Wt 72.1 kg (159 lb)   SpO2 99%   BMI 25.66 kg/m      Labs:  Most recent laboratory results reviewed and the pertinent results include:   Admission on 04/24/2019, Discharged on 04/24/2019   Component Date Value Ref Range Status     Glucose 04/24/2019 87  70 - 99 mg/dL Final     WBC 04/24/2019 7.3  4.0 - 11.0 10e9/L Final     RBC Count 04/24/2019 4.84  4.4 - 5.9 10e12/L Final     Hemoglobin 04/24/2019 13.4  13.3 - 17.7 g/dL Final     Hematocrit 04/24/2019 41.1  40.0 - 53.0 % Final     MCV 04/24/2019 85  78 - 100 fl Final     MCH 04/24/2019 27.7  26.5 - 33.0 pg Final     MCHC 04/24/2019 32.6  31.5 - 36.5 g/dL Final     RDW 04/24/2019 13.3  10.0 - 15.0 % Final     Platelet Count 04/24/2019 250  150 - 450 10e9/L Final     Diff Method 04/24/2019 Automated Method   Final     % Neutrophils 04/24/2019 48.0  % Final     % Lymphocytes 04/24/2019 37.7  % Final     % Monocytes 04/24/2019 8.1  % Final     % Eosinophils 04/24/2019 5.5  % Final     % Basophils 04/24/2019 0.4  % Final     % Immature Granulocytes 04/24/2019 0.3  % Final     Nucleated RBCs 04/24/2019 0  0 /100 Final     Absolute Neutrophil 04/24/2019 3.5  1.6 - 8.3 10e9/L Final     Absolute Lymphocytes 04/24/2019 2.8  0.8 - 5.3 10e9/L Final     Absolute Monocytes 04/24/2019 0.6  0.0 - 1.3 10e9/L Final     Absolute Eosinophils 04/24/2019 0.4  0.0 - 0.7 10e9/L Final     Absolute Basophils 04/24/2019 0.0  0.0 - 0.2 10e9/L Final     Abs Immature Granulocytes 04/24/2019 0.0  0 - 0.4 10e9/L Final     Absolute Nucleated RBC 04/24/2019 0.0   Final     Sodium 04/24/2019 139  133 - 144 mmol/L Final     Potassium 04/24/2019 4.0  3.4 - 5.3 mmol/L Final     Chloride 04/24/2019 106  94 - 109 mmol/L Final     Carbon Dioxide 04/24/2019 27  20 - 32 mmol/L Final     Anion Gap 04/24/2019 6  3 - 14 mmol/L Final     Glucose 04/24/2019 95  70 - 99 mg/dL Final "     Urea Nitrogen 04/24/2019 30  7 - 30 mg/dL Final     Creatinine 04/24/2019 2.58* 0.66 - 1.25 mg/dL Final     GFR Estimate 04/24/2019 26* >60 mL/min/[1.73_m2] Final    Comment: Non  GFR Calc  Starting 12/18/2018, serum creatinine based estimated GFR (eGFR) will be   calculated using the Chronic Kidney Disease Epidemiology Collaboration   (CKD-EPI) equation.       GFR Estimate If Black 04/24/2019 30* >60 mL/min/[1.73_m2] Final    Comment:  GFR Calc  Starting 12/18/2018, serum creatinine based estimated GFR (eGFR) will be   calculated using the Chronic Kidney Disease Epidemiology Collaboration   (CKD-EPI) equation.       Calcium 04/24/2019 9.0  8.5 - 10.1 mg/dL Final     Troponin I ES 04/24/2019 <0.015  0.000 - 0.045 ug/L Final    Comment: The 99th percentile for upper reference range is 0.045 ug/L.  Troponin values   in the range of 0.045 - 0.120 ug/L may be associated with risks of adverse   clinical events.       INR 04/24/2019 0.90  0.86 - 1.14 Final     PTT 04/24/2019 34  22 - 37 sec Final     Interpretation ECG 04/24/2019 Click View Image link to view waveform and result   Final     Review of Systems:  10 systems (general, cardiovascular, respiratory, eyes, ENT, endocrine, GI, , M/S, neurological) were reviewed. Most pertinent finding(s) is/are: chronic kidney disease, heart problems- chest tightness and pain when anxious and have anxiety attacks, hypertension, shortness of breath with panic, headaches about 2 times per week alleviated by home remedies and rest, dizziness for the last 3-4 years still 2-3 times per day and can catch himself and sits down- no falls, history of low back pain- worse when eats spicy foods, no blood in urine, peripheral edema over one week ago,  dry mouth, trouble with urinating and starting, itching, frequent urination. The remaining systems are all unremarkable.     Mental Status Examination:  Appearance:  awake, alert, adequate grooming, on  time, mild to moderate distress, and appeared stated age  Attitude:  cooperative   Eye Contact:  fair and wears reading glasses  Gait and Station: Normal, No assistive Devices used, dizziness continues and no falls after Emergency Department visit  Psychomotor Behavior:  no evidence of tardive dyskinesia, dystonia, or tics  Oriented to:  time, person, and place  Attention Span and Concentration:  Adequate  Speech:  speaks Japanese and uses   Mood:  anxious and depressed  Affect:  mood congruent and intensity is blunted to flat  Associations:  no loose associations  Thought Process:  logical, linear and goal oriented  Thought Content:  no evidence of suicidal ideation or homicidal ideation and no evidence of psychotic thought  Recent and Remote Memory:  Short term memory concerns. Continues to be forgetful. Denies long term memory concerns. Not formally assessed. Others have noticed. No amnesia.  Fund of Knowledge: appropriate  Insight:  intact  Judgment:  intact and adequate for safety  Impulse Control:  intact      Suicide Risk Assessment:  Today Gunner Velázquez reports still struggling with depression. Reports that he has felt that life is not worth living, but denies that he wants to kill self and denies a plan or intent. Still worries often about his health and financial difficulties. No thoughts to die or harm self or others. In addition, there are notable risk factors for self-harm, including age, anxiety, comorbid medical condition of kidney disease, anger, and withdrawing. However, risk is mitigated by commitment to family, sobriety, absence of past attempts, ability to volunteer a safety plan, history of seeking help when needed, future oriented, no access to firearms or weapons, denies suicidal intent or plan, no family history of suicide, denies homicidal ideation, intent, or plans, gets hope from family and reports he would be able to reach out to them or his doctor if needed in the future if he  had thoughts he would harm himself. Therefore, based on all available evidence including the factors cited above, Gunner Velázquez does not appear to be at imminent risk for self-harm, does not meet criteria for a 72-hr hold, and therefore remains appropriate for ongoing outpatient level of care.  A thorough assessment of risk factors related to suicide and self-harm have been reviewed and are noted above.  Local community safety resources reviewed and printed for patient to use if needed. There was no deceit detected, and the patient presented in a manner that was believable.       DSM5  Diagnosis:  296.33 (F33.2) Major Depressive Disorder, Recurrent Episode, Severe without psychosis  300.02 (F41.1) Generalized Anxiety Disorder  309.81 (F43.10) Posttraumatic Stress Disorder Without dissociative symptoms    Medical comorbidities include:   Patient Active Problem List    Diagnosis Date Noted     Anxiety 06/13/2018     Priority: Medium     PTSD (post-traumatic stress disorder) 05/03/2018     Priority: Medium     Severe depression (H) 05/31/2016     Priority: Medium     High triglycerides 08/11/2015     Priority: Medium     Seasonal allergies 05/04/2015     Priority: Medium     MOE (generalized anxiety disorder) 08/01/2014     Priority: Medium     GERD (gastroesophageal reflux disease) 08/01/2014     Priority: Medium     Dizziness 08/01/2014     Priority: Medium     Thought secondary to anxiety.  Workup with cardiology, ENT, audiology, [facility], physical therapy, MRI, MRA all negative       Hyperlipidemia with target LDL less than 130 02/21/2014     Priority: Medium     HTN, goal below 140/90 02/21/2014     Priority: Medium     CKD (chronic kidney disease) stage 4, GFR 15-29 ml/min (H) 02/21/2014     Priority: Medium     Nephrologist Dr. Arenas at [facility]         Psychosocial & Contextual Factors:  Occupational Difficulties, Financial Difficulties and Medical  Comorbidites       Assessment:  Gunner Velázquez reports ongoing depression, increased anxiety, insomnia, nightmares, irritability and anger. This continues at home and causes ongoing difficulty functioning. Family continues to be worried about him.      Medication side effects and alternatives were reviewed. Health promotion activities recommended and reviewed today. All questions addressed. Education and counseling completed regarding risks and benefits of medications and psychotherapy options.    Therapy should continue. Symptoms continue to affect his functioning at home and has not been able to work for years.   Patient is in the process of applying for disability due to his condition and he should continue to work with those legal specialists and disability assessors. Patient notes ongoing worries about finances- his co-pays for insurance now will increase from $15 to $25 and this is too expensive. CHARLOTTE given to Patient today to have his new  request all of patient's records for his next disability claim. Patient also has an upcoming third party evaluation from Disability specialists. Patient may need long term psychiatry care.     Continue to monitor memory- short term memory concerns specifically. Wife sets up alarms and helps with medications.     Patient has chronic kidney disease stage 4 so will need to closely monitor with medication changes. Most recent lab results showed diminished kidney functioning. Continue to monitor. Patient may need to start dialysis and consider kidney transplant.     Another option for depression may be an SNRI such as Effexor (venlafaxine) or Cymbalta (duloxetine).    Seroquel (quetiapine) would likely help with Post-traumatic stress disorder (PTSD) and severe insomnia, but can affect his kidney functioning. Will stop Desyrel/Olepto (trazodone) and try this instead. Will watch closely and focus on reducing anxiety, anger, and insomnia.     Treatment Plan:    Start Seroquel  (quetiapine) 50 mg by mouth daily at bedtime for sleep, anxiety, and Post-traumatic stress disorder (PTSD).    Stop Desyrel/Olepto (trazodone).     Continue Remeron (mirtazapine) 45 mg by mouth daily at bedtime for sleep, mood, anxiety.     Continue Minipress (prazosin) 2 mg daily in AM and 2 mg daily in PM for Post-traumatic stress disorder (PTSD) and nightmares.    Continue all other medications as reviewed per electronic medical record today.     Safety plan reviewed. To the Emergency Department as needed or call after hours crisis line at [phone] or [phone]. [place] Crisis Text Line. Text [place] to 541399 or Suicide LifeLine Chat: suicidepreventionlifeline.org/chat    Continue individual therapy as planned with Corrina Gallardo.    Schedule an appointment with me in 6-8 weeks or sooner as needed. Call [facility] at [phone] to schedule.    Follow up with primary care provider as planned or for acute medical concerns.    Call the psychiatric nurse line with medication questions or concerns at [phone].    Backupifyhart may be used to communicate with your provider, but this is not intended to be used for emergencies.    Administrative Billing:   Time spent with patient and  was 30 minutes and greater than 50% of time or 30 minutes was spent in counseling and coordination of care regarding above diagnoses and treatment plan.    Patient Status:  Patient will continue to be seen for ongoing consultation and stabilization.    Signed:   Promise Aranda, PhD, APRN, CNP   Psychiatry

## 2019-05-10 ENCOUNTER — OFFICE VISIT (OUTPATIENT)
Dept: PSYCHIATRY | Facility: CLINIC | Age: 59
End: 2019-05-10
Payer: COMMERCIAL

## 2019-05-10 VITALS
BODY MASS INDEX: 25.55 KG/M2 | WEIGHT: 159 LBS | HEIGHT: 66 IN | HEART RATE: 77 BPM | RESPIRATION RATE: 22 BRPM | TEMPERATURE: 98 F | SYSTOLIC BLOOD PRESSURE: 142 MMHG | OXYGEN SATURATION: 99 % | DIASTOLIC BLOOD PRESSURE: 80 MMHG

## 2019-05-10 DIAGNOSIS — F43.10 PTSD (POST-TRAUMATIC STRESS DISORDER): Primary | ICD-10-CM

## 2019-05-10 DIAGNOSIS — F41.1 GAD (GENERALIZED ANXIETY DISORDER): ICD-10-CM

## 2019-05-10 DIAGNOSIS — F32.2 SEVERE DEPRESSION (H): ICD-10-CM

## 2019-05-10 PROBLEM — F41.9 ANXIETY: Status: RESOLVED | Noted: 2018-06-13 | Resolved: 2019-05-10

## 2019-05-10 PROCEDURE — 99214 OFFICE O/P EST MOD 30 MIN: CPT | Performed by: NURSE PRACTITIONER

## 2019-05-10 RX ORDER — QUETIAPINE FUMARATE 50 MG/1
50 TABLET, FILM COATED ORAL AT BEDTIME
Qty: 30 TABLET | Refills: 1 | Status: SHIPPED | OUTPATIENT
Start: 2019-05-10 | End: 2019-07-01

## 2019-05-10 ASSESSMENT — ANXIETY QUESTIONNAIRES
3. WORRYING TOO MUCH ABOUT DIFFERENT THINGS: NEARLY EVERY DAY
7. FEELING AFRAID AS IF SOMETHING AWFUL MIGHT HAPPEN: NEARLY EVERY DAY
1. FEELING NERVOUS, ANXIOUS, OR ON EDGE: NEARLY EVERY DAY
2. NOT BEING ABLE TO STOP OR CONTROL WORRYING: MORE THAN HALF THE DAYS
GAD7 TOTAL SCORE: 17
6. BECOMING EASILY ANNOYED OR IRRITABLE: MORE THAN HALF THE DAYS
5. BEING SO RESTLESS THAT IT IS HARD TO SIT STILL: MORE THAN HALF THE DAYS
IF YOU CHECKED OFF ANY PROBLEMS ON THIS QUESTIONNAIRE, HOW DIFFICULT HAVE THESE PROBLEMS MADE IT FOR YOU TO DO YOUR WORK, TAKE CARE OF THINGS AT HOME, OR GET ALONG WITH OTHER PEOPLE: EXTREMELY DIFFICULT

## 2019-05-10 ASSESSMENT — MIFFLIN-ST. JEOR: SCORE: 1483.97

## 2019-05-10 ASSESSMENT — PATIENT HEALTH QUESTIONNAIRE - PHQ9
5. POOR APPETITE OR OVEREATING: MORE THAN HALF THE DAYS
SUM OF ALL RESPONSES TO PHQ QUESTIONS 1-9: 20

## 2019-05-10 ASSESSMENT — PAIN SCALES - GENERAL: PAINLEVEL: MODERATE PAIN (5)

## 2019-05-10 NOTE — NURSING NOTE
"Chief Complaint   Patient presents with     Recheck Medication     sometimes the medication works other times not helping.      initial /80 (BP Location: Right arm, Patient Position: Chair, Cuff Size: Adult Regular)   Pulse 77   Temp 98  F (36.7  C) (Oral)   Resp 22   Ht 1.676 m (5' 6\")   Wt 72.1 kg (159 lb)   SpO2 99%   BMI 25.66 kg/m   Estimated body mass index is 25.66 kg/m  as calculated from the following:    Height as of this encounter: 1.676 m (5' 6\").    Weight as of this encounter: 72.1 kg (159 lb)..  bp completed using cuff size regular    "

## 2019-05-10 NOTE — PATIENT INSTRUCTIONS
Treatment Plan:    Start Seroquel (quetiapine) 50 mg by mouth daily at bedtime for sleep, anxiety, and Post-traumatic stress disorder (PTSD).    Stop Desyrel/Olepto (trazodone).     Continue Remeron (mirtazapine) 45 mg by mouth daily at bedtime for sleep, mood, anxiety.     Continue Minipress (prazosin) 2 mg daily in AM and 2 mg daily in PM for Post-traumatic stress disorder (PTSD) and nightmares.    Continue all other medications as reviewed per electronic medical record today.     Safety plan reviewed. To the Emergency Department as needed or call after hours crisis line at [phone] or [phone]. [place] Crisis Text Line. Text [place] to 379909 or Suicide LifeLine Chat: suicidepreventionlifeline.org/chat    Continue individual therapy as planned with Corrina Gallardo.    Schedule an appointment with me in 6-8 weeks or sooner as needed. Call [facility] at [phone] to schedule.    Follow up with primary care provider as planned or for acute medical concerns.    Call the psychiatric nurse line with medication questions or concerns at [phone].    First To Filehart may be used to communicate with your provider, but this is not intended to be used for emergencies.

## 2019-05-11 ASSESSMENT — ANXIETY QUESTIONNAIRES: GAD7 TOTAL SCORE: 17

## 2019-05-29 ENCOUNTER — OFFICE VISIT (OUTPATIENT)
Dept: INTERNAL MEDICINE | Facility: CLINIC | Age: 59
End: 2019-05-29
Payer: COMMERCIAL

## 2019-05-29 VITALS
WEIGHT: 163 LBS | HEART RATE: 62 BPM | TEMPERATURE: 98.1 F | HEIGHT: 66 IN | DIASTOLIC BLOOD PRESSURE: 90 MMHG | SYSTOLIC BLOOD PRESSURE: 146 MMHG | BODY MASS INDEX: 26.2 KG/M2 | RESPIRATION RATE: 20 BRPM | OXYGEN SATURATION: 99 %

## 2019-05-29 DIAGNOSIS — F41.1 GAD (GENERALIZED ANXIETY DISORDER): ICD-10-CM

## 2019-05-29 DIAGNOSIS — I10 HTN, GOAL BELOW 140/90: Primary | ICD-10-CM

## 2019-05-29 DIAGNOSIS — F32.2 SEVERE DEPRESSION (H): ICD-10-CM

## 2019-05-29 DIAGNOSIS — N18.4 CKD (CHRONIC KIDNEY DISEASE) STAGE 4, GFR 15-29 ML/MIN (H): ICD-10-CM

## 2019-05-29 PROCEDURE — 99214 OFFICE O/P EST MOD 30 MIN: CPT | Performed by: INTERNAL MEDICINE

## 2019-05-29 RX ORDER — CARVEDILOL 6.25 MG/1
6.25 TABLET ORAL 2 TIMES DAILY WITH MEALS
Qty: 60 TABLET | Refills: 3 | Status: SHIPPED | OUTPATIENT
Start: 2019-05-29 | End: 2019-09-20

## 2019-05-29 ASSESSMENT — MIFFLIN-ST. JEOR: SCORE: 1502.11

## 2019-05-29 NOTE — PROGRESS NOTES
Subjective     Gunner Velázquez is a 58 year old male who presents to clinic today for the following health issues:      1 month F/U:  Patient is seen for a follow up visit.  Has h/o HTN. on medical treatment. BP has not been controlled. No side effects from medications. No CP, HA, dizziness. good compliance with medications and low salt diet.  Has h/o CRF. Symptoms include chronic fatigue. Monitoring BP, BG, medications, avoiding OTC NSAIDs. Needs periodic recheck of kidney function.  Has h/o anxiety and depression. On medical treatment, controlled, no side effects. No depressive symptoms or suicidal ideation. Has chronic anxiety , episodes of panic attacks, nightmares. Still experiencing these, seeing psychiatry , affects ability to do normal work.           Patient Active Problem List   Diagnosis     Hyperlipidemia with target LDL less than 130     HTN, goal below 140/90     CKD (chronic kidney disease) stage 4, GFR 15-29 ml/min (H)     MOE (generalized anxiety disorder)     GERD (gastroesophageal reflux disease)     Dizziness     Seasonal allergies     High triglycerides     Severe depression (H)     PTSD (post-traumatic stress disorder)     History reviewed. No pertinent surgical history.    Social History     Tobacco Use     Smoking status: Former Smoker     Smokeless tobacco: Never Used   Substance Use Topics     Alcohol use: No     Alcohol/week: 0.0 oz     Family History   Problem Relation Age of Onset     Heart Disease Father 70        tb     Family History Negative No family hx of          Current Outpatient Medications   Medication Sig Dispense Refill     amLODIPine (NORVASC) 10 MG tablet Take 1 tablet (10 mg) by mouth daily 90 tablet 3     carvedilol (COREG) 6.25 MG tablet Take 1 tablet (6.25 mg) by mouth 2 times daily (with meals) 60 tablet 3     cholecalciferol (VITAMIN D3) 5000 units CAPS capsule Take 1 per day 100 capsule 1     fexofenadine (ALLEGRA) 180 MG tablet Take 1 tablet (180 mg) by mouth daily  "90 tablet 3     fish oil-omega-3 fatty acids 1000 MG capsule Take 1 capsule (1 g) by mouth 2 times daily 180 capsule 3     meclizine (ANTIVERT) 12.5 MG tablet Take 1-2 tablets (12.5-25 mg) by mouth 4 times daily as needed for dizziness 30 tablet 5     mirtazapine (REMERON) 45 MG tablet Take 1 tablet (45 mg) by mouth At Bedtime Increased dose. For mood, sleep, and anxiety. 90 tablet 1     prazosin (MINIPRESS) 1 MG capsule Take 2 capsule in AM and 2 capsules in PM. 360 capsule 3     QUEtiapine (SEROQUEL) 50 MG tablet Take 1 tablet (50 mg) by mouth At Bedtime 30 tablet 1     simvastatin (ZOCOR) 40 MG tablet Take 1 tablet (40 mg) by mouth At Bedtime 90 tablet 1     tolterodine (DETROL) 1 MG tablet Take 1 tablet (1 mg) by mouth At Bedtime 90 tablet 3     ACE/ARB/ARNI NOT PRESCRIBED, INTENTIONAL, Please choose reason not prescribed, below         Reviewed and updated as needed this visit by Provider         Review of Systems   ROS COMP: Constitutional, HEENT, cardiovascular, pulmonary, GI, , musculoskeletal, neuro, skin, endocrine and psych systems are negative, except as otherwise noted.      Objective    /90   Pulse 62   Temp 98.1  F (36.7  C) (Oral)   Resp 20   Ht 1.676 m (5' 6\")   Wt 73.9 kg (163 lb)   SpO2 99%   BMI 26.31 kg/m    Body mass index is 26.31 kg/m .  Physical Exam   GENERAL: healthy, alert and no distress  EYES: Eyes grossly normal to inspection, PERRL and conjunctivae and sclerae normal  HENT: ear canals and TM's normal, nose and mouth without ulcers or lesions  NECK: no adenopathy, no asymmetry, masses, or scars and thyroid normal to palpation  RESP: lungs clear to auscultation - no rales, rhonchi or wheezes  CV: regular rate and rhythm, normal S1 S2, no S3 or S4, no murmur, click or rub, no peripheral edema and peripheral pulses strong  ABDOMEN: soft, nontender, no hepatosplenomegaly, no masses and bowel sounds normal  MS: no gross musculoskeletal defects noted, no edema  SKIN: no " suspicious lesions or rashes  NEURO: Normal strength and tone, mentation intact and speech normal    Diagnostic Test Results:  Labs reviewed in Epic  Results for orders placed or performed during the hospital encounter of 04/24/19   CT Head Perfusion w Contrast    Narrative    CT HEAD PERFUSION WITH CONTRAST 4/24/2019 2:28 PM     HISTORY: Dizziness. Some facial droopiness.    TECHNIQUE: Axial images were obtained through the brain with  intravenous contrast. 50 mL of Isovue-370 was given.    FINDINGS: Cerebral blood flow, cerebral blood volume, and mean transit  time maps are symmetric. There is no evidence for ischemia or infarct.      Impression    IMPRESSION: Normal CT brain perfusion imaging.    TASHA STANTON MD   CTA Head Neck with Contrast    Narrative    CTA HEAD AND NECK WITH CONTRAST 4/24/2019 2:17 PM     HISTORY: Anxiety, dizziness, possible facial droop. Some vomiting.    TECHNIQUE: Axial images were obtained through the head and neck with  intravenous contrast. 70 mL of Isovue-370 was given. Multiplanar  reconstructions were performed. 3-D reconstructions off a remote  workstation for CT angiography were also acquired. Carotid stenoses  were evaluated by comparing the caliber of the proximal internal  carotid artery to the caliber of the distal internal carotid artery.  Radiation dose for this scan was reduced using automated exposure  control, adjustment of the mA and/or kV according to patient size, or  iterative reconstruction technique..    FINDINGS:    Brachiocephalic vessels: There is some calcific plaque off the  thoracic arch. There is also mild calcific plaque in the proximal left  subclavian artery, but there is no stenosis.    Right carotid system: Normal.    Left carotid system: Normal.    Right vertebral artery: Normal.    Left vertebral artery: Normal.    [place]: Normal.    Other findings: Some degenerative changes are noted in the spine.      Impression    IMPRESSION:  1. Minimal  calcific atherosclerotic disease involving the proximal  left subclavian artery without stenosis.  2. No evidence for stenosis, dissection, thromboembolism, or aneurysm.    TASHA STANTON MD   CT Head w/o Contrast    Narrative    CT SCAN OF THE HEAD WITHOUT CONTRAST   4/24/2019 2:12 PM     HISTORY: Hypertension. Dizziness. Anxiety.    TECHNIQUE:  Axial images of the head and coronal reformations without  IV contrast material.  Radiation dose for this scan was reduced using  automated exposure control, adjustment of the mA and/or kV according  to patient size, or iterative reconstruction technique.    COMPARISON: 3/31/2017    FINDINGS:  The ventricles are normal in size, shape and configuration.   The brain parenchyma and subarachnoid spaces are normal. There is no  evidence of intracranial hemorrhage, mass, acute infarct or anomaly.     The visualized portions of the sinuses and mastoids appear normal.  There is no evidence of trauma.       Impression    IMPRESSION:  Normal CT scan of the head.     TASHA STANTON MD   MR Brain w/o Contrast    Narrative    MRI BRAIN WITHOUT CONTRAST  4/24/2019 4:32 PM    HISTORY:  Dizziness, possible right-sided facial/arm weakness.    TECHNIQUE: Multiplanar, multisequence MRI of the brain without  gadolinium IV contrast material.      COMPARISON: Head CT 4/24/2019, head MRI 7/10/2014    FINDINGS: The cerebral hemispheres, brainstem, and cerebellum  demonstrate normal morphology and signal. No evidence of acute  ischemia, hemorrhage, mass, mass effect, or hydrocephalus. Few white  matter T2 hyperintensities likely represent chronic small vessel  ischemic change considered to be within normal limits for age. No  abnormal diffusion restriction is identified. The visualized  calvarium, skull base, paranasal sinuses, and extracranial soft  tissues are unremarkable.      Impression    IMPRESSION: Unremarkable MRI of the head without contrast.    THAIS CHAVARRIA MD   Glucose by meter   Result  Value Ref Range    Glucose 87 70 - 99 mg/dL   CBC with platelets differential   Result Value Ref Range    WBC 7.3 4.0 - 11.0 10e9/L    RBC Count 4.84 4.4 - 5.9 10e12/L    Hemoglobin 13.4 13.3 - 17.7 g/dL    Hematocrit 41.1 40.0 - 53.0 %    MCV 85 78 - 100 fl    MCH 27.7 26.5 - 33.0 pg    MCHC 32.6 31.5 - 36.5 g/dL    RDW 13.3 10.0 - 15.0 %    Platelet Count 250 150 - 450 10e9/L    Diff Method Automated Method     % Neutrophils 48.0 %    % Lymphocytes 37.7 %    % Monocytes 8.1 %    % Eosinophils 5.5 %    % Basophils 0.4 %    % Immature Granulocytes 0.3 %    Nucleated RBCs 0 0 /100    Absolute Neutrophil 3.5 1.6 - 8.3 10e9/L    Absolute Lymphocytes 2.8 0.8 - 5.3 10e9/L    Absolute Monocytes 0.6 0.0 - 1.3 10e9/L    Absolute Eosinophils 0.4 0.0 - 0.7 10e9/L    Absolute Basophils 0.0 0.0 - 0.2 10e9/L    Abs Immature Granulocytes 0.0 0 - 0.4 10e9/L    Absolute Nucleated RBC 0.0    Basic metabolic panel   Result Value Ref Range    Sodium 139 133 - 144 mmol/L    Potassium 4.0 3.4 - 5.3 mmol/L    Chloride 106 94 - 109 mmol/L    Carbon Dioxide 27 20 - 32 mmol/L    Anion Gap 6 3 - 14 mmol/L    Glucose 95 70 - 99 mg/dL    Urea Nitrogen 30 7 - 30 mg/dL    Creatinine 2.58 (H) 0.66 - 1.25 mg/dL    GFR Estimate 26 (L) >60 mL/min/[1.73_m2]    GFR Estimate If Black 30 (L) >60 mL/min/[1.73_m2]    Calcium 9.0 8.5 - 10.1 mg/dL   Troponin I   Result Value Ref Range    Troponin I ES <0.015 0.000 - 0.045 ug/L   INR   Result Value Ref Range    INR 0.90 0.86 - 1.14   Partial thromboplastin time   Result Value Ref Range    PTT 34 22 - 37 sec   EKG 12 lead   Result Value Ref Range    Interpretation ECG Click View Image link to view waveform and result            Assessment & Plan   Problem List Items Addressed This Visit     HTN, goal below 140/90 - Primary    Relevant Medications    carvedilol (COREG) 6.25 MG tablet    CKD (chronic kidney disease) stage 4, GFR 15-29 ml/min (H)    MOE (generalized anxiety disorder)    Severe depression (H)     "         BMI:   Estimated body mass index is 26.31 kg/m  as calculated from the following:    Height as of this encounter: 1.676 m (5' 6\").    Weight as of this encounter: 73.9 kg (163 lb).     Change from Metoprolol to Carvedilol for better BP control , recheck in one month   Cont rest of treatment  Monitor renal function , follow up with nephrology   Continue psychiatry treatment       See Patient Instructions  Return in about 1 month (around 6/26/2019) for follow up on acute problem if persists.    Vinnie Brown MD  [facility]        "

## 2019-05-29 NOTE — NURSING NOTE
"Vital signs:  Temp: 98.1  F (36.7  C) Temp src: Oral BP: 146/90 Pulse: 62   Resp: 20 SpO2: 99 %     Height: 167.6 cm (5' 6\") Weight: 73.9 kg (163 lb)  Estimated body mass index is 26.31 kg/m  as calculated from the following:    Height as of this encounter: 1.676 m (5' 6\").    Weight as of this encounter: 73.9 kg (163 lb).          "

## 2019-07-01 DIAGNOSIS — F43.10 PTSD (POST-TRAUMATIC STRESS DISORDER): ICD-10-CM

## 2019-07-01 DIAGNOSIS — F32.1 MAJOR DEPRESSIVE DISORDER, SINGLE EPISODE, MODERATE (H): ICD-10-CM

## 2019-07-01 NOTE — TELEPHONE ENCOUNTER
"Last Written Prescription Date: 10/17/18  Last Fill Quantity: 100,  # refills: 1   Last office visit: 6/13/2018 with prescribing provider: Stephanie   Future Office Visit:   Next 5 appointments (look out 90 days)    Jul 12, 2019  2:15 PM CDT  [facility]anda NP  [facility] [address]  [facility] [address]  [phone]   Jul 19, 2019  1:30 PM CDT  Return Visit with Corrina Farooq [facility] ([facility]) [address] [place] [address]  [phone]   Sep 20, 2019 10:20 AM CDT  SHORT with Vinnie Brown MD  [facility] [address] [place] [id number] [phone]         Requested Prescriptions   Pending Prescriptions Disp Refills     cholecalciferol (VITAMIN D3) 5000 units (125 mcg) capsule [Pharmacy Med Name: Vitamin D3 Oral Capsule 5000 UNIT] 20 capsule 0     Sig: take 1 capsule by mouth daily.       Vitamin Supplements (Adult) Protocol Failed - 7/1/2019 12:35 PM        Failed - Recent (12 mo) or future (30 days) visit within the authorizing provider's specialty     Patient had office visit in the last 12 months or has a visit in the next 30 days with authorizing provider or within the authorizing provider's specialty.  See \"Patient Info\" tab in inbasket, or \"Choose Columns\" in Meds & Orders section of the refill encounter.              Passed - High dose Vitamin D not ordered        Passed - Medication is active on med list          "

## 2019-07-02 RX ORDER — QUETIAPINE FUMARATE 50 MG/1
50 TABLET, FILM COATED ORAL AT BEDTIME
Qty: 30 TABLET | Refills: 0 | Status: SHIPPED | OUTPATIENT
Start: 2019-07-02 | End: 2019-07-30

## 2019-07-02 NOTE — TELEPHONE ENCOUNTER
Future visit scheduled. Prescription approved per [facility] [place] [facility].  Tierney James RN

## 2019-07-03 ENCOUNTER — PATIENT OUTREACH (OUTPATIENT)
Dept: CARE COORDINATION | Facility: CLINIC | Age: 59
End: 2019-07-03

## 2019-07-03 NOTE — PROGRESS NOTES
Clinic Care Coordination Contact    Call placed to Patient with the assistance of a Red . Patient reports that his continues to appeal the previous denial and pursue Social Security Disability. Pt is working with [employer].     Pt reports that he is being sent a form in the mail, and may have questions once he receives it. CRISTINA ARMENTA encouraged Pt to call back with further questions regarding this form. Pt is in agreement with this plan.     Patient endorses that he is taking all of medications as prescribed, however, is still feeling depressed. We reviewed that Patient is scheduled to see Psychiatry next Friday. He agrees to address concerns during this planned visit.      Goals:   Goals        General    Psychosocial (pt-stated)     Notes - Note edited  4/25/2019 10:53 AM by Keisha Paris LGSW    1. Goal Statement: I want to get SSDI.  Measure of Success: Be approved for SSDI and certified disabled.  Supportive Steps to Achieve: Continue to apply for SSDI, SMRT assessment, appeals if appropriate.  Barriers: Unclear if Pt is eligible as he has been denied SSDI X2. Pt's primary language is not english.  Strengths: Support system of family and care team. Motivated to apply and appeal for services if necessary.  Date to Achieve By: 10.30.2019  Patient expressed understanding of goal: Pt reports understanding and denies any additional questions or concerns at this times. CRISTINA ARMENTA engaged in AIDET communication during encounter.            1. As of today's date 7/3/2019 goal is met at 0 - 25%.   Goal Status:  Ongoing    Patient/Caregiver understanding: Pt reports understanding and denies any additional questions or concerns at this times. CRISTINA ARMENTA engaged in AIDET communication during encounter.    Outreach Frequency: monthly  Future Appointments              In 1 week Promise Aranda, NP [place], [place]    In 2 weeks [facility] Farooq, [facility]  [facility]    In 2 months Vinnie Brown MD [place], [place]          Plan: SW CC will follow-up in one month. SW CC encouraged Pt to reach out with questions or concerns prior to this worker's next outreach.     Keisha Campa, [facility]  Clinic Care Coordinator  Ph. [phone]  [email or web address]

## 2019-07-17 ENCOUNTER — TELEPHONE (OUTPATIENT)
Dept: INTERNAL MEDICINE | Facility: CLINIC | Age: 59
End: 2019-07-17

## 2019-07-17 NOTE — TELEPHONE ENCOUNTER
Fax received from [employer] - SSN Disability Forms for review and signature.  Put in Dr. Christine's in basket in Dr. Brown's absence.

## 2019-07-18 NOTE — TELEPHONE ENCOUNTER
I don't do disability evaluation for any patient   I don't know if dr Brown does eval  Forms need to wait for him

## 2019-07-19 ENCOUNTER — OFFICE VISIT (OUTPATIENT)
Dept: BEHAVIORAL HEALTH | Facility: CLINIC | Age: 59
End: 2019-07-19
Payer: COMMERCIAL

## 2019-07-19 DIAGNOSIS — F32.2 SEVERE DEPRESSION (H): ICD-10-CM

## 2019-07-19 DIAGNOSIS — F41.1 GAD (GENERALIZED ANXIETY DISORDER): Primary | ICD-10-CM

## 2019-07-19 DIAGNOSIS — F43.10 PTSD (POST-TRAUMATIC STRESS DISORDER): ICD-10-CM

## 2019-07-19 PROCEDURE — 90785 PSYTX COMPLEX INTERACTIVE: CPT | Performed by: COUNSELOR

## 2019-07-19 PROCEDURE — 90832 PSYTX W PT 30 MINUTES: CPT | Performed by: COUNSELOR

## 2019-07-19 NOTE — PROGRESS NOTES
Progress Note    Patient Name: Gunner Whytegcmorenitah  Date: 7/19/2019           Service Type: Individual  Video Visit: No     Session Start Time: 1:14pm  Session End Time: 1:50 pm     Session Length: 36 mins     Session #: 10    Attendees: Client and  on phone      Treatment Plan Last Reviewed: 5/6/19  PHQ-9 / MOE-7 : unable to do no  in person     DATA  Interactive Complexity: -Use of play equipment, physical devices,  or  to overcome barriers to diagnostic or therapeutic interaction with a patient who is not fluent in the same language or who has not developed or lost expressive or receptive language skills to use or understand typical language--  on the phone   Crisis: No       Progress Since Last Session (Related to Symptoms / Goals / Homework):   Symptoms: No change      Homework: Achieved / completed to satisfaction      Episode of Care Goals: Minimal progress - ACTION (Actively working towards change); Intervened by reinforcing change plan / affirming steps taken     Current / Ongoing Stressors and Concerns:   Disability claim due to inability to work      Treatment Objective(s) Addressed in This Session:   assessed suicidal ideation  Nightmares and flashbacks (5x per month)  Physical health issues      Intervention:   Emotion Focused Therapy: check in on emotions, naming emotions  Solution Focused: symptom information gathering         ASSESSMENT: Current Emotional / Mental Status (status of significant symptoms):   Risk status (Self / Other harm or suicidal ideation)   Patient denies current fears or concerns for personal safety.   Patient reports the following current or recent suicidal ideation or behaviors: client reported feeling better off dead but states no plan or intent .   Patientdenies current or recent homicidal ideation or behaviors.   Patient denies current or recent self injurious behavior or  ideation.   Patient denies other safety concerns.   Patient Patient reports there has been no change in risk factors since their last session.     PatientPatient reports there has been no change in protective factors since their last session.     A safety and risk management plan has been developed including: Client consented to co-developed safety plan.  [facility]'s safety and risk management plan was completed.  Client agreed to use safety plan should any safety concerns arise.  A copy was given to the patient.     Appearance:   Appropriate    Eye Contact:   Fair    Psychomotor Behavior: Retarded (Slowed)    Attitude:   Cooperative    Orientation:   All   Speech    Rate / Production: Monotone     Volume:  Normal    Mood:    Depressed    Affect:    Flat    Thought Content:  Rumination    Thought Form:  Coherent    Insight:    Fair      Medication Review:   No changes to current psychiatric medication(s)     Medication Compliance:   Yes     Changes in Health Issues:   Yes: Chronic disease management, Associated Psychological Distress    Pain and burning pain from kidneys       Chemical Use Review:   Substance Use: Chemical use reviewed, no active concerns identified      Tobacco Use: No current tobacco use.      Diagnosis:  1. MOE (generalized anxiety disorder)    2. PTSD (post-traumatic stress disorder)    3. Severe depression (H)        Collateral Reports Completed:   Routed note to Care Team Member(s)    PLAN: (Patient Tasks / Therapist Tasks / Other)  Client reported no change in mood  Client endorses nightmares and flashbacks to war time   Client reported low mood, isolation from others, feeling down and low energy, nightmares and feeling hopeless, panic due to flashbacks and nightmares  Client reported past jobs have increased his stress so much he has been unable to function well and has been unable to be successful   Client reported that  Panic attacks have increased when working and has some often currently as  well prevents driving as well   Client endorsed physical panic symptoms causing him to miss work in the past   Client reported that physical kidney pain makes walking and sitting difficult (lower back pain)   Provider assigned client to continue to take medication on a regular basis as well as attempt to spend time with family especially kids since home from school in Summer time         Corrina Farooq, [facility] 7/19/2019                                                         ________________________________________________________________________      Treatment Plan      Client's Name: Gunner Velázquez                                    YOB: 1960      Date: 9/19/2018 cont 5/6/2019 due to one every 6 weeks              DSM-V Diagnoses: 296.33 (F33.2) Major Depressive Disorder, Recurrent Episode, Severe _, 300.02 (F41.1) Generalized Anxiety Disorder or 309.81 (F43.10) Posttraumatic Stress Disorder (includes Posttraumatic Stress Disorder for Children 6 Years and Younger)  With delayed expression  Psychosocial / Contextual Factors: trauma from [place], medical issues, finances, work , relationships   WHODAS: 54      Referral / Collaboration:  The following referral(s) was/were discussed but client declines follow up at this time. day treatment, partial hospitalization. Clients level of need presents as very high.       Anticipated number of session or this episode of care: 12-18          MeasurableTreatment Goal(s) related to diagnosis / functional impairment(s)  Goal 1: Client will score less than 15 on PHQ9.    I will know I've met my goal when everything feels better and good. I would socialize more with my kids and my wife        Objective #A (Client Action)                                    Client will Identify negative self-talk and behaviors: challenge core beliefs, myths, and actions.  Status:cont 5/6/2019         Intervention(s)  Therapist will teach thought labeling helpful or  unhelpful. .      Objective #B  Client will Decrease thoughts that you'd be better off dead or of suicide / self-harm.  Status: cont 5/6/2019         Intervention(s)  Therapist will teach emotional regulation skills. This will include positive thinking, deep breathing and fact checking. .                  Goal 2: Client will score less than 15 on GAD7.    I will know I've met my goal when everything feels better and good.         Objective #A (Client Action)                                    Status: 5/6/2019         Client will use thought-stopping strategy daily to reduce intrusive thoughts.      Intervention(s)  Therapist will teach emotional recognition/identification. This will include using thought labeling to indentify distorted thought patterns.      Objective #B  Client will track and record at least 1 pleasant exchanges with family members.                                      Status: cont 5/6/2019         Intervention(s)  Therapist will give client examples of ways he can interact with his family. Provider will check in each session to ask about client efforts on this. .          Client has reviewed and agreed to the above plan.          Corrina Farooq, [facility]                                    5/6/2019

## 2019-07-26 ENCOUNTER — TELEPHONE (OUTPATIENT)
Dept: PSYCHIATRY | Facility: CLINIC | Age: 59
End: 2019-07-26

## 2019-07-26 DIAGNOSIS — F32.2 SEVERE DEPRESSION (H): Primary | ICD-10-CM

## 2019-07-26 DIAGNOSIS — F43.10 PTSD (POST-TRAUMATIC STRESS DISORDER): ICD-10-CM

## 2019-07-26 RX ORDER — BUPROPION HYDROCHLORIDE 100 MG/1
100 TABLET, EXTENDED RELEASE ORAL EVERY MORNING
Qty: 30 TABLET | Refills: 0 | Status: SHIPPED | OUTPATIENT
Start: 2019-07-26 | End: 2019-08-25

## 2019-07-26 NOTE — TELEPHONE ENCOUNTER
Patient unable to be seen today due to  cancelling. Per provider request, writer attempted to contact patient to inform him that provider would be sending Wellbutrin to pharmacy and they could follow up at next visit. Patient did not answer-- provider notified.     Cherrie Ellsworth RN  07/26/19  3:21 PM

## 2019-07-29 NOTE — PROGRESS NOTES
"    Outpatient Psychiatric Progress Note    Name: Gunner Velázquez   : 1960                    Primary Care Provider: Vinnie Brown MD - last visit 2019  Therapist: Corrina Gallardo - last visit 2019  Nephrology last visit 2019- next visit needs to schedule in the next 1-2 months for follow up  [facility]  Janay Luis - fax [phone]  Client identified their preferred language to be Senegalese.  Client needs the assistance of an   [facility] Coordination  Patient has been denied disability x2 and is in the process of appeal- paperwork has been received and I will participate in completing this paperwork for him    PHQ-9 scores:  PHQ-9 SCORE 3/15/2019 2019 5/10/2019   PHQ-9 Total Score 19 23 20       MOE-7 scores:  MOE-7 SCORE 2018 3/15/2019 5/10/2019   Total Score 13 17 17      Noel  via IPad  Unable to complete PHQ9 and GAD7 paperwork due to remote     Patient Identification:  Patient is a 59 year old,    Laotian male  who presents for return visit with me.  Patient is currently unemployed. Patient attended the session alone.  via IPAD video services was used today. Patient prefers to be called: \"Gunner\". Consent to communicate signed for Jack, patient's Spouse/Partner.     Interim History:    I last saw Gunner Velázquez for outpatient psychiatry Return Visit on 5/10/2019.     During that appointment, we Start Seroquel (quetiapine) 50 mg by mouth daily at bedtime for sleep, anxiety, and Post-traumatic stress disorder (PTSD).    Stop Desyrel/Olepto (trazodone).     Continue Remeron (mirtazapine) 45 mg by mouth daily at bedtime for sleep, mood, anxiety.     Continue Minipress (prazosin) 2 mg daily in AM and 2 mg daily in PM for Post-traumatic stress disorder (PTSD) and nightmares.     Current medications include  Current Outpatient Medications   Medication Sig     ACE/ARB/ARNI NOT PRESCRIBED, INTENTIONAL, " "Please choose reason not prescribed, below     amLODIPine (NORVASC) 10 MG tablet Take 1 tablet (10 mg) by mouth daily     buPROPion (WELLBUTRIN SR) 100 MG 12 hr tablet Take 1 tablet (100 mg) by mouth every morning     carvedilol (COREG) 6.25 MG tablet Take 1 tablet (6.25 mg) by mouth 2 times daily (with meals)     cholecalciferol (VITAMIN D3) 5000 units (125 mcg) capsule take 1 capsule by mouth daily.     fexofenadine (ALLEGRA) 180 MG tablet Take 1 tablet (180 mg) by mouth daily     fish oil-omega-3 fatty acids 1000 MG capsule Take 1 capsule (1 g) by mouth 2 times daily     meclizine (ANTIVERT) 12.5 MG tablet Take 1-2 tablets (12.5-25 mg) by mouth 4 times daily as needed for dizziness     mirtazapine (REMERON) 45 MG tablet Take 1 tablet (45 mg) by mouth At Bedtime Increased dose. For mood, sleep, and anxiety.     prazosin (MINIPRESS) 1 MG capsule Take 2 capsule in AM and 2 capsules in PM.     QUEtiapine (SEROQUEL) 50 MG tablet Take 1 tablet (50 mg) by mouth At Bedtime     simvastatin (ZOCOR) 40 MG tablet Take 1 tablet (40 mg) by mouth At Bedtime     tolterodine (DETROL) 1 MG tablet Take 1 tablet (1 mg) by mouth At Bedtime     No current facility-administered medications for this visit.        The [place] Prescription Monitoring Program has been reviewed and there are no concerns about diversionary activity for controlled substances at this time.      PRESCRIPTIONS  Total Prescriptions: 1  Total Private Pay: 0  Fill Date ID Written Drug Qty Days Prescriber Rx # Pharmacy Refill Daily Dose * Pymt Type   04/25/2019 1 04/24/2019 Lorazepam 0.5 Mg Tablet   10 4 Ed Aus [id number] Sup ([id number]) 1/1 1.25 LME Medicaid [place]    I was able to review most recent Primary Care Provider, specialty provider, and therapy visit notes that I have access to.     Gunner Velázquez reports mood has been: \"still hard- I am depending on medicine to calm worrying feelings- hard to stop thinking- depression is worse\" continues to have episodes of " "increased anger and irritation. This is relatied to depression. no specific triggers.    Per family report, family reports \"I am lazy and I cannot do things to help, it's not that I do not want to things, feel like I cannot because of my brain holding me back\". No recent flashbacks.   Anxiety has been: \"high stll\" having ongoing panic attacks. These happen at least 2-3 times per week.   Sleep has been: \"little better\" notes Seroquel (quetiapine) is helping better than the Desyrel/Olepto (trazodone) for sleep and wondering if could be better.  Focus and concentration is a challenge and need to be told many times to do things.   Patient has anxiety about leaving his home. Appetite has been low and worse when anxious.   Still filling dizzy every morning. Sleeping a lot due to fatigue from ongoing depression- sleeping more during day instead of at night. Family has noted these symptoms.   Wellbutrin (buproprion) started two days ago. No side effects so far.   has panic attacks with shortness of breath, dizziness, sweating, fear, chest pain, nausea with vomiting  Low appetite and loss of interest in mood and activities.   PHQ9 and GAD7 scores were reviewed today.   Medication side effects: no abnormal or involuntary movements.   Current stressors include: Symptoms, Medical Comorbidities, Financial Difficulties, Relationship Difficulties  Coping mechanisms and supports include: Self help, Family, Therapy     Previous medication trials include but not limited to:  Buspar (buspirone) 30 mg BID  Paxil (paroxetine) 30 mg   Desyrel/Olepto (trazodone) 50 -150 mg   Celexa (citalopram)   Zoloft (sertraline)   Remeron (mirtazapine)  Minipress (prazosin)   Ativan (lorazepam)   Seroquel (quetiapine)   Wellbutrin (buproprion) just prescribed    Past Medical History:   Diagnosis Date     Anxiety      CKD (chronic kidney disease)      Depression      Dizziness      Dyslipidemia      HTN (hypertension)      Hyperlipidaemia      " "Shortness of breath       has a past medical history of Anxiety, CKD (chronic kidney disease), Depression, Dizziness, Dyslipidemia, HTN (hypertension), Hyperlipidaemia, and Shortness of breath.    Social History:  Current Living situation:  [place], [place] with Family. Feels safe at home.  Current use of drugs or alcohol: Denies - history of alcohol last used 10 years ago. Denies other drug use. No cravings.   Tobacco use: History of cigarette use- over 10 years ago   Caffeine: No    Vital Signs:   BP (!) 145/94 (BP Location: Left arm, Patient Position: Sitting, Cuff Size: Adult Regular)   Pulse 53   Temp 97.8  F (36.6  C) (Oral)   Resp 16   Ht 1.676 m (5' 6\")   Wt 72.3 kg (159 lb 8 oz)   SpO2 100%   BMI 25.74 kg/m      Labs:  Most recent laboratory results reviewed.    Review of Systems:  10 systems (general, cardiovascular, respiratory, eyes, ENT, endocrine, GI, , M/S, neurological) were reviewed. Most pertinent finding(s) is/are: chronic kidney disease, heart problems- chest tightness and pain when anxious and have anxiety attacks, hypertension, headaches about 2 times per week alleviated by home remedies and rest, dizziness for the last 3-4 years still 2-3 times per day and can catch himself and sits down- no falls,  no blood in urine, peripheral edema over one week ago,  dry mouth, trouble with urinating and starting, itching, frequent urination. The remaining systems are all unremarkable.     Mental Status Examination:  Appearance:  awake, alert, adequate grooming, on time, mild to moderate distress, and appeared stated age  Attitude:  cooperative   Eye Contact:  fair and wears reading glasses  Gait and Station: Normal, No assistive Devices used, dizziness continues and no falls after Emergency Department visit  Psychomotor Behavior:  no evidence of tardive dyskinesia, dystonia, or tics  Oriented to:  time, person, and place  Attention Span and Concentration:  Fair  Speech:  speaks Yakut and uses "   Mood: still hard- I am depending on medicine to calm worrying feelings- hard to stop thinking- depression is worse  Affect:  mood congruent and intensity is flat  Associations:  no loose associations  Thought Process:  logical, linear and goal oriented  Thought Content:  no evidence of suicidal ideation or homicidal ideation and no evidence of psychotic thought  Recent and Remote Memory:  Short term memory concerns. Continues to be forgetful. Denies long term memory concerns. Not formally assessed. Others have noticed. No amnesia.  Fund of Knowledge: appropriate  Insight:  intact  Judgment:  intact and adequate for safety  Impulse Control:  intact      Suicide Risk Assessment:  Today Gunner Velázquez reports still struggling with depression. Reports that he has felt that life is not worth living, but denies that he wants to kill self and denies a plan or intent. Still worries often about his health and financial difficulties. No thoughts to die or harm self or others. In addition, there are notable risk factors for self-harm, including age, anxiety, comorbid medical condition of kidney disease, anger, and withdrawing. However, risk is mitigated by commitment to family, sobriety, absence of past attempts, ability to volunteer a safety plan, history of seeking help when needed, future oriented, no access to firearms or weapons, denies suicidal intent or plan, no family history of suicide, denies homicidal ideation, intent, or plans, gets hope from family and reports he would be able to reach out to them or his doctor if needed in the future if he had thoughts he would harm himself. Therefore, based on all available evidence including the factors cited above, Gunner Velázquez does not appear to be at imminent risk for self-harm, does not meet criteria for a 72-hr hold, and therefore remains appropriate for ongoing outpatient level of care.  A thorough assessment of risk factors related to suicide and  self-harm have been reviewed and are noted above.  Local community safety resources reviewed and printed for patient to use if needed. There was no deceit detected, and the patient presented in a manner that was believable.       DSM5  Diagnosis:  296.33 (F33.2) Major Depressive Disorder, Recurrent Episode, Severe without psychosis  300.02 (F41.1) Generalized Anxiety Disorder  309.81 (F43.10) Posttraumatic Stress Disorder Without dissociative symptoms    Medical comorbidities include:   Patient Active Problem List    Diagnosis Date Noted     PTSD (post-traumatic stress disorder) 05/03/2018     Priority: Medium     Severe depression (H) 05/31/2016     Priority: Medium     High triglycerides 08/11/2015     Priority: Medium     Seasonal allergies 05/04/2015     Priority: Medium     MOE (generalized anxiety disorder) 08/01/2014     Priority: Medium     GERD (gastroesophageal reflux disease) 08/01/2014     Priority: Medium     Dizziness 08/01/2014     Priority: Medium     Thought secondary to anxiety.  Workup with cardiology, ENT, audiology, [facility], physical therapy, MRI, MRA all negative       Hyperlipidemia with target LDL less than 130 02/21/2014     Priority: Medium     HTN, goal below 140/90 02/21/2014     Priority: Medium     CKD (chronic kidney disease) stage 4, GFR 15-29 ml/min (H) 02/21/2014     Priority: Medium     Nephrologist Dr. Arenas at [facility]         Psychosocial & Contextual Factors:  Occupational Difficulties, Financial Difficulties and Medical Comorbidites    Assessment:  Gunner Velázquez reports ongoing depression and anxiety. Will watch closely and focus on reducing anxiety, anger, and insomnia.     Symptoms continue to cause ongoing difficulty with functioning. Medication side effects and alternatives were reviewed. Health promotion activities recommended and reviewed today. All questions addressed. Education and counseling completed regarding risks and benefits of  medications and psychotherapy options.    If Patient had to choose if depression or anxiety is worse, depression is worse, so will keep with the Wellbutrin (buproprion) and give this more time. May need to change the Remeron (mirtazapine) medication. Another option for depression may be an SNRI such as Effexor (venlafaxine) or Cymbalta (duloxetine) but need to closely monitor kidney functioning. Seroquel (quetiapine) can help with Post-traumatic stress disorder (PTSD) and severe insomnia, but can affect his kidney functioning. we added this last visit and is finding some initial improvement so will increase dosing.     Patient may need long term psychiatry care. My clinic hours will be reduced starting in August 2019 due to transitioning to an academic position.      Continue to monitor memory- short term memory concerns specifically. Wife sets up alarms and helps with medications.      Patient has chronic kidney disease stage 4 so will need to closely monitor with medication changes. Most recent lab results showed diminished kidney functioning. Continue to monitor. Patient may need to start dialysis and consider kidney transplant.       Treatment Plan:    Increase Seroquel (quetiapine) to 100 mg by mouth daily at bedtime for sleep, anxiety, and Post-traumatic stress disorder (PTSD).    Continue Wellbutrin (buproprion)  mg by mouth daily in AM for depression.     Continue Remeron (mirtazapine) 45 mg by mouth daily at bedtime for sleep, mood, anxiety.     Continue Minipress (prazosin) 2 mg daily in AM and 2 mg daily in PM for Post-traumatic stress disorder (PTSD) and nightmares.    Continue all other medications as reviewed per electronic medical record today.     Safety plan reviewed. To the Emergency Department as needed or call after hours crisis line at [phone] or [phone]. [place] Crisis Text Line. Text [place] to 956231 or Suicide LifeLine Chat: suicidepreventionlifeline.org/chat    Continue  individual therapy as planned with Corrina.    Schedule an appointment with me in 2-4 weeks or sooner as needed.     Follow up with primary care provider as planned or for acute medical concerns.    Call the psychiatric nurse line with medication questions or concerns at [phone].    Administrative Billing:   Time spent with patient and virtual  was 30 minutes and greater than 50% of time or 30 minutes was spent in counseling and coordination of care regarding above diagnoses and treatment plan.    Patient Status:  Patient will continue to be seen for ongoing consultation and stabilization.    Signed:   Promise Aranda, PhD, APRN, CNP   Psychiatry

## 2019-07-30 ENCOUNTER — OFFICE VISIT (OUTPATIENT)
Dept: PSYCHIATRY | Facility: CLINIC | Age: 59
End: 2019-07-30
Payer: COMMERCIAL

## 2019-07-30 VITALS
BODY MASS INDEX: 25.63 KG/M2 | WEIGHT: 159.5 LBS | OXYGEN SATURATION: 100 % | DIASTOLIC BLOOD PRESSURE: 94 MMHG | HEIGHT: 66 IN | HEART RATE: 53 BPM | RESPIRATION RATE: 16 BRPM | TEMPERATURE: 97.8 F | SYSTOLIC BLOOD PRESSURE: 145 MMHG

## 2019-07-30 DIAGNOSIS — F41.1 GAD (GENERALIZED ANXIETY DISORDER): Primary | ICD-10-CM

## 2019-07-30 DIAGNOSIS — F32.2 SEVERE DEPRESSION (H): ICD-10-CM

## 2019-07-30 DIAGNOSIS — F43.10 PTSD (POST-TRAUMATIC STRESS DISORDER): ICD-10-CM

## 2019-07-30 PROCEDURE — 99214 OFFICE O/P EST MOD 30 MIN: CPT | Performed by: NURSE PRACTITIONER

## 2019-07-30 RX ORDER — QUETIAPINE FUMARATE 100 MG/1
100 TABLET, FILM COATED ORAL AT BEDTIME
Qty: 30 TABLET | Refills: 1 | Status: SHIPPED | OUTPATIENT
Start: 2019-07-30 | End: 2019-08-30

## 2019-07-30 ASSESSMENT — MIFFLIN-ST. JEOR: SCORE: 1481.24

## 2019-07-30 NOTE — PATIENT INSTRUCTIONS
Treatment Plan:    Increase Seroquel (quetiapine) to 100 mg by mouth daily at bedtime for sleep, anxiety, and Post-traumatic stress disorder (PTSD).    Continue Wellbutrin (buproprion)  mg by mouth daily in AM for depression.     Continue Remeron (mirtazapine) 45 mg by mouth daily at bedtime for sleep, mood, anxiety.     Continue Minipress (prazosin) 2 mg daily in AM and 2 mg daily in PM for Post-traumatic stress disorder (PTSD) and nightmares.    Continue all other medications as reviewed per electronic medical record today.     Safety plan reviewed. To the Emergency Department as needed or call after hours crisis line at [phone] or [phone]. [place] Crisis Text Line. Text [place] to 811091 or Suicide LifeLine Chat: suicidepreventionlifeline.org/chat    Continue individual therapy as planned with Corrina.    Schedule an appointment with me in 2-4 weeks or sooner as needed.     Follow up with primary care provider as planned or for acute medical concerns.    Call the psychiatric nurse line with medication questions or concerns at [phone].

## 2019-08-01 DIAGNOSIS — F32.1 MAJOR DEPRESSIVE DISORDER, SINGLE EPISODE, MODERATE (H): ICD-10-CM

## 2019-08-01 NOTE — TELEPHONE ENCOUNTER
"Requested Prescriptions   Pending Prescriptions Disp Refills     cholecalciferol (VITAMIN D3) 5000 units (125 mcg) capsule [Pharmacy Med Name: Vitamin D3 Oral Capsule 5000 UNIT] 20 capsule 0     Sig: take 1 capsule by mouth daily.   Last Written Prescription Date:  07/02/2019  Last Fill Quantity: 20,  # refills: 0   Last office visit: 05/29/2019 with prescribing provider:     Future Office Visit:   Next 5 appointments (look out 90 days)    Aug 26, 2019  2:15 PM CDT  Return Visit with WHITNEY Matos, [employer]  [facility] [address] [place] [address]  [phone]   Aug 30, 2019  2:15 PM CDT  Return Visit with Promise Aranda NP  [facility] [address]  [facility] [address]  [phone]   Sep 20, 2019 10:05 AM CDT  SHORT with Vinnie Brown MD, [employer]  [facility]) [address] [place] [address]  [phone]           Vitamin Supplements (Adult) Protocol Passed - 8/1/2019  3:04 PM        Passed - High dose Vitamin D not ordered        Passed - Recent (12 mo) or future (30 days) visit within the authorizing provider's specialty     Patient had office visit in the last 12 months or has a visit in the next 30 days with authorizing provider or within the authorizing provider's specialty.  See \"Patient Info\" tab in inbasket, or \"Choose Columns\" in Meds & Orders section of the refill encounter.              Passed - Medication is active on med list        "

## 2019-08-02 ENCOUNTER — OFFICE VISIT (OUTPATIENT)
Dept: INTERNAL MEDICINE | Facility: CLINIC | Age: 59
End: 2019-08-02
Payer: COMMERCIAL

## 2019-08-02 VITALS
HEART RATE: 65 BPM | BODY MASS INDEX: 25.55 KG/M2 | SYSTOLIC BLOOD PRESSURE: 124 MMHG | OXYGEN SATURATION: 100 % | WEIGHT: 159 LBS | TEMPERATURE: 97.9 F | HEIGHT: 66 IN | RESPIRATION RATE: 20 BRPM | DIASTOLIC BLOOD PRESSURE: 84 MMHG

## 2019-08-02 DIAGNOSIS — N18.4 CKD (CHRONIC KIDNEY DISEASE) STAGE 4, GFR 15-29 ML/MIN (H): Primary | ICD-10-CM

## 2019-08-02 DIAGNOSIS — F41.1 GAD (GENERALIZED ANXIETY DISORDER): ICD-10-CM

## 2019-08-02 DIAGNOSIS — I10 HTN, GOAL BELOW 140/90: ICD-10-CM

## 2019-08-02 LAB
ANION GAP SERPL CALCULATED.3IONS-SCNC: 9 MMOL/L (ref 3–14)
BUN SERPL-MCNC: 34 MG/DL (ref 7–30)
CALCIUM SERPL-MCNC: 9.2 MG/DL (ref 8.5–10.1)
CHLORIDE SERPL-SCNC: 105 MMOL/L (ref 94–109)
CO2 SERPL-SCNC: 25 MMOL/L (ref 20–32)
CREAT SERPL-MCNC: 2.5 MG/DL (ref 0.66–1.25)
GFR SERPL CREATININE-BSD FRML MDRD: 27 ML/MIN/{1.73_M2}
GLUCOSE SERPL-MCNC: 98 MG/DL (ref 70–99)
POTASSIUM SERPL-SCNC: 3.9 MMOL/L (ref 3.4–5.3)
SODIUM SERPL-SCNC: 139 MMOL/L (ref 133–144)

## 2019-08-02 PROCEDURE — 99214 OFFICE O/P EST MOD 30 MIN: CPT | Performed by: INTERNAL MEDICINE

## 2019-08-02 PROCEDURE — 36415 COLL VENOUS BLD VENIPUNCTURE: CPT | Performed by: INTERNAL MEDICINE

## 2019-08-02 PROCEDURE — 80048 BASIC METABOLIC PNL TOTAL CA: CPT | Performed by: INTERNAL MEDICINE

## 2019-08-02 ASSESSMENT — MIFFLIN-ST. JEOR: SCORE: 1478.97

## 2019-08-02 NOTE — NURSING NOTE
"Vital signs:  Temp: 97.9  F (36.6  C) Temp src: Oral BP: 124/84 Pulse: 65   Resp: 20 SpO2: 100 %     Height: 167.6 cm (5' 6\") Weight: 72.1 kg (159 lb)  Estimated body mass index is 25.66 kg/m  as calculated from the following:    Height as of this encounter: 1.676 m (5' 6\").    Weight as of this encounter: 72.1 kg (159 lb).          "

## 2019-08-02 NOTE — PROGRESS NOTES
Subjective     Gunner Velázquez is a 59 year old male who presents to clinic today for the following health issues:    HPI   Disability forms:  CKD  HTN  Depression/ anxiety.     Patient is seen for a follow up visit.  Used interpretor services through video interpreting.     Has concerns for feeling weak, poor energy, SOB. Has CKD, slowly progressive. Seeing nephrology. No edema. No weight changes.   Has h/o anxiety and depression. On medical treatment, not well controlled, no side effects. Has chronic depressive symptoms and anxiety, no suicidal ideation.  Has h/o HTN. on medical treatment. BP has been controlled. No side effects from medications. No CP, HA, dizziness. good compliance with medications and low salt diet.  Has H/O hyperlipidemia. On medical treatment and diet. No side effects. No muscle weakness, myalgias or upset stomach.             Patient Active Problem List   Diagnosis     Hyperlipidemia with target LDL less than 130     HTN, goal below 140/90     CKD (chronic kidney disease) stage 4, GFR 15-29 ml/min (H)     MOE (generalized anxiety disorder)     GERD (gastroesophageal reflux disease)     Dizziness     Seasonal allergies     High triglycerides     Severe depression (H)     PTSD (post-traumatic stress disorder)     History reviewed. No pertinent surgical history.    Social History     Tobacco Use     Smoking status: Former Smoker     Smokeless tobacco: Never Used   Substance Use Topics     Alcohol use: No     Alcohol/week: 0.0 oz     Family History   Problem Relation Age of Onset     Heart Disease Father 70        tb     Family History Negative No family hx of          Current Outpatient Medications   Medication Sig Dispense Refill     buPROPion (WELLBUTRIN SR) 100 MG 12 hr tablet Take 1 tablet (100 mg) by mouth every morning 30 tablet 0     carvedilol (COREG) 6.25 MG tablet Take 1 tablet (6.25 mg) by mouth 2 times daily (with meals) 60 tablet 3     cholecalciferol (VITAMIN D3) 5000 units (125  "mcg) capsule take 1 capsule by mouth daily. 20 capsule 0     fexofenadine (ALLEGRA) 180 MG tablet Take 1 tablet (180 mg) by mouth daily 90 tablet 3     fish oil-omega-3 fatty acids 1000 MG capsule Take 1 capsule (1 g) by mouth 2 times daily 180 capsule 3     meclizine (ANTIVERT) 12.5 MG tablet Take 1-2 tablets (12.5-25 mg) by mouth 4 times daily as needed for dizziness 30 tablet 5     mirtazapine (REMERON) 45 MG tablet Take 1 tablet (45 mg) by mouth At Bedtime Increased dose. For mood, sleep, and anxiety. 90 tablet 1     prazosin (MINIPRESS) 1 MG capsule Take 2 capsule in AM and 2 capsules in PM. 360 capsule 3     QUEtiapine (SEROQUEL) 100 MG tablet Take 1 tablet (100 mg) by mouth At Bedtime 30 tablet 1     simvastatin (ZOCOR) 40 MG tablet Take 1 tablet (40 mg) by mouth At Bedtime 90 tablet 1     tolterodine (DETROL) 1 MG tablet Take 1 tablet (1 mg) by mouth At Bedtime 90 tablet 3     ACE/ARB/ARNI NOT PRESCRIBED, INTENTIONAL, Please choose reason not prescribed, below       amLODIPine (NORVASC) 10 MG tablet Take 1 tablet (10 mg) by mouth daily (Patient not taking: Reported on 8/2/2019) 90 tablet 3         Reviewed and updated as needed this visit by Provider         Review of Systems   ROS COMP: Constitutional, HEENT, cardiovascular, pulmonary, gi and gu systems are negative, except as otherwise noted.      Objective    /84   Pulse 65   Temp 97.9  F (36.6  C) (Oral)   Resp 20   Ht 1.676 m (5' 6\")   Wt 72.1 kg (159 lb)   SpO2 100%   BMI 25.66 kg/m    Body mass index is 25.66 kg/m .  Physical Exam   GENERAL: healthy, alert and no distress  EYES: Eyes grossly normal to inspection, PERRL and conjunctivae and sclerae normal  NECK: no adenopathy, no asymmetry, masses, or scars and thyroid normal to palpation  RESP: lungs clear to auscultation - no rales, rhonchi or wheezes  CV: regular rate and rhythm, normal S1 S2, no S3 or S4, no murmur, click or rub, no peripheral edema and peripheral pulses " strong  ABDOMEN: soft, nontender, no hepatosplenomegaly, no masses and bowel sounds normal  MS: no gross musculoskeletal defects noted, no edema    Diagnostic Test Results:  Labs reviewed in Epic  No results found for this or any previous visit (from the past 24 hour(s)).  Results for orders placed or performed during the hospital encounter of 04/24/19   CT Head Perfusion w Contrast    Narrative    CT HEAD PERFUSION WITH CONTRAST 4/24/2019 2:28 PM     HISTORY: Dizziness. Some facial droopiness.    TECHNIQUE: Axial images were obtained through the brain with  intravenous contrast. 50 mL of Isovue-370 was given.    FINDINGS: Cerebral blood flow, cerebral blood volume, and mean transit  time maps are symmetric. There is no evidence for ischemia or infarct.      Impression    IMPRESSION: Normal CT brain perfusion imaging.    TASHA STANTON MD   CTA Head Neck with Contrast    Narrative    CTA HEAD AND NECK WITH CONTRAST 4/24/2019 2:17 PM     HISTORY: Anxiety, dizziness, possible facial droop. Some vomiting.    TECHNIQUE: Axial images were obtained through the head and neck with  intravenous contrast. 70 mL of Isovue-370 was given. Multiplanar  reconstructions were performed. 3-D reconstructions off a remote  workstation for CT angiography were also acquired. Carotid stenoses  were evaluated by comparing the caliber of the proximal internal  carotid artery to the caliber of the distal internal carotid artery.  Radiation dose for this scan was reduced using automated exposure  control, adjustment of the mA and/or kV according to patient size, or  iterative reconstruction technique..    FINDINGS:    Brachiocephalic vessels: There is some calcific plaque off the  thoracic arch. There is also mild calcific plaque in the proximal left  subclavian artery, but there is no stenosis.    Right carotid system: Normal.    Left carotid system: Normal.    Right vertebral artery: Normal.    Left vertebral artery: Normal.    [place]: Normal.    Other findings: Some degenerative changes are noted in the spine.      Impression    IMPRESSION:  1. Minimal calcific atherosclerotic disease involving the proximal  left subclavian artery without stenosis.  2. No evidence for stenosis, dissection, thromboembolism, or aneurysm.    TASHA STANTON MD   CT Head w/o Contrast    Narrative    CT SCAN OF THE HEAD WITHOUT CONTRAST   4/24/2019 2:12 PM     HISTORY: Hypertension. Dizziness. Anxiety.    TECHNIQUE:  Axial images of the head and coronal reformations without  IV contrast material.  Radiation dose for this scan was reduced using  automated exposure control, adjustment of the mA and/or kV according  to patient size, or iterative reconstruction technique.    COMPARISON: 3/31/2017    FINDINGS:  The ventricles are normal in size, shape and configuration.   The brain parenchyma and subarachnoid spaces are normal. There is no  evidence of intracranial hemorrhage, mass, acute infarct or anomaly.     The visualized portions of the sinuses and mastoids appear normal.  There is no evidence of trauma.       Impression    IMPRESSION:  Normal CT scan of the head.     TASHA STANTON MD   MR Brain w/o Contrast    Narrative    MRI BRAIN WITHOUT CONTRAST  4/24/2019 4:32 PM    HISTORY:  Dizziness, possible right-sided facial/arm weakness.    TECHNIQUE: Multiplanar, multisequence MRI of the brain without  gadolinium IV contrast material.      COMPARISON: Head CT 4/24/2019, head MRI 7/10/2014    FINDINGS: The cerebral hemispheres, brainstem, and cerebellum  demonstrate normal morphology and signal. No evidence of acute  ischemia, hemorrhage, mass, mass effect, or hydrocephalus. Few white  matter T2 hyperintensities likely represent chronic small vessel  ischemic change considered to be within normal limits for age. No  abnormal diffusion restriction is identified. The visualized  calvarium, skull base, paranasal sinuses, and extracranial soft  tissues are  unremarkable.      Impression    IMPRESSION: Unremarkable MRI of the head without contrast.    THAIS CHAVARRIA MD   Glucose by meter   Result Value Ref Range    Glucose 87 70 - 99 mg/dL   CBC with platelets differential   Result Value Ref Range    WBC 7.3 4.0 - 11.0 10e9/L    RBC Count 4.84 4.4 - 5.9 10e12/L    Hemoglobin 13.4 13.3 - 17.7 g/dL    Hematocrit 41.1 40.0 - 53.0 %    MCV 85 78 - 100 fl    MCH 27.7 26.5 - 33.0 pg    MCHC 32.6 31.5 - 36.5 g/dL    RDW 13.3 10.0 - 15.0 %    Platelet Count 250 150 - 450 10e9/L    Diff Method Automated Method     % Neutrophils 48.0 %    % Lymphocytes 37.7 %    % Monocytes 8.1 %    % Eosinophils 5.5 %    % Basophils 0.4 %    % Immature Granulocytes 0.3 %    Nucleated RBCs 0 0 /100    Absolute Neutrophil 3.5 1.6 - 8.3 10e9/L    Absolute Lymphocytes 2.8 0.8 - 5.3 10e9/L    Absolute Monocytes 0.6 0.0 - 1.3 10e9/L    Absolute Eosinophils 0.4 0.0 - 0.7 10e9/L    Absolute Basophils 0.0 0.0 - 0.2 10e9/L    Abs Immature Granulocytes 0.0 0 - 0.4 10e9/L    Absolute Nucleated RBC 0.0    Basic metabolic panel   Result Value Ref Range    Sodium 139 133 - 144 mmol/L    Potassium 4.0 3.4 - 5.3 mmol/L    Chloride 106 94 - 109 mmol/L    Carbon Dioxide 27 20 - 32 mmol/L    Anion Gap 6 3 - 14 mmol/L    Glucose 95 70 - 99 mg/dL    Urea Nitrogen 30 7 - 30 mg/dL    Creatinine 2.58 (H) 0.66 - 1.25 mg/dL    GFR Estimate 26 (L) >60 mL/min/[1.73_m2]    GFR Estimate If Black 30 (L) >60 mL/min/[1.73_m2]    Calcium 9.0 8.5 - 10.1 mg/dL   Troponin I   Result Value Ref Range    Troponin I ES <0.015 0.000 - 0.045 ug/L   INR   Result Value Ref Range    INR 0.90 0.86 - 1.14   Partial thromboplastin time   Result Value Ref Range    PTT 34 22 - 37 sec   EKG 12 lead   Result Value Ref Range    Interpretation ECG Click View Image link to view waveform and result            Assessment & Plan   Problem List Items Addressed This Visit     HTN, goal below 140/90    CKD (chronic kidney disease) stage 4, GFR 15-29 ml/min  "(H) - Primary    Relevant Orders    Basic metabolic panel (Completed)    MOE (generalized anxiety disorder)         Recheck BMP   Continue treatment   Will provide letter regarding request for disability according to his test results     BMI:   Estimated body mass index is 25.66 kg/m  as calculated from the following:    Height as of this encounter: 1.676 m (5' 6\").    Weight as of this encounter: 72.1 kg (159 lb).           See Patient Instructions  Return in about 3 months (around 11/2/2019) for Routine Visit, Physical Exam.    Vinnie Brown MD  [facility]            "

## 2019-08-15 ENCOUNTER — TELEPHONE (OUTPATIENT)
Dept: PSYCHIATRY | Facility: CLINIC | Age: 59
End: 2019-08-15

## 2019-08-15 NOTE — TELEPHONE ENCOUNTER
Form completed by Promise from [employer] Statement and letter faxed to [phone] attn Marie Morrow.

## 2019-08-19 ENCOUNTER — PATIENT OUTREACH (OUTPATIENT)
Dept: CARE COORDINATION | Facility: CLINIC | Age: 59
End: 2019-08-19

## 2019-08-19 NOTE — PROGRESS NOTES
Community Health Worker called the patient for Clinic Care Coordination outreach follow up on goals and action steps.  Spoke with Gunner    Discussed the following  Getting SSDI approved    Patient reports that he had an interview with the county today for the SSDI.    They had asked him questions regarding his medical and health.    CHW encouraged him to be patient, as the county can take a while for an update on their determination.    Discussed reaching back out in a month to the patient for an update.  Patient acknowledge understanding.    ______________________  Next Outreach: 09/18/19  Planned Outreach Frequency: Monthly  Preferred Phone Number: [phone]   Arely  needed    Last Assessment Date: 03/04/19  Care Plan Completion Date: 03/04/19

## 2019-08-25 DIAGNOSIS — F32.2 SEVERE DEPRESSION (H): ICD-10-CM

## 2019-08-26 ENCOUNTER — OFFICE VISIT (OUTPATIENT)
Dept: BEHAVIORAL HEALTH | Facility: CLINIC | Age: 59
End: 2019-08-26
Payer: COMMERCIAL

## 2019-08-26 DIAGNOSIS — F43.10 PTSD (POST-TRAUMATIC STRESS DISORDER): ICD-10-CM

## 2019-08-26 DIAGNOSIS — F41.1 GAD (GENERALIZED ANXIETY DISORDER): Primary | ICD-10-CM

## 2019-08-26 DIAGNOSIS — F32.2 SEVERE DEPRESSION (H): ICD-10-CM

## 2019-08-26 PROCEDURE — 90834 PSYTX W PT 45 MINUTES: CPT | Performed by: COUNSELOR

## 2019-08-26 PROCEDURE — 90785 PSYTX COMPLEX INTERACTIVE: CPT | Performed by: COUNSELOR

## 2019-08-26 RX ORDER — BUPROPION HYDROCHLORIDE 100 MG/1
100 TABLET, EXTENDED RELEASE ORAL EVERY MORNING
Qty: 30 TABLET | Refills: 0 | Status: SHIPPED | OUTPATIENT
Start: 2019-08-26 | End: 2019-08-30

## 2019-08-26 ASSESSMENT — ANXIETY QUESTIONNAIRES
6. BECOMING EASILY ANNOYED OR IRRITABLE: NEARLY EVERY DAY
7. FEELING AFRAID AS IF SOMETHING AWFUL MIGHT HAPPEN: NEARLY EVERY DAY
1. FEELING NERVOUS, ANXIOUS, OR ON EDGE: NEARLY EVERY DAY
5. BEING SO RESTLESS THAT IT IS HARD TO SIT STILL: NEARLY EVERY DAY
2. NOT BEING ABLE TO STOP OR CONTROL WORRYING: SEVERAL DAYS
3. WORRYING TOO MUCH ABOUT DIFFERENT THINGS: NEARLY EVERY DAY
GAD7 TOTAL SCORE: 19

## 2019-08-26 ASSESSMENT — PATIENT HEALTH QUESTIONNAIRE - PHQ9
5. POOR APPETITE OR OVEREATING: NEARLY EVERY DAY
SUM OF ALL RESPONSES TO PHQ QUESTIONS 1-9: 21

## 2019-08-26 NOTE — TELEPHONE ENCOUNTER
Refill for: buPROPion (WELLBUTRIN SR) 100 MG 12 hr tablet    Last Appointment: 7/30/19    Next Appointment: 8/30/19    No Shows/Cancellations since last appointment: none    Last Refill in Epic (date and amount/how many days):    Disp Refills Start End JOSE   buPROPion (WELLBUTRIN SR) 100 MG 12 hr tablet 30 tablet 0 7/26/2019  --   Sig - Route: Take 1 tablet (100 mg) by mouth every morning - Oral     Last office visit note reviewed and summarized below:  Treatment Plan:    Increase Seroquel (quetiapine) to 100 mg by mouth daily at bedtime for sleep, anxiety, and Post-traumatic stress disorder (PTSD).    Continue Wellbutrin (buproprion)  mg by mouth daily in AM for depression.     Continue Remeron (mirtazapine) 45 mg by mouth daily at bedtime for sleep, mood, anxiety.     Continue Minipress (prazosin) 2 mg daily in AM and 2 mg daily in PM for Post-traumatic stress disorder (PTSD) and nightmares.    Continue all other medications as reviewed per electronic medical record today    Continue individual therapy as planned with Corrina.    Schedule an appointment with me in 2-4 weeks or sooner as needed  Patient Status:  Patient will continue to be seen for ongoing consultation and stabilization      Refilled x1 as patient will run out prior to follow up on 8/30.    Cherrie Ellsworth RN  08/26/19  10:03 AM

## 2019-08-26 NOTE — PROGRESS NOTES
Progress Note    Patient Name: Gunner Whytegchanh  Date: 8/26/2019           Service Type: Individual  Video Visit: No     Session Start Time: 2:30pm  Session End Time: 3:10pm     Session Length: 40 mins     Session #: 11    Attendees: Client and interpretor     Treatment Plan Last Reviewed: 5/6/19  PHQ-9 / MOE-7 : P=21 and G=19    DATA  Interactive Complexity: -Use of play equipment, physical devices,  or  to overcome barriers to diagnostic or therapeutic interaction with a patient who is not fluent in the same language or who has not developed or lost expressive or receptive language skills to use or understand typical language--use of interpretor   Crisis: No       Progress Since Last Session (Related to Symptoms / Goals / Homework):   Symptoms: stated medication has helped mood some but makes him very tired and unable to do anything    Homework: Achieved / completed to satisfaction      Episode of Care Goals: Minimal progress - ACTION (Actively working towards change); Intervened by reinforcing change plan / affirming steps taken     Current / Ongoing Stressors and Concerns:   Past trauma and physical health      Treatment Objective(s) Addressed in This Session:   Decrease thoughts that you'd be better off dead or of suicide / self-harm  Sleep/eating concerns      Intervention:   Emotion Focused Therapy: processing emotions and anxiety connected to worries about diability being denies 3x  Solution Focused: sitting outside on nice days, making dure to eat 2 meals per day        ASSESSMENT: Current Emotional / Mental Status (status of significant symptoms):   Risk status (Self / Other harm or suicidal ideation)   Patient denies current fears or concerns for personal safety.   Patient reports the following current or recent suicidal ideation or behaviors: client reports thoughts but denies an intent or plan.   Patientdenies current or recent homicidal  ideation or behaviors.   Patient denies current or recent self injurious behavior or ideation.   Patient denies other safety concerns.   Patient Patient reports there has been no change in risk factors since their last session.     PatientPatient reports there has been no change in protective factors since their last session.     A safety and risk management plan has been developed including: Patient consented to co-developed safety plan.  Safety and risk management plan was completed.  Patient agreed to use safety plan should any safety concerns arise.  A copy was given to the patient.     Appearance:   Appropriate    Eye Contact:   Fair    Psychomotor Behavior: Normal    Attitude:   Cooperative    Orientation:   All   Speech    Rate / Production: Monotone  Slow     Volume:  Soft    Mood:    Anxious  Depressed    Affect:    Flat    Thought Content:  Rumination    Thought Form:  Coherent  Logical    Insight:    Fair      Medication Review:   Changes to psychiatric medications, see updated Medication List in EPIC.      Medication Compliance:   Yes     Changes in Health Issues:   Yes: Chronic disease management, Associated Psychological Distress     Chemical Use Review:   Substance Use: Chemical use reviewed, no active concerns identified      Tobacco Use: No current tobacco use.      Diagnosis:  1. MOE (generalized anxiety disorder)    2. PTSD (post-traumatic stress disorder)    3. Severe depression (H)        Collateral Reports Completed:   Not Applicable    PLAN: (Patient Tasks / Therapist Tasks / Other)  Client reports that he is experiencing a lot of worry over fear of being denied SSDI again  Client reported an argument with his daughter caused him distress as well   Provider and client processed argument in session  Provider assessed for suicidal ideation risk   Client denies any plan or intent to act on these thoughts  Provider assigned client to try to spend 20 mins per day outdoors on deck and to track 2  meals per day         Corrina Farooq, [facility]                                                                                                        ________________________________________________________________________      Treatment Plan      Client's Name: Gunner Velázquez                                    YOB: 1960      Date: 9/19/2018 cont 5/6/2019 due to one every 6 weeks              DSM-V Diagnoses: 296.33 (F33.2) Major Depressive Disorder, Recurrent Episode, Severe _, 300.02 (F41.1) Generalized Anxiety Disorder or 309.81 (F43.10) Posttraumatic Stress Disorder (includes Posttraumatic Stress Disorder for Children 6 Years and Younger)  With delayed expression  Psychosocial / Contextual Factors: trauma from [place], medical issues, finances, work , relationships   WHODAS: 54      Referral / Collaboration:  The following referral(s) was/were discussed but client declines follow up at this time. day treatment, partial hospitalization. Clients level of need presents as very high.       Anticipated number of session or this episode of care: 12-18          MeasurableTreatment Goal(s) related to diagnosis / functional impairment(s)  Goal 1: Client will score less than 15 on PHQ9.    I will know I've met my goal when everything feels better and good. I would socialize more with my kids and my wife        Objective #A (Client Action)                                    Client will Identify negative self-talk and behaviors: challenge core beliefs, myths, and actions.  Status:cont 5/6/2019         Intervention(s)  Therapist will teach thought labeling helpful or unhelpful. .      Objective #B  Client will Decrease thoughts that you'd be better off dead or of suicide / self-harm.  Status: cont 5/6/2019         Intervention(s)  Therapist will teach emotional regulation skills. This will include positive thinking, deep breathing and fact checking. .                  Goal 2: Client will score less  than 15 on GAD7.    I will know I've met my goal when everything feels better and good.         Objective #A (Client Action)                                    Status: 5/6/2019         Client will use thought-stopping strategy daily to reduce intrusive thoughts.      Intervention(s)  Therapist will teach emotional recognition/identification. This will include using thought labeling to indentify distorted thought patterns.      Objective #B  Client will track and record at least 1 pleasant exchanges with family members.                                      Status: cont 5/6/2019         Intervention(s)  Therapist will give client examples of ways he can interact with his family. Provider will check in each session to ask about client efforts on this. .          Client has reviewed and agreed to the above plan.          Corrina Farooq, [facility]                                    5/6/2019

## 2019-08-27 ASSESSMENT — ANXIETY QUESTIONNAIRES: GAD7 TOTAL SCORE: 19

## 2019-08-29 DIAGNOSIS — F32.1 MAJOR DEPRESSIVE DISORDER, SINGLE EPISODE, MODERATE (H): ICD-10-CM

## 2019-08-29 NOTE — TELEPHONE ENCOUNTER
"Requested Prescriptions   Pending Prescriptions Disp Refills     cholecalciferol (VITAMIN D3) 5000 units (125 mcg) capsule [Pharmacy Med Name:  Last Written Prescription Date:  8/1/2019  Last Fill Quantity: 20,  # refills: 0   Last office visit: 8/2/2019 with prescribing provider:     Future Office Visit:   Next 5 appointments (look out 90 days)    Aug 30, 2019  2:00 PM CDT  Return Visit with Promise Aranda NP, [employer]  [facility] [address]  [facility] [address]  [phone]   Sep 20, 2019 10:05 AM CDT  SHORT with Vinnie Brown MD, [employer]  [facility] [place] [place] [address] [place] [address]  [phone]   Sep 20, 2019 12:30 PM CDT  Return Visit with Corrina Farooq [facility] [place] ([facility] [place] [address] [place] [address]  [phone]        Vitamin D3 Oral Capsule 5000 UNIT] 20 capsule 0     Sig: take 1 capsule by mouth daily.       Vitamin Supplements (Adult) Protocol Passed - 8/29/2019  1:32 PM        Passed - High dose Vitamin D not ordered        Passed - Recent (12 mo) or future (30 days) visit within the authorizing provider's specialty     Patient had office visit in the last 12 months or has a visit in the next 30 days with authorizing provider or within the authorizing provider's specialty.  See \"Patient Info\" tab in inbasket, or \"Choose Columns\" in Meds & Orders section of the refill encounter.              Passed - Medication is active on med list        "

## 2019-08-30 ENCOUNTER — OFFICE VISIT (OUTPATIENT)
Dept: PSYCHIATRY | Facility: CLINIC | Age: 59
End: 2019-08-30
Payer: COMMERCIAL

## 2019-08-30 VITALS
HEIGHT: 66 IN | TEMPERATURE: 97.7 F | SYSTOLIC BLOOD PRESSURE: 136 MMHG | WEIGHT: 161.4 LBS | DIASTOLIC BLOOD PRESSURE: 88 MMHG | BODY MASS INDEX: 25.94 KG/M2 | OXYGEN SATURATION: 99 % | HEART RATE: 61 BPM | RESPIRATION RATE: 22 BRPM

## 2019-08-30 DIAGNOSIS — F41.1 GAD (GENERALIZED ANXIETY DISORDER): ICD-10-CM

## 2019-08-30 DIAGNOSIS — F43.10 PTSD (POST-TRAUMATIC STRESS DISORDER): Primary | ICD-10-CM

## 2019-08-30 DIAGNOSIS — F32.2 SEVERE DEPRESSION (H): ICD-10-CM

## 2019-08-30 PROCEDURE — 99214 OFFICE O/P EST MOD 30 MIN: CPT | Performed by: NURSE PRACTITIONER

## 2019-08-30 RX ORDER — BUPROPION HYDROCHLORIDE 100 MG/1
100 TABLET, EXTENDED RELEASE ORAL EVERY MORNING
Qty: 30 TABLET | Refills: 1 | Status: SHIPPED | OUTPATIENT
Start: 2019-08-30 | End: 2019-10-25

## 2019-08-30 RX ORDER — QUETIAPINE FUMARATE 200 MG/1
200 TABLET, FILM COATED ORAL AT BEDTIME
Qty: 30 TABLET | Refills: 1 | Status: SHIPPED | OUTPATIENT
Start: 2019-08-30 | End: 2019-10-25

## 2019-08-30 RX ORDER — MIRTAZAPINE 45 MG/1
45 TABLET, FILM COATED ORAL AT BEDTIME
Qty: 30 TABLET | Refills: 0 | Status: SHIPPED | OUTPATIENT
Start: 2019-08-30 | End: 2019-10-25

## 2019-08-30 ASSESSMENT — PATIENT HEALTH QUESTIONNAIRE - PHQ9
SUM OF ALL RESPONSES TO PHQ QUESTIONS 1-9: 25
10. IF YOU CHECKED OFF ANY PROBLEMS, HOW DIFFICULT HAVE THESE PROBLEMS MADE IT FOR YOU TO DO YOUR WORK, TAKE CARE OF THINGS AT HOME, OR GET ALONG WITH OTHER PEOPLE: EXTREMELY DIFFICULT
SUM OF ALL RESPONSES TO PHQ QUESTIONS 1-9: 25

## 2019-08-30 ASSESSMENT — ANXIETY QUESTIONNAIRES
3. WORRYING TOO MUCH ABOUT DIFFERENT THINGS: SEVERAL DAYS
5. BEING SO RESTLESS THAT IT IS HARD TO SIT STILL: NEARLY EVERY DAY
GAD7 TOTAL SCORE: 17
2. NOT BEING ABLE TO STOP OR CONTROL WORRYING: NEARLY EVERY DAY
GAD7 TOTAL SCORE: 17
4. TROUBLE RELAXING: NEARLY EVERY DAY
GAD7 TOTAL SCORE: 17
1. FEELING NERVOUS, ANXIOUS, OR ON EDGE: NEARLY EVERY DAY
6. BECOMING EASILY ANNOYED OR IRRITABLE: NEARLY EVERY DAY
7. FEELING AFRAID AS IF SOMETHING AWFUL MIGHT HAPPEN: SEVERAL DAYS
7. FEELING AFRAID AS IF SOMETHING AWFUL MIGHT HAPPEN: SEVERAL DAYS

## 2019-08-30 ASSESSMENT — MIFFLIN-ST. JEOR: SCORE: 1489.86

## 2019-08-30 NOTE — PATIENT INSTRUCTIONS
Treatment Plan:    Increase Seroquel (quetiapine) to 200 mg by mouth daily at bedtime for sleep, anxiety, and Post-traumatic stress disorder (PTSD).     Continue Wellbutrin (buproprion)  mg by mouth daily in AM for depression.     Continue Minipress (prazosin) 2 mg daily in AM and 2 mg daily in PM for Post-traumatic stress disorder (PTSD) and nightmares.    Continue Remeron (mirtazapine) 45 mg by mouth daily at bedtime for sleep, mood, anxiety.     Continue all other medications as reviewed per electronic medical record today.     Safety plan reviewed. To the Emergency Department as needed or call after hours crisis line at [phone] or [phone]. [place] Crisis Text Line. Text [place] to 848708 or Suicide LifeLine Chat: suicidepreventionlifeline.org/chat    Continue individual therapy as planned with Corrina.    Schedule an appointment with me in 6 weeks or sooner as needed. Call [facility] at [phone] to schedule.    Follow up with primary care provider as planned or for acute medical concerns.    Call the psychiatric nurse line with medication questions or concerns at [phone].    MyChart may be used to communicate with your provider, but this is not intended to be used for emergencies.

## 2019-08-30 NOTE — PROGRESS NOTES
"    Outpatient Psychiatric Progress Note    Name: Gunner Velázquez   : 1960                    Primary Care Provider: Vinnie Brown MD - last visit 2019  Therapist: Corrina Gallardo  - last visit 2019  [facility]  Janay Luis - fax [phone]- today Patient reports that he may not be working with her anymore  Client identified their preferred language to be Red.  Client needs the assistance of an   [place] Care Coordination history   Patient has been denied disability x2 and is in the process of appeal- paperwork has been received and I have completed paperwork for him and submitted it- he is awaiting to hear back about this- I will mail Patient a copy of the paperwork I completed    PHQ-9 scores:  PHQ-9 SCORE 5/10/2019 2019 2019   PHQ-9 Total Score 20 21 25       MOE-7 scores:  MOE-7 SCORE 5/10/2019 2019 2019   Total Score 17 19 17        Answers for HPI/ROS submitted by the patient on 2019   If you checked off any problems, how difficult have these problems made it for you to do your work, take care of things at home, or get along with other people?: Extremely difficult  PHQ9 TOTAL SCORE: 25  MOE 7 TOTAL SCORE: 17        Patient Identification:  Patient is a 59 year old,    Laotian male  who presents for return visit with me.  Patient is currently unemployed. Patient attended the session with professional  Igor Fitzgerald. Patient prefers to be called: \"Gunner\". Consent to communicate signed for Jack, patient's Spouse/Partner.     Interim History:    I last saw Gunner Velázquez for outpatient psychiatry Return Visit on 2019.     During that appointment, we Increase Seroquel (quetiapine) to 100 mg by mouth daily at bedtime for sleep, anxiety, and Post-traumatic stress disorder (PTSD).    Continue Wellbutrin (buproprion)  mg by mouth daily in AM for depression.     Continue Remeron (mirtazapine) 45 mg by " mouth daily at bedtime for sleep, mood, anxiety.     Continue Minipress (prazosin) 2 mg daily in AM and 2 mg daily in PM for Post-traumatic stress disorder (PTSD) and nightmares.     Current medications include:   Current Outpatient Medications   Medication Sig     ACE/ARB/ARNI NOT PRESCRIBED, INTENTIONAL, Please choose reason not prescribed, below     amLODIPine (NORVASC) 10 MG tablet Take 1 tablet (10 mg) by mouth daily     buPROPion (WELLBUTRIN SR) 100 MG 12 hr tablet Take 1 tablet (100 mg) by mouth every morning     carvedilol (COREG) 6.25 MG tablet Take 1 tablet (6.25 mg) by mouth 2 times daily (with meals)     cholecalciferol (VITAMIN D3) 5000 units (125 mcg) capsule take 1 capsule by mouth daily.     fexofenadine (ALLEGRA) 180 MG tablet Take 1 tablet (180 mg) by mouth daily     fish oil-omega-3 fatty acids 1000 MG capsule Take 1 capsule (1 g) by mouth 2 times daily     meclizine (ANTIVERT) 12.5 MG tablet Take 1-2 tablets (12.5-25 mg) by mouth 4 times daily as needed for dizziness     mirtazapine (REMERON) 45 MG tablet Take 1 tablet (45 mg) by mouth At Bedtime Increased dose. For mood, sleep, and anxiety.     prazosin (MINIPRESS) 1 MG capsule Take 2 capsule in AM and 2 capsules in PM.     QUEtiapine (SEROQUEL) 100 MG tablet Take 1 tablet (100 mg) by mouth At Bedtime     simvastatin (ZOCOR) 40 MG tablet Take 1 tablet (40 mg) by mouth At Bedtime     tolterodine (DETROL) 1 MG tablet Take 1 tablet (1 mg) by mouth At Bedtime     No current facility-administered medications for this visit.        The [place] Prescription Monitoring Program has been reviewed and there are no concerns about diversionary activity for controlled substances at this time.      PRESCRIPTIONS  Total Prescriptions: 1  Total Private Pay: 0  Fill Date ID Written Drug Qty Days Prescriber Rx # Pharmacy Refill Daily Dose * Pymt Type   04/25/2019 1 04/24/2019 Lorazepam 0.5 Mg Tablet   10.00 4 Ed Aus [id number] Sup (5062) 1/1 1.25  "LME Medicaid [place]    I was able to review most recent Primary Care Provider, specialty provider, and therapy visit notes that I have access to.     Gunner Velázquez reports mood has been: \"kind of irritable\" feeling hopeful with ongoing work together with all specialists. No hypomania or roxanne. No aggression. Ongoing racing thoughts and hard to focus.   Anxiety has been: \"worried and concerned\" Feels on edge and jumpy at times. This happens about once per week. Appetite has been low and worse when anxious.   Sleep has been:  Still filling dizzy every morning.  A few days ago was not able to sleep at all. Denies missed doses of medications. Patient notes ongoing fatigue when not sleeping well. Having dreams about people shooting him and arresting him and harming him and these wake him up at night. His wife will also wake up. He also will act out these nightmares/dreams. Some snoring but no apnea events.   Focus and concentration is a challenge and need to be told many times to do things. He gets angry and upset with his family when they keep reminding him to do things. He notes his older daughter and him got into a fight recently about \"he has a mental illness\" and he feels bad about this and he told her to leave the house. She apologized to him later.   Patient has anxiety about leaving his home and some paranoia related to anxiety.  Wellbutrin (buproprion) started two days ago. No side effects so far.   has panic attacks with shortness of breath, dizziness, sweating, fear, chest pain, nausea with vomiting  Low appetite and loss of interest in mood and activities.     PHQ9 and GAD7 scores were reviewed today.   Medication side effects: no abnormal or involuntary movements.   Current stressors include: Symptoms, Medical Comorbidities, Financial Difficulties, Relationship Difficulties  Coping mechanisms and supports include: Self help, Meditate, Family, Therapy     Previous medication trials include but not limited " "to:  Buspar (buspirone) 30 mg BID  Paxil (paroxetine) 30 mg   Desyrel/Olepto (trazodone) 50 -150 mg   Celexa (citalopram)   Zoloft (sertraline)   Remeron (mirtazapine)  Minipress (prazosin)   Ativan (lorazepam)   Seroquel (quetiapine)   Wellbutrin (buproprion)     Past Medical History:   Diagnosis Date     Anxiety      CKD (chronic kidney disease)      Depression      Dizziness      Dyslipidemia      HTN (hypertension)      Hyperlipidaemia      Shortness of breath       has a past medical history of Anxiety, CKD (chronic kidney disease), Depression, Dizziness, Dyslipidemia, HTN (hypertension), Hyperlipidaemia, and Shortness of breath.    Social History:  Current Living situation:  [place], [place] with Family- 2 daughters, 1 son, wife. Feels safe at home.  Current use of drugs or alcohol: Denies - history of alcohol last used 10 years ago. Denies other drug use. No cravings.   Tobacco use: History of cigarette use- over 10 years ago   Caffeine: No    Vital Signs:   /88   Pulse 61   Temp 97.7  F (36.5  C) (Oral)   Resp 22   Ht 1.676 m (5' 6\")   Wt 73.2 kg (161 lb 6.4 oz)   SpO2 99%   BMI 26.05 kg/m      Labs:  Most recent laboratory results reviewed and the pertinent results include:   Office Visit on 08/02/2019   Component Date Value Ref Range Status     Sodium 08/02/2019 139  133 - 144 mmol/L Final     Potassium 08/02/2019 3.9  3.4 - 5.3 mmol/L Final     Chloride 08/02/2019 105  94 - 109 mmol/L Final     Carbon Dioxide 08/02/2019 25  20 - 32 mmol/L Final     Anion Gap 08/02/2019 9  3 - 14 mmol/L Final     Glucose 08/02/2019 98  70 - 99 mg/dL Final     Urea Nitrogen 08/02/2019 34* 7 - 30 mg/dL Final     Creatinine 08/02/2019 2.50* 0.66 - 1.25 mg/dL Final     GFR Estimate 08/02/2019 27* >60 mL/min/[1.73_m2] Final    Comment: Non  GFR Calc  Starting 12/18/2018, serum creatinine based estimated GFR (eGFR) will be   calculated using the Chronic Kidney Disease Epidemiology Collaboration   (CKD-EPI) " equation.       GFR Estimate If Black 08/02/2019 31* >60 mL/min/[1.73_m2] Final    Comment:  GFR Calc  Starting 12/18/2018, serum creatinine based estimated GFR (eGFR) will be   calculated using the Chronic Kidney Disease Epidemiology Collaboration   (CKD-EPI) equation.       Calcium 08/02/2019 9.2  8.5 - 10.1 mg/dL Final       Review of Systems:  10 systems (general, cardiovascular, respiratory, eyes, ENT, endocrine, GI, , M/S, neurological) were reviewed. Most pertinent finding(s) is/are: chronic kidney disease, heart problems- chest tightness and pain when anxious and have anxiety attacks, hypertension, headaches about 2 times per week alleviated by home remedies and rest, dizziness for the last 3-4 years still 2-3 times per day and can catch himself and sits down- no falls,  no blood in urine,  no bleeding or bruising, bilateral peripheral edema in legs is less, dry mouth, itching, frequent urination. The remaining systems are all unremarkable.     Mental Status Examination:  Appearance:  awake, alert, adequate grooming, on time, mild to moderate distress, and appeared stated age  Attitude:  cooperative   Eye Contact:  fair and wears reading glasses  Gait and Station: Normal, No assistive Devices used, dizziness continues and no recent falls  Psychomotor Behavior:  no evidence of tardive dyskinesia, dystonia, or tics  Oriented to:  time, person, and place  Attention Span and Concentration:  Fair  Speech:  speaks Red and uses   Mood: still hard- I am depending on medicine to calm worrying feelings- hard to stop thinking- depression is worse  Affect:  mood congruent and intensity is flat  Associations:  no loose associations  Thought Process:  logical, linear and goal oriented  Thought Content:  no evidence of suicidal ideation or homicidal ideation and no evidence of psychotic thought  Recent and Remote Memory:  Short term memory concerns. Continues to be forgetful. Denies long term  memory concerns. Not formally assessed. Others have noticed. No amnesia.  Fund of Knowledge: appropriate  Insight:  intact  Judgment:  intact and adequate for safety  Impulse Control:  intact      Suicide Risk Assessment:  Today Gunner Velázquez reports still struggling with depression. Reports that he has felt that life is not worth living, but denies that he wants to kill self and denies a plan or intent. Still worries often about his health and financial difficulties. No thoughts to  harm self or others. In addition, there are notable risk factors for self-harm, including age, anxiety, comorbid medical condition of kidney disease, anger, and withdrawing. However, risk is mitigated by commitment to family, sobriety, absence of past attempts, ability to volunteer a safety plan, history of seeking help when needed, future oriented, no access to firearms or weapons, denies suicidal intent or plan, no family history of suicide, denies homicidal ideation, intent, or plans, gets hope from family and reports he would be able to reach out to them or his doctor if needed in the future if he had thoughts he would harm himself. Therefore, based on all available evidence including the factors cited above, Gunner Velázquez does not appear to be at imminent risk for self-harm, does not meet criteria for a 72-hr hold, and therefore remains appropriate for ongoing outpatient level of care.  A thorough assessment of risk factors related to suicide and self-harm have been reviewed and are noted above.  Local community safety resources reviewed and printed for patient to use if needed. There was no deceit detected, and the patient presented in a manner that was believable.       DSM5  Diagnosis:  296.33 (F33.2) Major Depressive Disorder, Recurrent Episode, Severe without psychosis  300.02 (F41.1) Generalized Anxiety Disorder  309.81 (F43.10) Posttraumatic Stress Disorder Without dissociative symptoms    Medical comorbidities include:    Patient Active Problem List    Diagnosis Date Noted     PTSD (post-traumatic stress disorder) 05/03/2018     Priority: Medium     Severe depression (H) 05/31/2016     Priority: Medium     High triglycerides 08/11/2015     Priority: Medium     Seasonal allergies 05/04/2015     Priority: Medium     MOE (generalized anxiety disorder) 08/01/2014     Priority: Medium     GERD (gastroesophageal reflux disease) 08/01/2014     Priority: Medium     Dizziness 08/01/2014     Priority: Medium     Thought secondary to anxiety.  Workup with cardiology, ENT, audiology, [facility], physical therapy, MRI, MRA all negative       Hyperlipidemia with target LDL less than 130 02/21/2014     Priority: Medium     HTN, goal below 140/90 02/21/2014     Priority: Medium     CKD (chronic kidney disease) stage 4, GFR 15-29 ml/min (H) 02/21/2014     Priority: Medium     Nephrologist Dr. Arenas at [facility]         Psychosocial & Contextual Factors:  Occupational Difficulties, Financial Difficulties and Medical Comorbidities transportation difficulties- takes a cab    Assessment:  [place] Eber reports depression is a little better but still struggling the most with anxiety. Symptoms continue to cause ongoing difficulty with functioning. Will watch closely and focus on reducing anxiety, anger, and insomnia. Continue to monitor memory- short term memory concerns specifically. Wife sets up alarms and helps with medications.     Medication side effects and alternatives were reviewed. Health promotion activities recommended and reviewed today. All questions addressed. Education and counseling completed regarding risks and benefits of medications and psychotherapy options.    Patient has chronic kidney disease stage 4 so will need to closely monitor with medication changes. Most recent lab results showed diminished kidney functioning. Continue to monitor. Patient may need to start dialysis and consider kidney transplant.      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 Patient is aware of this and we discussed other options for care if needed. We also reviewed the Collaborative Care Psychiatry Service.     Discussed changing the Remeron (mirtazapine) to Lexapro (escitalopram). But he is concerned about sleeping. So will increase the Seroquel (quetiapine) first.        Treatment Plan:    Increase Seroquel (quetiapine) to 200 mg by mouth daily at bedtime for sleep, anxiety, and Post-traumatic stress disorder (PTSD).     Continue Wellbutrin (buproprion)  mg by mouth daily in AM for depression.     Continue Minipress (prazosin) 2 mg daily in AM and 2 mg daily in PM for Post-traumatic stress disorder (PTSD) and nightmares.    Continue Remeron (mirtazapine) 45 mg by mouth daily at bedtime for sleep, mood, anxiety.     Continue all other medications as reviewed per electronic medical record today.     Safety plan reviewed. To the Emergency Department as needed or call after hours crisis line at [phone] or [phone]. [place] Crisis Text Line. Text [place] to 053229 or Suicide LifeLine Chat: suicidepreventionlifeline.org/chat    Continue individual therapy as planned with Corrina.    Schedule an appointment with me in 6 weeks or sooner as needed. Call [facility] at [phone] to schedule.    Follow up with primary care provider as planned or for acute medical concerns.    Call the psychiatric nurse line with medication questions or concerns at [phone].    MediaPlatform[employer] may be used to communicate with your provider, but this is not intended to be used for emergencies.    Administrative Billing:   Time spent with patient and  was 30 minutes and greater than 50% of time or 20 minutes was spent in counseling and coordination of care regarding above diagnoses and treatment plan.    Patient Status:  Patient will continue to be seen for ongoing  consultation and stabilization.    Signed:   Promise Aranda, PhD, APRN, CNP   Psychiatry

## 2019-08-31 ASSESSMENT — PATIENT HEALTH QUESTIONNAIRE - PHQ9: SUM OF ALL RESPONSES TO PHQ QUESTIONS 1-9: 25

## 2019-08-31 ASSESSMENT — ANXIETY QUESTIONNAIRES: GAD7 TOTAL SCORE: 17

## 2019-09-03 NOTE — PROGRESS NOTES
It was sent down for scanning into the chart. I am not sure if my LPN Ivett Jimmie has it. She can check on Thursday when we are both back in clinic.

## 2019-09-03 NOTE — TELEPHONE ENCOUNTER
Prescription approved per [facility] Refill Protocol.  Next 5 appointments (look out 90 days)    Sep 20, 2019 10:05 AM CDT  SHORT with Vinnie Bronw MD, [employer]  [facility] [address]  [facility] [id number] [phone]

## 2019-09-18 ENCOUNTER — PATIENT OUTREACH (OUTPATIENT)
Dept: CARE COORDINATION | Facility: CLINIC | Age: 59
End: 2019-09-18

## 2019-09-18 NOTE — PROGRESS NOTES
Community Health Worker called the patient for Clinic Care Coordination outreach follow up on goals and action steps.  Spoke to Gunner    Discussed the following  Getting SSDI approved    Gunner states that the application was received by the [facility], but they're still waiting on some additional information.    He has a worker helping with him this paperwork, but is not sure where they're from.    This worker has helped send in the additional paperwork that was needed.    ______________________  Next Outreach: 10/18/19  Planned Outreach Frequency: Monthly  Preferred Phone Number: [phone]   [employer]  needed    Last Assessment Date: 03/04/19  Care Plan Completion Date: 03/04/19  ______________________  Goals       Psychosocial (pt-stated)      1. Goal Statement: I want to get SSDI.  Measure of Success: Be approved for SSDI and certified disabled.  Supportive Steps to Achieve: Continue to apply for SSDI, SMRT assessment, appeals if appropriate.  Barriers: Unclear if Pt is eligible as he has been denied SSDI X2. Pt's primary language is not english.  Strengths: Support system of family and care team. Motivated to apply and appeal for services if necessary.  Date to Achieve By: 10.30.2019  Patient expressed understanding of goal: Pt reports understanding and denies any additional questions or concerns at this times. CRISTINA CC engaged in AIDET communication during encounter.    As of today's date 9/18/2019 goal is met at 26 - 50%.   Goal Status:  Ongoing  Application was received by [facility], but states that they're waiting on some additional documents.  Has a worker who's helped him with these documents and sent in.

## 2019-09-20 ENCOUNTER — OFFICE VISIT (OUTPATIENT)
Dept: INTERNAL MEDICINE | Facility: CLINIC | Age: 59
End: 2019-09-20
Payer: COMMERCIAL

## 2019-09-20 ENCOUNTER — OFFICE VISIT (OUTPATIENT)
Dept: BEHAVIORAL HEALTH | Facility: CLINIC | Age: 59
End: 2019-09-20
Payer: COMMERCIAL

## 2019-09-20 VITALS
DIASTOLIC BLOOD PRESSURE: 78 MMHG | HEART RATE: 60 BPM | HEIGHT: 66 IN | RESPIRATION RATE: 18 BRPM | SYSTOLIC BLOOD PRESSURE: 126 MMHG | BODY MASS INDEX: 25.97 KG/M2 | TEMPERATURE: 98.2 F | OXYGEN SATURATION: 99 % | WEIGHT: 161.6 LBS

## 2019-09-20 DIAGNOSIS — Z12.5 SCREENING FOR PROSTATE CANCER: ICD-10-CM

## 2019-09-20 DIAGNOSIS — E78.5 HYPERLIPIDEMIA WITH TARGET LDL LESS THAN 130: ICD-10-CM

## 2019-09-20 DIAGNOSIS — F43.10 PTSD (POST-TRAUMATIC STRESS DISORDER): Primary | ICD-10-CM

## 2019-09-20 DIAGNOSIS — F32.2 SEVERE DEPRESSION (H): ICD-10-CM

## 2019-09-20 DIAGNOSIS — F41.1 GAD (GENERALIZED ANXIETY DISORDER): ICD-10-CM

## 2019-09-20 DIAGNOSIS — Z00.00 ROUTINE GENERAL MEDICAL EXAMINATION AT A HEALTH CARE FACILITY: Primary | ICD-10-CM

## 2019-09-20 DIAGNOSIS — I10 HTN, GOAL BELOW 140/90: ICD-10-CM

## 2019-09-20 LAB
ALBUMIN SERPL-MCNC: 3.5 G/DL (ref 3.4–5)
ALP SERPL-CCNC: 98 U/L (ref 40–150)
ALT SERPL W P-5'-P-CCNC: 36 U/L (ref 0–70)
ANION GAP SERPL CALCULATED.3IONS-SCNC: 8 MMOL/L (ref 3–14)
AST SERPL W P-5'-P-CCNC: 34 U/L (ref 0–45)
BILIRUB SERPL-MCNC: 0.3 MG/DL (ref 0.2–1.3)
BUN SERPL-MCNC: 43 MG/DL (ref 7–30)
CALCIUM SERPL-MCNC: 9.1 MG/DL (ref 8.5–10.1)
CHLORIDE SERPL-SCNC: 103 MMOL/L (ref 94–109)
CHOLEST SERPL-MCNC: 225 MG/DL
CO2 SERPL-SCNC: 26 MMOL/L (ref 20–32)
CREAT SERPL-MCNC: 3.11 MG/DL (ref 0.66–1.25)
ERYTHROCYTE [DISTWIDTH] IN BLOOD BY AUTOMATED COUNT: 13.1 % (ref 10–15)
GFR SERPL CREATININE-BSD FRML MDRD: 21 ML/MIN/{1.73_M2}
GLUCOSE SERPL-MCNC: 89 MG/DL (ref 70–99)
HCT VFR BLD AUTO: 39.3 % (ref 40–53)
HDLC SERPL-MCNC: 71 MG/DL
HGB BLD-MCNC: 13.1 G/DL (ref 13.3–17.7)
LDLC SERPL CALC-MCNC: 134 MG/DL
MCH RBC QN AUTO: 27.4 PG (ref 26.5–33)
MCHC RBC AUTO-ENTMCNC: 33.3 G/DL (ref 31.5–36.5)
MCV RBC AUTO: 82 FL (ref 78–100)
NONHDLC SERPL-MCNC: 154 MG/DL
PLATELET # BLD AUTO: 217 10E9/L (ref 150–450)
POTASSIUM SERPL-SCNC: 4.2 MMOL/L (ref 3.4–5.3)
PROT SERPL-MCNC: 8.1 G/DL (ref 6.8–8.8)
PSA SERPL-ACNC: 0.43 UG/L (ref 0–4)
RBC # BLD AUTO: 4.78 10E12/L (ref 4.4–5.9)
SODIUM SERPL-SCNC: 137 MMOL/L (ref 133–144)
TRIGL SERPL-MCNC: 102 MG/DL
TSH SERPL DL<=0.005 MIU/L-ACNC: 2.52 MU/L (ref 0.4–4)
WBC # BLD AUTO: 6.1 10E9/L (ref 4–11)

## 2019-09-20 PROCEDURE — 99213 OFFICE O/P EST LOW 20 MIN: CPT | Mod: 25 | Performed by: INTERNAL MEDICINE

## 2019-09-20 PROCEDURE — 80061 LIPID PANEL: CPT | Performed by: INTERNAL MEDICINE

## 2019-09-20 PROCEDURE — 90471 IMMUNIZATION ADMIN: CPT | Performed by: INTERNAL MEDICINE

## 2019-09-20 PROCEDURE — 85027 COMPLETE CBC AUTOMATED: CPT | Performed by: INTERNAL MEDICINE

## 2019-09-20 PROCEDURE — G0103 PSA SCREENING: HCPCS | Performed by: INTERNAL MEDICINE

## 2019-09-20 PROCEDURE — 36415 COLL VENOUS BLD VENIPUNCTURE: CPT | Performed by: INTERNAL MEDICINE

## 2019-09-20 PROCEDURE — 84443 ASSAY THYROID STIM HORMONE: CPT | Performed by: INTERNAL MEDICINE

## 2019-09-20 PROCEDURE — 90785 PSYTX COMPLEX INTERACTIVE: CPT | Performed by: COUNSELOR

## 2019-09-20 PROCEDURE — 99396 PREV VISIT EST AGE 40-64: CPT | Mod: 25 | Performed by: INTERNAL MEDICINE

## 2019-09-20 PROCEDURE — 90682 RIV4 VACC RECOMBINANT DNA IM: CPT | Performed by: INTERNAL MEDICINE

## 2019-09-20 PROCEDURE — 80053 COMPREHEN METABOLIC PANEL: CPT | Performed by: INTERNAL MEDICINE

## 2019-09-20 PROCEDURE — 90834 PSYTX W PT 45 MINUTES: CPT | Performed by: COUNSELOR

## 2019-09-20 RX ORDER — CARVEDILOL 6.25 MG/1
6.25 TABLET ORAL 2 TIMES DAILY WITH MEALS
Qty: 180 TABLET | Refills: 3 | Status: SHIPPED | OUTPATIENT
Start: 2019-09-20 | End: 2020-04-17

## 2019-09-20 RX ORDER — SIMVASTATIN 40 MG
40 TABLET ORAL AT BEDTIME
Qty: 90 TABLET | Refills: 1 | Status: SHIPPED | OUTPATIENT
Start: 2019-09-20 | End: 2020-03-25

## 2019-09-20 ASSESSMENT — ANXIETY QUESTIONNAIRES
2. NOT BEING ABLE TO STOP OR CONTROL WORRYING: MORE THAN HALF THE DAYS
7. FEELING AFRAID AS IF SOMETHING AWFUL MIGHT HAPPEN: MORE THAN HALF THE DAYS
GAD7 TOTAL SCORE: 15
IF YOU CHECKED OFF ANY PROBLEMS ON THIS QUESTIONNAIRE, HOW DIFFICULT HAVE THESE PROBLEMS MADE IT FOR YOU TO DO YOUR WORK, TAKE CARE OF THINGS AT HOME, OR GET ALONG WITH OTHER PEOPLE: EXTREMELY DIFFICULT
6. BECOMING EASILY ANNOYED OR IRRITABLE: MORE THAN HALF THE DAYS
6. BECOMING EASILY ANNOYED OR IRRITABLE: SEVERAL DAYS
2. NOT BEING ABLE TO STOP OR CONTROL WORRYING: NEARLY EVERY DAY
1. FEELING NERVOUS, ANXIOUS, OR ON EDGE: MORE THAN HALF THE DAYS
5. BEING SO RESTLESS THAT IT IS HARD TO SIT STILL: MORE THAN HALF THE DAYS
3. WORRYING TOO MUCH ABOUT DIFFERENT THINGS: MORE THAN HALF THE DAYS
7. FEELING AFRAID AS IF SOMETHING AWFUL MIGHT HAPPEN: MORE THAN HALF THE DAYS
GAD7 TOTAL SCORE: 12
1. FEELING NERVOUS, ANXIOUS, OR ON EDGE: MORE THAN HALF THE DAYS
5. BEING SO RESTLESS THAT IT IS HARD TO SIT STILL: MORE THAN HALF THE DAYS
3. WORRYING TOO MUCH ABOUT DIFFERENT THINGS: MORE THAN HALF THE DAYS
IF YOU CHECKED OFF ANY PROBLEMS ON THIS QUESTIONNAIRE, HOW DIFFICULT HAVE THESE PROBLEMS MADE IT FOR YOU TO DO YOUR WORK, TAKE CARE OF THINGS AT HOME, OR GET ALONG WITH OTHER PEOPLE: SOMEWHAT DIFFICULT

## 2019-09-20 ASSESSMENT — PATIENT HEALTH QUESTIONNAIRE - PHQ9
5. POOR APPETITE OR OVEREATING: SEVERAL DAYS
5. POOR APPETITE OR OVEREATING: MORE THAN HALF THE DAYS
SUM OF ALL RESPONSES TO PHQ QUESTIONS 1-9: 11
SUM OF ALL RESPONSES TO PHQ QUESTIONS 1-9: 21

## 2019-09-20 ASSESSMENT — MIFFLIN-ST. JEOR: SCORE: 1490.76

## 2019-09-20 NOTE — NURSING NOTE
"/78   Pulse 60   Temp 98.2  F (36.8  C) (Oral)   Resp 18   Ht 1.676 m (5' 6\")   Wt 73.3 kg (161 lb 9.6 oz)   SpO2 99%   BMI 26.08 kg/m    Patient in for annual Male Physical.  Karin Rondon CMA    "

## 2019-09-20 NOTE — PROGRESS NOTES
SUBJECTIVE:   CC: Gunner Velázquez is an 59 year old male who presents for preventive health visit.     Healthy Habits:    Do you get at least three servings of calcium containing foods daily (dairy, green leafy vegetables, etc.)? yes    Amount of exercise or daily activities, outside of work: 3 day(s) per week    Problems taking medications regularly No    Medication side effects: Yes drowsey    Have you had an eye exam in the past two years? yes    Do you see a dentist twice per year? yes    Do you have sleep apnea, excessive snoring or daytime drowsiness?no      PROBLEMS TO ADD ON...  Has h/o HTN. on medical treatment. BP has been controlled. No side effects from medications. No CP, HA, dizziness. good compliance with medications and low salt diet.  Has H/O hyperlipidemia. On medical treatment and diet. No side effects. No muscle weakness, myalgias or upset stomach.   Has h/o CRF. Symptoms include fatigue, mild edema . Seeing nephrology. Monitoring BP, BG, medications, avoiding OTC NSAIDs. Needs periodic recheck of kidney function.  Has h/o anxiety and depression. On medical treatment, better controlled, no side effects. Has chronic depressive symptoms , no suicidal ideation. Has episodes of anxiety, that interfere with normal activities. Needs help for his transportation to visits.       Today's PHQ-2 Score:   PHQ-2 ( 1999 [employer]) 3/15/2019 11/24/2015   Q1: Little interest or pleasure in doing things 0 0   Q2: Feeling down, depressed or hopeless 0 0   PHQ-2 Score 0 0       Abuse: Current or Past(Physical, Sexual or Emotional)- No  Do you feel safe in your environment? Yes    Social History     Tobacco Use     Smoking status: Former Smoker     Smokeless tobacco: Never Used   Substance Use Topics     Alcohol use: No     Alcohol/week: 0.0 oz     If you drink alcohol do you typically have >3 drinks per day or >7 drinks per week? No                      Last PSA:   PSA   Date Value Ref Range Status   09/11/2018 0.40  "0 - 4 ug/L Final     Comment:     Assay Method:  Chemiluminescence using Siemens Vista analyzer       Reviewed orders with patient. Reviewed health maintenance and updated orders accordingly - Yes  Lab work is in process  Labs reviewed in EPIC    Reviewed and updated as needed this visit by clinical staff  Tobacco  Allergies  Meds  Med Hx  Surg Hx  Fam Hx  Soc Hx        Reviewed and updated as needed this visit by Provider            ROS:  CONSTITUTIONAL: NEGATIVE for fever, chills, change in weight  INTEGUMENTARY/SKIN: NEGATIVE for worrisome rashes, moles or lesions  EYES: NEGATIVE for vision changes or irritation  ENT: NEGATIVE for ear, mouth and throat problems  RESP: NEGATIVE for significant cough or SOB  CV: NEGATIVE for chest pain, palpitations or peripheral edema  GI: NEGATIVE for nausea, abdominal pain, heartburn, or change in bowel habits   male: negative for dysuria, hematuria, decreased urinary stream, erectile dysfunction, urethral discharge  MUSCULOSKELETAL: NEGATIVE for significant arthralgias or myalgia  NEURO: NEGATIVE for weakness, dizziness or paresthesias  PSYCHIATRIC: NEGATIVE for changes in mood or affect, + anxiety symptoms     OBJECTIVE:   Pulse 60   Temp 98.2  F (36.8  C) (Oral)   Resp 18   Ht 1.676 m (5' 6\")   Wt 73.3 kg (161 lb 9.6 oz)   SpO2 99%   BMI 26.08 kg/m    EXAM:  GENERAL: healthy, alert and no distress  EYES: Eyes grossly normal to inspection, PERRL and conjunctivae and sclerae normal  HENT: ear canals and TM's normal, nose and mouth without ulcers or lesions  NECK: no adenopathy, no asymmetry, masses, or scars and thyroid normal to palpation  RESP: lungs clear to auscultation - no rales, rhonchi or wheezes  CV: regular rate and rhythm, normal S1 S2, no S3 or S4, no murmur, click or rub, no peripheral edema and peripheral pulses strong  ABDOMEN: soft, nontender, no hepatosplenomegaly, no masses and bowel sounds normal  MS: no gross musculoskeletal defects noted, no " "edema  SKIN: no suspicious lesions or rashes  NEURO: Normal strength and tone, mentation intact and speech normal  PSYCH: mentation appears normal, affect normal/bright    Diagnostic Test Results:  Labs reviewed in Epic    ASSESSMENT/PLAN:       ICD-10-CM    1. Routine general medical examination at a health care facility Z00.00 CBC with platelets     Comprehensive metabolic panel     Lipid panel reflex to direct LDL Non-fasting     TSH with free T4 reflex     Prostate spec antigen screen   2. Hyperlipidemia with target LDL less than 130 E78.5 simvastatin (ZOCOR) 40 MG tablet     OFFICE/OUTPT VISIT,EST,LEVL III   3. HTN, goal below 140/90 I10 carvedilol (COREG) 6.25 MG tablet     CBC with platelets     Comprehensive metabolic panel     Lipid panel reflex to direct LDL Non-fasting     TSH with free T4 reflex     OFFICE/OUTPT VISIT,EST,LEVL III   4. Screening for prostate cancer Z12.5 Prostate spec antigen screen   5. MOE (generalized anxiety disorder) F41.1 OFFICE/OUTPT VISIT,EST,LEVL III   6. Severe depression (H) F32.2 OFFICE/OUTPT VISIT,EST,LEVL III     Assess lab   Cont treatment   Filled in form for FMLA for wife       COUNSELING:  Reviewed preventive health counseling, as reflected in patient instructions       Regular exercise       Healthy diet/nutrition       Vision screening       Hearing screening       Colon cancer screening       Prostate cancer screening    Estimated body mass index is 26.08 kg/m  as calculated from the following:    Height as of this encounter: 1.676 m (5' 6\").    Weight as of this encounter: 73.3 kg (161 lb 9.6 oz).         reports that he has quit smoking. He has never used smokeless tobacco.      Counseling Resources:  ATP IV Guidelines  Pooled Cohorts Equation Calculator  FRAX Risk Assessment  ICSI Preventive Guidelines  Dietary Guidelines for Americans, 2010  USDA's MyPlate  ASA Prophylaxis  Lung CA Screening    Vinnie Brown MD  [facility]  "

## 2019-09-20 NOTE — LETTER
September 23, 2019      Gunner Velázquez  [address]  [place] [address]        Dear ,    Your lab results came back within acceptable limits except for high cholesterol. Recommendations are to exercise regularly and follow a low fat low cholesterol diet also follow up with your nephrologist. The rest of the results are normal.     Resulted Orders   CBC with platelets   Result Value Ref Range    WBC 6.1 4.0 - 11.0 10e9/L    RBC Count 4.78 4.4 - 5.9 10e12/L    Hemoglobin 13.1 (L) 13.3 - 17.7 g/dL    Hematocrit 39.3 (L) 40.0 - 53.0 %    MCV 82 78 - 100 fl    MCH 27.4 26.5 - 33.0 pg    MCHC 33.3 31.5 - 36.5 g/dL    RDW 13.1 10.0 - 15.0 %    Platelet Count 217 150 - 450 10e9/L   Comprehensive metabolic panel   Result Value Ref Range    Sodium 137 133 - 144 mmol/L    Potassium 4.2 3.4 - 5.3 mmol/L    Chloride 103 94 - 109 mmol/L    Carbon Dioxide 26 20 - 32 mmol/L    Anion Gap 8 3 - 14 mmol/L    Glucose 89 70 - 99 mg/dL      Comment:      Non Fasting    Urea Nitrogen 43 (H) 7 - 30 mg/dL    Creatinine 3.11 (H) 0.66 - 1.25 mg/dL    GFR Estimate 21 (L) >60 mL/min/[1.73_m2]      Comment:      Non  GFR Calc  Starting 12/18/2018, serum creatinine based estimated GFR (eGFR) will be   calculated using the Chronic Kidney Disease Epidemiology Collaboration   (CKD-EPI) equation.      GFR Estimate If Black 24 (L) >60 mL/min/[1.73_m2]      Comment:       GFR Calc  Starting 12/18/2018, serum creatinine based estimated GFR (eGFR) will be   calculated using the Chronic Kidney Disease Epidemiology Collaboration   (CKD-EPI) equation.      Calcium 9.1 8.5 - 10.1 mg/dL    Bilirubin Total 0.3 0.2 - 1.3 mg/dL    Albumin 3.5 3.4 - 5.0 g/dL    Protein Total 8.1 6.8 - 8.8 g/dL    Alkaline Phosphatase 98 40 - 150 U/L    ALT 36 0 - 70 U/L    AST 34 0 - 45 U/L   Lipid panel reflex to direct LDL Non-fasting   Result Value Ref Range    Cholesterol 225 (H) <200 mg/dL      Comment:      Desirable:        <200 mg/dl    Triglycerides 102 <150 mg/dL      Comment:      Non Fasting    HDL Cholesterol 71 >39 mg/dL    LDL Cholesterol Calculated 134 (H) <100 mg/dL      Comment:      Above desirable:  100-129 mg/dl  Borderline High:  130-159 mg/dL  High:             160-189 mg/dL  Very high:       >189 mg/dl      Non HDL Cholesterol 154 (H) <130 mg/dL      Comment:      Above Desirable:  130-159 mg/dl  Borderline high:  160-189 mg/dl  High:             190-219 mg/dl  Very high:       >219 mg/dl     TSH with free T4 reflex   Result Value Ref Range    TSH 2.52 0.40 - 4.00 mU/L   Prostate spec antigen screen   Result Value Ref Range    PSA 0.43 0 - 4 ug/L      Comment:      Assay Method:  Chemiluminescence using Siemens Vista analyzer       If you have any questions or concerns, please call the clinic at the number listed above.       Sincerely,        Vinnie Brown MD
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
## 2019-09-21 ASSESSMENT — ANXIETY QUESTIONNAIRES
GAD7 TOTAL SCORE: 15
GAD7 TOTAL SCORE: 12

## 2019-10-24 NOTE — PROGRESS NOTES
"    Outpatient Psychiatric Progress Note    Name: Gunner Velázquez   : 1960                    Primary Care Provider: Vinnie Brown MD - last visit 2019  Therapist: Corrina Gallardo - last visit 2019- client [facility] for ongoing specialized treatment for Post-traumatic stress disorder (PTSD).  Clinical Care Coordination  Client identified their preferred language to be German.  Client needs the assistance of an    went to nephrologist for follow up visit-     PHQ-9 scores:  PHQ-9 SCORE 2019 2019 10/25/2019   PHQ-9 Total Score 11 21 18       MOE-7 scores:  MOE-7 SCORE 2019 2019 10/25/2019   Total Score 12 15 15       Patient Identification:  Patient is a 59 year old,    Laotian male  who presents for return visit with me.  Patient is currently unemployed. Patient attended the session with  , who they agreed to have interview with. Patient prefers to be called: \"[place]\". Consent to communicate signed for Perlaedmundoshola, patient's Spouse/Partner.   Professional  Igor Fitzgerald    Chief Complaint:  Patient presents with:  Recheck Medication: pt here with , pt feels the medication is working well, some HA,dizziness and drowsy.     Interim History:    I last saw Gunner Velázquez for outpatient psychiatry Return Visit on 2019.     During that appointment, we Increase Seroquel (quetiapine) to 200 mg by mouth daily at bedtime for sleep, anxiety, and Post-traumatic stress disorder (PTSD).     Continue Wellbutrin (buproprion)  mg by mouth daily in AM for depression.     Continue Minipress (prazosin) 2 mg daily in AM and 2 mg daily in PM for Post-traumatic stress disorder (PTSD) and nightmares.    Continue Remeron (mirtazapine) 45 mg by mouth daily at bedtime for sleep, mood, anxiety.      Current medications include:   Current Outpatient Medications   Medication Sig     " ACE/ARB/ARNI NOT PRESCRIBED, INTENTIONAL, Please choose reason not prescribed, below     amLODIPine (NORVASC) 10 MG tablet Take 1 tablet (10 mg) by mouth daily     buPROPion (WELLBUTRIN SR) 100 MG 12 hr tablet Take 1 tablet (100 mg) by mouth every morning     carvedilol (COREG) 6.25 MG tablet Take 1 tablet (6.25 mg) by mouth 2 times daily (with meals)     cholecalciferol (VITAMIN D3) 5000 units (125 mcg) capsule take 1 capsule by mouth daily.     fexofenadine (ALLEGRA) 180 MG tablet Take 1 tablet (180 mg) by mouth daily     fish oil-omega-3 fatty acids 1000 MG capsule Take 1 capsule (1 g) by mouth 2 times daily     meclizine (ANTIVERT) 12.5 MG tablet Take 1-2 tablets (12.5-25 mg) by mouth 4 times daily as needed for dizziness     mirtazapine (REMERON) 45 MG tablet Take 1 tablet (45 mg) by mouth At Bedtime For mood, sleep, and anxiety.     prazosin (MINIPRESS) 1 MG capsule Take 2 capsule in AM and 2 capsules in PM.     QUEtiapine (SEROQUEL) 200 MG tablet Take 1 tablet (200 mg) by mouth At Bedtime     simvastatin (ZOCOR) 40 MG tablet Take 1 tablet (40 mg) by mouth At Bedtime     tolterodine (DETROL) 1 MG tablet Take 1 tablet (1 mg) by mouth At Bedtime     No current facility-administered medications for this visit.        The [place] Prescription Monitoring Program has been reviewed and there are no concerns about diversionary activity for controlled substances at this time.      PRESCRIPTIONS  Total Prescriptions: 1  Total Private Pay: 0  Fill Date ID Written Drug Qty Days Prescriber Rx # Pharmacy Refill Daily Dose * Pymt Type   04/25/2019 1 04/24/2019 Lorazepam 0.5 Mg Tablet  10.00 4 Ed Aus [id number] Sup (4260) 0/0 1.25 LME Medicaid [place]    I was able to review most recent Primary Care Provider, specialty provider, and therapy visit notes that I have access to.     Patient called his disability  yesterday to ask about an update on his case and they are still waiting on a decision for him. He notes much  "distress about this because he cannot work.     Gunner Velázquez reports mood has been: \"feeling okay, but still struggling with depression\" no anger or aggression.   Anxiety has been: \"worried about my kidneys and my health and not having money and not getting support\" no panic, but continues to ruminate.   Sleep has been: \"better\" easier to fall asleep. Less nightmares overall. Do not wake him up. No naps during the day. Better energy.   PHQ9 and GAD7 scores were reviewed today.   Medication side effects: Denies  Current stressors include: Symptoms, Medical Comorbidities, Financial Difficulties, Relationship Difficulties  Coping mechanisms and supports include: Self help, Meditate, Family, Therapy     Previous medication trials include but not limited to:  Buspar (buspirone) 30 mg BID  Paxil (paroxetine) 30 mg   Desyrel/Olepto (trazodone) 50 -150 mg   Celexa (citalopram)   Zoloft (sertraline)   Remeron (mirtazapine)  Minipress (prazosin)   Ativan (lorazepam)   Seroquel (quetiapine)   Wellbutrin (buproprion)     Past Medical History:   Diagnosis Date     Anxiety      CKD (chronic kidney disease)      Depression      Dizziness      Dyslipidemia      HTN (hypertension)      Hyperlipidaemia      Shortness of breath       has a past medical history of Anxiety, CKD (chronic kidney disease), Depression, Dizziness, Dyslipidemia, HTN (hypertension), Hyperlipidaemia, and Shortness of breath.    Social History:  Current Living situation:  [place], [place] with Family- 2 daughters, 1 son, wife. Feels safe at home.  Current use of drugs or alcohol: Denies - history of alcohol last used 10 years ago. Denies other drug use. No cravings.   Tobacco use: History of cigarette use- over 10 years ago   Caffeine: No    Vital Signs:   /80 (BP Location: Left arm, Patient Position: Sitting, Cuff Size: Adult Regular)   Pulse 53   Temp 97.8  F (36.6  C) (Oral)   Resp 21   Ht 1.676 m (5' 6\")   Wt 68.9 kg (152 lb)   SpO2 99%   BMI 24.53 " kg/m      Labs:  Most recent laboratory results reviewed and the pertinent results include:   Office Visit on 09/20/2019   Component Date Value Ref Range Status     WBC 09/20/2019 6.1  4.0 - 11.0 10e9/L Final     RBC Count 09/20/2019 4.78  4.4 - 5.9 10e12/L Final     Hemoglobin 09/20/2019 13.1* 13.3 - 17.7 g/dL Final     Hematocrit 09/20/2019 39.3* 40.0 - 53.0 % Final     MCV 09/20/2019 82  78 - 100 fl Final     MCH 09/20/2019 27.4  26.5 - 33.0 pg Final     MCHC 09/20/2019 33.3  31.5 - 36.5 g/dL Final     RDW 09/20/2019 13.1  10.0 - 15.0 % Final     Platelet Count 09/20/2019 217  150 - 450 10e9/L Final     Sodium 09/20/2019 137  133 - 144 mmol/L Final     Potassium 09/20/2019 4.2  3.4 - 5.3 mmol/L Final     Chloride 09/20/2019 103  94 - 109 mmol/L Final     Carbon Dioxide 09/20/2019 26  20 - 32 mmol/L Final     Anion Gap 09/20/2019 8  3 - 14 mmol/L Final     Glucose 09/20/2019 89  70 - 99 mg/dL Final    Non Fasting     Urea Nitrogen 09/20/2019 43* 7 - 30 mg/dL Final     Creatinine 09/20/2019 3.11* 0.66 - 1.25 mg/dL Final     GFR Estimate 09/20/2019 21* >60 mL/min/[1.73_m2] Final    Comment: Non  GFR Calc  Starting 12/18/2018, serum creatinine based estimated GFR (eGFR) will be   calculated using the Chronic Kidney Disease Epidemiology Collaboration   (CKD-EPI) equation.       GFR Estimate If Black 09/20/2019 24* >60 mL/min/[1.73_m2] Final    Comment:  GFR Calc  Starting 12/18/2018, serum creatinine based estimated GFR (eGFR) will be   calculated using the Chronic Kidney Disease Epidemiology Collaboration   (CKD-EPI) equation.       Calcium 09/20/2019 9.1  8.5 - 10.1 mg/dL Final     Bilirubin Total 09/20/2019 0.3  0.2 - 1.3 mg/dL Final     Albumin 09/20/2019 3.5  3.4 - 5.0 g/dL Final     Protein Total 09/20/2019 8.1  6.8 - 8.8 g/dL Final     Alkaline Phosphatase 09/20/2019 98  40 - 150 U/L Final     ALT 09/20/2019 36  0 - 70 U/L Final     AST 09/20/2019 34  0 - 45 U/L Final      Cholesterol 09/20/2019 225* <200 mg/dL Final    Desirable:       <200 mg/dl     Triglycerides 09/20/2019 102  <150 mg/dL Final    Non Fasting     HDL Cholesterol 09/20/2019 71  >39 mg/dL Final     LDL Cholesterol Calculated 09/20/2019 134* <100 mg/dL Final    Comment: Above desirable:  100-129 mg/dl  Borderline High:  130-159 mg/dL  High:             160-189 mg/dL  Very high:       >189 mg/dl       Non HDL Cholesterol 09/20/2019 154* <130 mg/dL Final    Comment: Above Desirable:  130-159 mg/dl  Borderline high:  160-189 mg/dl  High:             190-219 mg/dl  Very high:       >219 mg/dl       TSH 09/20/2019 2.52  0.40 - 4.00 mU/L Final     PSA 09/20/2019 0.43  0 - 4 ug/L Final    Assay Method:  Chemiluminescence using Siemens Vista analyzer     Patient reports that his kidney function GFR was 17.  Might need to start dialysis next month.     Review of Systems:  10 systems (general, cardiovascular, respiratory, eyes, ENT, endocrine, GI, , M/S, neurological) were reviewed. Most pertinent finding(s) is/are: chronic kidney disease, heart problems- chest tightness and pain when anxious and have anxiety attacks, hypertension, headaches about 2 times per week alleviated by home remedies and rest, dizziness for the last 3-4 years still 2-3 times per day and can catch himself and sits down- no falls,  no blood in urine,  no bleeding or bruising, bilateral peripheral edema in legs is less, dry mouth, itching, frequent urination, weight loss, usually bowel movement every 2 days. The remaining systems are all unremarkable.     Mental Status Examination:  Appearance:  awake, alert, adequate grooming, on time, mild to moderate distress, and appeared stated age  Attitude:  cooperative   Eye Contact:  fair and wears reading glasses  Gait and Station: Normal, No assistive Devices used, dizziness continues and no recent falls  Psychomotor Behavior:  no evidence of tardive dyskinesia, dystonia, or tics  Oriented to:  time, person,  and place  Attention Span and Concentration:  Fair  Speech:  speaks Bengali and uses   Mood: still hard- I am depending on medicine to calm worrying feelings- hard to stop thinking- depression is worse  Affect:  mood congruent and intensity is flat  Associations:  no loose associations  Thought Process:  logical, linear and goal oriented  Thought Content:  no evidence of suicidal ideation or homicidal ideation and no evidence of psychotic thought  Recent and Remote Memory:  Short term memory concerns. Continues to be forgetful. Denies long term memory concerns. Not formally assessed. Others have noticed. No amnesia.  Fund of Knowledge: appropriate  Insight:  intact  Judgment:  intact and adequate for safety  Impulse Control:  intact      Suicide Risk Assessment:  Today Gunner Velázquez reports still struggling with depression. Reports that he has felt that life is not worth living, but denies that he wants to kill self and denies a plan or intent. Still worries often about his health and financial difficulties. No thoughts to  harm self or others. In addition, there are notable risk factors for self-harm, including age, anxiety, comorbid medical condition of kidney disease, anger, and withdrawing. However, risk is mitigated by commitment to family, sobriety, absence of past attempts, ability to volunteer a safety plan, history of seeking help when needed, future oriented, no access to firearms or weapons, denies suicidal intent or plan, no family history of suicide, denies homicidal ideation, intent, or plans, gets hope from family and reports he would be able to reach out to them or his doctor if needed in the future if he had thoughts he would harm himself. Therefore, based on all available evidence including the factors cited above, Gunner Velázquez does not appear to be at imminent risk for self-harm, does not meet criteria for a 72-hr hold, and therefore remains appropriate for ongoing outpatient level of  care.  A thorough assessment of risk factors related to suicide and self-harm have been reviewed and are noted above.  Local community safety resources reviewed and printed for patient to use if needed. There was no deceit detected, and the patient presented in a manner that was believable.       DSM5  Diagnosis:  296.33 (F33.2) Major Depressive Disorder, Recurrent Episode, Severe without psychosis  300.02 (F41.1) Generalized Anxiety Disorder  309.81 (F43.10) Posttraumatic Stress Disorder Without dissociative symptoms    Medical comorbidities include:   Patient Active Problem List    Diagnosis Date Noted     PTSD (post-traumatic stress disorder) 05/03/2018     Priority: Medium     Severe depression (H) 05/31/2016     Priority: Medium     High triglycerides 08/11/2015     Priority: Medium     Seasonal allergies 05/04/2015     Priority: Medium     MOE (generalized anxiety disorder) 08/01/2014     Priority: Medium     GERD (gastroesophageal reflux disease) 08/01/2014     Priority: Medium     Dizziness 08/01/2014     Priority: Medium     Thought secondary to anxiety.  Workup with cardiology, ENT, audiology, [facility], physical therapy, MRI, MRA all negative       Hyperlipidemia with target LDL less than 130 02/21/2014     Priority: Medium     HTN, goal below 140/90 02/21/2014     Priority: Medium     CKD (chronic kidney disease) stage 4, GFR 15-29 ml/min (H) 02/21/2014     Priority: Medium     Nephrologist Dr. Arenas at [facility]         Psychosocial & Contextual Factors:  Occupational Difficulties, Financial Difficulties and Medical Comorbidities transportation difficulties- takes a cab or hopes to have his wife transport him- [facility] paperwork has been completed by PCP at their last visit so wife can miss work to help transport patient to his medical appointments.     Assessment:  Gunner Velázquez reports limited improvement in sleep and his mood.       Health promotion activities recommended and  reviewed today. All questions addressed. Education and counseling completed regarding risks and benefits of medications and psychotherapy options. Recommend therapy for additional support. 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 Patient is aware of this and we discussed other options for care if needed. We also reviewed the [facility].      Symptoms continue to cause ongoing difficulty with functioning. Will watch closely and focus on reducing anxiety, anger, and insomnia. Continue to monitor memory- short term memory concerns specifically. Wife sets up alarms and helps with medications.     Patient has chronic kidney disease stage 4 so will need to continue to closely monitor with medication changes. Most recent lab results showed ongoing diminished kidney functioning. Patient may need to start dialysis in the next few weeks and consider kidney transplant. I am concerned about his current medication regimen and trying to reduce polypharmacy. If dialysis starts, may need to adjust dosing of our psychotropic medication. Discussed changing the Remeron (mirtazapine) to Lexapro (escitalopram). But he is concerned about sleeping without the Remeron (mirtazapine). He desires to continue with his current medication regimen and medication side effects and alternatives were reviewed.      Treatment Plan:    Continue Seroquel (quetiapine) 200 mg by mouth daily at bedtime for sleep, anxiety, and Post-traumatic stress disorder (PTSD).     Continue Wellbutrin (buproprion)  mg by mouth daily in AM for depression.     Continue Minipress (prazosin) 2 mg daily in AM and 2 mg daily in PM for Post-traumatic stress disorder (PTSD) and nightmares.    Continue Remeron (mirtazapine) 45 mg by mouth daily at bedtime for sleep, mood, anxiety.     Continue all other medications as reviewed per electronic medical record today.     Safety plan  reviewed. To the Emergency Department as needed or call after hours crisis line at [phone] or [phone]. [place] Crisis Text Line. Text [place] to 530561 or Suicide LifeLine Chat: suicidepreventioneBooxline.org/chat    Continue therapy as planned.    Schedule an appointment with me in 12 weeks or sooner as needed.     Follow up with primary care provider as planned or for acute medical concerns.    Call the psychiatric nurse line with medication questions or concerns at [phone].    CreativeLive may be used to communicate with your provider, but this is not intended to be used for emergencies.    Administrative Billing:   Time spent with patient and professional  was 30 minutes and greater than 50% of time or 25 minutes was spent in counseling and coordination of care regarding above diagnoses and treatment plan.    Patient Status:  This is a continuous care patient and medications will be prescribed by the psychiatric provider until further indicated.    Signed:   Promise Aranda, PhD, APRN, CNP   Psychiatry

## 2019-10-25 ENCOUNTER — OFFICE VISIT (OUTPATIENT)
Dept: PSYCHIATRY | Facility: CLINIC | Age: 59
End: 2019-10-25
Payer: COMMERCIAL

## 2019-10-25 VITALS
OXYGEN SATURATION: 99 % | HEIGHT: 66 IN | BODY MASS INDEX: 24.43 KG/M2 | TEMPERATURE: 97.8 F | SYSTOLIC BLOOD PRESSURE: 132 MMHG | RESPIRATION RATE: 21 BRPM | HEART RATE: 53 BPM | DIASTOLIC BLOOD PRESSURE: 80 MMHG | WEIGHT: 152 LBS

## 2019-10-25 DIAGNOSIS — F32.2 SEVERE DEPRESSION (H): ICD-10-CM

## 2019-10-25 DIAGNOSIS — F43.10 PTSD (POST-TRAUMATIC STRESS DISORDER): ICD-10-CM

## 2019-10-25 PROCEDURE — 99214 OFFICE O/P EST MOD 30 MIN: CPT | Performed by: NURSE PRACTITIONER

## 2019-10-25 RX ORDER — BUPROPION HYDROCHLORIDE 100 MG/1
100 TABLET, EXTENDED RELEASE ORAL EVERY MORNING
Qty: 90 TABLET | Refills: 0 | Status: SHIPPED | OUTPATIENT
Start: 2019-10-25 | End: 2019-12-20

## 2019-10-25 RX ORDER — QUETIAPINE FUMARATE 200 MG/1
200 TABLET, FILM COATED ORAL AT BEDTIME
Qty: 90 TABLET | Refills: 0 | Status: SHIPPED | OUTPATIENT
Start: 2019-10-25 | End: 2019-12-20

## 2019-10-25 RX ORDER — MIRTAZAPINE 45 MG/1
45 TABLET, FILM COATED ORAL AT BEDTIME
Qty: 90 TABLET | Refills: 0 | Status: SHIPPED | OUTPATIENT
Start: 2019-10-25 | End: 2019-12-20

## 2019-10-25 ASSESSMENT — ANXIETY QUESTIONNAIRES
7. FEELING AFRAID AS IF SOMETHING AWFUL MIGHT HAPPEN: MORE THAN HALF THE DAYS
1. FEELING NERVOUS, ANXIOUS, OR ON EDGE: MORE THAN HALF THE DAYS
3. WORRYING TOO MUCH ABOUT DIFFERENT THINGS: MORE THAN HALF THE DAYS
IF YOU CHECKED OFF ANY PROBLEMS ON THIS QUESTIONNAIRE, HOW DIFFICULT HAVE THESE PROBLEMS MADE IT FOR YOU TO DO YOUR WORK, TAKE CARE OF THINGS AT HOME, OR GET ALONG WITH OTHER PEOPLE: VERY DIFFICULT
6. BECOMING EASILY ANNOYED OR IRRITABLE: NEARLY EVERY DAY
2. NOT BEING ABLE TO STOP OR CONTROL WORRYING: MORE THAN HALF THE DAYS
GAD7 TOTAL SCORE: 15
5. BEING SO RESTLESS THAT IT IS HARD TO SIT STILL: MORE THAN HALF THE DAYS

## 2019-10-25 ASSESSMENT — PATIENT HEALTH QUESTIONNAIRE - PHQ9
SUM OF ALL RESPONSES TO PHQ QUESTIONS 1-9: 18
5. POOR APPETITE OR OVEREATING: MORE THAN HALF THE DAYS

## 2019-10-25 ASSESSMENT — MIFFLIN-ST. JEOR: SCORE: 1447.22

## 2019-10-25 ASSESSMENT — PAIN SCALES - GENERAL: PAINLEVEL: NO PAIN (0)

## 2019-10-25 NOTE — Clinical Note
Psychiatry update. Minimal improvements in symptoms. Patient did not want to change medications at this time. I am hesitant to add more medications and if he starts dialysis soon, then will likely need to adjust medications again with everyone's collaboration. Please let me know any questions or concerns. Promise

## 2019-10-25 NOTE — NURSING NOTE
"Chief Complaint   Patient presents with     Recheck Medication     pt here with , pt feels the medication is working well, some HA,dizziness and drowsy.      initial /80 (BP Location: Left arm, Patient Position: Sitting, Cuff Size: Adult Regular)   Pulse 53   Temp 97.8  F (36.6  C) (Oral)   Resp 21   Ht 1.676 m (5' 6\")   Wt 68.9 kg (152 lb)   SpO2 99%   BMI 24.53 kg/m   Estimated body mass index is 24.53 kg/m  as calculated from the following:    Height as of this encounter: 1.676 m (5' 6\").    Weight as of this encounter: 68.9 kg (152 lb)..  bp completed using cuff size regular    "

## 2019-10-25 NOTE — PATIENT INSTRUCTIONS
Treatment Plan:    Continue Seroquel (quetiapine) 200 mg by mouth daily at bedtime for sleep, anxiety, and Post-traumatic stress disorder (PTSD).     Continue Wellbutrin (buproprion)  mg by mouth daily in AM for depression.     Continue Minipress (prazosin) 2 mg daily in AM and 2 mg daily in PM for Post-traumatic stress disorder (PTSD) and nightmares.    Continue Remeron (mirtazapine) 45 mg by mouth daily at bedtime for sleep, mood, anxiety.     Continue all other medications as reviewed per electronic medical record today.     Safety plan reviewed. To the Emergency Department as needed or call after hours crisis line at [phone] or [phone]. [place] Crisis Text Line. Text [place] to 212986 or Suicide LifeLine Chat: suicidepreventionlifeline.org/chat    Continue therapy as planned.    Schedule an appointment with me in 12 weeks or sooner as needed.     Follow up with primary care provider as planned or for acute medical concerns.    Call the psychiatric nurse line with medication questions or concerns at [phone].    Clipaboutt may be used to communicate with your provider, but this is not intended to be used for emergencies.

## 2019-10-26 DIAGNOSIS — F32.1 MAJOR DEPRESSIVE DISORDER, SINGLE EPISODE, MODERATE (H): ICD-10-CM

## 2019-10-26 ASSESSMENT — ANXIETY QUESTIONNAIRES: GAD7 TOTAL SCORE: 15

## 2019-10-28 ENCOUNTER — PATIENT OUTREACH (OUTPATIENT)
Dept: CARE COORDINATION | Facility: CLINIC | Age: 59
End: 2019-10-28

## 2019-10-28 NOTE — TELEPHONE ENCOUNTER
"Requested Prescriptions   Pending Prescriptions Disp Refills     cholecalciferol (VITAMIN D3) 5000 units (125 mcg) capsule [Pharmacy Med Name: Vitamin D3 Oral Capsule 125 MCG (5000 UT)]  Last Written Prescription Date:  9/3/2019  Last Fill Quantity: 30,  # refills: 0   Last office visit: 6920/2019 with prescribing provider:     Future Office Visit:   Next 5 appointments (look out 90 days)    Nov 01, 2019 12:30 PM CDT  [facility] [address] [place] [address]  [phone]   Nov 08, 2019  2:00 PM CST  Pre-Op physical with Vinnie Brown MD  [facility] [place] [facility] [place] [address] [place] [id number] [phone]   Syed 10, 2020  2:45 PM CST  Return Visit with Promise Aranda NP  [facility] [address]  [address]  [facility] [id number] [phone]        30 capsule 0     Sig: take 1 capsule by mouth daily.       Vitamin Supplements (Adult) Protocol Passed - 10/26/2019 10:24 AM        Passed - High dose Vitamin D not ordered        Passed - Recent (12 mo) or future (30 days) visit within the authorizing provider's specialty     Patient has had an office visit with the authorizing provider or a provider within the authorizing providers department within the previous 12 mos or has a future within next 30 days. See \"Patient Info\" tab in inbasket, or \"Choose Columns\" in Meds & Orders section of the refill encounter.              Passed - Medication is active on med list        "

## 2019-10-29 NOTE — TELEPHONE ENCOUNTER
Prescription approved per G Refill Protocol.    Nov 08, 2019  2:00 PM CST  Pre-Op physical with Vinnie Brown MD  [facility] [address]  [facility] [id number] [phone]

## 2019-11-08 ENCOUNTER — OFFICE VISIT (OUTPATIENT)
Dept: INTERNAL MEDICINE | Facility: CLINIC | Age: 59
End: 2019-11-08
Payer: COMMERCIAL

## 2019-11-08 VITALS
HEIGHT: 66 IN | SYSTOLIC BLOOD PRESSURE: 138 MMHG | RESPIRATION RATE: 18 BRPM | WEIGHT: 148.7 LBS | OXYGEN SATURATION: 99 % | DIASTOLIC BLOOD PRESSURE: 92 MMHG | HEART RATE: 80 BPM | BODY MASS INDEX: 23.9 KG/M2 | TEMPERATURE: 97.9 F

## 2019-11-08 DIAGNOSIS — N18.4 CKD (CHRONIC KIDNEY DISEASE) STAGE 4, GFR 15-29 ML/MIN (H): ICD-10-CM

## 2019-11-08 DIAGNOSIS — Z01.818 PRE-OP EXAM: Primary | ICD-10-CM

## 2019-11-08 DIAGNOSIS — I10 HTN, GOAL BELOW 140/90: ICD-10-CM

## 2019-11-08 DIAGNOSIS — E78.5 HYPERLIPIDEMIA WITH TARGET LDL LESS THAN 130: ICD-10-CM

## 2019-11-08 LAB
ALBUMIN SERPL-MCNC: 3.7 G/DL (ref 3.4–5)
ALBUMIN UR-MCNC: ABNORMAL MG/DL
ALP SERPL-CCNC: 78 U/L (ref 40–150)
ALT SERPL W P-5'-P-CCNC: 30 U/L (ref 0–70)
ANION GAP SERPL CALCULATED.3IONS-SCNC: 4 MMOL/L (ref 3–14)
APPEARANCE UR: CLEAR
AST SERPL W P-5'-P-CCNC: 27 U/L (ref 0–45)
BASOPHILS # BLD AUTO: 0 10E9/L (ref 0–0.2)
BASOPHILS NFR BLD AUTO: 0.3 %
BILIRUB SERPL-MCNC: 0.4 MG/DL (ref 0.2–1.3)
BILIRUB UR QL STRIP: NEGATIVE
BUN SERPL-MCNC: 28 MG/DL (ref 7–30)
CALCIUM SERPL-MCNC: 9.3 MG/DL (ref 8.5–10.1)
CHLORIDE SERPL-SCNC: 107 MMOL/L (ref 94–109)
CO2 SERPL-SCNC: 26 MMOL/L (ref 20–32)
COLOR UR AUTO: YELLOW
CREAT SERPL-MCNC: 2.9 MG/DL (ref 0.66–1.25)
DIFFERENTIAL METHOD BLD: ABNORMAL
EOSINOPHIL # BLD AUTO: 0.1 10E9/L (ref 0–0.7)
EOSINOPHIL NFR BLD AUTO: 2 %
ERYTHROCYTE [DISTWIDTH] IN BLOOD BY AUTOMATED COUNT: 13.3 % (ref 10–15)
GFR SERPL CREATININE-BSD FRML MDRD: 23 ML/MIN/{1.73_M2}
GLUCOSE SERPL-MCNC: 89 MG/DL (ref 70–99)
GLUCOSE UR STRIP-MCNC: NEGATIVE MG/DL
HCT VFR BLD AUTO: 36.6 % (ref 40–53)
HGB BLD-MCNC: 12.2 G/DL (ref 13.3–17.7)
HGB UR QL STRIP: NEGATIVE
KETONES UR STRIP-MCNC: NEGATIVE MG/DL
LEUKOCYTE ESTERASE UR QL STRIP: NEGATIVE
LYMPHOCYTES # BLD AUTO: 1.7 10E9/L (ref 0.8–5.3)
LYMPHOCYTES NFR BLD AUTO: 26.3 %
MCH RBC QN AUTO: 27.5 PG (ref 26.5–33)
MCHC RBC AUTO-ENTMCNC: 33.3 G/DL (ref 31.5–36.5)
MCV RBC AUTO: 82 FL (ref 78–100)
MONOCYTES # BLD AUTO: 0.6 10E9/L (ref 0–1.3)
MONOCYTES NFR BLD AUTO: 8.9 %
NEUTROPHILS # BLD AUTO: 4.1 10E9/L (ref 1.6–8.3)
NEUTROPHILS NFR BLD AUTO: 62.5 %
NITRATE UR QL: NEGATIVE
PH UR STRIP: 5 PH (ref 5–7)
PLATELET # BLD AUTO: 176 10E9/L (ref 150–450)
POTASSIUM SERPL-SCNC: 4.9 MMOL/L (ref 3.4–5.3)
PROT SERPL-MCNC: 7.9 G/DL (ref 6.8–8.8)
RBC # BLD AUTO: 4.44 10E12/L (ref 4.4–5.9)
RBC #/AREA URNS AUTO: ABNORMAL /HPF
SODIUM SERPL-SCNC: 137 MMOL/L (ref 133–144)
SOURCE: ABNORMAL
SP GR UR STRIP: <=1.005 (ref 1–1.03)
UROBILINOGEN UR STRIP-ACNC: 0.2 EU/DL (ref 0.2–1)
WBC # BLD AUTO: 6.5 10E9/L (ref 4–11)
WBC #/AREA URNS AUTO: ABNORMAL /HPF

## 2019-11-08 PROCEDURE — 81001 URINALYSIS AUTO W/SCOPE: CPT | Performed by: INTERNAL MEDICINE

## 2019-11-08 PROCEDURE — 93000 ELECTROCARDIOGRAM COMPLETE: CPT | Performed by: INTERNAL MEDICINE

## 2019-11-08 PROCEDURE — 80053 COMPREHEN METABOLIC PANEL: CPT | Performed by: INTERNAL MEDICINE

## 2019-11-08 PROCEDURE — 85025 COMPLETE CBC W/AUTO DIFF WBC: CPT | Performed by: INTERNAL MEDICINE

## 2019-11-08 PROCEDURE — 99214 OFFICE O/P EST MOD 30 MIN: CPT | Performed by: INTERNAL MEDICINE

## 2019-11-08 PROCEDURE — 36415 COLL VENOUS BLD VENIPUNCTURE: CPT | Performed by: INTERNAL MEDICINE

## 2019-11-08 ASSESSMENT — MIFFLIN-ST. JEOR: SCORE: 1432.25

## 2019-11-08 NOTE — LETTER
[facility]  [address], [address]  [place] [address]  [phone]        November 12, 2019    Gunner Velázquez  [address] [place] [address]            Dear Mr. Gunner Lopezmaribeldonald:      The results of your recent labs shows decreased kidney function and mild anemia but stable.      If you have any further questions or problems, please contact our office.      Sincerely,        Vinnie Brown M.D.

## 2019-11-08 NOTE — NURSING NOTE
"BP (!) 140/100 (BP Location: Left arm, Patient Position: Sitting, Cuff Size: Adult Regular)   Pulse 80   Temp 97.9  F (36.6  C) (Oral)   Resp 18   Ht 1.676 m (5' 6\")   Wt 67.4 kg (148 lb 11.2 oz)   SpO2 99%   BMI 24.00 kg/m      "

## 2019-11-08 NOTE — PROGRESS NOTES
[place] [address]  [facility] [address]  [phone]  Dept: [phone]    PRE-OP EVALUATION:  Today's date: 2019    Gunner Velázquez (: 1960) presents for pre-operative evaluation assessment as requested by Dr. Arenas.  He requires evaluation and anesthesia risk assessment prior to undergoing surgery/procedure for treatment of Vascular surgery .    Fax number for surgical facility: [facility]  Primary Physician: Vinnie Brown  Type of Anesthesia Anticipated: General    Patient has a Health Care Directive or Living Will:  NO    Preop Questions 2019   Who is doing your surgery? Dr Arenas   What are you having done? fistula placement   Date of Surgery/Procedure: 19   Facility or Hospital where procedure/surgery will be performed: [facility]   1.  Do you have a history of Heart attack, stroke, stent, coronary bypass surgery, or other heart surgery? No   2.  Do you ever have any pain or discomfort in your chest? YES -    3.  Do you have a history of  Heart Failure? No   4.   Are you troubled by shortness of breath when:  walking on a level surface, or up a slight hill, or at night? No   5.  Do you currently have a cold, bronchitis or other respiratory infection? No   6.  Do you have a cough, shortness of breath, or wheezing? No   7.  Do you sometimes get pains in the calves of your legs when you walk? No   8. Do you or anyone in your family have previous history of blood clots? No   9.  Do you or does anyone in your family have a serious bleeding problem such as prolonged bleeding following surgeries or cuts? No   10. Have you ever had problems with anemia or been told to take iron pills? YES -    11. Have you had any abnormal blood loss such as black, tarry or bloody stools? No   12. Have you ever had a blood transfusion? No   13. Have you or any of your relatives ever had problems with anesthesia? YES -    14. Do you have  sleep apnea, excessive snoring or daytime drowsiness? YES -    15. Do you have any prosthetic heart valves? No   16. Do you have prosthetic joints? No         HPI:     HPI related to upcoming procedure: scheduled for AV fistula surgery for hemodialysis access in the future. Has stage 4 CKD, progressive.   No acute complaints, no medication change or new medical conditions.  Has h/o HTN. on medical treatment. BP has been controlled. No side effects from medications. No CP, HA, dizziness. good compliance with medications and low salt diet.  Has H/O hyperlipidemia. On medical treatment and diet. No side effects. No muscle weakness, myalgias or upset stomach.   Has h/o depression. On medical treatment, controlled, no side effects. No depressive symptoms or suicidal ideation. Has symptoms of anxiety. Follows with psychiatry.         See problem list for active medical problems.  Problems all longstanding and stable, except as noted/documented.  See ROS for pertinent symptoms related to these conditions.      MEDICAL HISTORY:     Patient Active Problem List    Diagnosis Date Noted     PTSD (post-traumatic stress disorder) 05/03/2018     Priority: Medium     Severe depression (H) 05/31/2016     Priority: Medium     High triglycerides 08/11/2015     Priority: Medium     Seasonal allergies 05/04/2015     Priority: Medium     MOE (generalized anxiety disorder) 08/01/2014     Priority: Medium     GERD (gastroesophageal reflux disease) 08/01/2014     Priority: Medium     Dizziness 08/01/2014     Priority: Medium     Thought secondary to anxiety.  Workup with cardiology, ENT, audiology, [facility], physical therapy, MRI, MRA all negative       Hyperlipidemia with target LDL less than 130 02/21/2014     Priority: Medium     HTN, goal below 140/90 02/21/2014     Priority: Medium     CKD (chronic kidney disease) stage 4, GFR 15-29 ml/min (H) 02/21/2014     Priority: Medium     Nephrologist Dr. Deepa gaston [place]  [facility]        Past Medical History:   Diagnosis Date     Anxiety      CKD (chronic kidney disease)      Depression      Dizziness      Dyslipidemia      HTN (hypertension)      Hyperlipidaemia      Shortness of breath      History reviewed. No pertinent surgical history.  Current Outpatient Medications   Medication Sig Dispense Refill     ACE/ARB/ARNI NOT PRESCRIBED, INTENTIONAL, Please choose reason not prescribed, below       amLODIPine (NORVASC) 10 MG tablet Take 1 tablet (10 mg) by mouth daily 90 tablet 3     buPROPion (WELLBUTRIN SR) 100 MG 12 hr tablet Take 1 tablet (100 mg) by mouth every morning 90 tablet 0     carvedilol (COREG) 6.25 MG tablet Take 1 tablet (6.25 mg) by mouth 2 times daily (with meals) 180 tablet 3     cholecalciferol (VITAMIN D3) 5000 units (125 mcg) capsule take 1 capsule by mouth daily. 30 capsule 1     fexofenadine (ALLEGRA) 180 MG tablet Take 1 tablet (180 mg) by mouth daily 90 tablet 3     fish oil-omega-3 fatty acids 1000 MG capsule Take 1 capsule (1 g) by mouth 2 times daily 180 capsule 3     meclizine (ANTIVERT) 12.5 MG tablet Take 1-2 tablets (12.5-25 mg) by mouth 4 times daily as needed for dizziness 30 tablet 5     mirtazapine (REMERON) 45 MG tablet Take 1 tablet (45 mg) by mouth At Bedtime For mood, sleep, and anxiety. 90 tablet 0     prazosin (MINIPRESS) 1 MG capsule Take 2 capsule in AM and 2 capsules in PM. 360 capsule 3     QUEtiapine (SEROQUEL) 200 MG tablet Take 1 tablet (200 mg) by mouth At Bedtime 90 tablet 0     simvastatin (ZOCOR) 40 MG tablet Take 1 tablet (40 mg) by mouth At Bedtime 90 tablet 1     tolterodine (DETROL) 1 MG tablet Take 1 tablet (1 mg) by mouth At Bedtime 90 tablet 3     OTC products: None, except as noted above    Allergies   Allergen Reactions     Rifampin Other (See Comments)     HA, dizziness      Latex Allergy: NO    Social History     Tobacco Use     Smoking status: Former Smoker     Packs/day: 0.00     Smokeless tobacco: Never Used  "  Substance Use Topics     Alcohol use: No     Alcohol/week: 0.0 standard drinks     History   Drug Use No       REVIEW OF SYSTEMS:   CONSTITUTIONAL: NEGATIVE for fever, chills, change in weight  INTEGUMENTARY/SKIN: NEGATIVE for worrisome rashes, moles or lesions  EYES: NEGATIVE for vision changes or irritation  ENT/MOUTH: NEGATIVE for ear, mouth and throat problems  RESP: NEGATIVE for significant cough or SOB  CV: NEGATIVE for chest pain, palpitations or peripheral edema  GI: NEGATIVE for nausea, abdominal pain, heartburn, or change in bowel habits  : NEGATIVE for frequency, dysuria, or hematuria  MUSCULOSKELETAL: NEGATIVE for significant arthralgias or myalgia  NEURO: NEGATIVE for weakness, dizziness or paresthesias  ENDOCRINE: NEGATIVE for temperature intolerance, skin/hair changes  HEME: NEGATIVE for bleeding problems  PSYCHIATRIC: NEGATIVE for changes in mood or affect    EXAM:   BP (!) 140/100 (BP Location: Left arm, Patient Position: Sitting, Cuff Size: Adult Regular)   Pulse 80   Temp 97.9  F (36.6  C) (Oral)   Resp 18   Ht 1.676 m (5' 6\")   Wt 67.4 kg (148 lb 11.2 oz)   SpO2 99%   BMI 24.00 kg/m      GENERAL APPEARANCE: healthy, alert and no distress     EYES: EOMI,  PERRL     HENT: ear canals and TM's normal and nose and mouth without ulcers or lesions     NECK: no adenopathy, no asymmetry, masses, or scars and thyroid normal to palpation     RESP: lungs clear to auscultation - no rales, rhonchi or wheezes     CV: regular rates and rhythm, normal S1 S2, no S3 or S4 and no murmur, click or rub     ABDOMEN:  soft, nontender, no HSM or masses and bowel sounds normal     MS: extremities normal- no gross deformities noted, no evidence of inflammation in joints, FROM in all extremities.     SKIN: no suspicious lesions or rashes     NEURO: Normal strength and tone, sensory exam grossly normal, mentation intact and speech normal     PSYCH: mentation appears normal. and affect normal/bright     LYMPHATICS: " No cervical adenopathy    DIAGNOSTICS:     EKG: appears normal, NSR, normal axis, normal intervals, no acute ST/T changes c/w ischemia, no LVH by voltage criteria, unchanged from previous tracings  Labs Drawn and in Process:   Unresulted Labs Ordered in the Past 30 Days of this Admission     Date and Time Order Name Status Description    11/8/2019 1435 COMPREHENSIVE METABOLIC PANEL In process           Recent Labs   Lab Test 09/20/19  1123 08/02/19  1033 04/24/19  1357  07/10/14  1052   HGB 13.1*  --  13.4   < > 11.9*     --  250   < > 205   INR  --   --  0.90  --  0.94    139 139   < > 140   POTASSIUM 4.2 3.9 4.0   < > 3.7   CR 3.11* 2.50* 2.58*   < > 2.19*    < > = values in this interval not displayed.        IMPRESSION:   Reason for surgery/procedure: AV fistula surgery for ESRD   Diagnosis/reason for consult: preoperative evaluation/ clearance      The proposed surgical procedure is considered INTERMEDIATE risk.    REVISED CARDIAC RISK INDEX  The patient has the following serious cardiovascular risks for perioperative complications such as (MI, PE, VFib and 3  AV Block):  Serum Creatinine >2.0 mg/dl  INTERPRETATION: 1 risks: Class II (low risk - 0.9% complication rate)    The patient has the following additional risks for perioperative complications:  No identified additional risks      ICD-10-CM    1. Pre-op exam Z01.818 EKG 12-lead complete w/read - Clinics       RECOMMENDATIONS:         --Patient is to take all scheduled medications on the day of surgery .    APPROVAL GIVEN to proceed with proposed procedure, without further diagnostic evaluation       Signed Electronically by: Vinnie Brown MD    Copy of this evaluation report is provided to requesting physician.    [place] Preop Guidelines    Revised Cardiac Risk Index

## 2019-11-22 ENCOUNTER — OFFICE VISIT (OUTPATIENT)
Dept: BEHAVIORAL HEALTH | Facility: CLINIC | Age: 59
End: 2019-11-22
Payer: COMMERCIAL

## 2019-11-22 DIAGNOSIS — F32.2 SEVERE DEPRESSION (H): ICD-10-CM

## 2019-11-22 DIAGNOSIS — F43.10 PTSD (POST-TRAUMATIC STRESS DISORDER): Primary | ICD-10-CM

## 2019-11-22 DIAGNOSIS — F41.1 GAD (GENERALIZED ANXIETY DISORDER): ICD-10-CM

## 2019-11-22 PROCEDURE — 90834 PSYTX W PT 45 MINUTES: CPT | Performed by: COUNSELOR

## 2019-11-22 PROCEDURE — 90785 PSYTX COMPLEX INTERACTIVE: CPT | Performed by: COUNSELOR

## 2019-11-22 ASSESSMENT — PATIENT HEALTH QUESTIONNAIRE - PHQ9
SUM OF ALL RESPONSES TO PHQ QUESTIONS 1-9: 16
5. POOR APPETITE OR OVEREATING: NEARLY EVERY DAY

## 2019-11-22 ASSESSMENT — ANXIETY QUESTIONNAIRES
7. FEELING AFRAID AS IF SOMETHING AWFUL MIGHT HAPPEN: NEARLY EVERY DAY
1. FEELING NERVOUS, ANXIOUS, OR ON EDGE: SEVERAL DAYS
2. NOT BEING ABLE TO STOP OR CONTROL WORRYING: NOT AT ALL
3. WORRYING TOO MUCH ABOUT DIFFERENT THINGS: NEARLY EVERY DAY
GAD7 TOTAL SCORE: 12
IF YOU CHECKED OFF ANY PROBLEMS ON THIS QUESTIONNAIRE, HOW DIFFICULT HAVE THESE PROBLEMS MADE IT FOR YOU TO DO YOUR WORK, TAKE CARE OF THINGS AT HOME, OR GET ALONG WITH OTHER PEOPLE: VERY DIFFICULT
2. NOT BEING ABLE TO STOP OR CONTROL WORRYING: NOT AT ALL
5. BEING SO RESTLESS THAT IT IS HARD TO SIT STILL: SEVERAL DAYS
3. WORRYING TOO MUCH ABOUT DIFFERENT THINGS: NEARLY EVERY DAY
6. BECOMING EASILY ANNOYED OR IRRITABLE: SEVERAL DAYS
1. FEELING NERVOUS, ANXIOUS, OR ON EDGE: SEVERAL DAYS

## 2019-11-22 NOTE — PROGRESS NOTES
Progress Note    Patient Name: Gunner Whytegchanh  Date: 11/22/2019           Service Type: Individual  Video Visit: No     Session Start Time: 12:25pm  Session End Time: 1:03pm     Session Length: 38 mins     Session #: 13    Attendees: Client attended alone       PHQ-9 / MOE-7 : in flowsheet     DATA  Interactive Complexity: -Use of play equipment, physical devices,  or  to overcome barriers to diagnostic or therapeutic interaction with a patient who is not fluent in the same language or who has not developed or lost expressive or receptive language skills to use or understand typical language--video   Crisis: No       Progress Since Last Session (Related to Symptoms / Goals / Homework):   Symptoms: Improving reported by client, scores decreasing    Homework: Did not complete      Episode of Care Goals: Minimal progress - PREPARATION (Decided to change - considering how); Intervened by negotiating a change plan and determining options / strategies for behavior change, identifying triggers, exploring social supports, and working towards setting a date to begin behavior change     Current / Ongoing Stressors and Concerns:   Past trauma, health issues, family      Treatment Objective(s) Addressed in This Session:   Possible transfer to [place] speaking provider  Symptom decrease-- less irritability and no SI     Intervention:   Emotion Focused Therapy: check in on emotions and symptoms  Solution Focused: transfer to [place] therapist         ASSESSMENT: Current Emotional / Mental Status (status of significant symptoms):   Risk status (Self / Other harm or suicidal ideation)   Patient denies current fears or concerns for personal safety.   Patient denies current or recent suicidal ideation or behaviors.   Patientdenies current or recent homicidal ideation or behaviors.   Patient denies current or recent self injurious behavior or ideation.   Patient  denies other safety concerns.   Patient Patient reports there has been no change in risk factors since their last session.     PatientPatient reports there has been no change in protective factors since their last session.     A safety and risk management plan has been developed including: Patient consented to co-developed safety plan.  Safety and risk management plan was completed.  Patient agreed to use safety plan should any safety concerns arise.  A copy was given to the patient.     Appearance:   Appropriate    Eye Contact:   Good    Psychomotor Behavior: Normal    Attitude:   Cooperative    Orientation:   All   Speech    Rate / Production: Normal     Volume:  Normal    Mood:    Normal   Affect:    Flat    Thought Content:  Clear    Thought Form:  Coherent    Insight:    Poor      Medication Review:   No changes to current psychiatric medication(s)     Medication Compliance:   Yes     Changes in Health Issues:   Yes: Chronic disease management, Associated Psychological Distress     Chemical Use Review:   Substance Use: Chemical use reviewed, no active concerns identified      Tobacco Use: No current tobacco use.      Diagnosis:  1. PTSD (post-traumatic stress disorder)    2. MOE (generalized anxiety disorder)    3. Severe depression (H)        Collateral Reports Completed:   Routed note to Care Team Member(s)    PLAN: (Patient Tasks / Therapist Tasks / Other)  Client and provider did visit with video  as live cancelled   Client reported he is doing better and no longer having suicidal thoughts   Provider referred client to transfer to [place] speaking therapist for best practice care   Client reported since he is feeling better he would like to see me 2x more and then be discharged  Provider assigned client to check out [place] therapist if symptoms worsen     PALLAVI Hilton, LGSW  [address] [place] [address]  Phone: [phone]  Fax: [phone]    Corrina Farooq, [facility] 11/22/2019                                                                                                        ________________________________________________________________________      Treatment Plan      Client's Name: Gunner Velázquez                                    YOB: 1960      Date: cont 5/6/2019 due to one every 6 weeks              DSM-V Diagnoses: 296.33 (F33.2) Major Depressive Disorder, Recurrent Episode, Severe _, 300.02 (F41.1) Generalized Anxiety Disorder or 309.81 (F43.10) Posttraumatic Stress Disorder (includes Posttraumatic Stress Disorder for Children 6 Years and Younger)  With delayed expression  Psychosocial / Contextual Factors: trauma from [place], medical issues, finances, work , relationships   WHODAS: 54      Referral / Collaboration:  The following referral(s) was/were discussed but client declines follow up at this time. day treatment, partial hospitalization. Clients level of need presents as very high.       Anticipated number of session or this episode of care: 12-18          MeasurableTreatment Goal(s) related to diagnosis / functional impairment(s)  Goal 1: Client will score less than 15 on PHQ9.    I will know I've met my goal when everything feels better and good. I would socialize more with my kids and my wife        Objective #A (Client Action)                                    Client will Identify negative self-talk and behaviors: challenge core beliefs, myths, and actions.  Status:cont 5/6/2019         Intervention(s)  Therapist will teach thought labeling helpful or unhelpful. .      Objective #B  Client will Decrease thoughts that you'd be better off dead or of suicide / self-harm.  Status: cont 5/6/2019         Intervention(s)  Therapist will teach emotional regulation skills. This will include positive thinking, deep breathing and fact checking. .                  Goal 2: Client will score less than 15 on GAD7.    I will know I've met my goal when everything  feels better and good.         Objective #A (Client Action)                                    Status: 5/6/2019         Client will use thought-stopping strategy daily to reduce intrusive thoughts.      Intervention(s)  Therapist will teach emotional recognition/identification. This will include using thought labeling to indentify distorted thought patterns.      Objective #B  Client will track and record at least 1 pleasant exchanges with family members.                                      Status: cont 5/6/2019         Intervention(s)  Therapist will give client examples of ways he can interact with his family. Provider will check in each session to ask about client efforts on this. .          Client has reviewed and agreed to the above plan.          Corrina Farooq, [facility]                                    5/6/2019

## 2019-11-22 NOTE — Clinical Note
Found [place] speaking provider in [place]. Gunner did not want to switch as he stated he wants to see me 2x more then be discharged because he is feeling better

## 2019-11-23 ASSESSMENT — ANXIETY QUESTIONNAIRES: GAD7 TOTAL SCORE: 12

## 2019-11-25 DIAGNOSIS — R42 DIZZINESS: ICD-10-CM

## 2019-11-26 RX ORDER — MECLIZINE HCL 12.5 MG 12.5 MG/1
12.5-25 TABLET ORAL 4 TIMES DAILY PRN
Qty: 30 TABLET | Refills: 1 | Status: SHIPPED | OUTPATIENT
Start: 2019-11-26 | End: 2020-01-27

## 2019-11-26 NOTE — TELEPHONE ENCOUNTER
"Requested Prescriptions   Pending Prescriptions Disp Refills     meclizine (ANTIVERT) 12.5 MG tablet [Pharmacy Med Name: Meclizine HCl Oral  Last Written Prescription Date:  2/5/2019  Last Fill Quantity: 30,  # refills: 5   Last office visit: 11/8/2019 with prescribing provider:     Future Office Visit:   Next 5 appointments (look out 90 days)    Syed 10, 2020  2:45 PM CST  Return Visit with Promise Aranda NP  [facility] ([facility] [address]  [facility] [address]  [phone]   Jan 24, 2020 12:30 PM CST  [facility]  [facility] [place] ([facility] [address] [place] [address]  [phone]        Tablet 12.5 MG] 30 tablet 4     Sig: Take 1-2 tablets (12.5-25 mg) by mouth 4 times daily as needed for dizziness        Antivertigo/Antiemetic Agents Passed - 11/25/2019  9:26 AM        Passed - Recent (12 mo) or future (30 days) visit within the authorizing provider's specialty     Patient has had an office visit with the authorizing provider or a provider within the authorizing providers department within the previous 12 mos or has a future within next 30 days. See \"Patient Info\" tab in inbasket, or \"Choose Columns\" in Meds & Orders section of the refill encounter.              Passed - Medication is active on med list        Passed - Patient is 18 years of age or older        "

## 2019-11-27 ENCOUNTER — PATIENT OUTREACH (OUTPATIENT)
Dept: CARE COORDINATION | Facility: CLINIC | Age: 59
End: 2019-11-27

## 2019-11-27 NOTE — TELEPHONE ENCOUNTER
Community Health Worker called the patient for Clinic Care Coordination outreach follow up on goals and action steps.  Spoke to Gunner    Discussed the following  Approved for SSDI    Gunner received a letter in the mail of approval for SSDI.    States that he'll be receiving $1400 a month.    Discussed completing this goal.    Patient states that he's satisfied with this goal.  Goal has been completed    Patient reports  Per chart review, patient was supposed to have Kidney surgery on 11/21/19.  When CHW mentioned this appointment.  Patient states that he was not feeling well at that time, so needed to cancel the appointment, patient has not had a chance to reschedule.  CHW encouraged patient to reschedule appointment as he had previous concerns of dialysis.  Patient will plan to reschedule soon.    Discussed transitioning patient to Maintenance.  Patient agreed to this.  However, after this discussion patient then mentioned that he received a letter in the mail stating that he should apply for some sort of benefit for his children since he's now on SSDI.  CHW scheduled a time for us to meet to go over letter and assist with application on 12/13/19 at 1pm.    ______________________  Next Outreach: 12/13/19  Planned Outreach Frequency: Monthly  Preferred Phone Number: [phone]   [place]  needed    Last Assessment Date: 03/04/19  Care Plan Completion Date: 03/04/19  ______________________  Goals Addressed                 This Visit's Progress      COMPLETED: Psychosocial (pt-stated)        1. Goal Statement: I want to get SSDI.  Measure of Success: Be approved for SSDI and certified disabled.  Supportive Steps to Achieve: Continue to apply for SSDI, SMRT assessment, appeals if appropriate.  Barriers: Unclear if Pt is eligible as he has been denied SSDI X2. Pt's primary language is not english.  Strengths: Support system of family and care team. Motivated to apply and appeal for services if necessary.  Date  to Achieve By: 10.30.2019  Patient expressed understanding of goal: Pt reports understanding and denies any additional questions or concerns at this times. SW CC engaged in AIDET communication during encounter.    As of today's date 11/27/2019 goal is met at 76 - 100%.   Goal Status:  Complete  Received approval letter in the mail. Receiving $1400.    As of today's date 10/28/2019 goal is met at 26 - 50%.   Goal Status:  Ongoing  Addison states that he hasn't heard from the [facility] yet.    As of today's date 9/18/2019 goal is met at 26 - 50%.   Goal Status:  Ongoing  Application was received by [facility], but states that they're waiting on some additional documents.  Has a worker who's helped him with these documents and sent in.

## 2019-12-18 ENCOUNTER — PATIENT OUTREACH (OUTPATIENT)
Dept: CARE COORDINATION | Facility: CLINIC | Age: 59
End: 2019-12-18

## 2019-12-18 NOTE — PROGRESS NOTES
Community Health Worker called the patient for Clinic Care Coordination outreach follow up on goals and action steps.  Spoke to Gunner    CHW contacted patient to see if he was still needing assistance with the paperwork for benefits for his children.  Gunner states that he has an appointment with the Count includes the [facility] officer to help him with this paperwork on 01/10/20.    No additional needs at this time.  Discussed transitioning over to maintenance.  Patient acknowledged understanding and agreed.    Plan  I will send message to Keisha Campa, to review chart for Maintenance.    ______________________  Next Outreach: 02/18/20  Planned Outreach Frequency: Monthly  Preferred Phone Number: [phone]   Arely  needed    Last Assessment Date: 03/04/19  Care Plan Completion Date: 03/04/19  ______________________

## 2019-12-19 ENCOUNTER — PATIENT OUTREACH (OUTPATIENT)
Dept: CARE COORDINATION | Facility: CLINIC | Age: 59
End: 2019-12-19

## 2019-12-19 NOTE — LETTER
December 19, 2019      Gunner Velázquez  [address] [place] [address] [place] [address]      My Clinic Care Coordination Wellness Plan  This Maintenance Wellness Plan provides private information in regard to the work I have done with my Care Team at my Primary Care Clinic.  This document provides insight on the goals I have worked hard to achieve.  My Care Team congratulates me on my journey to become well.  With the assistance of the Clinic Care Coordination Team and my Primary Care Provider, I have succeeded in improving areas of my health that I identified as barriers to becoming well.  I will continue to seek wellness and use the skills I have obtained to further my journey.  My Community Health Worker will follow up with me in 2 months.  In the meantime, if I should have any questions or concerns I will contact my Community Health Worker.    [facility]  [address] [place] [address]    My Preferred Method of Contact:  Phone [phone]    My Primary/Preferred Language:  Laotion    Emergency Contact: Extended Emergency Contact Information  Primary Emergency Contact: Blu Velázquez  Address: [address] [place] [address]           [address]  Home Phone: [phone]  Mobile Phone: [phone]  Relation: Spouse  Secondary Emergency Contact: [facility]  Home Phone: [phone]  Relation: Other      PCP:  Vinnie Brown    Specialists:    Patient Care Team       Relationship Specialty Notifications Start End    Vinnie Brown MD PCP - General Internal Medicine  10/9/18     Phone: [phone] Pager: [phone] Fax: [phone]        [address] [facility] [address]    Vinnie Brown MD Assigned PCP   10/21/18     Phone: [phone] Pager: [phone] Fax: [phone]        [address] [place] [address]    Keisha Paris LGSW Lead Care Coordinator   3/4/19     Jane Last Community Health Worker Primary Care - CC  7/30/19      [place]; Ph: [phone], Fax: [phone]    Kathleen Arenas MD    11/27/19     Phone: [phone] Fax: [phone]         [facility] [facility] [address] [facility] [address]    Promise Aranda, NP Nurse Practitioner Nurse Practitioner Psych/Mental Health  11/27/19     Phone: [phone] Fax: [phone]         [facility] [address] [place] [address]    Corrina Farooq, [facility] Therapist COUNSELOR - PROFESSIONAL  11/27/19     Phone: [phone] Fax: [phone]         [address] [address]          Advanced Directive/Living Will: None    Clinical Emergency Plan    All [facility] patients have access to a Nurse 24 hours a day, 7 days a week.  If you have questions or want advice from a Nurse, please know [facility] is here for you.  You can call your clinic and they will connect you or you can call Care Connection at [phone] ([phone]).  [facility] also has Walk In Care clinics in multiple locations.  Call the number listed above for more information about our Walk In Care clinics or visit the [facility] website at [email or web address].

## 2019-12-19 NOTE — PROGRESS NOTES
On 12/18/19, KATHI Callejas completed a chart review and approved transition to Maintenance.    I have completed the Maintenance Wellness Plan and mailed it to the patient.    ______________________  Next Outreach: 02/18/20  Planned Outreach Frequency: Maintenance - 2 months  Preferred Phone Number: [phone]   Arely  needed    Last Assessment Date: 03/04/19  Care Plan Completion Date: 03/04/19  ______________________

## 2019-12-20 ENCOUNTER — OFFICE VISIT (OUTPATIENT)
Dept: PSYCHIATRY | Facility: CLINIC | Age: 59
End: 2019-12-20
Payer: COMMERCIAL

## 2019-12-20 VITALS
SYSTOLIC BLOOD PRESSURE: 132 MMHG | OXYGEN SATURATION: 100 % | TEMPERATURE: 97.7 F | RESPIRATION RATE: 20 BRPM | HEART RATE: 51 BPM | HEIGHT: 66 IN | BODY MASS INDEX: 23.78 KG/M2 | DIASTOLIC BLOOD PRESSURE: 90 MMHG | WEIGHT: 148 LBS

## 2019-12-20 DIAGNOSIS — F43.10 PTSD (POST-TRAUMATIC STRESS DISORDER): ICD-10-CM

## 2019-12-20 DIAGNOSIS — F32.2 SEVERE DEPRESSION (H): ICD-10-CM

## 2019-12-20 PROCEDURE — 99214 OFFICE O/P EST MOD 30 MIN: CPT | Performed by: NURSE PRACTITIONER

## 2019-12-20 RX ORDER — BUPROPION HYDROCHLORIDE 100 MG/1
100 TABLET, EXTENDED RELEASE ORAL EVERY MORNING
Qty: 90 TABLET | Refills: 0 | Status: SHIPPED | OUTPATIENT
Start: 2019-12-20 | End: 2020-03-26

## 2019-12-20 RX ORDER — MIRTAZAPINE 45 MG/1
45 TABLET, FILM COATED ORAL AT BEDTIME
Qty: 90 TABLET | Refills: 0 | Status: SHIPPED | OUTPATIENT
Start: 2019-12-20 | End: 2020-03-26

## 2019-12-20 RX ORDER — QUETIAPINE FUMARATE 200 MG/1
200 TABLET, FILM COATED ORAL AT BEDTIME
Qty: 90 TABLET | Refills: 0 | Status: SHIPPED | OUTPATIENT
Start: 2019-12-20 | End: 2020-03-26

## 2019-12-20 ASSESSMENT — PATIENT HEALTH QUESTIONNAIRE - PHQ9
SUM OF ALL RESPONSES TO PHQ QUESTIONS 1-9: 20
5. POOR APPETITE OR OVEREATING: MORE THAN HALF THE DAYS

## 2019-12-20 ASSESSMENT — ANXIETY QUESTIONNAIRES
GAD7 TOTAL SCORE: 12
7. FEELING AFRAID AS IF SOMETHING AWFUL MIGHT HAPPEN: MORE THAN HALF THE DAYS
5. BEING SO RESTLESS THAT IT IS HARD TO SIT STILL: SEVERAL DAYS
1. FEELING NERVOUS, ANXIOUS, OR ON EDGE: SEVERAL DAYS
IF YOU CHECKED OFF ANY PROBLEMS ON THIS QUESTIONNAIRE, HOW DIFFICULT HAVE THESE PROBLEMS MADE IT FOR YOU TO DO YOUR WORK, TAKE CARE OF THINGS AT HOME, OR GET ALONG WITH OTHER PEOPLE: VERY DIFFICULT
2. NOT BEING ABLE TO STOP OR CONTROL WORRYING: MORE THAN HALF THE DAYS
3. WORRYING TOO MUCH ABOUT DIFFERENT THINGS: MORE THAN HALF THE DAYS
6. BECOMING EASILY ANNOYED OR IRRITABLE: MORE THAN HALF THE DAYS

## 2019-12-20 ASSESSMENT — PAIN SCALES - GENERAL: PAINLEVEL: NO PAIN (0)

## 2019-12-20 ASSESSMENT — MIFFLIN-ST. JEOR: SCORE: 1429.07

## 2019-12-20 NOTE — PROGRESS NOTES
"    Outpatient Psychiatric Progress Note    Name: Gunner Velázquez   : 1960                    Primary Care Provider: Vinnie Brown MD - last visit 2019  Therapist: Corrina Farooq - last visit 2019  Clinical Care Coordination  Nephrologist- kidney function has improved     Client identified their preferred language to be Swiss.  Client needs the assistance of an     PHQ-9 scores:  PHQ-9 SCORE 10/25/2019 2019 2019   PHQ-9 Total Score 18 16 20       MOE-7 scores:  MOE-7 SCORE 10/25/2019 2019 2019   Total Score 15 12 12       Patient Identification:  Patient is a 59 year old,    Laotian male  who presents for return visit with me.  Patient is currently disabled. Patient attended the session with phone  , who they agreed to have interview with. Patient prefers to be called: \"Hayfield\".    Chief Complaint:  Patient presents with:  Recheck Medication: phone interperter- pt c/o sleepiness from medication    Interim History:    I last saw Gunner Velázquez for outpatient psychiatry Return Visit on 10/25/2019.     During that appointment, we Continue Seroquel (quetiapine) 200 mg by mouth daily at bedtime for sleep, anxiety, and Post-traumatic stress disorder (PTSD).     Continue Wellbutrin (buproprion)  mg by mouth daily in AM for depression.     Continue Minipress (prazosin) 2 mg daily in AM and 2 mg daily in PM for Post-traumatic stress disorder (PTSD) and nightmares.    Continue Remeron (mirtazapine) 45 mg by mouth daily at bedtime for sleep, mood, anxiety.      Current medications include:   Current Outpatient Medications   Medication Sig     ACE/ARB/ARNI NOT PRESCRIBED, INTENTIONAL, Please choose reason not prescribed, below     amLODIPine (NORVASC) 10 MG tablet Take 1 tablet (10 mg) by mouth daily     buPROPion (WELLBUTRIN SR) 100 MG 12 hr tablet Take 1 tablet (100 mg) by mouth every morning     carvedilol (COREG) 6.25 MG tablet Take 1 " "tablet (6.25 mg) by mouth 2 times daily (with meals)     cholecalciferol (VITAMIN D3) 5000 units (125 mcg) capsule take 1 capsule by mouth daily.     fexofenadine (ALLEGRA) 180 MG tablet Take 1 tablet (180 mg) by mouth daily     fish oil-omega-3 fatty acids 1000 MG capsule Take 1 capsule (1 g) by mouth 2 times daily     meclizine (ANTIVERT) 12.5 MG tablet Take 1-2 tablets (12.5-25 mg) by mouth 4 times daily as needed for dizziness     mirtazapine (REMERON) 45 MG tablet Take 1 tablet (45 mg) by mouth At Bedtime For mood, sleep, and anxiety.     prazosin (MINIPRESS) 1 MG capsule Take 2 capsule in AM and 2 capsules in PM.     QUEtiapine (SEROQUEL) 200 MG tablet Take 1 tablet (200 mg) by mouth At Bedtime     simvastatin (ZOCOR) 40 MG tablet Take 1 tablet (40 mg) by mouth At Bedtime     tolterodine (DETROL) 1 MG tablet Take 1 tablet (1 mg) by mouth At Bedtime     No current facility-administered medications for this visit.        The [place] Prescription Monitoring Program has been reviewed and there are no concerns about diversionary activity for controlled substances at this time.      PRESCRIPTIONS  Total Prescriptions: 1  Total Private Pay: 0  Fill Date ID Written Drug Qty Days Prescriber Rx # Pharmacy Refill Daily Dose * Pymt Type   04/25/2019 1 04/24/2019 Lorazepam 0.5 Mg Tablet  10.00 4 Ed Aus [id number] Sup (1589) 0/0 1.25 LME Medicaid [place]    I was able to review most recent Primary Care Provider, specialty provider, and therapy visit notes that I have access to.     Gunner Velázquez reports mood has been: \"better than before\" no hypomania or roxanne  Anxiety has been: \"better\"  Focus and concentration-   Sleep has been: \"better but not as well as I want to \" awake to urinate often still. Less bad dreams. Rare naps.   PHQ9 and GAD7 scores were reviewed today.   Medication side effects: Denies - feels medication can overpow him in the morning - wife reminds him to take meds  Wife thinks he is better than " "before  Current stressors include: Symptoms, Medical Comorbidities  Coping mechanisms and supports include: Self help, Meditation, Family, Therapy     Previous medication trials include but not limited to:  Buspar (buspirone) 30 mg BID  Paxil (paroxetine) 30 mg   Desyrel/Olepto (trazodone) 50 -150 mg   Celexa (citalopram)   Zoloft (sertraline)   Remeron (mirtazapine)  Minipress (prazosin)   Ativan (lorazepam)   Seroquel (quetiapine)   Wellbutrin (buproprion)     Past Medical History:   Diagnosis Date     Anxiety      CKD (chronic kidney disease)      Depression      Dizziness      Dyslipidemia      HTN (hypertension)      Hyperlipidaemia      Shortness of breath       has a past medical history of Anxiety, CKD (chronic kidney disease), Depression, Dizziness, Dyslipidemia, HTN (hypertension), Hyperlipidaemia, and Shortness of breath.    Social History:  Current Living situation:  [place], [place] with Family- 2 daughters, 1 son, wife. Feels safe at home.  Current use of drugs or alcohol: Denies - history of alcohol last used 10 years ago. Denies other drug use. No cravings.   Tobacco use: History of cigarette use- over 10 years ago   Caffeine: No    Vital Signs:   BP (!) 132/90 (BP Location: Left arm, Patient Position: Sitting, Cuff Size: Adult Regular)   Pulse 51   Temp 97.7  F (36.5  C) (Oral)   Resp 20   Ht 1.676 m (5' 6\")   Wt 67.1 kg (148 lb)   SpO2 100%   BMI 23.89 kg/m      Labs:  Most recent laboratory results reviewed and the pertinent results include:   Office Visit on 11/08/2019   Component Date Value Ref Range Status     Sodium 11/08/2019 137  133 - 144 mmol/L Final     Potassium 11/08/2019 4.9  3.4 - 5.3 mmol/L Final     Chloride 11/08/2019 107  94 - 109 mmol/L Final     Carbon Dioxide 11/08/2019 26  20 - 32 mmol/L Final     Anion Gap 11/08/2019 4  3 - 14 mmol/L Final     Glucose 11/08/2019 89  70 - 99 mg/dL Final    Non Fasting     Urea Nitrogen 11/08/2019 28  7 - 30 mg/dL Final     Creatinine " 11/08/2019 2.90* 0.66 - 1.25 mg/dL Final     GFR Estimate 11/08/2019 23* >60 mL/min/[1.73_m2] Final    Comment: Non  GFR Calc  Starting 12/18/2018, serum creatinine based estimated GFR (eGFR) will be   calculated using the Chronic Kidney Disease Epidemiology Collaboration   (CKD-EPI) equation.       GFR Estimate If Black 11/08/2019 26* >60 mL/min/[1.73_m2] Final    Comment:  GFR Calc  Starting 12/18/2018, serum creatinine based estimated GFR (eGFR) will be   calculated using the Chronic Kidney Disease Epidemiology Collaboration   (CKD-EPI) equation.       Calcium 11/08/2019 9.3  8.5 - 10.1 mg/dL Final     Bilirubin Total 11/08/2019 0.4  0.2 - 1.3 mg/dL Final     Albumin 11/08/2019 3.7  3.4 - 5.0 g/dL Final     Protein Total 11/08/2019 7.9  6.8 - 8.8 g/dL Final     Alkaline Phosphatase 11/08/2019 78  40 - 150 U/L Final     ALT 11/08/2019 30  0 - 70 U/L Final     AST 11/08/2019 27  0 - 45 U/L Final     WBC 11/08/2019 6.5  4.0 - 11.0 10e9/L Final     RBC Count 11/08/2019 4.44  4.4 - 5.9 10e12/L Final     Hemoglobin 11/08/2019 12.2* 13.3 - 17.7 g/dL Final     Hematocrit 11/08/2019 36.6* 40.0 - 53.0 % Final     MCV 11/08/2019 82  78 - 100 fl Final     MCH 11/08/2019 27.5  26.5 - 33.0 pg Final     MCHC 11/08/2019 33.3  31.5 - 36.5 g/dL Final     RDW 11/08/2019 13.3  10.0 - 15.0 % Final     Platelet Count 11/08/2019 176  150 - 450 10e9/L Final     % Neutrophils 11/08/2019 62.5  % Final     % Lymphocytes 11/08/2019 26.3  % Final     % Monocytes 11/08/2019 8.9  % Final     % Eosinophils 11/08/2019 2.0  % Final     % Basophils 11/08/2019 0.3  % Final     Absolute Neutrophil 11/08/2019 4.1  1.6 - 8.3 10e9/L Final     Absolute Lymphocytes 11/08/2019 1.7  0.8 - 5.3 10e9/L Final     Absolute Monocytes 11/08/2019 0.6  0.0 - 1.3 10e9/L Final     Absolute Eosinophils 11/08/2019 0.1  0.0 - 0.7 10e9/L Final     Absolute Basophils 11/08/2019 0.0  0.0 - 0.2 10e9/L Final     Diff Method 11/08/2019  Automated Method   Final     Color Urine 11/08/2019 Yellow   Final     Appearance Urine 11/08/2019 Clear   Final     Glucose Urine 11/08/2019 Negative  NEG^Negative mg/dL Final     Bilirubin Urine 11/08/2019 Negative  NEG^Negative Final     Ketones Urine 11/08/2019 Negative  NEG^Negative mg/dL Final     Specific Gravity Urine 11/08/2019 <=1.005  1.003 - 1.035 Final     pH Urine 11/08/2019 5.0  5.0 - 7.0 pH Final     Protein Albumin Urine 11/08/2019 Trace* NEG^Negative mg/dL Final     Urobilinogen Urine 11/08/2019 0.2  0.2 - 1.0 EU/dL Final     Nitrite Urine 11/08/2019 Negative  NEG^Negative Final     Blood Urine 11/08/2019 Negative  NEG^Negative Final     Leukocyte Esterase Urine 11/08/2019 Negative  NEG^Negative Final     Source 11/08/2019 Midstream Urine   Final     WBC Urine 11/08/2019 0 - 5  OTO5^0 - 5 /HPF Final     RBC Urine 11/08/2019 O - 2  OTO2^O - 2 /HPF Final         Review of Systems:  10 systems (general, cardiovascular, respiratory, eyes, ENT, endocrine, GI, , M/S, neurological) were reviewed. Most pertinent finding(s) is/are: chronic kidney disease,  headaches about 2 times per week alleviated by home remedies and rest, dizziness for the last 3-4 years still 2-3 times per day and can catch himself and sits down- no falls,  no blood in urine,  no bleeding or bruising, bilateral peripheral edema in legs is less and not noted today, dry mouth, itching, frequent urination,  usually bowel movement every 2 days. The remaining systems are all unremarkable.     Mental Status Examination:  Appearance:  awake, alert, adequate grooming, on time, no apparent distress, on time, dressed appropriately to weather, wife in waiting room, and appeared stated age  Attitude:  cooperative   Eye Contact: good, wears reading glasses  Gait and Station: Normal, No assistive Devices used, dizziness continues but less often, no recent falls  Psychomotor Behavior:  no evidence of tardive dyskinesia, dystonia, or tics  Oriented  "to:  time, person, and place  Attention Span and Concentration:  Improved  Speech:  speaks Portuguese and uses   Mood: \"little better\"  Affect:   intensity is blunted, calm, reactive as appropriate  Associations:  no loose associations  Thought Process:  logical, linear and goal oriented  Thought Content:  no evidence of suicidal ideation or homicidal ideation and no evidence of psychotic thought  Recent and Remote Memory:  Short term memory concerns. Continues to be forgetful. Denies long term memory concerns. Not formally assessed. Others have noticed. No amnesia.  Fund of Knowledge: appropriate  Insight:  intact  Judgment:  intact and adequate for safety  Impulse Control:  intact      Suicide Risk Assessment:  Today Gunner Velázquez reports still struggling with depression. Reports that he has felt that life is not worth living, but denies that he wants to kill self and denies a plan or intent. Still worries often about his health and financial difficulties. No thoughts to  harm self or others. In addition, there are notable risk factors for self-harm, including age, anxiety, comorbid medical condition of kidney disease, anger, and withdrawing. However, risk is mitigated by commitment to family, sobriety, absence of past attempts, ability to volunteer a safety plan, history of seeking help when needed, future oriented, no access to firearms or weapons, denies suicidal intent or plan, no family history of suicide, denies homicidal ideation, intent, or plans, gets hope from family and reports he would be able to reach out to them or his doctor if needed in the future if he had thoughts he would harm himself. Therefore, based on all available evidence including the factors cited above, Gunner Velázquez does not appear to be at imminent risk for self-harm, does not meet criteria for a 72-hr hold, and therefore remains appropriate for ongoing outpatient level of care.  A thorough assessment of risk factors related " to suicide and self-harm have been reviewed and are noted above.  Local community safety resources reviewed and printed for patient to use if needed. There was no deceit detected, and the patient presented in a manner that was believable.       DSM5  Diagnosis:  296.33 (F33.2) Major Depressive Disorder, Recurrent Episode, Severe without psychosis  300.02 (F41.1) Generalized Anxiety Disorder  309.81 (F43.10) Posttraumatic Stress Disorder Without dissociative symptoms    Medical comorbidities include:   Patient Active Problem List    Diagnosis Date Noted     PTSD (post-traumatic stress disorder) 05/03/2018     Priority: Medium     Severe depression (H) 05/31/2016     Priority: Medium     High triglycerides 08/11/2015     Priority: Medium     Seasonal allergies 05/04/2015     Priority: Medium     MOE (generalized anxiety disorder) 08/01/2014     Priority: Medium     GERD (gastroesophageal reflux disease) 08/01/2014     Priority: Medium     Dizziness 08/01/2014     Priority: Medium     Thought secondary to anxiety.  Workup with cardiology, ENT, audiology, [facility], physical therapy, MRI, MRA all negative       Hyperlipidemia with target LDL less than 130 02/21/2014     Priority: Medium     HTN, goal below 140/90 02/21/2014     Priority: Medium     CKD (chronic kidney disease) stage 4, GFR 15-29 ml/min (H) 02/21/2014     Priority: Medium     Nephrologist Dr. Arenas at [facility]         Psychosocial & Contextual Factors:  Medical Comorbidites    Assessment:  Gunner Velázquez reports slight mood improvement due to good news regarding his physical health and finally receiving social security disability benefits. Patient is thankful for his care team and plans to continue working toward recovery from his trauma and mental health issues.      Medication side effects and alternatives were reviewed. Health promotion activities recommended and reviewed today. All questions addressed. Education and  counseling completed regarding risks and benefits of medications and psychotherapy options. Recommend therapy for additional support. 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 Patient is aware of this and we discussed other options for care if needed. We also reviewed the [facility] Psychiat[facility].      He desires to continue with his current medication regimen and medication side effects and alternatives were reviewed.    Treatment Plan:     Continue Seroquel (quetiapine) 200 mg by mouth daily at bedtime for sleep, anxiety, and Post-traumatic stress disorder (PTSD).     Continue Wellbutrin (buproprion)  mg by mouth daily in AM for depression.     Continue Minipress (prazosin) 2 mg daily in AM and 2 mg daily in PM for Post-traumatic stress disorder (PTSD) and nightmares.    Continue Remeron (mirtazapine) 45 mg by mouth daily at bedtime for sleep, mood, anxiety.     Continue all other medications as reviewed per electronic medical record today.     Safety plan reviewed. To the Emergency Department as needed or call after hours crisis line at [phone] or [phone]. [place] Crisis Text Line. Text [place] to 229421 or Suicide LifeLine Chat: suicidepreventionlifeline.org/chat    Continue therapy as planned.    Schedule an appointment with me in 8-12 weeks or sooner as needed.     Follow up with primary care provider as planned or sooner for acute medical concerns.    Call the psychiatric nurse line with medication questions or concerns at [phone].    [employer]hart may be used to communicate with your provider, but this is not intended to be used for emergencies.    Administrative Billing:   Time spent with patient and phone  was 30 minutes and greater than 50% of time or 30 minutes was spent in counseling and coordination of care regarding above diagnoses and treatment plan.    Patient Status:  Patient will continue to be seen  for ongoing consultation and stabilization.    Signed:   Promise Aranda, PhD, APRN, CNP   Psychiatry

## 2019-12-20 NOTE — PATIENT INSTRUCTIONS
Treatment Plan:     Continue Seroquel (quetiapine) 200 mg by mouth daily at bedtime for sleep, anxiety, and Post-traumatic stress disorder (PTSD).     Continue Wellbutrin (buproprion)  mg by mouth daily in AM for depression.     Continue Minipress (prazosin) 2 mg daily in AM and 2 mg daily in PM for Post-traumatic stress disorder (PTSD) and nightmares.    Continue Remeron (mirtazapine) 45 mg by mouth daily at bedtime for sleep, mood, anxiety.     Continue all other medications as reviewed per electronic medical record today.     Safety plan reviewed. To the Emergency Department as needed or call after hours crisis line at [phone] or [phone]. [place] Crisis Text Line. Text [place] to 790588 or Suicide LifeLine Chat: suicidepreventionlifeline.org/chat    Continue therapy as planned.    Schedule an appointment with me in 8-12 weeks or sooner as needed.     Follow up with primary care provider as planned or sooner for acute medical concerns.    Call the psychiatric nurse line with medication questions or concerns at [phone].    [employer] may be used to communicate with your provider, but this is not intended to be used for emergencies.

## 2019-12-21 ASSESSMENT — ANXIETY QUESTIONNAIRES: GAD7 TOTAL SCORE: 12

## 2019-12-23 DIAGNOSIS — F32.1 MAJOR DEPRESSIVE DISORDER, SINGLE EPISODE, MODERATE (H): ICD-10-CM

## 2019-12-23 NOTE — TELEPHONE ENCOUNTER
"Requested Prescriptions   Pending Prescriptions Disp Refills     cholecalciferol (VITAMIN D3) 5000 units (125 mcg) capsule [Pharmacy Med Name:  Last Written Prescription Date:  10/29/2019  Last Fill Quantity: 30,  # refills: 1   Last office visit:11/8/2019 with prescribing provider:     Future Office Visit:   Next 5 appointments (look out 90 days)    Jan 17, 2020  1:40 PM CST  SHORT with Vinnie Brown MD  [facility] ([facility]) [address]  [facility] [address]  [phone]   Jan 24, 2020 12:30 PM CST  Return Visit with Corrina Farooq [facility] ([facility]) [address]  [facility] [address] [phone]        Vitamin D3 Oral Capsule 125 MCG (5000 UT)] 30 capsule 0     Sig: TAKE 1 CAPSULE BY MOUTH DAILY.       Vitamin Supplements (Adult) Protocol Passed - 12/23/2019 10:14 AM        Passed - High dose Vitamin D not ordered        Passed - Recent (12 mo) or future (30 days) visit within the authorizing provider's specialty     Patient has had an office visit with the authorizing provider or a provider within the authorizing providers department within the previous 12 mos or has a future within next 30 days. See \"Patient Info\" tab in inbasket, or \"Choose Columns\" in Meds & Orders section of the refill encounter.              Passed - Medication is active on med list        "

## 2019-12-26 NOTE — TELEPHONE ENCOUNTER
Prescription approved per [facility] Refill Protocol.    Next 5 appointments (look out 90 days)    Jan 17, 2020  1:40 PM CST  [facility] [address]  [facility] [id number] [phone]   Jan 24, 2020 12:30 PM CST  Return Visit with WHITNEY Matos  [facility] ([facility]) [address]  [facility] [id number] [phone]

## 2020-01-17 ENCOUNTER — OFFICE VISIT (OUTPATIENT)
Dept: INTERNAL MEDICINE | Facility: CLINIC | Age: 60
End: 2020-01-17
Payer: COMMERCIAL

## 2020-01-17 VITALS
SYSTOLIC BLOOD PRESSURE: 120 MMHG | RESPIRATION RATE: 16 BRPM | DIASTOLIC BLOOD PRESSURE: 72 MMHG | HEIGHT: 66 IN | TEMPERATURE: 98 F | HEART RATE: 78 BPM | BODY MASS INDEX: 24.11 KG/M2 | OXYGEN SATURATION: 98 % | WEIGHT: 150 LBS

## 2020-01-17 DIAGNOSIS — F32.2 SEVERE DEPRESSION (H): ICD-10-CM

## 2020-01-17 DIAGNOSIS — R42 DIZZINESS: ICD-10-CM

## 2020-01-17 DIAGNOSIS — F41.1 GAD (GENERALIZED ANXIETY DISORDER): Primary | ICD-10-CM

## 2020-01-17 DIAGNOSIS — I10 HTN, GOAL BELOW 140/90: ICD-10-CM

## 2020-01-17 DIAGNOSIS — E78.5 HYPERLIPIDEMIA WITH TARGET LDL LESS THAN 130: ICD-10-CM

## 2020-01-17 DIAGNOSIS — N18.4 CKD (CHRONIC KIDNEY DISEASE) STAGE 4, GFR 15-29 ML/MIN (H): ICD-10-CM

## 2020-01-17 PROCEDURE — 80048 BASIC METABOLIC PNL TOTAL CA: CPT | Performed by: INTERNAL MEDICINE

## 2020-01-17 PROCEDURE — 36415 COLL VENOUS BLD VENIPUNCTURE: CPT | Performed by: INTERNAL MEDICINE

## 2020-01-17 PROCEDURE — 99214 OFFICE O/P EST MOD 30 MIN: CPT | Performed by: INTERNAL MEDICINE

## 2020-01-17 PROCEDURE — 82043 UR ALBUMIN QUANTITATIVE: CPT | Performed by: INTERNAL MEDICINE

## 2020-01-17 ASSESSMENT — MIFFLIN-ST. JEOR: SCORE: 1438.15

## 2020-01-17 NOTE — NURSING NOTE
"Vital signs:  Temp: 98  F (36.7  C) Temp src: Oral BP: 120/72 Pulse: 78   Resp: 16 SpO2: 98 %     Height: 167.6 cm (5' 6\") Weight: 68 kg (150 lb)  Estimated body mass index is 24.21 kg/m  as calculated from the following:    Height as of this encounter: 1.676 m (5' 6\").    Weight as of this encounter: 68 kg (150 lb).          "

## 2020-01-17 NOTE — LETTER
[facility]  [address], [address]  [place] [address]  [phone]        January 21, 2020    Gunner Velázquez  [address] [place] [address]            Dear Mr. Gunner Lopezdavidbenoit:      The results of your recent decreased kidney function, slightly improved ( stable). Please, follow up in 3 months.    If you have any further questions or problems, please contact our office.      Sincerely,        Vinnie Brown M.D.

## 2020-01-17 NOTE — PROGRESS NOTES
Subjective     Gunner Velázquez is a 59 year old male who presents to clinic today for the following health issues:    HPI     Patient is seen for a follow up visit.  Has h/o PTSD, anxiety and depression. On medical treatment, controlled, no side effects. Has chronic anxiety and depressive symptoms , no suicidal ideation. Seeing psychiatry.       Hyperlipidemia Follow-Up  Has H/O hyperlipidemia. On medical treatment and diet. No side effects. No muscle weakness, myalgias or upset stomach.       Are you regularly taking any medication or supplement to lower your cholesterol?   Yes- Simvastatin    Are you having muscle aches or other side effects that you think could be caused by your cholesterol lowering medication?  No    Hypertension Follow-up  Has h/o HTN. on medical treatment. BP has been controlled. No side effects from medications. No CP, HA, dizziness. good compliance with medications and low salt diet.      Do you check your blood pressure regularly outside of the clinic? Yes     Are you following a low salt diet? Yes    Are your blood pressures ever more than 140 on the top number (systolic) OR more   than 90 on the bottom number (diastolic), for example 140/90? Yes, occasionally      How many servings of fruits and vegetables do you eat daily?  0-1    On average, how many sweetened beverages do you drink each day (Examples: soda, juice, sweet tea, etc.  Do NOT count diet or artificially sweetened beverages)?   1    How many days per week do you miss taking your medication? 0    PROBLEMS TO ADD ON...  Has h/o CRF. Symptoms include fatigue. Monitoring BP, BG, medications, avoiding OTC NSAIDs. Needs periodic recheck of kidney function.  Has seen nephrology , advised for preparation for dialysis. Has not had a fistula placed yet.   Has chronic dizziness. Currently no acute symptoms, balance is unstable, no falls.     Patient Active Problem List   Diagnosis     Hyperlipidemia with target LDL less than 130      HTN, goal below 140/90     CKD (chronic kidney disease) stage 4, GFR 15-29 ml/min (H)     MOE (generalized anxiety disorder)     GERD (gastroesophageal reflux disease)     Dizziness     Seasonal allergies     High triglycerides     Severe depression (H)     PTSD (post-traumatic stress disorder)     History reviewed. No pertinent surgical history.    Social History     Tobacco Use     Smoking status: Former Smoker     Packs/day: 0.00     Smokeless tobacco: Never Used   Substance Use Topics     Alcohol use: No     Alcohol/week: 0.0 standard drinks     Family History   Problem Relation Age of Onset     Heart Disease Father 70        tb     Family History Negative No family hx of          Current Outpatient Medications   Medication Sig Dispense Refill     amLODIPine (NORVASC) 10 MG tablet Take 1 tablet (10 mg) by mouth daily 90 tablet 3     buPROPion (WELLBUTRIN SR) 100 MG 12 hr tablet Take 1 tablet (100 mg) by mouth every morning 90 tablet 0     carvedilol (COREG) 6.25 MG tablet Take 1 tablet (6.25 mg) by mouth 2 times daily (with meals) 180 tablet 3     cholecalciferol (VITAMIN D3) 5000 units (125 mcg) capsule TAKE 1 CAPSULE BY MOUTH DAILY. 30 capsule 0     fexofenadine (ALLEGRA) 180 MG tablet Take 1 tablet (180 mg) by mouth daily 90 tablet 3     fish oil-omega-3 fatty acids 1000 MG capsule Take 1 capsule (1 g) by mouth 2 times daily 180 capsule 3     meclizine (ANTIVERT) 12.5 MG tablet Take 1-2 tablets (12.5-25 mg) by mouth 4 times daily as needed for dizziness 30 tablet 1     mirtazapine (REMERON) 45 MG tablet Take 1 tablet (45 mg) by mouth At Bedtime For mood, sleep, and anxiety. 90 tablet 0     prazosin (MINIPRESS) 1 MG capsule Take 2 capsule in AM and 2 capsules in PM. 360 capsule 3     QUEtiapine (SEROQUEL) 200 MG tablet Take 1 tablet (200 mg) by mouth At Bedtime 90 tablet 0     simvastatin (ZOCOR) 40 MG tablet Take 1 tablet (40 mg) by mouth At Bedtime 90 tablet 1     tolterodine (DETROL) 1 MG tablet Take 1  "tablet (1 mg) by mouth At Bedtime 90 tablet 3     ACE/ARB/ARNI NOT PRESCRIBED, INTENTIONAL, Please choose reason not prescribed, below           Reviewed and updated as needed this visit by Provider         Review of Systems   ROS COMP: Constitutional, HEENT, cardiovascular, pulmonary, gi and gu systems are negative, except as otherwise noted.      Objective    Pulse 78   Temp 98  F (36.7  C) (Oral)   Resp 16   Ht 1.676 m (5' 6\")   Wt 68 kg (150 lb)   SpO2 98%   BMI 24.21 kg/m    Body mass index is 24.21 kg/m .  Physical Exam   GENERAL: fatigued , alert and no distress  NECK: no adenopathy, no asymmetry, masses, or scars and thyroid normal to palpation  RESP: lungs clear to auscultation - no rales, rhonchi or wheezes  CV: regular rate and rhythm, normal S1 S2, no S3 or S4, no murmur, click or rub, no peripheral edema and peripheral pulses strong  ABDOMEN: soft, nontender, no hepatosplenomegaly, no masses and bowel sounds normal  MS: no gross musculoskeletal defects noted, no edema    Diagnostic Test Results:  Labs reviewed in Epic        Assessment & Plan   Problem List Items Addressed This Visit     Hyperlipidemia with target LDL less than 130    HTN, goal below 140/90    CKD (chronic kidney disease) stage 4, GFR 15-29 ml/min (H)    Relevant Orders    Basic metabolic panel (Completed)    Albumin Random Urine Quantitative with Creat Ratio (Completed)    MOE (generalized anxiety disorder) - Primary    Dizziness    Severe depression (H)         Assess lab work   Continue treatment   Nephrology and psychiatry follow up       See Patient Instructions  Return in about 3 months (around 4/17/2020) for Physical Exam.    Vinnie Brown MD  [facility]        "

## 2020-01-18 LAB
ANION GAP SERPL CALCULATED.3IONS-SCNC: 7 MMOL/L (ref 3–14)
BUN SERPL-MCNC: 39 MG/DL (ref 7–30)
CALCIUM SERPL-MCNC: 9.1 MG/DL (ref 8.5–10.1)
CHLORIDE SERPL-SCNC: 108 MMOL/L (ref 94–109)
CO2 SERPL-SCNC: 24 MMOL/L (ref 20–32)
CREAT SERPL-MCNC: 2.56 MG/DL (ref 0.66–1.25)
CREAT UR-MCNC: 33 MG/DL
GFR SERPL CREATININE-BSD FRML MDRD: 26 ML/MIN/{1.73_M2}
GLUCOSE SERPL-MCNC: 103 MG/DL (ref 70–99)
MICROALBUMIN UR-MCNC: 224 MG/L
MICROALBUMIN/CREAT UR: 680.85 MG/G CR (ref 0–17)
POTASSIUM SERPL-SCNC: 4 MMOL/L (ref 3.4–5.3)
SODIUM SERPL-SCNC: 139 MMOL/L (ref 133–144)

## 2020-01-24 ENCOUNTER — OFFICE VISIT (OUTPATIENT)
Dept: BEHAVIORAL HEALTH | Facility: CLINIC | Age: 60
End: 2020-01-24
Payer: COMMERCIAL

## 2020-01-24 DIAGNOSIS — F43.10 PTSD (POST-TRAUMATIC STRESS DISORDER): ICD-10-CM

## 2020-01-24 DIAGNOSIS — F41.1 GAD (GENERALIZED ANXIETY DISORDER): Primary | ICD-10-CM

## 2020-01-24 DIAGNOSIS — F32.2 SEVERE DEPRESSION (H): ICD-10-CM

## 2020-01-24 PROCEDURE — 90834 PSYTX W PT 45 MINUTES: CPT | Performed by: COUNSELOR

## 2020-01-24 PROCEDURE — 90785 PSYTX COMPLEX INTERACTIVE: CPT | Performed by: COUNSELOR

## 2020-01-24 ASSESSMENT — PATIENT HEALTH QUESTIONNAIRE - PHQ9
SUM OF ALL RESPONSES TO PHQ QUESTIONS 1-9: 18
5. POOR APPETITE OR OVEREATING: SEVERAL DAYS

## 2020-01-24 ASSESSMENT — ANXIETY QUESTIONNAIRES
6. BECOMING EASILY ANNOYED OR IRRITABLE: MORE THAN HALF THE DAYS
1. FEELING NERVOUS, ANXIOUS, OR ON EDGE: MORE THAN HALF THE DAYS
GAD7 TOTAL SCORE: 11
2. NOT BEING ABLE TO STOP OR CONTROL WORRYING: MORE THAN HALF THE DAYS
3. WORRYING TOO MUCH ABOUT DIFFERENT THINGS: SEVERAL DAYS
5. BEING SO RESTLESS THAT IT IS HARD TO SIT STILL: MORE THAN HALF THE DAYS
7. FEELING AFRAID AS IF SOMETHING AWFUL MIGHT HAPPEN: SEVERAL DAYS

## 2020-01-24 NOTE — PATIENT INSTRUCTIONS
Provider assigned client to check out [place] therapist if symptoms worsen      PALLAVI Hilton, LGSW  [address] [place] [address]  Phone: [phone]  Fax: [phone]

## 2020-01-24 NOTE — Clinical Note
Client discharged due to being satisfied with progress. I gave client referral to [place] speaking therapist if he ever feels the need to engage in therapy in the future. That therapists information is in patient instructions of this visit. Thank you!

## 2020-01-24 NOTE — PROGRESS NOTES
Discharge Summary  Single Session    Client Name: Gunner Velázquez MRN#: [id number] YOB: 1960    Discharge Date:   January 24, 2020      Service Type: Individual      Session Start Time: 12:25pm  Session End Time: 1:05pm      Session Length: 40 mins      Session #: 14     Attendees: Client and interpretor    Focus of Treatment Objective(s):  Client's presenting concerns included: Depressed Mood -    Anxiety -    PTSD and medical condition   Stage of Change at time of Discharge: MAINTENANCE (Working to maintain change, with risk of relapse)    Medication Adherence:  Yes    Chemical Use:  No    Assessment: Current Emotional / Mental Status (status of significant symptoms):    Risk status (Self / Other harm or suicidal ideation)  Client denies current fears or concerns for personal safety.  Client reports the following current or recent suicidal ideation or behaviors: Client reports thoughts of being better off dead have decreased but happen occassionally. Client denied intent or plan..  Client denies current or recent homicidal ideation or behaviors.  Client denies current or recent self injurious behavior or ideation.  Client denies other safety concerns.  A safety and risk management plan has been developed including safety plan created with client .    Appearance:   Appropriate   Eye Contact:   Good   Psychomotor Behavior: Normal  Retarded (Slowed)   Attitude:   Cooperative   Orientation:   All  Speech   Rate / Production: Normal    Volume:  Normal   Mood:    Normal  Affect:    Appropriate   Thought Content:  Clear   Thought Form:  Coherent  Logical   Insight:   Fair     DSM5 Diagnoses: (Sustained by DSM5 Criteria Listed Above)  Diagnoses: 296.33 (F33.2) Major Depressive Disorder, Recurrent Episode, Severe _  300.02 (F41.1) Generalized Anxiety Disorder  309.81 (F43.10) Posttraumatic Stress Disorder (includes Posttraumatic Stress Disorder for Children 6 Years and Younger)  With  delayed expression  Psychosocial & Contextual Factors:  trauma from [place], medical issues, finances, work , relationships   WHODAS 2.0 (12 item) Score: 42    Reason for Discharge:  Client is satisfied with progress      Aftercare Plan:  Client agreed to follow safety contract after discharge  Provider assigned client to check out [place] therapist if symptoms worsen      PALLAVI Hilton, LGSW  [address] [place] [address]  Phone: [phone]  Fax: [phone]    Corrina Farooq, [facility] 1/24/2020

## 2020-01-25 DIAGNOSIS — F32.1 MAJOR DEPRESSIVE DISORDER, SINGLE EPISODE, MODERATE (H): ICD-10-CM

## 2020-01-25 DIAGNOSIS — R42 DIZZINESS: ICD-10-CM

## 2020-01-25 ASSESSMENT — ANXIETY QUESTIONNAIRES: GAD7 TOTAL SCORE: 11

## 2020-01-27 RX ORDER — MECLIZINE HCL 12.5 MG 12.5 MG/1
12.5-25 TABLET ORAL 4 TIMES DAILY PRN
Qty: 30 TABLET | Refills: 2 | Status: SHIPPED | OUTPATIENT
Start: 2020-01-27 | End: 2020-04-24

## 2020-01-27 NOTE — TELEPHONE ENCOUNTER
"Requested Prescriptions   Pending Prescriptions Disp Refills     cholecalciferol (VITAMIN D3) 5000 units (125 mcg) capsule [Pharmacy Med Name: Vitamin D3 Oral Capsule 125 MCG (5000 UT)] 30 capsule 0     Sig: TAKE 1 CAPSULE BY MOUTH DAILY.   Last Written Prescription Date:  12/26/2019  Last Fill Quantity: 30,  # refills: 0   Last office visit: 6/13/2018 with prescribing provider:     Future Office Visit:   Next 5 appointments (look out 90 days)    Apr 17, 2020  1:40 PM CDT  SHORT with Vinnie Brown MD  [facility] [address]  [facility] [address]  [phone]           Vitamin Supplements (Adult) Protocol Passed - 1/25/2020 10:50 AM        Passed - High dose Vitamin D not ordered        Passed - Recent (12 mo) or future (30 days) visit within the authorizing provider's specialty     Patient has had an office visit with the authorizing provider or a provider within the authorizing providers department within the previous 12 mos or has a future within next 30 days. See \"Patient Info\" tab in inbasket, or \"Choose Columns\" in Meds & Orders section of the refill encounter.              Passed - Medication is active on med list        meclizine (ANTIVERT) 12.5 MG tablet [Pharmacy Med Name: Meclizine HCl Oral Tablet 12.5 MG] 30 tablet 0     Sig: Take 1-2 tablets (12.5-25 mg) by mouth 4 times daily as needed for dizziness   Last Written Prescription Date:  11/26/2019  Last Fill Quantity: 30,  # refills: 01   Last office visit: 6/13/2018 with prescribing provider:     Future Office Visit:   Next 5 appointments (look out 90 days)    Apr 17, 2020  1:40 PM CDT  SHORT with Vinnie Brown MD  [facility] ([facility]) [address]  [facility] [address]  [phone]            Antivertigo/Antiemetic Agents Passed - 1/25/2020 10:50 AM        Passed - Recent (12 mo) or future (30 days) visit within the authorizing " "provider's specialty     Patient has had an office visit with the authorizing provider or a provider within the authorizing providers department within the previous 12 mos or has a future within next 30 days. See \"Patient Info\" tab in inbasket, or \"Choose Columns\" in Meds & Orders section of the refill encounter.              Passed - Medication is active on med list        Passed - Patient is 18 years of age or older        "

## 2020-02-19 ENCOUNTER — PATIENT OUTREACH (OUTPATIENT)
Dept: CARE COORDINATION | Facility: CLINIC | Age: 60
End: 2020-02-19

## 2020-02-19 NOTE — PROGRESS NOTES
Community Health Worker called the patient for Clinic Care Coordination outreach follow up on goals and action steps.  Spoke to Gunner    Discussed the following  Magness states that he has no other concerns at this time.  Patient still has our information and will call us if he's in need of any other assistance.  Discussed transitioning to Graduation.  Patient agreed.    Plan  I will send message to KATHI Callejas, to complete graduation.    ______________________  Next Outreach: NA  Planned Outreach Frequency: Graduated  Preferred Phone Number: [phone]   [place]  needed    Last Assessment Date: 03/04/19  Care Plan Completion Date: 03/04/19  ______________________

## 2020-02-20 ENCOUNTER — PATIENT OUTREACH (OUTPATIENT)
Dept: CARE COORDINATION | Facility: CLINIC | Age: 60
End: 2020-02-20

## 2020-02-20 NOTE — LETTER
[facility] CARE COORDINATION  [address] [facility] [address]  February 20, 2020    Gunner Lopezuangchanh  [address] [place] [address]    Dear Gunner,  Your Care Team congratulates you on your journey to maintain wellness. This document will help guide you on your journey to maintain a healthy lifestyle.  You can use this to help you overcome any barriers you may encounter.  If you should have any questions or concerns, you can contact the members of your Care Team or contact your Primary Care Clinic for assistance.     Health Maintenance  Health Maintenance Reviewed:      My Access Plan  Medical Emergency 911   Primary Clinic Line [facility] - [phone]   24 Hour Appointment Line [phone] or  [phone]) (toll-free)   24 Hour Nurse Line [phone] (toll-free)   Preferred Urgent Care     Preferred Hospital     Preferred Pharmacy [id number]  [place], [place] - [address] Behavioral Health Crisis Line The National Suicide Prevention Lifeline at 1-920.893.1984 or 911     My Care Team Members  Patient Care Team       Relationship Specialty Notifications Start End    Vinnie Brown MD PCP - General Internal Medicine  10/9/18     Phone: [phone] Pager: [phone] Fax: [phone]        [address] [facility] [address]    Vinnie Brown MD Assigned PCP   10/21/18     Phone: [phone] Pager: [phone] Fax: [phone]        [address] [place] [address]    Keisha Paris [facility] Lead Care Coordinator   3/4/19     Jane Last Community Health Worker Primary Care - CC  7/30/19     [place]; Ph: [phone], Fax: [phone]    Kathleen Arenas MD    11/27/19     Phone: [phone] Fax: [phone]         [address] [facility] [address] [facility] [address]    Promise Aranda NP Nurse Practitioner Nurse Practitioner Psych/Mental Health  11/27/19     Phone: [phone] Fax:  [phone]         [facility] [address] [facility] [address]    OCorrina Spivey, [facility] Therapist COUNSELOR - PROFESSIONAL  11/27/19     Phone: [phone] Fax: [phone]         [address] [facility] [address] [facility] [address]              Goals        Patient Stated      COMPLETED: Psychosocial (pt-stated)      1. Goal Statement: I want to get SSDI.  Measure of Success: Be approved for SSDI and certified disabled.  Supportive Steps to Achieve: Continue to apply for SSDI, SMRT assessment, appeals if appropriate.  Barriers: Unclear if Pt is eligible as he has been denied SSDI X2. Pt's primary language is not english.  Strengths: Support system of family and care team. Motivated to apply and appeal for services if necessary.  Date to Achieve By: 10.30.2019  Patient expressed understanding of goal: Pt reports understanding and denies any additional questions or concerns at this times. CRISTINA CC engaged in AIDET communication during encounter.    As of today's date 11/27/2019 goal is met at 76 - 100%.   Goal Status:  Complete  Received approval letter in the mail. Receiving $1400.    As of today's date 10/28/2019 goal is met at 26 - 50%.   Goal Status:  Ongoing  Gunner states that he hasn't heard from the [facility] yet.    As of today's date 9/18/2019 goal is met at 26 - 50%.   Goal Status:  Ongoing  Application was received by [facility], but states that they're waiting on some additional documents.  Has a worker who's helped him with these documents and sent in.                 Advance Care Plans/Directives Type:      We notice that you do not have an Advance Directive on file. Upon completion of your Health Care Directive, please bring a copy with you to your next office visit.    It has been your Clinic Care Team's pleasure to work with you on your goals.    Regards,  Your Clinic Care Team    Keisha Campa, [facility]  Clinic Care Coordinator  Ph. [phone]  [email or web address]

## 2020-02-20 NOTE — PROGRESS NOTES
Clinic Care Coordination Contact    Assessment: Care Coordinator contacted patient for 2 month follow up.  Patient has continued to follow the plan of care and assessment is negative for any new needs or concerns.    Enrollment status: Graduated.      Plan: No further outreaches at this time.  Patient will continue to follow the plan of care.  If new needs arise a new Care Coordination referral may be placed.  FYI to PCP    Keisha Campa [facility]  Clinic Care Coordinator  Ph. [phone]  [email or web address]

## 2020-03-25 DIAGNOSIS — F43.10 PTSD (POST-TRAUMATIC STRESS DISORDER): ICD-10-CM

## 2020-03-25 DIAGNOSIS — N32.81 OVERACTIVE BLADDER: ICD-10-CM

## 2020-03-25 DIAGNOSIS — I10 HTN, GOAL BELOW 140/90: ICD-10-CM

## 2020-03-25 DIAGNOSIS — F32.2 SEVERE DEPRESSION (H): ICD-10-CM

## 2020-03-25 DIAGNOSIS — E78.5 HYPERLIPIDEMIA WITH TARGET LDL LESS THAN 130: ICD-10-CM

## 2020-03-25 RX ORDER — TOLTERODINE TARTRATE 1 MG/1
1 TABLET, EXTENDED RELEASE ORAL AT BEDTIME
Qty: 90 TABLET | Refills: 0 | Status: SHIPPED | OUTPATIENT
Start: 2020-03-25 | End: 2020-06-25

## 2020-03-25 RX ORDER — SIMVASTATIN 40 MG
40 TABLET ORAL AT BEDTIME
Qty: 90 TABLET | Refills: 0 | Status: SHIPPED | OUTPATIENT
Start: 2020-03-25 | End: 2020-06-25

## 2020-03-25 RX ORDER — AMLODIPINE BESYLATE 10 MG/1
10 TABLET ORAL DAILY
Qty: 90 TABLET | Refills: 0 | Status: SHIPPED | OUTPATIENT
Start: 2020-03-25 | End: 2020-06-26

## 2020-03-25 NOTE — TELEPHONE ENCOUNTER
Provider approval needed.     Creatinine   Date Value Ref Range Status   01/17/2020 2.56 (H) 0.66 - 1.25 mg/dL Final     Pt has appt scheduled in APril.     LDL Cholesterol Calculated   Date Value Ref Range Status   09/20/2019 134 (H) <100 mg/dL Final     Comment:     Above desirable:  100-129 mg/dl  Borderline High:  130-159 mg/dL  High:             160-189 mg/dL  Very high:       >189 mg/dl

## 2020-03-25 NOTE — TELEPHONE ENCOUNTER
"Requested Prescriptions   Pending Prescriptions Disp Refills     simvastatin (ZOCOR) 40 MG tablet [Pharmacy Med Name: Simvastatin Oral Tablet 40 MG]  Last Written Prescription Date:  9/20/2019  Last Fill Quantity: 90,  # refills: 1   Last office visit: 1/17/2020 with prescribing provider:     Future Office Visit:   Next 5 appointments (look out 90 days)    Apr 17, 2020  1:40 PM CDT  SHORT with Vinnie Brown MD  [facility] ([facility]) [address] [place] [address] [phone]   May 01, 2020  2:15 PM CDT  Return Visit with Promise Aranda NP  [facility] ([facility]) [address]  [address]  [facility] [address] [phone]        30 tablet 0     Sig: Take 1 tablet (40 mg) by mouth At Bedtime       Statins Protocol Passed - 3/25/2020  1:08 PM        Passed - LDL on file in past 12 months     Recent Labs   Lab Test 09/20/19  1123   *             Passed - No abnormal creatine kinase in past 12 months     No lab results found.             Passed - Recent (12 mo) or future (30 days) visit within the authorizing provider's specialty     Patient has had an office visit with the authorizing provider or a provider within the authorizing providers department within the previous 12 mos or has a future within next 30 days. See \"Patient Info\" tab in inbasket, or \"Choose Columns\" in Meds & Orders section of the refill encounter.              Passed - Medication is active on med list        Passed - Patient is age 18 or older           amLODIPine (NORVASC) 10 MG tablet [Pharmacy Med Name: amLODIPine Besylate Oral Tablet 10 MG]  Last Written Prescription Date:  3/15/2019  Last Fill Quantity: 90,  # refills: 3   Last office visit: 1/17/2020 with prescribing provider:     Future Office Visit:   Next 5 appointments (look out 90 days)    Apr 17, 2020  1:40 PM CDT  SHORT with Vinnie Brown MD  [facility] " "[place] ([facility]) [facility] [address] [facility] [address]  [phone]   May 01, 2020  2:15 PM CDT  Return Visit with Promise Aranda NP  [facility] ([facility]) [address]  [facility] [address] [phone]        30 tablet 2     Sig: Take 1 tablet (10 mg) by mouth daily       Calcium Channel Blockers Protocol  Failed - 3/25/2020  1:08 PM        Failed - Normal serum creatinine on file in past 12 months     Recent Labs   Lab Test 01/17/20  1410   CR 2.56*       Ok to refill medication if creatinine is low          Passed - Blood pressure under 140/90 in past 12 months     BP Readings from Last 3 Encounters:   01/17/20 120/72   12/20/19 (!) 132/90   11/08/19 (!) 138/92                 Passed - Recent (12 mo) or future (30 days) visit within the authorizing provider's specialty     Patient has had an office visit with the authorizing provider or a provider within the authorizing providers department within the previous 12 mos or has a future within next 30 days. See \"Patient Info\" tab in inbasket, or \"Choose Columns\" in Meds & Orders section of the refill encounter.              Passed - Medication is active on med list        Passed - Patient is age 18 or older           tolterodine (DETROL) 1 MG tablet [Pharmacy Med Name: Tolterodine Tartrate Oral Tablet 1 MG]  Last Written Prescription Date:  3/15/2019  Last Fill Quantity: 90,  # refills: 3   Last office visit: 1/17/2020 with prescribing provider:     Future Office Visit:   Next 5 appointments (look out 90 days)    Apr 17, 2020  1:40 PM CDT  SHORT with Vinnie Brown MD  [facility] ([facility]) [address] [place], [place] [address] [address] [phone]   May 01, 2020  2:15 PM CDT  Return Visit with Promise Aranda NP  [facility] ([facility]) [address]  [place] " "[place] [address]  [phone]        30 tablet 2     Sig: Take 1 tablet (1 mg) by mouth At Bedtime       Muscarinic Antagonists (Urinary Incontinence Agents) Passed - 3/25/2020  1:08 PM        Passed - Recent (12 mo) or future (30 days) visit within the authorizing provider's specialty     Patient has had an office visit with the authorizing provider or a provider within the authorizing providers department within the previous 12 mos or has a future within next 30 days. See \"Patient Info\" tab in inbasket, or \"Choose Columns\" in Meds & Orders section of the refill encounter.              Passed - Patient does not have a diagnosis of glaucoma on the problem list     If glaucoma diagnosis is new, refer refill to physician.          Passed - Medication is active on med list        Passed - Patient is 18 years of age or older           "

## 2020-03-26 RX ORDER — QUETIAPINE FUMARATE 200 MG/1
200 TABLET, FILM COATED ORAL AT BEDTIME
Qty: 30 TABLET | Refills: 1 | Status: SHIPPED | OUTPATIENT
Start: 2020-03-26 | End: 2020-05-01

## 2020-03-26 RX ORDER — BUPROPION HYDROCHLORIDE 100 MG/1
TABLET, EXTENDED RELEASE ORAL
Qty: 30 TABLET | Refills: 1 | Status: SHIPPED | OUTPATIENT
Start: 2020-03-26 | End: 2020-05-01

## 2020-03-26 RX ORDER — MIRTAZAPINE 45 MG/1
TABLET, FILM COATED ORAL
Qty: 30 TABLET | Refills: 1 | Status: SHIPPED | OUTPATIENT
Start: 2020-03-26 | End: 2020-05-01

## 2020-04-17 ENCOUNTER — VIRTUAL VISIT (OUTPATIENT)
Dept: INTERNAL MEDICINE | Facility: CLINIC | Age: 60
End: 2020-04-17
Payer: COMMERCIAL

## 2020-04-17 DIAGNOSIS — N18.4 CKD (CHRONIC KIDNEY DISEASE) STAGE 4, GFR 15-29 ML/MIN (H): Primary | ICD-10-CM

## 2020-04-17 DIAGNOSIS — F41.1 GAD (GENERALIZED ANXIETY DISORDER): ICD-10-CM

## 2020-04-17 DIAGNOSIS — I10 HTN, GOAL BELOW 140/90: ICD-10-CM

## 2020-04-17 PROCEDURE — 99442 ZZC PHYSICIAN TELEPHONE EVALUATION 11-20 MIN: CPT | Performed by: INTERNAL MEDICINE

## 2020-04-17 RX ORDER — CARVEDILOL 6.25 MG/1
TABLET ORAL
Qty: 180 TABLET | Refills: 3 | Status: SHIPPED | OUTPATIENT
Start: 2020-04-17 | End: 2021-02-17

## 2020-04-17 ASSESSMENT — ANXIETY QUESTIONNAIRES
1. FEELING NERVOUS, ANXIOUS, OR ON EDGE: MORE THAN HALF THE DAYS
5. BEING SO RESTLESS THAT IT IS HARD TO SIT STILL: NEARLY EVERY DAY
7. FEELING AFRAID AS IF SOMETHING AWFUL MIGHT HAPPEN: MORE THAN HALF THE DAYS
3. WORRYING TOO MUCH ABOUT DIFFERENT THINGS: NEARLY EVERY DAY
2. NOT BEING ABLE TO STOP OR CONTROL WORRYING: MORE THAN HALF THE DAYS

## 2020-04-17 ASSESSMENT — PATIENT HEALTH QUESTIONNAIRE - PHQ9: 5. POOR APPETITE OR OVEREATING: MORE THAN HALF THE DAYS

## 2020-04-17 NOTE — PROGRESS NOTES
"Gunner Velázquez is a 59 year old male who is being evaluated via a billable telephone visit.      The patient has been notified of following:     \"This telephone visit will be conducted via a call between you and your physician/provider. We have found that certain health care needs can be provided without the need for a physical exam.  This service lets us provide the care you need with a short phone conversation.  If a prescription is necessary we can send it directly to your pharmacy.  If lab work is needed we can place an order for that and you can then stop by our lab to have the test done at a later time.    Telephone visits are billed at different rates depending on your insurance coverage. During this emergency period, for some insurers they may be billed the same as an in-person visit.  Please reach out to your insurance provider with any questions.    If during the course of the call the physician/provider feels a telephone visit is not appropriate, you will not be charged for this service.\"    Patient has given verbal consent for Telephone visit?  Yes    Subjective     Gunner Velázquez is a 59 year old male who presents to clinic today for the following health issues:    Interpretor services used over the phone  No acute complaints, no medication change or new medical conditions.    Hyperlipidemia Follow-Up      Are you regularly taking any medication or supplement to lower your cholesterol?   Yes- Simvastatin    Are you having muscle aches or other side effects that you think could be caused by your cholesterol lowering medication?  No    Hypertension Follow-up  Has h/o HTN. on medical treatment. BP has not been controlled. No side effects from medications. No CP, HA, dizziness. good compliance with medications and low salt diet.      Do you check your blood pressure regularly outside of the clinic? Yes     Are you following a low salt diet? Yes    Are your blood pressures ever more than 140 on the top " number (systolic) OR more   than 90 on the bottom number (diastolic), for example 140/90? Yes, occasionally      How many servings of fruits and vegetables do you eat daily?  0-1    On average, how many sweetened beverages do you drink each day (Examples: soda, juice, sweet tea, etc.  Do NOT count diet or artificially sweetened beverages)?   0    How many days per week do you exercise enough to make your heart beat faster? minimal    How many minutes a day do you exercise enough to make your heart beat faster? N/A    How many days per week do you miss taking your medication? 0    Has h/o anxiety. Follows with psychiatry. On medications.          Patient Active Problem List   Diagnosis     Hyperlipidemia with target LDL less than 130     HTN, goal below 140/90     CKD (chronic kidney disease) stage 4, GFR 15-29 ml/min (H)     MOE (generalized anxiety disorder)     GERD (gastroesophageal reflux disease)     Dizziness     Seasonal allergies     High triglycerides     Severe depression (H)     PTSD (post-traumatic stress disorder)     History reviewed. No pertinent surgical history.    Social History     Tobacco Use     Smoking status: Former Smoker     Packs/day: 0.00     Smokeless tobacco: Never Used   Substance Use Topics     Alcohol use: No     Alcohol/week: 0.0 standard drinks     Family History   Problem Relation Age of Onset     Heart Disease Father 70        tb     Family History Negative No family hx of          Current Outpatient Medications   Medication Sig Dispense Refill     amLODIPine (NORVASC) 10 MG tablet Take 1 tablet (10 mg) by mouth daily 90 tablet 0     buPROPion (WELLBUTRIN SR) 100 MG 12 hr tablet TAKE ONE TABLET BY MOUTH IN THE MORNING  30 tablet 1     carvedilol (COREG) 6.25 MG tablet Take 2 tablets in am and one in the pm 180 tablet 3     cholecalciferol (VITAMIN D3) 5000 units (125 mcg) capsule TAKE 1 CAPSULE BY MOUTH DAILY. 30 capsule 3     fexofenadine (ALLEGRA) 180 MG tablet Take 1 tablet  (180 mg) by mouth daily 90 tablet 3     fish oil-omega-3 fatty acids 1000 MG capsule Take 1 capsule (1 g) by mouth 2 times daily 180 capsule 3     meclizine (ANTIVERT) 12.5 MG tablet Take 1-2 tablets (12.5-25 mg) by mouth 4 times daily as needed for dizziness 30 tablet 2     mirtazapine (REMERON) 45 MG tablet TAKE ONE TABLET BY MOUTH AT BEDTIME  30 tablet 1     prazosin (MINIPRESS) 1 MG capsule Take 2 capsule in AM and 2 capsules in PM. 360 capsule 3     QUEtiapine (SEROQUEL) 200 MG tablet Take 1 tablet (200 mg) by mouth At Bedtime 30 tablet 1     simvastatin (ZOCOR) 40 MG tablet Take 1 tablet (40 mg) by mouth At Bedtime 90 tablet 0     tolterodine (DETROL) 1 MG tablet Take 1 tablet (1 mg) by mouth At Bedtime 90 tablet 0     ACE/ARB/ARNI NOT PRESCRIBED, INTENTIONAL, Please choose reason not prescribed, below         Reviewed and updated as needed this visit by Provider         Review of Systems   ROS COMP: Constitutional, HEENT, cardiovascular, pulmonary, gi and gu systems are negative, except as otherwise noted.       Objective   Normal speech   Remainder of exam unable to be completed due to telephone visits    Diagnostic Test Results:  Labs reviewed in Epic        Assessment/Plan:  1. HTN, goal below 140/90  Increased Coreg to 12.5 in am 6.25 mg in pm, monitor BP   - carvedilol (COREG) 6.25 MG tablet; Take 2 tablets in am and one in the pm  Dispense: 180 tablet; Refill: 3    2. CKD (chronic kidney disease) stage 4, GFR 15-29 ml/min (H)  Follow up with nephrology     3. MOE (generalized anxiety disorder)  Follow up with psychiatry       No follow-ups on file.      Phone call duration:  12 minutes    Vinnie Brown MD

## 2020-04-24 DIAGNOSIS — R42 DIZZINESS: ICD-10-CM

## 2020-04-24 RX ORDER — MECLIZINE HCL 12.5 MG 12.5 MG/1
12.5-25 TABLET ORAL 4 TIMES DAILY PRN
Qty: 30 TABLET | Refills: 0 | Status: SHIPPED | OUTPATIENT
Start: 2020-04-24 | End: 2020-05-27

## 2020-05-01 ENCOUNTER — VIRTUAL VISIT (OUTPATIENT)
Dept: PSYCHIATRY | Facility: CLINIC | Age: 60
End: 2020-05-01
Payer: COMMERCIAL

## 2020-05-01 DIAGNOSIS — F43.10 PTSD (POST-TRAUMATIC STRESS DISORDER): ICD-10-CM

## 2020-05-01 DIAGNOSIS — F32.2 SEVERE DEPRESSION (H): ICD-10-CM

## 2020-05-01 PROCEDURE — 99214 OFFICE O/P EST MOD 30 MIN: CPT | Mod: 95 | Performed by: NURSE PRACTITIONER

## 2020-05-01 RX ORDER — QUETIAPINE FUMARATE 200 MG/1
200 TABLET, FILM COATED ORAL AT BEDTIME
Qty: 90 TABLET | Refills: 0 | Status: SHIPPED | OUTPATIENT
Start: 2020-05-01 | End: 2020-09-01

## 2020-05-01 RX ORDER — MIRTAZAPINE 45 MG/1
TABLET, FILM COATED ORAL
Qty: 90 TABLET | Refills: 0 | Status: SHIPPED | OUTPATIENT
Start: 2020-05-01 | End: 2020-09-01

## 2020-05-01 RX ORDER — BUPROPION HYDROCHLORIDE 100 MG/1
TABLET, EXTENDED RELEASE ORAL
Qty: 90 TABLET | Refills: 0 | Status: SHIPPED | OUTPATIENT
Start: 2020-05-01 | End: 2020-09-01

## 2020-05-01 ASSESSMENT — ANXIETY QUESTIONNAIRES
3. WORRYING TOO MUCH ABOUT DIFFERENT THINGS: MORE THAN HALF THE DAYS
2. NOT BEING ABLE TO STOP OR CONTROL WORRYING: MORE THAN HALF THE DAYS
6. BECOMING EASILY ANNOYED OR IRRITABLE: MORE THAN HALF THE DAYS
1. FEELING NERVOUS, ANXIOUS, OR ON EDGE: SEVERAL DAYS
5. BEING SO RESTLESS THAT IT IS HARD TO SIT STILL: NEARLY EVERY DAY
GAD7 TOTAL SCORE: 15
7. FEELING AFRAID AS IF SOMETHING AWFUL MIGHT HAPPEN: MORE THAN HALF THE DAYS
IF YOU CHECKED OFF ANY PROBLEMS ON THIS QUESTIONNAIRE, HOW DIFFICULT HAVE THESE PROBLEMS MADE IT FOR YOU TO DO YOUR WORK, TAKE CARE OF THINGS AT HOME, OR GET ALONG WITH OTHER PEOPLE: SOMEWHAT DIFFICULT

## 2020-05-01 ASSESSMENT — PATIENT HEALTH QUESTIONNAIRE - PHQ9
SUM OF ALL RESPONSES TO PHQ QUESTIONS 1-9: 18
5. POOR APPETITE OR OVEREATING: NEARLY EVERY DAY

## 2020-05-01 NOTE — PROGRESS NOTES
"Gunner Velázquez is a 59 year old male who is being evaluated via a billable telephone visit.      The patient has been notified of following:     \"This telephone visit will be conducted via a call between you and your physician/provider. We have found that certain health care needs can be provided without the need for a physical exam.  This service lets us provide the care you need with a short phone conversation.  If a prescription is necessary we can send it directly to your pharmacy.  If lab work is needed we can place an order for that and you can then stop by our lab to have the test done at a later time.    If during the course of the call the physician/provider feels a telephone visit is not appropriate, you will not be charged for this service.\"     Patient has given verbal consent for Telephone visit?  Yes    Gunner Velázquez complains of  Patient presents with:  Telephone Visit: return patient    I have reviewed and updated the patient's Past Medical History, Social History, Family History, Allergies, and Medication List.    Primary Care Provider: Vinnie Brown MD - last visit 4/17/2020  Therapist: Corrina Gallardo - last visit 1/24/2020    Client identified their preferred language to be Vietnamese.  Client needs the assistance of an .  [id number]    PHQ 12/20/2019 1/24/2020 5/1/2020   PHQ-9 Total Score 20 18 18   Q9: Thoughts of better off dead/self-harm past 2 weeks Several days Several days Several days   F/U: Thoughts of suicide or self-harm No - -   F/U: Self harm-plan - - -   F/U: Self-harm action - - -   F/U: Safety concerns - - -       MOE-7 SCORE 12/20/2019 1/24/2020 5/1/2020   Total Score - - -   Total Score - - -   Total Score 12 11 15       I last saw Gunner Velázquez for outpatient psychiatry Return Visit on 12/20/2019.     During that appointment, we Continue Seroquel (quetiapine) 200 mg by mouth daily at bedtime for sleep, anxiety, and Post-traumatic stress " "disorder (PTSD).     Continue Wellbutrin (buproprion)  mg by mouth daily in AM for depression.     Continue Minipress (prazosin) 2 mg daily in AM and 2 mg daily in PM for Post-traumatic stress disorder (PTSD) and nightmares.    Continue Remeron (mirtazapine) 45 mg by mouth daily at bedtime for sleep, mood, anxiety.     Current medications include:   Current Outpatient Medications   Medication Sig     ACE/ARB/ARNI NOT PRESCRIBED, INTENTIONAL, Please choose reason not prescribed, below     amLODIPine (NORVASC) 10 MG tablet Take 1 tablet (10 mg) by mouth daily     buPROPion (WELLBUTRIN SR) 100 MG 12 hr tablet TAKE ONE TABLET BY MOUTH IN THE MORNING      carvedilol (COREG) 6.25 MG tablet Take 2 tablets in am and one in the pm     cholecalciferol (VITAMIN D3) 5000 units (125 mcg) capsule TAKE 1 CAPSULE BY MOUTH DAILY.     fexofenadine (ALLEGRA) 180 MG tablet Take 1 tablet (180 mg) by mouth daily     fish oil-omega-3 fatty acids 1000 MG capsule Take 1 capsule (1 g) by mouth 2 times daily     meclizine (ANTIVERT) 12.5 MG tablet Take 1-2 tablets (12.5-25 mg) by mouth 4 times daily as needed for dizziness     mirtazapine (REMERON) 45 MG tablet TAKE ONE TABLET BY MOUTH AT BEDTIME      prazosin (MINIPRESS) 1 MG capsule Take 2 capsule in AM and 2 capsules in PM.     QUEtiapine (SEROQUEL) 200 MG tablet Take 1 tablet (200 mg) by mouth At Bedtime     simvastatin (ZOCOR) 40 MG tablet Take 1 tablet (40 mg) by mouth At Bedtime     tolterodine (DETROL) 1 MG tablet Take 1 tablet (1 mg) by mouth At Bedtime     No current facility-administered medications for this visit.        The [place] Prescription Monitoring Program has been reviewed and there are no concerns about diversionary activity for controlled substances at this time.      I was able to review most recent Primary Care Provider, specialty provider, and therapy visit notes that I have access to.     Gunner Velázquez reports mood has been: \"up and down, but mostly up\" no " "hypomania or roxanne. Family think he has been \"better than before.\"  Patient notes that his blood pressure is still high and Primary Care Provider increased his blood pressure medication about 2 weeks ago. Denies side effects.   Anxiety has been: worrying about COVID19 and his health. Has been staying home and trying to stay healthy.   Sleep has been: \"some days I can sleep well, but some days I am not able to sleep and go to sleep late\" He thinks this is related to his sleep schedule. Nightmares have improved overall. Having nightmares on average about once per week.   PHQ9 and GAD7 scores were reviewed today.   Medication side effects: Denies    Previous medication trials include but not limited to:  Buspar (buspirone) 30 mg BID  Paxil (paroxetine) 30 mg   Desyrel/Olepto (trazodone) 50 -150 mg   Celexa (citalopram)   Zoloft (sertraline)   Remeron (mirtazapine)  Minipress (prazosin)   Ativan (lorazepam)   Seroquel (quetiapine)   Wellbutrin (buproprion)     Past Medical History:   Diagnosis Date     Anxiety      CKD (chronic kidney disease)      Depression      Dizziness      Dyslipidemia      HTN (hypertension)      Hyperlipidaemia      Shortness of breath       has a past medical history of Anxiety, CKD (chronic kidney disease), Depression, Dizziness, Dyslipidemia, HTN (hypertension), Hyperlipidaemia, and Shortness of breath.    Social History:  Current Living situation:  [place], [place] with Family- 2 daughters, 1 son, wife. Feels safe at home.  Current use of drugs or alcohol: Denies - history of alcohol last used 10 years ago. Denies other drug use. No cravings.   Tobacco use: History of cigarette use- over 10 years ago   Caffeine: No    Vital Signs:   There were no vitals taken for this visit.    Labs:  Office Visit on 01/17/2020   Component Date Value Ref Range Status     Sodium 01/17/2020 139  133 - 144 mmol/L Final     Potassium 01/17/2020 4.0  3.4 - 5.3 mmol/L Final     Chloride 01/17/2020 108  94 - 109 mmol/L " "Final     Carbon Dioxide 01/17/2020 24  20 - 32 mmol/L Final     Anion Gap 01/17/2020 7  3 - 14 mmol/L Final     Glucose 01/17/2020 103* 70 - 99 mg/dL Final    Non Fasting     Urea Nitrogen 01/17/2020 39* 7 - 30 mg/dL Final     Creatinine 01/17/2020 2.56* 0.66 - 1.25 mg/dL Final     GFR Estimate 01/17/2020 26* >60 mL/min/[1.73_m2] Final    Comment: Non  GFR Calc  Starting 12/18/2018, serum creatinine based estimated GFR (eGFR) will be   calculated using the Chronic Kidney Disease Epidemiology Collaboration   (CKD-EPI) equation.       GFR Estimate If Black 01/17/2020 30* >60 mL/min/[1.73_m2] Final    Comment:  GFR Calc  Starting 12/18/2018, serum creatinine based estimated GFR (eGFR) will be   calculated using the Chronic Kidney Disease Epidemiology Collaboration   (CKD-EPI) equation.       Calcium 01/17/2020 9.1  8.5 - 10.1 mg/dL Final     Creatinine Urine 01/17/2020 33  mg/dL Final     Albumin Urine mg/L 01/17/2020 224  mg/L Final     Albumin Urine mg/g Cr 01/17/2020 680.85* 0 - 17 mg/g Cr Final       Review of Systems:  10 systems (general, cardiovascular, respiratory, eyes, ENT, endocrine, GI, , M/S, neurological) were reviewed. Most pertinent finding(s) is/are: headaches about 2 times per week alleviated by home remedies and rest, no falls, still feeling drowsy during the day related to when takes medications in the morning, no blood in urine  but seeing more bubbles in urine,  no bleeding or bruising, bilateral peripheral edema in legs is less, dry mouth has been the same, itching the same, frequent urination. The remaining systems are all unremarkable.    Mental Status Examination:  Attitude:  cooperative   Oriented to:  time, person, and place  Attention Span and Concentration:  Normal- notes some difficulty  Speech:   regular rate and regular rhythm, speaks Yoruba, can understand some  Language: intact  Mood:  \"up and down but mostly up\"  Associations:  no loose " associations  Thought Process:  logical, linear and goal oriented  Thought Content:  no evidence of suicidal ideation or homicidal ideation and no evidence of psychotic thought  Recent and Remote Memory:  Notes some difficulty at times. Not formally assessed. No amnesia.  Fund of Knowledge: appropriate  Insight:  good  Judgment:  intact  Impulse Control:  intact    Suicide Risk Assessment:  Today Gunner Velázquez reports history of depression. In addition, there are notable risk factors for self-harm, includingage, anxiety, comorbid medical condition of kidney disease, anger, and withdrawing. However, risk is mitigated by commitment to family, sobriety, absence of past attempts, ability to volunteer a safety plan, history of seeking help when needed, future oriented, no access to firearms or weapons, denies suicidal intent or plan, no family history of suicide, denies homicidal ideation, intent, or plans, gets hope from family and reports he would be able to reach out to them or his doctor if needed in the future if he had thoughts he would harm himself. Therefore, based on all available evidence including the factors cited above, Gunner Velázquez does not appear to be at imminent risk for self-harm, does not meet criteria for a 72-hr hold, and therefore remains appropriate for ongoing outpatient level of care.  A thorough assessment of risk factors related to suicide and self-harm have been reviewed and are noted above. Local community safety resources printed and reviewed for patient to use if needed.     DSM5 Diagnosis:  296.33 (F33.2) Major Depressive Disorder, Recurrent Episode, Severe without psychosis- moderate to severe severity  309.81 (F43.10) Posttraumatic Stress Disorder Without dissociative symptoms  300.02 (F41.1) Generalized Anxiety Disorder      Medical comorbidities include:   Patient Active Problem List    Diagnosis Date Noted     PTSD (post-traumatic stress disorder) 05/03/2018     Priority: Medium      Severe depression (H) 05/31/2016     Priority: Medium     High triglycerides 08/11/2015     Priority: Medium     Seasonal allergies 05/04/2015     Priority: Medium     MOE (generalized anxiety disorder) 08/01/2014     Priority: Medium     GERD (gastroesophageal reflux disease) 08/01/2014     Priority: Medium     Dizziness 08/01/2014     Priority: Medium     Thought secondary to anxiety.  Workup with cardiology, ENT, audiology, [facility], physical therapy, MRI, MRA all negative       Hyperlipidemia with target LDL less than 130 02/21/2014     Priority: Medium     HTN, goal below 140/90 02/21/2014     Priority: Medium     CKD (chronic kidney disease) stage 4, GFR 15-29 ml/min (H) 02/21/2014     Priority: Medium     Nephrologist Dr. Arenas at [facility]           Assessment:  Gunner Velázquez reports stable symptoms and hopes anxiety will be less after COVID19.   Medication side effects and alternatives were reviewed. Health promotion activities recommended and reviewed today. All questions addressed. Education and counseling completed regarding risks and benefits of medications and psychotherapy options.   Due to COVID19, not able to get needed labs for monitoring with current medications such as fasting lipids and glucose for antipsychotic use. Will draw labs when able.   Reviewed today that I will be leaving [facility] as of June 30, 2020. We reviewed the Collaborative Care Psychiatry Service. We discussed other options for care. Patient was thanked for our work together.     Treatment Plan:    Continue Seroquel (quetiapine) 200 mg by mouth daily at bedtime for sleep, anxiety, and Post-traumatic stress disorder (PTSD).     Continue Wellbutrin (buproprion)  mg by mouth daily in AM for depression.     Continue Minipress (prazosin) 2 mg daily in AM and 2 mg daily in PM for Post-traumatic stress disorder (PTSD) and nightmares.    Continue Remeron (mirtazapine) 45 mg by mouth daily  at bedtime for sleep, mood, anxiety.     Continue all other medications as reviewed per electronic medical record today.     Safety plan reviewed. To the Emergency Department as needed or call after hours crisis line at [phone] or [phone].     Continue individual therapy as planned with Corrina.  Thank you for our work together in the Psychiatry Collaborative Care Model at [facility]. This is our last visit and I am returning your care back to your Primary Care Provider Vinnie Brown MD . If you are not doing well, please contact your Primary Care Provider office.     Follow up with primary care provider as planned or for acute medical concerns.    Call the psychiatric nurse line with medication questions or concerns at [phone].    Administrative Billing:   Phone call duration: 24 minutes with  and patient  Start 2:22 PM   End 2:46 PM      Patient Status:  The patient is being returned to the referring provider for ongoing care and medication prescribing.  The patient can be referred back to this service for further consultation as needed.    Signed:  Promise Aranda, PhD, APRN, CNP  Psychiatric Mental Health Nurse Practitioner  [facility]

## 2020-05-01 NOTE — PATIENT INSTRUCTIONS
Treatment Plan:    Continue Seroquel (quetiapine) 200 mg by mouth daily at bedtime for sleep, anxiety, and Post-traumatic stress disorder (PTSD).     Continue Wellbutrin (buproprion)  mg by mouth daily in AM for depression.     Continue Minipress (prazosin) 2 mg daily in AM and 2 mg daily in PM for Post-traumatic stress disorder (PTSD) and nightmares.    Continue Remeron (mirtazapine) 45 mg by mouth daily at bedtime for sleep, mood, anxiety.     Continue all other medications as reviewed per electronic medical record today.     Safety plan reviewed. To the Emergency Department as needed or call after hours crisis line at [phone] or [phone].     Continue individual therapy as planned with Corrina.  Thank you for our work together in the Psychiatry Collaborative Care Model at [facility]. This is our last visit and I am returning your care back to your Primary Care Provider Vinnie Brown MD . If you are not doing well, please contact your Primary Care Provider office.     Follow up with primary care provider as planned or for acute medical concerns.    Call the psychiatric nurse line with medication questions or concerns at [phone].

## 2020-05-02 ASSESSMENT — ANXIETY QUESTIONNAIRES: GAD7 TOTAL SCORE: 15

## 2020-05-25 DIAGNOSIS — F43.10 PTSD (POST-TRAUMATIC STRESS DISORDER): ICD-10-CM

## 2020-05-25 DIAGNOSIS — F32.1 MAJOR DEPRESSIVE DISORDER, SINGLE EPISODE, MODERATE (H): ICD-10-CM

## 2020-05-25 DIAGNOSIS — R42 DIZZINESS: ICD-10-CM

## 2020-05-27 RX ORDER — CHOLECALCIFEROL (VITAMIN D3) 125 MCG
CAPSULE ORAL
Qty: 90 CAPSULE | Refills: 1 | Status: SHIPPED | OUTPATIENT
Start: 2020-05-27 | End: 2020-12-08

## 2020-05-27 RX ORDER — MECLIZINE HCL 12.5 MG 12.5 MG/1
12.5-25 TABLET ORAL 4 TIMES DAILY PRN
Qty: 30 TABLET | Refills: 1 | Status: SHIPPED | OUTPATIENT
Start: 2020-05-27 | End: 2020-07-27

## 2020-05-27 RX ORDER — PRAZOSIN HYDROCHLORIDE 1 MG/1
CAPSULE ORAL
Qty: 360 CAPSULE | Refills: 1 | Status: SHIPPED | OUTPATIENT
Start: 2020-05-27 | End: 2020-12-08

## 2020-05-27 NOTE — TELEPHONE ENCOUNTER
"Prazosin and Vitamin D3 refills.     Last OV on 20  Provider approval needed. Creatinine elevated and Prazosin is . Last refilled 19 for one year.     Requested Prescriptions   Pending Prescriptions Disp Refills     Cholecalciferol (VITAMIN D) 125 MCG (5000 UT) CAPS [Pharmacy Med Name: Vitamin D Oral Capsule 125 MCG (5000 UT)] 30 capsule 0     Sig: TAKE 1 CAPSULE BY MOUTH DAILY.       Vitamin Supplements (Adult) Protocol Passed - 2020  1:13 PM        Passed - High dose Vitamin D not ordered        Passed - Recent (12 mo) or future (30 days) visit within the authorizing provider's specialty     Patient has had an office visit with the authorizing provider or a provider within the authorizing providers department within the previous 12 mos or has a future within next 30 days. See \"Patient Info\" tab in inbasket, or \"Choose Columns\" in Meds & Orders section of the refill encounter.              Passed - Medication is active on med list           prazosin (MINIPRESS) 1 MG capsule [Pharmacy Med Name: Prazosin HCl Oral Capsule 1 MG] 360 capsule 0     Sig: Take 2 capsule in AM and 2 capsules in PM.       Antiadrenergic Antihypertensives Failed - 2020  1:13 PM        Failed - Normal serum creatinine on file in past 12 months     Recent Labs   Lab Test 20  1410   CR 2.56*       Ok to refill medication if creatinine is low          Failed - Recent (6 mo) or future (30 days) visit within the authorizing provider's specialty     Patient had office visit in the last 6 months or has a visit in the next 30 days with authorizing provider or within the authorizing provider's specialty.  See \"Patient Info\" tab in inbasket, or \"Choose Columns\" in Meds & Orders section of the refill encounter.            Passed - Blood pressure less than 140/90 in past 6 months     BP Readings from Last 3 Encounters:   20 120/72   19 (!) 132/90   19 (!) 138/92                 Passed - Medication is " "active on med list        Passed - Patient is age 18 or older       Alpha Blockers Passed - 5/25/2020  1:13 PM        Passed - Blood pressure under 140/90 in past 12 months     BP Readings from Last 3 Encounters:   01/17/20 120/72   12/20/19 (!) 132/90   11/08/19 (!) 138/92                 Passed - Recent (12 mo) or future (30 days) visit within the authorizing provider's specialty     Patient has had an office visit with the authorizing provider or a provider within the authorizing providers department within the previous 12 mos or has a future within next 30 days. See \"Patient Info\" tab in inbasket, or \"Choose Columns\" in Meds & Orders section of the refill encounter.              Passed - Patient does not have Tadalafil, Vardenafil, or Sildenafil on their medication list        Passed - Medication is active on med list        Passed - Patient is 18 years of age or older             "

## 2020-06-25 DIAGNOSIS — I10 HTN, GOAL BELOW 140/90: ICD-10-CM

## 2020-06-25 DIAGNOSIS — N32.81 OVERACTIVE BLADDER: ICD-10-CM

## 2020-06-25 DIAGNOSIS — E78.5 HYPERLIPIDEMIA WITH TARGET LDL LESS THAN 130: ICD-10-CM

## 2020-06-25 RX ORDER — SIMVASTATIN 40 MG
40 TABLET ORAL AT BEDTIME
Qty: 90 TABLET | Refills: 0 | Status: SHIPPED | OUTPATIENT
Start: 2020-06-25 | End: 2020-08-10

## 2020-06-25 RX ORDER — TOLTERODINE TARTRATE 1 MG/1
1 TABLET, EXTENDED RELEASE ORAL AT BEDTIME
Qty: 90 TABLET | Refills: 2 | Status: SHIPPED | OUTPATIENT
Start: 2020-06-25 | End: 2021-02-17

## 2020-06-25 NOTE — TELEPHONE ENCOUNTER
"Last office visit 4/17/20.    Requested Prescriptions   Pending Prescriptions Disp Refills     tolterodine (DETROL) 1 MG tablet 90 tablet 0     Sig: Take 1 tablet (1 mg) by mouth At Bedtime       Muscarinic Antagonists (Urinary Incontinence Agents) Passed - 6/25/2020  2:50 PM        Passed - Recent (12 mo) or future (30 days) visit within the authorizing provider's specialty     Patient has had an office visit with the authorizing provider or a provider within the authorizing providers department within the previous 12 mos or has a future within next 30 days. See \"Patient Info\" tab in inbasket, or \"Choose Columns\" in Meds & Orders section of the refill encounter.              Passed - Patient does not have a diagnosis of glaucoma on the problem list     If glaucoma diagnosis is new, refer refill to physician.          Passed - Medication is active on med list        Passed - Patient is 18 years of age or older           Prescription approved per [facility] Refill Protocol.  Tierney James RN    "

## 2020-06-25 NOTE — TELEPHONE ENCOUNTER
"Last office visit 4/17/20.    Requested Prescriptions   Pending Prescriptions Disp Refills     simvastatin (ZOCOR) 40 MG tablet 90 tablet 0     Sig: Take 1 tablet (40 mg) by mouth At Bedtime       Statins Protocol Passed - 6/25/2020  2:47 PM        Passed - LDL on file in past 12 months     Recent Labs   Lab Test 09/20/19  1123   *             Passed - No abnormal creatine kinase in past 12 months     No lab results found.             Passed - Recent (12 mo) or future (30 days) visit within the authorizing provider's specialty     Patient has had an office visit with the authorizing provider or a provider within the authorizing providers department within the previous 12 mos or has a future within next 30 days. See \"Patient Info\" tab in inbasket, or \"Choose Columns\" in Meds & Orders section of the refill encounter.              Passed - Medication is active on med list        Passed - Patient is age 18 or older           Prescription approved per [facility] Refill Protocol.  Tierney James RN    "

## 2020-06-26 RX ORDER — AMLODIPINE BESYLATE 10 MG/1
10 TABLET ORAL DAILY
Qty: 90 TABLET | Refills: 0 | Status: SHIPPED | OUTPATIENT
Start: 2020-06-26 | End: 2020-08-10

## 2020-06-26 NOTE — TELEPHONE ENCOUNTER
Pending Prescriptions:                       Disp   Refills    amLODIPine (NORVASC) 10 MG tablet          90 tab*0        Sig: Take 1 tablet (10 mg) by mouth daily    Routing refill request to provider for review/approval because:  Patient fails protocol

## 2020-07-24 DIAGNOSIS — R42 DIZZINESS: ICD-10-CM

## 2020-07-27 RX ORDER — MECLIZINE HCL 12.5 MG 12.5 MG/1
12.5-25 TABLET ORAL 4 TIMES DAILY PRN
Qty: 30 TABLET | Refills: 1 | Status: SHIPPED | OUTPATIENT
Start: 2020-07-27 | End: 2020-11-03

## 2020-07-27 NOTE — TELEPHONE ENCOUNTER
Pending Prescriptions:                       Disp   Refills    meclizine (ANTIVERT) 12.5 MG tablet [Phar*30 tab*1            Sig: TAKE 1-2 TABLETS (12.5-25 MG) BY MOUTH 4 TIMES           DAILY AS NEEDED FOR DIZZINESS    Prescription approved per [facility] Refill Protocol.

## 2020-08-09 DIAGNOSIS — E78.5 HYPERLIPIDEMIA WITH TARGET LDL LESS THAN 130: ICD-10-CM

## 2020-08-09 DIAGNOSIS — I10 HTN, GOAL BELOW 140/90: ICD-10-CM

## 2020-08-10 RX ORDER — AMLODIPINE BESYLATE 10 MG/1
10 TABLET ORAL DAILY
Qty: 90 TABLET | Refills: 0 | Status: SHIPPED | OUTPATIENT
Start: 2020-08-10 | End: 2020-09-01

## 2020-08-10 RX ORDER — SIMVASTATIN 40 MG
40 TABLET ORAL AT BEDTIME
Qty: 90 TABLET | Refills: 0 | Status: SHIPPED | OUTPATIENT
Start: 2020-08-10 | End: 2020-09-01

## 2020-08-10 NOTE — TELEPHONE ENCOUNTER
Pt has upcoming appt on 9/1/20     Drug Drug interaction.   Provider approval needed.   Recent Labs   Lab Test 09/20/19  1123 01/29/18  1420  01/29/15  0942   CHOL 225* 224*   < > 211*   HDL 71 79   < > 62   * 114*   < > 126   TRIG 102 155*   < > 117   CHOLHDLRATIO  --   --   --  3.4    < > = values in this interval not displayed.

## 2020-09-01 ENCOUNTER — OFFICE VISIT (OUTPATIENT)
Dept: INTERNAL MEDICINE | Facility: CLINIC | Age: 60
End: 2020-09-01
Payer: COMMERCIAL

## 2020-09-01 VITALS — WEIGHT: 160 LBS | RESPIRATION RATE: 16 BRPM | BODY MASS INDEX: 25.82 KG/M2 | OXYGEN SATURATION: 99 %

## 2020-09-01 DIAGNOSIS — Z12.5 SCREENING FOR PROSTATE CANCER: ICD-10-CM

## 2020-09-01 DIAGNOSIS — F43.10 PTSD (POST-TRAUMATIC STRESS DISORDER): ICD-10-CM

## 2020-09-01 DIAGNOSIS — I10 HTN, GOAL BELOW 140/90: Primary | ICD-10-CM

## 2020-09-01 DIAGNOSIS — E78.5 HYPERLIPIDEMIA WITH TARGET LDL LESS THAN 130: ICD-10-CM

## 2020-09-01 DIAGNOSIS — F32.2 SEVERE DEPRESSION (H): ICD-10-CM

## 2020-09-01 DIAGNOSIS — H10.13 ALLERGIC CONJUNCTIVITIS, BILATERAL: ICD-10-CM

## 2020-09-01 PROCEDURE — 99214 OFFICE O/P EST MOD 30 MIN: CPT | Performed by: INTERNAL MEDICINE

## 2020-09-01 RX ORDER — QUETIAPINE FUMARATE 200 MG/1
200 TABLET, FILM COATED ORAL AT BEDTIME
Qty: 90 TABLET | Refills: 0 | Status: SHIPPED | OUTPATIENT
Start: 2020-09-01 | End: 2020-12-09

## 2020-09-01 RX ORDER — BUPROPION HYDROCHLORIDE 100 MG/1
TABLET, EXTENDED RELEASE ORAL
Qty: 90 TABLET | Refills: 1 | Status: SHIPPED | OUTPATIENT
Start: 2020-09-01 | End: 2021-02-17

## 2020-09-01 RX ORDER — MIRTAZAPINE 45 MG/1
TABLET, FILM COATED ORAL
Qty: 90 TABLET | Refills: 0 | Status: SHIPPED | OUTPATIENT
Start: 2020-09-01 | End: 2020-12-09

## 2020-09-01 RX ORDER — SIMVASTATIN 40 MG
40 TABLET ORAL AT BEDTIME
Qty: 90 TABLET | Refills: 3 | Status: SHIPPED | OUTPATIENT
Start: 2020-09-01 | End: 2021-09-02

## 2020-09-01 RX ORDER — AMLODIPINE BESYLATE 10 MG/1
10 TABLET ORAL DAILY
Qty: 90 TABLET | Refills: 3 | Status: SHIPPED | OUTPATIENT
Start: 2020-09-01 | End: 2021-10-05

## 2020-09-01 RX ORDER — OLOPATADINE HYDROCHLORIDE 1 MG/ML
1 SOLUTION/ DROPS OPHTHALMIC 2 TIMES DAILY
Qty: 1 BOTTLE | Refills: 0 | Status: SHIPPED | OUTPATIENT
Start: 2020-09-01 | End: 2020-11-05

## 2020-09-01 NOTE — PROGRESS NOTES
Subjective     Gunner Velázquez is a 60 year old male who presents to clinic today for the following health issues:    HPI       Hyperlipidemia Follow-Up  Has H/O hyperlipidemia. On medical treatment and diet. No side effects. No muscle weakness, myalgias or upset stomach.       Are you regularly taking any medication or supplement to lower your cholesterol?   Yes- statin    Are you having muscle aches or other side effects that you think could be caused by your cholesterol lowering medication?  No    Hypertension Follow-up  Has h/o HTN. on medical treatment. BP has been controlled. No side effects from medications. No CP, HA, dizziness. good compliance with medications and low salt diet.      Do you check your blood pressure regularly outside of the clinic? Yes     Are you following a low salt diet? Yes    Are your blood pressures ever more than 140 on the top number (systolic) OR more   than 90 on the bottom number (diastolic), for example 140/90? No      How many servings of fruits and vegetables do you eat daily?  2-3    On average, how many sweetened beverages do you drink each day (Examples: soda, juice, sweet tea, etc.  Do NOT count diet or artificially sweetened beverages)?   0    How many days per week do you exercise enough to make your heart beat faster? 4    How many minutes a day do you exercise enough to make your heart beat faster? 20 - 29    How many days per week do you miss taking your medication? 0    Has h/o PTSD, anxiety and depression. On medical treatment, controlled, no side effects. No depressive symptoms or suicidal ideation.     Has concern for eyes itching, irritation, redness, tearing. Seasonal.     Review of Systems   Constitutional, HEENT, cardiovascular, pulmonary, gi and gu systems are negative, except as otherwise noted.      Objective    There were no vitals taken for this visit.  There is no height or weight on file to calculate BMI.  Physical Exam   GENERAL: healthy, alert and no  distress  EYES: Eyes grossly normal to inspection, PERRL and conjunctivae - injection   HENT: ear canals and TM's normal, nose and mouth without ulcers or lesions  NECK: no adenopathy, no asymmetry, masses, or scars and thyroid normal to palpation  RESP: lungs clear to auscultation - no rales, rhonchi or wheezes  CV: regular rate and rhythm, normal S1 S2, no S3 or S4, no murmur, click or rub, no peripheral edema and peripheral pulses strong  ABDOMEN: soft, nontender, no hepatosplenomegaly, no masses and bowel sounds normal  MS: no gross musculoskeletal defects noted, no edema    No results found for any visits on 09/01/20.        Assessment & Plan   Problem List Items Addressed This Visit     Hyperlipidemia with target LDL less than 130    Relevant Medications    simvastatin (ZOCOR) 40 MG tablet    HTN, goal below 140/90 - Primary    Relevant Medications    amLODIPine (NORVASC) 10 MG tablet    Other Relevant Orders    CBC with platelets    Comprehensive metabolic panel    Lipid panel reflex to direct LDL Fasting    TSH with free T4 reflex    Severe depression (H)    Relevant Medications    buPROPion (WELLBUTRIN SR) 100 MG 12 hr tablet    mirtazapine (REMERON) 45 MG tablet    PTSD (post-traumatic stress disorder)    Relevant Medications    buPROPion (WELLBUTRIN SR) 100 MG 12 hr tablet    mirtazapine (REMERON) 45 MG tablet    QUEtiapine (SEROQUEL) 200 MG tablet      Other Visit Diagnoses     Allergic conjunctivitis, bilateral        Relevant Medications    olopatadine (PATANOL) 0.1 % ophthalmic solution    Screening for prostate cancer        Relevant Orders    Prostate spec antigen screen         Assess lab work   Cont treatment  Start on anti allergy eye drops        See Patient Instructions  Return in about 6 months (around 3/1/2021) for Physical Exam.    Vinnie Brown MD  [facility]

## 2020-09-02 DIAGNOSIS — Z12.5 SCREENING FOR PROSTATE CANCER: ICD-10-CM

## 2020-09-02 DIAGNOSIS — I10 HTN, GOAL BELOW 140/90: ICD-10-CM

## 2020-09-02 LAB
ERYTHROCYTE [DISTWIDTH] IN BLOOD BY AUTOMATED COUNT: 13.4 % (ref 10–15)
HCT VFR BLD AUTO: 37 % (ref 40–53)
HGB BLD-MCNC: 12.6 G/DL (ref 13.3–17.7)
MCH RBC QN AUTO: 28.1 PG (ref 26.5–33)
MCHC RBC AUTO-ENTMCNC: 34.1 G/DL (ref 31.5–36.5)
MCV RBC AUTO: 82 FL (ref 78–100)
PLATELET # BLD AUTO: 223 10E9/L (ref 150–450)
RBC # BLD AUTO: 4.49 10E12/L (ref 4.4–5.9)
WBC # BLD AUTO: 5.8 10E9/L (ref 4–11)

## 2020-09-02 PROCEDURE — 85027 COMPLETE CBC AUTOMATED: CPT | Performed by: INTERNAL MEDICINE

## 2020-09-02 PROCEDURE — 36415 COLL VENOUS BLD VENIPUNCTURE: CPT | Performed by: INTERNAL MEDICINE

## 2020-09-02 PROCEDURE — 80053 COMPREHEN METABOLIC PANEL: CPT | Performed by: INTERNAL MEDICINE

## 2020-09-02 PROCEDURE — 84443 ASSAY THYROID STIM HORMONE: CPT | Performed by: INTERNAL MEDICINE

## 2020-09-02 PROCEDURE — G0103 PSA SCREENING: HCPCS | Performed by: INTERNAL MEDICINE

## 2020-09-02 PROCEDURE — 80061 LIPID PANEL: CPT | Performed by: INTERNAL MEDICINE

## 2020-09-03 LAB
ALBUMIN SERPL-MCNC: 3.4 G/DL (ref 3.4–5)
ALP SERPL-CCNC: 81 U/L (ref 40–150)
ALT SERPL W P-5'-P-CCNC: 32 U/L (ref 0–70)
ANION GAP SERPL CALCULATED.3IONS-SCNC: 9 MMOL/L (ref 3–14)
AST SERPL W P-5'-P-CCNC: 27 U/L (ref 0–45)
BILIRUB SERPL-MCNC: 0.3 MG/DL (ref 0.2–1.3)
BUN SERPL-MCNC: 44 MG/DL (ref 7–30)
CALCIUM SERPL-MCNC: 9.3 MG/DL (ref 8.5–10.1)
CHLORIDE SERPL-SCNC: 105 MMOL/L (ref 94–109)
CHOLEST SERPL-MCNC: 240 MG/DL
CO2 SERPL-SCNC: 24 MMOL/L (ref 20–32)
CREAT SERPL-MCNC: 2.95 MG/DL (ref 0.66–1.25)
GFR SERPL CREATININE-BSD FRML MDRD: 22 ML/MIN/{1.73_M2}
GLUCOSE SERPL-MCNC: 86 MG/DL (ref 70–99)
HDLC SERPL-MCNC: 63 MG/DL
LDLC SERPL CALC-MCNC: 155 MG/DL
NONHDLC SERPL-MCNC: 177 MG/DL
POTASSIUM SERPL-SCNC: 4 MMOL/L (ref 3.4–5.3)
PROT SERPL-MCNC: 8 G/DL (ref 6.8–8.8)
PSA SERPL-ACNC: 0.4 UG/L (ref 0–4)
SODIUM SERPL-SCNC: 138 MMOL/L (ref 133–144)
TRIGL SERPL-MCNC: 111 MG/DL
TSH SERPL DL<=0.005 MIU/L-ACNC: 2.82 MU/L (ref 0.4–4)

## 2020-11-02 DIAGNOSIS — R42 DIZZINESS: ICD-10-CM

## 2020-11-03 RX ORDER — MECLIZINE HCL 12.5 MG 12.5 MG/1
TABLET ORAL
Qty: 30 TABLET | Refills: 0 | Status: SHIPPED | OUTPATIENT
Start: 2020-11-03 | End: 2020-12-08

## 2020-12-07 DIAGNOSIS — R42 DIZZINESS: ICD-10-CM

## 2020-12-08 RX ORDER — MECLIZINE HCL 12.5 MG 12.5 MG/1
TABLET ORAL
Qty: 30 TABLET | Refills: 3 | Status: SHIPPED | OUTPATIENT
Start: 2020-12-08 | End: 2021-04-22

## 2021-02-16 DIAGNOSIS — F43.10 PTSD (POST-TRAUMATIC STRESS DISORDER): ICD-10-CM

## 2021-02-17 DIAGNOSIS — E78.5 HYPERLIPIDEMIA WITH TARGET LDL LESS THAN 130: ICD-10-CM

## 2021-02-17 DIAGNOSIS — N32.81 OVERACTIVE BLADDER: ICD-10-CM

## 2021-02-17 DIAGNOSIS — I10 HTN, GOAL BELOW 140/90: ICD-10-CM

## 2021-02-17 DIAGNOSIS — F32.2 SEVERE DEPRESSION (H): ICD-10-CM

## 2021-02-17 RX ORDER — TOLTERODINE TARTRATE 1 MG/1
1 TABLET, EXTENDED RELEASE ORAL AT BEDTIME
Qty: 90 TABLET | Refills: 0 | Status: SHIPPED | OUTPATIENT
Start: 2021-02-17 | End: 2021-04-22

## 2021-02-17 RX ORDER — QUETIAPINE FUMARATE 200 MG/1
TABLET, FILM COATED ORAL
Qty: 90 TABLET | Refills: 0 | Status: SHIPPED | OUTPATIENT
Start: 2021-02-17 | End: 2021-04-22

## 2021-02-17 RX ORDER — CARVEDILOL 6.25 MG/1
TABLET ORAL
Qty: 180 TABLET | Refills: 0 | Status: SHIPPED | OUTPATIENT
Start: 2021-02-17 | End: 2021-05-14

## 2021-02-17 RX ORDER — MIRTAZAPINE 45 MG/1
TABLET, FILM COATED ORAL
Qty: 90 TABLET | Refills: 0 | Status: SHIPPED | OUTPATIENT
Start: 2021-02-17 | End: 2021-04-22

## 2021-02-17 RX ORDER — PRAZOSIN HYDROCHLORIDE 1 MG/1
CAPSULE ORAL
Qty: 360 CAPSULE | Refills: 0 | Status: SHIPPED | OUTPATIENT
Start: 2021-02-17 | End: 2021-04-22

## 2021-02-17 RX ORDER — BUPROPION HYDROCHLORIDE 100 MG/1
TABLET, EXTENDED RELEASE ORAL
Qty: 90 TABLET | Refills: 0 | Status: SHIPPED | OUTPATIENT
Start: 2021-02-17 | End: 2021-04-22

## 2021-04-05 ENCOUNTER — IMMUNIZATION (OUTPATIENT)
Dept: NURSING | Facility: CLINIC | Age: 61
End: 2021-04-05
Payer: MEDICARE

## 2021-04-05 PROCEDURE — 0001A PR COVID VAC PFIZER DIL RECON 30 MCG/0.3 ML IM: CPT

## 2021-04-05 PROCEDURE — 91300 PR COVID VAC PFIZER DIL RECON 30 MCG/0.3 ML IM: CPT

## 2021-04-20 DIAGNOSIS — F32.2 SEVERE DEPRESSION (H): ICD-10-CM

## 2021-04-20 DIAGNOSIS — R42 DIZZINESS: ICD-10-CM

## 2021-04-20 DIAGNOSIS — N32.81 OVERACTIVE BLADDER: ICD-10-CM

## 2021-04-20 DIAGNOSIS — F43.10 PTSD (POST-TRAUMATIC STRESS DISORDER): ICD-10-CM

## 2021-04-22 RX ORDER — QUETIAPINE FUMARATE 200 MG/1
TABLET, FILM COATED ORAL
Qty: 90 TABLET | Refills: 0 | Status: SHIPPED | OUTPATIENT
Start: 2021-04-22 | End: 2021-09-03

## 2021-04-22 RX ORDER — BUPROPION HYDROCHLORIDE 100 MG/1
TABLET, EXTENDED RELEASE ORAL
Qty: 90 TABLET | Refills: 0 | Status: SHIPPED | OUTPATIENT
Start: 2021-04-22 | End: 2021-09-03

## 2021-04-22 RX ORDER — MECLIZINE HCL 12.5 MG 12.5 MG/1
TABLET ORAL
Qty: 30 TABLET | Refills: 3 | Status: SHIPPED | OUTPATIENT
Start: 2021-04-22 | End: 2022-02-01

## 2021-04-22 RX ORDER — PRAZOSIN HYDROCHLORIDE 1 MG/1
CAPSULE ORAL
Qty: 360 CAPSULE | Refills: 0 | Status: SHIPPED | OUTPATIENT
Start: 2021-04-22 | End: 2021-09-02

## 2021-04-22 RX ORDER — TOLTERODINE TARTRATE 1 MG/1
1 TABLET, EXTENDED RELEASE ORAL AT BEDTIME
Qty: 90 TABLET | Refills: 0 | Status: SHIPPED | OUTPATIENT
Start: 2021-04-22 | End: 2021-09-02

## 2021-04-22 RX ORDER — MIRTAZAPINE 45 MG/1
TABLET, FILM COATED ORAL
Qty: 90 TABLET | Refills: 0 | Status: SHIPPED | OUTPATIENT
Start: 2021-04-22 | End: 2021-09-02

## 2021-04-22 NOTE — TELEPHONE ENCOUNTER
Meclizine & tolterodine  Prescription approved per [facility] Refill Protocol.    Remeron, prazosin, Seroquel, bupropion   Routing refill request to provider for review/approval because:  BP Readings from Last 3 Encounters:   01/17/20 120/72   12/20/19 (!) 132/90   11/08/19 (!) 138/92     PHQ-9 score:    PHQ 5/1/2020   PHQ-9 Total Score 18   Q9: Thoughts of better off dead/self-harm past 2 weeks Several days   F/U: Thoughts of suicide or self-harm -   F/U: Self harm-plan -   F/U: Self-harm action -   F/U: Safety concerns -

## 2021-04-22 NOTE — TELEPHONE ENCOUNTER
"Requested Prescriptions   Pending Prescriptions Disp Refills     buPROPion (WELLBUTRIN SR) 100 MG 12 hr tablet [Pharmacy Med Name: buPROPion HCl ER (SR) Oral Tablet Extended Release 12 Hour 100 MG] 90 tablet 0     Sig: TAKE ONE TABLET BY MOUTH IN THE MORNING       SSRIs Protocol Failed - 4/20/2021  9:53 AM        Failed - PHQ-9 score less than 5 in past 6 months     Please review last PHQ-9 score.           Passed - Medication is Bupropion     If the medication is Bupropion (Wellbutrin), and the patient is taking for smoking cessation; OK to refill.          Passed - Medication is active on med list        Passed - Patient is age 18 or older        Passed - Recent (6 mo) or future (30 days) visit within the authorizing provider's specialty     Patient had office visit in the last 6 months or has a visit in the next 30 days with authorizing provider or within the authorizing provider's specialty.  See \"Patient Info\" tab in inbasket, or \"Choose Columns\" in Meds & Orders section of the refill encounter.               mirtazapine (REMERON) 45 MG tablet [Pharmacy Med Name: Mirtazapine Oral Tablet 45 MG] 90 tablet 0     Sig: TAKE ONE TABLET BY MOUTH AT BEDTIME       Atypical Antidepressants Protocol Failed - 4/20/2021  9:53 AM        Failed - Patient has PHQ-9 score less than 5 in past 6 months.     Please review last PHQ-9 score.           Passed - Medication active on med list        Passed - Patient is age 18 or older        Passed - Recent (6 mo) or future (30 days) visit within the authorizing provider's specialty     Patient had office visit in the last 6 months or has a visit in the next 30 days with authorizing provider or within the authorizing provider's specialty.  See \"Patient Info\" tab in in[facility], or \"Choose Columns\" in Meds & Orders section of the refill encounter.               tolterodine (DETROL) 1 MG tablet [Pharmacy Med Name: Tolterodine Tartrate Oral Tablet 1 MG] 90 tablet 0     Sig: TAKE 1 TABLET (1 " "MG) BY MOUTH AT BEDTIME       Muscarinic Antagonists (Urinary Incontinence Agents) Passed - 4/20/2021  9:53 AM        Passed - Recent (12 mo) or future (30 days) visit within the authorizing provider's specialty     Patient has had an office visit with the authorizing provider or a provider within the authorizing providers department within the previous 12 mos or has a future within next 30 days. See \"Patient Info\" tab in inbasket, or \"Choose Columns\" in Meds & Orders section of the refill encounter.              Passed - Patient does not have a diagnosis of glaucoma on the problem list     If glaucoma diagnosis is new, refer refill to physician.          Passed - Medication is active on med list        Passed - Patient is 18 years of age or older           prazosin (MINIPRESS) 1 MG capsule [Pharmacy Med Name: Prazosin HCl Oral Capsule 1 MG] 360 capsule 0     Sig: TAKE 2 CAPSULES BY MOUTH IN THE MORNING AND TAKE 2 CAPSULES BY MOUTH IN THE EVENING.       Alpha Blockers Failed - 4/20/2021  9:53 AM        Failed - Blood pressure under 140/90 in past 12 months     BP Readings from Last 3 Encounters:   01/17/20 120/72   12/20/19 (!) 132/90   11/08/19 (!) 138/92                 Passed - Recent (12 mo) or future (30 days) visit within the authorizing provider's specialty     Patient has had an office visit with the authorizing provider or a provider within the authorizing providers department within the previous 12 mos or has a future within next 30 days. See \"Patient Info\" tab in inbasket, or \"Choose Columns\" in Meds & Orders section of the refill encounter.              Passed - Patient does not have Tadalafil, Vardenafil, or Sildenafil on their medication list        Passed - Medication is active on med list        Passed - Patient is 18 years of age or older       Antiadrenergic Antihypertensives Failed - 4/20/2021  9:53 AM        Failed - Blood pressure less than 140/90 in past 6 months     BP Readings from Last 3 " "Encounters:   01/17/20 120/72   12/20/19 (!) 132/90   11/08/19 (!) 138/92                 Failed - Normal serum creatinine on file in past 12 months     Recent Labs   Lab Test 09/02/20  0953   CR 2.95*       Ok to refill medication if creatinine is low          Passed - Medication is active on med list        Passed - Patient is age 18 or older        Passed - Recent (6 mo) or future (30 days) visit within the authorizing provider's specialty     Patient had office visit in the last 6 months or has a visit in the next 30 days with authorizing provider or within the authorizing provider's specialty.  See \"Patient Info\" tab in inbasket, or \"Choose Columns\" in Meds & Orders section of the refill encounter.               QUEtiapine (SEROQUEL) 200 MG tablet [Pharmacy Med Name: QUEtiapine Fumarate Oral Tablet 200 MG] 90 tablet 0     Sig: TAKE ONE TABLET BY MOUTH AT BEDTIME       Antipsychotic Medications Failed - 4/20/2021  9:53 AM        Failed - Blood pressure under 140/90 in past 12 months     BP Readings from Last 3 Encounters:   01/17/20 120/72   12/20/19 (!) 132/90   11/08/19 (!) 138/92                 Failed - Heart Rate on file within past 12 months     Pulse Readings from Last 3 Encounters:   01/17/20 78   12/20/19 51   11/08/19 80               Passed - Patient is 12 years of age or older        Passed - Lipid panel on file within the past 12 months     Recent Labs   Lab Test 09/02/20  0953 01/29/15  0942 01/29/15  0942   CHOL 240*   < > 211*   TRIG 111   < > 117   HDL 63   < > 62   *   < > 126   NHDL 177*   < >  --    VLDL  --   --  23   CHOLHDLRATIO  --   --  3.4    < > = values in this interval not displayed.               Passed - CBC on file in past 12 months     Recent Labs   Lab Test 09/02/20  0953   WBC 5.8   RBC 4.49   HGB 12.6*   HCT 37.0*                    Passed - A1c or Glucose on file in past 12 months     Recent Labs   Lab Test 09/02/20  0953   GLC 86       Please review patients " "last 3 weights. If a weight gain of >10 lbs exists, you may refill the prescription once after instructing the patient to schedule an appointment within the next 30 days.    Wt Readings from Last 3 Encounters:   09/01/20 72.6 kg (160 lb)   01/17/20 68 kg (150 lb)   12/20/19 67.1 kg (148 lb)             Passed - Medication is active on med list        Passed - Recent (6 mo) or future (30 days) visit within the authorizing provider's specialty     Patient had office visit in the last 6 months or has a visit in the next 30 days with authorizing provider or within the authorizing provider's specialty.  See \"Patient Info\" tab in inbasket, or \"Choose Columns\" in Meds & Orders section of the refill encounter.               meclizine (ANTIVERT) 12.5 MG tablet [Pharmacy Med Name: Meclizine HCl Oral Tablet 12.5 MG] 30 tablet 0     Sig: TAKE 1 TO 2 TABLETS BY MOUTH 4 TIMES DAILY AS NEEDED FOR DIZZINESS.        Antivertigo/Antiemetic Agents Passed - 4/20/2021  9:53 AM        Passed - Recent (12 mo) or future (30 days) visit within the authorizing provider's specialty     Patient has had an office visit with the authorizing provider or a provider within the authorizing providers department within the previous 12 mos or has a future within next 30 days. See \"Patient Info\" tab in inbasket, or \"Choose Columns\" in Meds & Orders section of the refill encounter.              Passed - Medication is active on med list        Passed - Patient is 18 years of age or older             "

## 2021-04-26 ENCOUNTER — IMMUNIZATION (OUTPATIENT)
Dept: NURSING | Facility: CLINIC | Age: 61
End: 2021-04-26
Attending: INTERNAL MEDICINE
Payer: MEDICARE

## 2021-04-26 PROCEDURE — 0002A PR COVID VAC PFIZER DIL RECON 30 MCG/0.3 ML IM: CPT

## 2021-04-26 PROCEDURE — 91300 PR COVID VAC PFIZER DIL RECON 30 MCG/0.3 ML IM: CPT

## 2021-05-14 ENCOUNTER — OFFICE VISIT (OUTPATIENT)
Dept: INTERNAL MEDICINE | Facility: CLINIC | Age: 61
End: 2021-05-14
Payer: MEDICARE

## 2021-05-14 ENCOUNTER — TELEPHONE (OUTPATIENT)
Dept: INTERNAL MEDICINE | Facility: CLINIC | Age: 61
End: 2021-05-14

## 2021-05-14 VITALS
HEIGHT: 66 IN | HEART RATE: 53 BPM | TEMPERATURE: 97.7 F | WEIGHT: 170 LBS | BODY MASS INDEX: 27.32 KG/M2 | DIASTOLIC BLOOD PRESSURE: 86 MMHG | OXYGEN SATURATION: 99 % | SYSTOLIC BLOOD PRESSURE: 130 MMHG

## 2021-05-14 DIAGNOSIS — I10 HTN, GOAL BELOW 140/90: Primary | ICD-10-CM

## 2021-05-14 DIAGNOSIS — E78.5 HYPERLIPIDEMIA WITH TARGET LDL LESS THAN 130: ICD-10-CM

## 2021-05-14 DIAGNOSIS — I10 HTN, GOAL BELOW 140/90: ICD-10-CM

## 2021-05-14 DIAGNOSIS — F32.2 SEVERE DEPRESSION (H): ICD-10-CM

## 2021-05-14 DIAGNOSIS — N18.4 CKD (CHRONIC KIDNEY DISEASE) STAGE 4, GFR 15-29 ML/MIN (H): ICD-10-CM

## 2021-05-14 LAB
CHOLEST SERPL-MCNC: 252 MG/DL
ERYTHROCYTE [DISTWIDTH] IN BLOOD BY AUTOMATED COUNT: 13.3 % (ref 10–15)
HCT VFR BLD AUTO: 38.2 % (ref 40–53)
HDLC SERPL-MCNC: 60 MG/DL
HGB BLD-MCNC: 12.6 G/DL (ref 13.3–17.7)
LDLC SERPL CALC-MCNC: 173 MG/DL
MCH RBC QN AUTO: 27.9 PG (ref 26.5–33)
MCHC RBC AUTO-ENTMCNC: 33 G/DL (ref 31.5–36.5)
MCV RBC AUTO: 85 FL (ref 78–100)
NONHDLC SERPL-MCNC: 192 MG/DL
PLATELET # BLD AUTO: 204 10E9/L (ref 150–450)
RBC # BLD AUTO: 4.51 10E12/L (ref 4.4–5.9)
TRIGL SERPL-MCNC: 93 MG/DL
WBC # BLD AUTO: 6.9 10E9/L (ref 4–11)

## 2021-05-14 PROCEDURE — 84443 ASSAY THYROID STIM HORMONE: CPT | Performed by: INTERNAL MEDICINE

## 2021-05-14 PROCEDURE — 80061 LIPID PANEL: CPT | Performed by: INTERNAL MEDICINE

## 2021-05-14 PROCEDURE — 99214 OFFICE O/P EST MOD 30 MIN: CPT | Performed by: INTERNAL MEDICINE

## 2021-05-14 PROCEDURE — 85027 COMPLETE CBC AUTOMATED: CPT | Performed by: INTERNAL MEDICINE

## 2021-05-14 PROCEDURE — 36415 COLL VENOUS BLD VENIPUNCTURE: CPT | Performed by: INTERNAL MEDICINE

## 2021-05-14 PROCEDURE — 80053 COMPREHEN METABOLIC PANEL: CPT | Performed by: INTERNAL MEDICINE

## 2021-05-14 RX ORDER — CARVEDILOL 25 MG/1
25 TABLET ORAL 2 TIMES DAILY WITH MEALS
Qty: 180 TABLET | Refills: 3 | Status: SHIPPED | OUTPATIENT
Start: 2021-05-14 | End: 2021-05-14

## 2021-05-14 RX ORDER — CARVEDILOL 25 MG/1
25 TABLET ORAL 2 TIMES DAILY WITH MEALS
Qty: 180 TABLET | Refills: 3 | Status: SHIPPED | OUTPATIENT
Start: 2021-05-14 | End: 2022-05-27

## 2021-05-14 ASSESSMENT — PATIENT HEALTH QUESTIONNAIRE - PHQ9: SUM OF ALL RESPONSES TO PHQ QUESTIONS 1-9: 19

## 2021-05-14 ASSESSMENT — MIFFLIN-ST. JEOR: SCORE: 1523.86

## 2021-05-14 NOTE — LETTER
May 17, 2021      Gunner uangchan  [address]  [place] [address]      Dear Gunner,      Decreased kidney function and mild anemia. Follow up with nephrology.   High cholesterol. Recommend diet, exercise. Continue medication.   Sincerely,      Vinnie Brown MD        Results for orders placed or performed in visit on 05/14/21   CBC with platelets     Status: Abnormal   Result Value Ref Range    WBC 6.9 4.0 - 11.0 10e9/L    RBC Count 4.51 4.4 - 5.9 10e12/L    Hemoglobin 12.6 (L) 13.3 - 17.7 g/dL    Hematocrit 38.2 (L) 40.0 - 53.0 %    MCV 85 78 - 100 fl    MCH 27.9 26.5 - 33.0 pg    MCHC 33.0 31.5 - 36.5 g/dL    RDW 13.3 10.0 - 15.0 %    Platelet Count 204 150 - 450 10e9/L   Comprehensive metabolic panel     Status: Abnormal   Result Value Ref Range    Sodium 137 133 - 144 mmol/L    Potassium 4.4 3.4 - 5.3 mmol/L    Chloride 106 94 - 109 mmol/L    Carbon Dioxide 28 20 - 32 mmol/L    Anion Gap 3 3 - 14 mmol/L    Glucose 89 70 - 99 mg/dL    Urea Nitrogen 41 (H) 7 - 30 mg/dL    Creatinine 2.86 (H) 0.66 - 1.25 mg/dL    GFR Estimate 23 (L) >60 mL/min/[1.73_m2]    GFR Estimate If Black 26 (L) >60 mL/min/[1.73_m2]    Calcium 9.4 8.5 - 10.1 mg/dL    Bilirubin Total 0.5 0.2 - 1.3 mg/dL    Albumin 3.3 (L) 3.4 - 5.0 g/dL    Protein Total 8.4 6.8 - 8.8 g/dL    Alkaline Phosphatase 77 40 - 150 U/L    ALT 25 0 - 70 U/L    AST 25 0 - 45 U/L   Lipid panel reflex to direct LDL Fasting     Status: Abnormal   Result Value Ref Range    Cholesterol 252 (H) <200 mg/dL    Triglycerides 93 <150 mg/dL    HDL Cholesterol 60 >39 mg/dL    LDL Cholesterol Calculated 173 (H) <100 mg/dL    Non HDL Cholesterol 192 (H) <130 mg/dL   TSH with free T4 reflex     Status: None   Result Value Ref Range    TSH 1.77 0.40 - 4.00 mU/L

## 2021-05-14 NOTE — TELEPHONE ENCOUNTER
"[facility] calls with questions regarding Carvedilol prescription sent today. There are 2 sets of directions on the prescription: \"Take 1 tablet (25 mg) by mouth 2 times daily (with meals) Take 2 tablets in am and one in the pm.\"     Please update prescription and sent corrected prescription to [facility]. Patient has already tried to  prescription, pharmacy requesting provider review ASAP.   "

## 2021-05-14 NOTE — PROGRESS NOTES
"    Assessment & Plan     HTN, goal below 140/90  Cont treatment, monitor BP   - carvedilol (COREG) 25 MG tablet; Take 1 tablet (25 mg) by mouth 2 times daily (with meals) Take 2 tablets in am and one in the pm  - CBC with platelets  - TSH with free T4 reflex    CKD (chronic kidney disease) stage 4, GFR 15-29 ml/min (H)  Assess renal function,  Follow up with nephrology   - Comprehensive metabolic panel    Hyperlipidemia with target LDL less than 130  Assess lipid panel. Cont statin   - Lipid panel reflex to direct LDL Fasting             BMI:   Estimated body mass index is 27.44 kg/m  as calculated from the following:    Height as of this encounter: 1.676 m (5' 6\").    Weight as of this encounter: 77.1 kg (170 lb).       See Patient Instructions    Return in about 6 months (around 11/14/2021) for Physical Exam.    Vinnie Brown MD  [facility] THA Martino is a 60 year old who presents for the following health issues     HPI   Chief Complaint   Patient presents with     RECHECK     F/U from Nephrologist         Patient is seen for a follow up visit.  No acute complaints, no medication change or new medical conditions.  Has h/o HTN. on medical treatment. BP has been controlled. No side effects from medications. No CP, HA, dizziness. good compliance with medications and low salt diet.  Has H/O hyperlipidemia. On medical treatment and diet. No side effects. No muscle weakness, myalgias or upset stomach.   Has h/o CRF. Symptoms include fatigue. Monitoring BP, BG, medications, avoiding OTC NSAIDs. Needs periodic recheck of kidney function.    Has h/o depression. On medical treatment, not well controlled, no side effects.has chronic depressive symptoms, no  suicidal ideation.      Review of Systems   Constitutional, HEENT, cardiovascular, pulmonary, gi and gu systems are negative, except as otherwise noted.      Objective    BP (!) 144/96 (BP Location: Right arm, Patient Position: " "Sitting, Cuff Size: Adult Regular)   Pulse 53   Temp 97.7  F (36.5  C) (Oral)   Ht 1.676 m (5' 6\")   Wt 77.1 kg (170 lb)   SpO2 99%   BMI 27.44 kg/m    Body mass index is 27.44 kg/m .  Physical Exam   GENERAL: healthy, alert and no distress  EYES: Eyes grossly normal to inspection, PERRL and conjunctivae and sclerae normal  HENT: ear canals and TM's normal, nose and mouth without ulcers or lesions  NECK: no adenopathy, no asymmetry, masses, or scars and thyroid normal to palpation  RESP: lungs clear to auscultation - no rales, rhonchi or wheezes  CV: regular rate and rhythm, normal S1 S2, no S3 or S4, no murmur, click or rub, no peripheral edema and peripheral pulses strong  ABDOMEN: soft, nontender, no hepatosplenomegaly, no masses and bowel sounds normal  MS: no gross musculoskeletal defects noted, no edema    Orders Only on 09/02/2020   Component Date Value Ref Range Status     PSA 09/02/2020 0.40  0 - 4 ug/L Final    Assay Method:  Chemiluminescence using Siemens Vista analyzer     TSH 09/02/2020 2.82  0.40 - 4.00 mU/L Final     Cholesterol 09/02/2020 240* <200 mg/dL Final    Desirable:       <200 mg/dl     Triglycerides 09/02/2020 111  <150 mg/dL Final     HDL Cholesterol 09/02/2020 63  >39 mg/dL Final     LDL Cholesterol Calculated 09/02/2020 155* <100 mg/dL Final    Comment: Above desirable:  100-129 mg/dl  Borderline High:  130-159 mg/dL  High:             160-189 mg/dL  Very high:       >189 mg/dl       Non HDL Cholesterol 09/02/2020 177* <130 mg/dL Final    Comment: Above Desirable:  130-159 mg/dl  Borderline high:  160-189 mg/dl  High:             190-219 mg/dl  Very high:       >219 mg/dl       Sodium 09/02/2020 138  133 - 144 mmol/L Final     Potassium 09/02/2020 4.0  3.4 - 5.3 mmol/L Final     Chloride 09/02/2020 105  94 - 109 mmol/L Final     Carbon Dioxide 09/02/2020 24  20 - 32 mmol/L Final     Anion Gap 09/02/2020 9  3 - 14 mmol/L Final     Glucose 09/02/2020 86  70 - 99 mg/dL Final     Urea " Nitrogen 09/02/2020 44* 7 - 30 mg/dL Final     Creatinine 09/02/2020 2.95* 0.66 - 1.25 mg/dL Final     GFR Estimate 09/02/2020 22* >60 mL/min/[1.73_m2] Final    Comment: Non  GFR Calc  Starting 12/18/2018, serum creatinine based estimated GFR (eGFR) will be   calculated using the Chronic Kidney Disease Epidemiology Collaboration   (CKD-EPI) equation.       GFR Estimate If Black 09/02/2020 25* >60 mL/min/[1.73_m2] Final    Comment:  GFR Calc  Starting 12/18/2018, serum creatinine based estimated GFR (eGFR) will be   calculated using the Chronic Kidney Disease Epidemiology Collaboration   (CKD-EPI) equation.       Calcium 09/02/2020 9.3  8.5 - 10.1 mg/dL Final     Bilirubin Total 09/02/2020 0.3  0.2 - 1.3 mg/dL Final     Albumin 09/02/2020 3.4  3.4 - 5.0 g/dL Final     Protein Total 09/02/2020 8.0  6.8 - 8.8 g/dL Final     Alkaline Phosphatase 09/02/2020 81  40 - 150 U/L Final     ALT 09/02/2020 32  0 - 70 U/L Final     AST 09/02/2020 27  0 - 45 U/L Final     WBC 09/02/2020 5.8  4.0 - 11.0 10e9/L Final     RBC Count 09/02/2020 4.49  4.4 - 5.9 10e12/L Final     Hemoglobin 09/02/2020 12.6* 13.3 - 17.7 g/dL Final     Hematocrit 09/02/2020 37.0* 40.0 - 53.0 % Final     MCV 09/02/2020 82  78 - 100 fl Final     MCH 09/02/2020 28.1  26.5 - 33.0 pg Final     MCHC 09/02/2020 34.1  31.5 - 36.5 g/dL Final     RDW 09/02/2020 13.4  10.0 - 15.0 % Final     Platelet Count 09/02/2020 223  150 - 450 10e9/L Final

## 2021-05-15 LAB
ALBUMIN SERPL-MCNC: 3.3 G/DL (ref 3.4–5)
ALP SERPL-CCNC: 77 U/L (ref 40–150)
ALT SERPL W P-5'-P-CCNC: 25 U/L (ref 0–70)
ANION GAP SERPL CALCULATED.3IONS-SCNC: 3 MMOL/L (ref 3–14)
AST SERPL W P-5'-P-CCNC: 25 U/L (ref 0–45)
BILIRUB SERPL-MCNC: 0.5 MG/DL (ref 0.2–1.3)
BUN SERPL-MCNC: 41 MG/DL (ref 7–30)
CALCIUM SERPL-MCNC: 9.4 MG/DL (ref 8.5–10.1)
CHLORIDE SERPL-SCNC: 106 MMOL/L (ref 94–109)
CO2 SERPL-SCNC: 28 MMOL/L (ref 20–32)
CREAT SERPL-MCNC: 2.86 MG/DL (ref 0.66–1.25)
GFR SERPL CREATININE-BSD FRML MDRD: 23 ML/MIN/{1.73_M2}
GLUCOSE SERPL-MCNC: 89 MG/DL (ref 70–99)
POTASSIUM SERPL-SCNC: 4.4 MMOL/L (ref 3.4–5.3)
PROT SERPL-MCNC: 8.4 G/DL (ref 6.8–8.8)
SODIUM SERPL-SCNC: 137 MMOL/L (ref 133–144)
TSH SERPL DL<=0.005 MIU/L-ACNC: 1.77 MU/L (ref 0.4–4)

## 2021-09-01 DIAGNOSIS — F43.10 PTSD (POST-TRAUMATIC STRESS DISORDER): ICD-10-CM

## 2021-09-01 DIAGNOSIS — N32.81 OVERACTIVE BLADDER: ICD-10-CM

## 2021-09-01 DIAGNOSIS — F32.1 MAJOR DEPRESSIVE DISORDER, SINGLE EPISODE, MODERATE (H): ICD-10-CM

## 2021-09-02 RX ORDER — MIRTAZAPINE 45 MG/1
TABLET, FILM COATED ORAL
Qty: 90 TABLET | Refills: 0 | Status: SHIPPED | OUTPATIENT
Start: 2021-09-02 | End: 2021-12-07

## 2021-09-02 RX ORDER — TOLTERODINE TARTRATE 1 MG/1
1 TABLET, EXTENDED RELEASE ORAL AT BEDTIME
Qty: 90 TABLET | Refills: 2 | Status: SHIPPED | OUTPATIENT
Start: 2021-09-02 | End: 2023-09-21

## 2021-09-02 RX ORDER — PRAZOSIN HYDROCHLORIDE 1 MG/1
CAPSULE ORAL
Qty: 360 CAPSULE | Refills: 0 | Status: SHIPPED | OUTPATIENT
Start: 2021-09-02 | End: 2021-09-03

## 2021-09-02 NOTE — TELEPHONE ENCOUNTER
Routing refill request to provider for review/approval because:  Labs out of range:    PHQ 1/24/2020 5/1/2020 5/14/2021   PHQ-9 Total Score 18 18 19   Q9: Thoughts of better off dead/self-harm past 2 weeks Several days Several days Several days   F/U: Thoughts of suicide or self-harm - - -   F/U: Self harm-plan - - -   F/U: Self-harm action - - -   F/U: Safety concerns - - -     Creatinine   Date Value Ref Range Status   05/14/2021 2.86 (H) 0.66 - 1.25 mg/dL Final     Rosa Munroe RN, BSN

## 2021-09-03 DIAGNOSIS — F43.10 PTSD (POST-TRAUMATIC STRESS DISORDER): ICD-10-CM

## 2021-09-03 DIAGNOSIS — F32.2 SEVERE DEPRESSION (H): ICD-10-CM

## 2021-09-03 RX ORDER — QUETIAPINE FUMARATE 200 MG/1
TABLET, FILM COATED ORAL
Qty: 30 TABLET | Refills: 0 | Status: SHIPPED | OUTPATIENT
Start: 2021-09-03 | End: 2021-10-05

## 2021-09-03 RX ORDER — PRAZOSIN HYDROCHLORIDE 1 MG/1
CAPSULE ORAL
Qty: 120 CAPSULE | Refills: 0 | Status: SHIPPED | OUTPATIENT
Start: 2021-09-03 | End: 2022-01-26

## 2021-09-03 RX ORDER — BUPROPION HYDROCHLORIDE 100 MG/1
TABLET, EXTENDED RELEASE ORAL
Qty: 30 TABLET | Refills: 0 | Status: SHIPPED | OUTPATIENT
Start: 2021-09-03 | End: 2021-10-05

## 2021-09-03 NOTE — TELEPHONE ENCOUNTER
Pending Prescriptions:                       Disp   Refills    QUEtiapine (SEROQUEL) 200 MG tablet [Pharm*90 tab*0        Sig: TAKE ONE TABLET BY MOUTH AT BEDTIME     prazosin (MINIPRESS) 1 MG capsule [Pharmac*360 ca*0        Sig: TAKE 2 CAPSULES BY MOUTH IN THE MORNING AND TAKE 2           CAPSULES BY MOUTH IN THE EVENING.    buPROPion (WELLBUTRIN SR) 100 MG 12 hr tab*90 tab*0        Sig: TAKE ONE TABLET BY MOUTH IN THE MORNING  Routing refill request to provider for review/approval because:  Fails protocol

## 2021-09-17 ENCOUNTER — ALLIED HEALTH/NURSE VISIT (OUTPATIENT)
Dept: FAMILY MEDICINE | Facility: CLINIC | Age: 61
End: 2021-09-17
Payer: MEDICARE

## 2021-09-17 DIAGNOSIS — Z23 NEED FOR PROPHYLACTIC VACCINATION AND INOCULATION AGAINST INFLUENZA: Primary | ICD-10-CM

## 2021-09-17 PROCEDURE — 90682 RIV4 VACC RECOMBINANT DNA IM: CPT

## 2021-09-17 PROCEDURE — G0008 ADMIN INFLUENZA VIRUS VAC: HCPCS

## 2021-09-17 PROCEDURE — 99207 PR NO CHARGE NURSE ONLY: CPT

## 2021-10-02 DIAGNOSIS — F32.2 SEVERE DEPRESSION (H): ICD-10-CM

## 2021-10-02 DIAGNOSIS — F43.10 PTSD (POST-TRAUMATIC STRESS DISORDER): ICD-10-CM

## 2021-10-05 ENCOUNTER — OFFICE VISIT (OUTPATIENT)
Dept: INTERNAL MEDICINE | Facility: CLINIC | Age: 61
End: 2021-10-05
Payer: MEDICARE

## 2021-10-05 VITALS
SYSTOLIC BLOOD PRESSURE: 127 MMHG | BODY MASS INDEX: 26.36 KG/M2 | HEIGHT: 66 IN | HEART RATE: 60 BPM | TEMPERATURE: 97.7 F | WEIGHT: 164 LBS | DIASTOLIC BLOOD PRESSURE: 84 MMHG | OXYGEN SATURATION: 99 %

## 2021-10-05 DIAGNOSIS — Z12.5 SCREENING FOR PROSTATE CANCER: ICD-10-CM

## 2021-10-05 DIAGNOSIS — I10 HTN, GOAL BELOW 140/90: Primary | ICD-10-CM

## 2021-10-05 DIAGNOSIS — E78.5 HYPERLIPIDEMIA WITH TARGET LDL LESS THAN 130: ICD-10-CM

## 2021-10-05 DIAGNOSIS — F32.2 SEVERE DEPRESSION (H): ICD-10-CM

## 2021-10-05 DIAGNOSIS — N18.4 CKD (CHRONIC KIDNEY DISEASE) STAGE 4, GFR 15-29 ML/MIN (H): ICD-10-CM

## 2021-10-05 LAB
ERYTHROCYTE [DISTWIDTH] IN BLOOD BY AUTOMATED COUNT: 13.6 % (ref 10–15)
HCT VFR BLD AUTO: 36.5 % (ref 40–53)
HGB BLD-MCNC: 12.2 G/DL (ref 13.3–17.7)
MCH RBC QN AUTO: 27.7 PG (ref 26.5–33)
MCHC RBC AUTO-ENTMCNC: 33.4 G/DL (ref 31.5–36.5)
MCV RBC AUTO: 83 FL (ref 78–100)
PLATELET # BLD AUTO: 215 10E3/UL (ref 150–450)
RBC # BLD AUTO: 4.4 10E6/UL (ref 4.4–5.9)
WBC # BLD AUTO: 5 10E3/UL (ref 4–11)

## 2021-10-05 PROCEDURE — 99214 OFFICE O/P EST MOD 30 MIN: CPT | Performed by: INTERNAL MEDICINE

## 2021-10-05 PROCEDURE — 84443 ASSAY THYROID STIM HORMONE: CPT | Performed by: INTERNAL MEDICINE

## 2021-10-05 PROCEDURE — 85027 COMPLETE CBC AUTOMATED: CPT | Performed by: INTERNAL MEDICINE

## 2021-10-05 PROCEDURE — 96127 BRIEF EMOTIONAL/BEHAV ASSMT: CPT | Performed by: INTERNAL MEDICINE

## 2021-10-05 PROCEDURE — 80053 COMPREHEN METABOLIC PANEL: CPT | Performed by: INTERNAL MEDICINE

## 2021-10-05 PROCEDURE — G0103 PSA SCREENING: HCPCS | Performed by: INTERNAL MEDICINE

## 2021-10-05 PROCEDURE — 36415 COLL VENOUS BLD VENIPUNCTURE: CPT | Performed by: INTERNAL MEDICINE

## 2021-10-05 PROCEDURE — 80061 LIPID PANEL: CPT | Performed by: INTERNAL MEDICINE

## 2021-10-05 RX ORDER — QUETIAPINE FUMARATE 200 MG/1
TABLET, FILM COATED ORAL
Qty: 30 TABLET | Refills: 0 | Status: SHIPPED | OUTPATIENT
Start: 2021-10-05 | End: 2021-11-12

## 2021-10-05 RX ORDER — BUPROPION HYDROCHLORIDE 100 MG/1
TABLET, EXTENDED RELEASE ORAL
Qty: 30 TABLET | Refills: 0 | Status: SHIPPED | OUTPATIENT
Start: 2021-10-05 | End: 2022-05-02

## 2021-10-05 RX ORDER — BUPROPION HYDROCHLORIDE 100 MG/1
TABLET, EXTENDED RELEASE ORAL
Qty: 90 TABLET | Refills: 3 | Status: SHIPPED | OUTPATIENT
Start: 2021-10-05 | End: 2022-10-27

## 2021-10-05 ASSESSMENT — MIFFLIN-ST. JEOR: SCORE: 1491.65

## 2021-10-05 ASSESSMENT — PATIENT HEALTH QUESTIONNAIRE - PHQ9: SUM OF ALL RESPONSES TO PHQ QUESTIONS 1-9: 14

## 2021-10-05 NOTE — LETTER
October 9, 2021      Gunner Velázquez  [address] [place] [address]        Dear ,    We are writing to inform you of your test results.    High cholesterol - improved, continue diet , exercise.   Mild anemia and decreased kidney function, stable, recheck in 6 months.       Resulted Orders   Lipid panel reflex to direct LDL Fasting   Result Value Ref Range    Cholesterol 225 (H) <200 mg/dL    Triglycerides 89 <150 mg/dL    Direct Measure HDL 58 >=40 mg/dL    LDL Cholesterol Calculated 149 (H) <=100 mg/dL    Non HDL Cholesterol 167 (H) <130 mg/dL    Patient Fasting > 8hrs? Yes     Narrative    Cholesterol  Desirable:  <200 mg/dL    Triglycerides  Normal:  Less than 150 mg/dL  Borderline High:  150-199 mg/dL  High:  200-499 mg/dL  Very High:  Greater than or equal to 500 mg/dL    Direct Measure HDL  Female:  Greater than or equal to 50 mg/dL   Male:  Greater than or equal to 40 mg/dL    LDL Cholesterol  Desirable:  <100mg/dL  Above Desirable:  100-129 mg/dL   Borderline High:  130-159 mg/dL   High:  160-189 mg/dL   Very High:  >= 190 mg/dL    Non HDL Cholesterol  Desirable:  130 mg/dL  Above Desirable:  130-159 mg/dL  Borderline High:  160-189 mg/dL  High:  190-219 mg/dL  Very High:  Greater than or equal to 220 mg/dL   CBC with platelets   Result Value Ref Range    WBC Count 5.0 4.0 - 11.0 10e3/uL    RBC Count 4.40 4.40 - 5.90 10e6/uL    Hemoglobin 12.2 (L) 13.3 - 17.7 g/dL    Hematocrit 36.5 (L) 40.0 - 53.0 %    MCV 83 78 - 100 fL    MCH 27.7 26.5 - 33.0 pg    MCHC 33.4 31.5 - 36.5 g/dL    RDW 13.6 10.0 - 15.0 %    Platelet Count 215 150 - 450 10e3/uL   Comprehensive metabolic panel (BMP + Alb, Alk Phos, ALT, AST, Total. Bili, TP)   Result Value Ref Range    Sodium 141 133 - 144 mmol/L    Potassium 4.3 3.4 - 5.3 mmol/L    Chloride 109 94 - 109 mmol/L    Carbon Dioxide (CO2) 28 20 - 32 mmol/L    Anion Gap 4 3 - 14 mmol/L    Urea Nitrogen 37 (H) 7 - 30 mg/dL    Creatinine 2.40 (H) 0.66 - 1.25 mg/dL     Calcium 9.0 8.5 - 10.1 mg/dL    Glucose 96 70 - 99 mg/dL    Alkaline Phosphatase 76 40 - 150 U/L    AST 21 0 - 45 U/L    ALT 28 0 - 70 U/L    Protein Total 7.9 6.8 - 8.8 g/dL    Albumin 3.1 (L) 3.4 - 5.0 g/dL    Bilirubin Total 0.3 0.2 - 1.3 mg/dL    GFR Estimate 28 (L) >60 mL/min/1.73m2      Comment:      As of July 11, 2021, eGFR is calculated by the CKD-EPI creatinine equation, without race adjustment. eGFR can be influenced by muscle mass, exercise, and diet. The reported eGFR is an estimation only and is only applicable if the renal function is stable.   PSA, screen   Result Value Ref Range    Prostate Specific Antigen Screen 0.47 0.00 - 4.00 ug/L   TSH with free T4 reflex   Result Value Ref Range    TSH 1.94 0.40 - 4.00 mU/L       If you have any questions or concerns, please call the clinic at the number listed above.       Sincerely,      Vinnie Brown MD

## 2021-10-05 NOTE — PROGRESS NOTES
Assessment & Plan     HTN, goal below 140/90  Controlled HTN  Cont treatment , assess lab   - CBC with platelets  - TSH with free T4 reflex    CKD (chronic kidney disease) stage 4, GFR 15-29 ml/min (H)  Monitor kidney function ,follow up with nephrology   - Comprehensive metabolic panel (BMP + Alb, Alk Phos, ALT, AST, Total. Bili, TP)    Severe depression (H)  Improved, continue treatment   - buPROPion (WELLBUTRIN SR) 100 MG 12 hr tablet; TAKE ONE TABLET BY MOUTH IN THE MORNING    Screening for prostate cancer  Assess lab work   - PSA, screen    Hyperlipidemia with target LDL less than 130  Continue statin, consider change to more potent medication   - Lipid panel reflex to direct LDL Fasting             See Patient Instructions    Return in about 6 months (around 4/5/2022) for Routine Visit.    Vinnie Brown MD  [facility] 61 [facility] who presents for the following health issues     HPI   Patient is seen for a follow up visit.  No acute complaints, no medication change or new medical conditions.      Hyperlipidemia Follow-Up  Has H/O hyperlipidemia. On medical treatment and diet. No side effects. No muscle weakness, myalgias or upset stomach.       Are you regularly taking any medication or supplement to lower your cholesterol?   Yes- Simvastatin    Are you having muscle aches or other side effects that you think could be caused by your cholesterol lowering medication?  No    Hypertension Follow-up  Has h/o HTN. on medical treatment. BP has been controlled. No side effects from medications. No CP, HA, dizziness. good compliance with medications and low salt diet.      Do you check your blood pressure regularly outside of the clinic? Yes     Are you following a low salt diet? Trying low salt    Are your blood pressures ever more than 140 on the top number (systolic) OR more   than 90 on the bottom number (diastolic), for example 140/90? Varies normal to  "high     Has h/o depression. On medical treatment, controlled, no side effects. No depressive symptoms or suicidal ideation.  Has h/o CRF. Follows with nephrology. Monitoring BP, BG, medications, avoiding OTC NSAIDs. Needs periodic recheck of kidney function.      Review of Systems   Constitutional, HEENT, cardiovascular, pulmonary, gi and gu systems are negative, except as otherwise noted.      Objective    /84 (BP Location: Left arm, Patient Position: Sitting, Cuff Size: Adult Large)   Pulse 60   Temp 97.7  F (36.5  C) (Oral)   Ht 1.676 m (5' 6\")   Wt 74.4 kg (164 lb)   SpO2 99%   BMI 26.47 kg/m    Body mass index is 26.47 kg/m .  Physical Exam   GENERAL: healthy, alert and no distress  NECK: no adenopathy, no asymmetry, masses, or scars and thyroid normal to palpation  RESP: lungs clear to auscultation - no rales, rhonchi or wheezes  CV: regular rate and rhythm, normal S1 S2, no S3 or S4, no murmur, click or rub, no peripheral edema and peripheral pulses strong  ABDOMEN: soft, nontender, no hepatosplenomegaly, no masses and bowel sounds normal  MS: no gross musculoskeletal defects noted, no edema    Office Visit on 05/14/2021   Component Date Value Ref Range Status     WBC 05/14/2021 6.9  4.0 - 11.0 10e9/L Final     RBC Count 05/14/2021 4.51  4.4 - 5.9 10e12/L Final     Hemoglobin 05/14/2021 12.6* 13.3 - 17.7 g/dL Final     Hematocrit 05/14/2021 38.2* 40.0 - 53.0 % Final     MCV 05/14/2021 85  78 - 100 fl Final     MCH 05/14/2021 27.9  26.5 - 33.0 pg Final     MCHC 05/14/2021 33.0  31.5 - 36.5 g/dL Final     RDW 05/14/2021 13.3  10.0 - 15.0 % Final     Platelet Count 05/14/2021 204  150 - 450 10e9/L Final     Sodium 05/14/2021 137  133 - 144 mmol/L Final     Potassium 05/14/2021 4.4  3.4 - 5.3 mmol/L Final     Chloride 05/14/2021 106  94 - 109 mmol/L Final     Carbon Dioxide 05/14/2021 28  20 - 32 mmol/L Final     Anion Gap 05/14/2021 3  3 - 14 mmol/L Final     Glucose 05/14/2021 89  70 - 99 mg/dL " Final    Fasting specimen     Urea Nitrogen 05/14/2021 41* 7 - 30 mg/dL Final     Creatinine 05/14/2021 2.86* 0.66 - 1.25 mg/dL Final     GFR Estimate 05/14/2021 23* >60 mL/min/[1.73_m2] Final    Comment: Non  GFR Calc  Starting 12/18/2018, serum creatinine based estimated GFR (eGFR) will be   calculated using the Chronic Kidney Disease Epidemiology Collaboration   (CKD-EPI) equation.       GFR Estimate If Black 05/14/2021 26* >60 mL/min/[1.73_m2] Final    Comment:  GFR Calc  Starting 12/18/2018, serum creatinine based estimated GFR (eGFR) will be   calculated using the Chronic Kidney Disease Epidemiology Collaboration   (CKD-EPI) equation.       Calcium 05/14/2021 9.4  8.5 - 10.1 mg/dL Final     Bilirubin Total 05/14/2021 0.5  0.2 - 1.3 mg/dL Final     Albumin 05/14/2021 3.3* 3.4 - 5.0 g/dL Final     Protein Total 05/14/2021 8.4  6.8 - 8.8 g/dL Final     Alkaline Phosphatase 05/14/2021 77  40 - 150 U/L Final     ALT 05/14/2021 25  0 - 70 U/L Final     AST 05/14/2021 25  0 - 45 U/L Final     Cholesterol 05/14/2021 252* <200 mg/dL Final    Desirable:       <200 mg/dl     Triglycerides 05/14/2021 93  <150 mg/dL Final    Fasting specimen     HDL Cholesterol 05/14/2021 60  >39 mg/dL Final     LDL Cholesterol Calculated 05/14/2021 173* <100 mg/dL Final    Comment: Above desirable:  100-129 mg/dl  Borderline High:  130-159 mg/dL  High:             160-189 mg/dL  Very high:       >189 mg/dl       Non HDL Cholesterol 05/14/2021 192* <130 mg/dL Final    Comment: Above Desirable:  130-159 mg/dl  Borderline high:  160-189 mg/dl  High:             190-219 mg/dl  Very high:       >219 mg/dl       TSH 05/14/2021 1.77  0.40 - 4.00 mU/L Final

## 2021-10-06 LAB
ALBUMIN SERPL-MCNC: 3.1 G/DL (ref 3.4–5)
ALP SERPL-CCNC: 76 U/L (ref 40–150)
ALT SERPL W P-5'-P-CCNC: 28 U/L (ref 0–70)
ANION GAP SERPL CALCULATED.3IONS-SCNC: 4 MMOL/L (ref 3–14)
AST SERPL W P-5'-P-CCNC: 21 U/L (ref 0–45)
BILIRUB SERPL-MCNC: 0.3 MG/DL (ref 0.2–1.3)
BUN SERPL-MCNC: 37 MG/DL (ref 7–30)
CALCIUM SERPL-MCNC: 9 MG/DL (ref 8.5–10.1)
CHLORIDE BLD-SCNC: 109 MMOL/L (ref 94–109)
CHOLEST SERPL-MCNC: 225 MG/DL
CO2 SERPL-SCNC: 28 MMOL/L (ref 20–32)
CREAT SERPL-MCNC: 2.4 MG/DL (ref 0.66–1.25)
FASTING STATUS PATIENT QL REPORTED: YES
GFR SERPL CREATININE-BSD FRML MDRD: 28 ML/MIN/1.73M2
GLUCOSE BLD-MCNC: 96 MG/DL (ref 70–99)
HDLC SERPL-MCNC: 58 MG/DL
LDLC SERPL CALC-MCNC: 149 MG/DL
NONHDLC SERPL-MCNC: 167 MG/DL
POTASSIUM BLD-SCNC: 4.3 MMOL/L (ref 3.4–5.3)
PROT SERPL-MCNC: 7.9 G/DL (ref 6.8–8.8)
SODIUM SERPL-SCNC: 141 MMOL/L (ref 133–144)
TRIGL SERPL-MCNC: 89 MG/DL
TSH SERPL DL<=0.005 MIU/L-ACNC: 1.94 MU/L (ref 0.4–4)

## 2021-10-07 LAB — PSA SERPL-MCNC: 0.47 UG/L (ref 0–4)

## 2021-12-06 DIAGNOSIS — F43.10 PTSD (POST-TRAUMATIC STRESS DISORDER): ICD-10-CM

## 2021-12-07 RX ORDER — MIRTAZAPINE 45 MG/1
TABLET, FILM COATED ORAL
Qty: 90 TABLET | Refills: 1 | Status: SHIPPED | OUTPATIENT
Start: 2021-12-07 | End: 2022-05-27

## 2021-12-07 RX ORDER — QUETIAPINE FUMARATE 200 MG/1
TABLET, FILM COATED ORAL
Qty: 30 TABLET | Refills: 0 | Status: SHIPPED | OUTPATIENT
Start: 2021-12-07 | End: 2022-01-21

## 2021-12-07 NOTE — TELEPHONE ENCOUNTER
Routing refill request to provider for review/approval because:  I think this med is fairly new to protocol, can it be filled for the year?

## 2022-01-20 DIAGNOSIS — F43.10 PTSD (POST-TRAUMATIC STRESS DISORDER): ICD-10-CM

## 2022-01-21 RX ORDER — QUETIAPINE FUMARATE 200 MG/1
TABLET, FILM COATED ORAL
Qty: 30 TABLET | Refills: 2 | Status: SHIPPED | OUTPATIENT
Start: 2022-01-21 | End: 2022-04-29

## 2022-01-25 DIAGNOSIS — F43.10 PTSD (POST-TRAUMATIC STRESS DISORDER): ICD-10-CM

## 2022-01-26 RX ORDER — PRAZOSIN HYDROCHLORIDE 1 MG/1
CAPSULE ORAL
Qty: 120 CAPSULE | Refills: 0 | Status: SHIPPED | OUTPATIENT
Start: 2022-01-26 | End: 2022-03-29

## 2022-01-26 NOTE — TELEPHONE ENCOUNTER
Pending Prescriptions:                       Disp   Refills    prazosin (MINIPRESS) 1 MG capsule [Pharmac*120 ca*0        Sig: TAKE 2 CAPSULES BY MOUTH IN THE MORNING AND TAKE 2           CAPSULES BY MOUTH IN THE EVENING.    Routing refill request to provider for review/approval because:  Labs out of range    Please advise, thanks.

## 2022-03-07 DIAGNOSIS — H10.13 ALLERGIC CONJUNCTIVITIS, BILATERAL: ICD-10-CM

## 2022-03-07 DIAGNOSIS — R42 DIZZINESS: ICD-10-CM

## 2022-03-08 RX ORDER — MECLIZINE HCL 12.5 MG 12.5 MG/1
TABLET ORAL
Qty: 30 TABLET | Refills: 1 | Status: SHIPPED | OUTPATIENT
Start: 2022-03-08 | End: 2022-04-29

## 2022-03-08 RX ORDER — OLOPATADINE HYDROCHLORIDE 1 MG/ML
1 SOLUTION/ DROPS OPHTHALMIC 2 TIMES DAILY
Qty: 5 ML | Refills: 1 | Status: SHIPPED | OUTPATIENT
Start: 2022-03-08 | End: 2023-04-19

## 2022-03-27 DIAGNOSIS — F43.10 PTSD (POST-TRAUMATIC STRESS DISORDER): ICD-10-CM

## 2022-03-29 RX ORDER — PRAZOSIN HYDROCHLORIDE 1 MG/1
CAPSULE ORAL
Qty: 120 CAPSULE | Refills: 0 | Status: SHIPPED | OUTPATIENT
Start: 2022-03-29 | End: 2022-04-29

## 2022-03-29 NOTE — TELEPHONE ENCOUNTER
Pending Prescriptions:                       Disp   Refills    prazosin (MINIPRESS) 1 MG capsule [Pharmac*120 ca*0        Sig: TAKE 2 CAPSULES BY MOUTH IN THE MORNING AND TAKE 2           CAPSULES BY MOUTH IN THE EVENING.    Routing refill request to provider for review/approval because:  Labs out of range:  creatinine    Appointments in Next Year      May 02, 2022 10:30 AM  (Arrive by 10:10 AM)  Provider Visit with Vinnie Brown MD  [facility] ([facility] ) [phone]            Lin CAIN RN   [facility]

## 2022-04-28 DIAGNOSIS — F43.10 PTSD (POST-TRAUMATIC STRESS DISORDER): ICD-10-CM

## 2022-04-28 DIAGNOSIS — R42 DIZZINESS: ICD-10-CM

## 2022-04-29 RX ORDER — PRAZOSIN HYDROCHLORIDE 1 MG/1
CAPSULE ORAL
Qty: 360 CAPSULE | Refills: 1 | Status: SHIPPED | OUTPATIENT
Start: 2022-04-29 | End: 2022-11-09

## 2022-04-29 RX ORDER — MECLIZINE HCL 12.5 MG 12.5 MG/1
TABLET ORAL
Qty: 30 TABLET | Refills: 0 | Status: SHIPPED | OUTPATIENT
Start: 2022-04-29 | End: 2022-08-18

## 2022-04-29 RX ORDER — QUETIAPINE FUMARATE 200 MG/1
TABLET, FILM COATED ORAL
Qty: 90 TABLET | Refills: 1 | Status: SHIPPED | OUTPATIENT
Start: 2022-04-29 | End: 2022-11-09

## 2022-04-29 NOTE — TELEPHONE ENCOUNTER
Pt has appt on Monday.     Creatinine   Date Value Ref Range Status   10/05/2021 2.40 (H) 0.66 - 1.25 mg/dL Final   05/14/2021 2.86 (H) 0.66 - 1.25 mg/dL Final

## 2022-05-02 ENCOUNTER — OFFICE VISIT (OUTPATIENT)
Dept: INTERNAL MEDICINE | Facility: CLINIC | Age: 62
End: 2022-05-02
Payer: MEDICARE

## 2022-05-02 VITALS
WEIGHT: 172 LBS | HEIGHT: 66 IN | TEMPERATURE: 97.5 F | SYSTOLIC BLOOD PRESSURE: 122 MMHG | HEART RATE: 53 BPM | DIASTOLIC BLOOD PRESSURE: 83 MMHG | BODY MASS INDEX: 27.64 KG/M2 | OXYGEN SATURATION: 97 %

## 2022-05-02 DIAGNOSIS — E78.5 HYPERLIPIDEMIA WITH TARGET LDL LESS THAN 130: ICD-10-CM

## 2022-05-02 DIAGNOSIS — F32.2 SEVERE DEPRESSION (H): ICD-10-CM

## 2022-05-02 DIAGNOSIS — Z11.4 SCREENING FOR HIV (HUMAN IMMUNODEFICIENCY VIRUS): ICD-10-CM

## 2022-05-02 DIAGNOSIS — I10 HTN, GOAL BELOW 140/90: Primary | ICD-10-CM

## 2022-05-02 DIAGNOSIS — Z23 NEED FOR TD VACCINE: ICD-10-CM

## 2022-05-02 DIAGNOSIS — N18.4 CKD (CHRONIC KIDNEY DISEASE) STAGE 4, GFR 15-29 ML/MIN (H): ICD-10-CM

## 2022-05-02 LAB
CREAT UR-MCNC: 44 MG/DL
HGB BLD-MCNC: 12.5 G/DL (ref 13.3–17.7)
MICROALBUMIN UR-MCNC: 291 MG/L
MICROALBUMIN/CREAT UR: 661.36 MG/G CR (ref 0–17)

## 2022-05-02 PROCEDURE — 90714 TD VACC NO PRESV 7 YRS+ IM: CPT | Performed by: INTERNAL MEDICINE

## 2022-05-02 PROCEDURE — 80048 BASIC METABOLIC PNL TOTAL CA: CPT | Performed by: INTERNAL MEDICINE

## 2022-05-02 PROCEDURE — 84460 ALANINE AMINO (ALT) (SGPT): CPT | Performed by: INTERNAL MEDICINE

## 2022-05-02 PROCEDURE — 99214 OFFICE O/P EST MOD 30 MIN: CPT | Mod: 25 | Performed by: INTERNAL MEDICINE

## 2022-05-02 PROCEDURE — 82043 UR ALBUMIN QUANTITATIVE: CPT | Performed by: INTERNAL MEDICINE

## 2022-05-02 PROCEDURE — 90471 IMMUNIZATION ADMIN: CPT | Performed by: INTERNAL MEDICINE

## 2022-05-02 PROCEDURE — 87389 HIV-1 AG W/HIV-1&-2 AB AG IA: CPT | Performed by: INTERNAL MEDICINE

## 2022-05-02 PROCEDURE — 84450 TRANSFERASE (AST) (SGOT): CPT | Performed by: INTERNAL MEDICINE

## 2022-05-02 PROCEDURE — 36415 COLL VENOUS BLD VENIPUNCTURE: CPT | Performed by: INTERNAL MEDICINE

## 2022-05-02 PROCEDURE — 85018 HEMOGLOBIN: CPT | Performed by: INTERNAL MEDICINE

## 2022-05-02 ASSESSMENT — PATIENT HEALTH QUESTIONNAIRE - PHQ9: SUM OF ALL RESPONSES TO PHQ QUESTIONS 1-9: 12

## 2022-05-02 NOTE — PROGRESS NOTES
"  Assessment & Plan     HTN, goal below 140/90  Controlled HTN, cont treatment   - Albumin Random Urine Quantitative with Creat Ratio  - BASIC METABOLIC PANEL  - Hemoglobin    Hyperlipidemia with target LDL less than 130  Assess lab work   - AST  - ALT    Screening for HIV (human immunodeficiency virus)  - HIV Antigen Antibody Combo    Severe depression (H)  Improved , continue treatment     CKD (chronic kidney disease) stage 4, GFR 15-29 ml/min (H)  Follow up with nephrology. Assess lab work   - Albumin Random Urine Quantitative with Creat Ratio  - BASIC METABOLIC PANEL  - Hemoglobin             BMI:   Estimated body mass index is 27.76 kg/m  as calculated from the following:    Height as of this encounter: 1.676 m (5' 6\").    Weight as of this encounter: 78 kg (172 lb).       See Patient Instructions    Return in about 6 months (around 11/2/2022) for Physical Exam.    Vinnie Brown MD  [facility] THA Martino is a 61 year old who presents for the following health issues     HPI     Patient is seen for a follow up visit.  No acute complaints, no medication change or new medical conditions.    Has h/o depression. On medical treatment, controlled, no side effects. Reports chronic stable depressive symptoms , no  suicidal ideation.    Hyperlipidemia Follow-Up  Has H/O hyperlipidemia. On medical treatment and diet. No side effects. No muscle weakness, myalgias or upset stomach.       Are you regularly taking any medication or supplement to lower your cholesterol?   Yes- Simvastatin    Are you having muscle aches or other side effects that you think could be caused by your cholesterol lowering medication?  No    Hypertension Follow-up  Has h/o HTN. on medical treatment. BP has been controlled. No side effects from medications. No CP, HA, dizziness. good compliance with medications and low salt diet.      Do you check your blood pressure regularly outside of the clinic? Yes     Are " "you following a low salt diet? Yes    Are your blood pressures ever more than 140 on the top number (systolic) OR more   than 90 on the bottom number (diastolic), for example 140/90? Yes, varies      Has h/o CRF. Monitoring BP, BG, medications, avoiding OTC NSAIDs. Needs periodic recheck of kidney function.      Review of Systems   Constitutional, HEENT, cardiovascular, pulmonary, gi and gu systems are negative, except as otherwise noted.      Objective    /83 (BP Location: Left arm, Patient Position: Sitting, Cuff Size: Adult Regular)   Pulse 53   Temp 97.5  F (36.4  C) (Oral)   Ht 1.676 m (5' 6\")   Wt 78 kg (172 lb)   SpO2 97%   BMI 27.76 kg/m    Body mass index is 27.76 kg/m .  Physical Exam   GENERAL: healthy, alert and no distress  EYES: Eyes grossly normal to inspection, PERRL and conjunctivae and sclerae normal  HENT: ear canals and TM's normal, nose and mouth without ulcers or lesions  NECK: no adenopathy, no asymmetry, masses, or scars and thyroid normal to palpation  RESP: lungs clear to auscultation - no rales, rhonchi or wheezes  CV: regular rate and rhythm, normal S1 S2, no S3 or S4, no murmur, click or rub, no peripheral edema and peripheral pulses strong  ABDOMEN: soft, nontender, no hepatosplenomegaly, no masses and bowel sounds normal  MS: no gross musculoskeletal defects noted, no edema  SKIN: no suspicious lesions or rashes  NEURO: Normal strength and tone, mentation intact and speech normal    Office Visit on 10/05/2021   Component Date Value Ref Range Status     Cholesterol 10/05/2021 225 (A) <200 mg/dL Final     Triglycerides 10/05/2021 89  <150 mg/dL Final     Direct Measure HDL 10/05/2021 58  >=40 mg/dL Final     LDL Cholesterol Calculated 10/05/2021 149 (A) <=100 mg/dL Final     Non HDL Cholesterol 10/05/2021 167 (A) <130 mg/dL Final     Patient Fasting > 8hrs? 10/05/2021 Yes   Final     WBC Count 10/05/2021 5.0  4.0 - 11.0 10e3/uL Final     RBC Count 10/05/2021 4.40  4.40 - 5.90 " 10e6/uL Final     Hemoglobin 10/05/2021 12.2 (A) 13.3 - 17.7 g/dL Final     Hematocrit 10/05/2021 36.5 (A) 40.0 - 53.0 % Final     MCV 10/05/2021 83  78 - 100 fL Final     MCH 10/05/2021 27.7  26.5 - 33.0 pg Final     MCHC 10/05/2021 33.4  31.5 - 36.5 g/dL Final     RDW 10/05/2021 13.6  10.0 - 15.0 % Final     Platelet Count 10/05/2021 215  150 - 450 10e3/uL Final     Sodium 10/05/2021 141  133 - 144 mmol/L Final     Potassium 10/05/2021 4.3  3.4 - 5.3 mmol/L Final     Chloride 10/05/2021 109  94 - 109 mmol/L Final     Carbon Dioxide (CO2) 10/05/2021 28  20 - 32 mmol/L Final     Anion Gap 10/05/2021 4  3 - 14 mmol/L Final     Urea Nitrogen 10/05/2021 37 (A) 7 - 30 mg/dL Final     Creatinine 10/05/2021 2.40 (A) 0.66 - 1.25 mg/dL Final     Calcium 10/05/2021 9.0  8.5 - 10.1 mg/dL Final     Glucose 10/05/2021 96  70 - 99 mg/dL Final     Alkaline Phosphatase 10/05/2021 76  40 - 150 U/L Final     AST 10/05/2021 21  0 - 45 U/L Final     ALT 10/05/2021 28  0 - 70 U/L Final     Protein Total 10/05/2021 7.9  6.8 - 8.8 g/dL Final     Albumin 10/05/2021 3.1 (A) 3.4 - 5.0 g/dL Final     Bilirubin Total 10/05/2021 0.3  0.2 - 1.3 mg/dL Final     GFR Estimate 10/05/2021 28 (A) >60 mL/min/1.73m2 Final    As of July 11, 2021, eGFR is calculated by the CKD-EPI creatinine equation, without race adjustment. eGFR can be influenced by muscle mass, exercise, and diet. The reported eGFR is an estimation only and is only applicable if the renal function is stable.     Prostate Specific Antigen Screen 10/05/2021 0.47  0.00 - 4.00 ug/L Final     TSH 10/05/2021 1.94  0.40 - 4.00 mU/L Final

## 2022-05-02 NOTE — LETTER
[facility] [address], [address]  [place] [address]  [phone]        May 3, 2022    Gunner Hays[address]  [place] [address]            Dear Mr. Gunner Whyterosariodonald:    Your recent labs are all normal except for Mild anemia and decreased kidney function, stable.   Slightly elevated one of the liver enzymes, recommend to recheck AST, ALT in 3 months.     Please give us a call with any questions.        Vinnie Brown MD     Results for orders placed or performed in visit on 05/02/22   HIV Antigen Antibody Combo     Status: Normal   Result Value Ref Range    HIV Antigen Antibody Combo Nonreactive Nonreactive   Albumin Random Urine Quantitative with Creat Ratio     Status: Abnormal   Result Value Ref Range    Creatinine Urine mg/dL 44 mg/dL    Albumin Urine mg/L 291 mg/L    Albumin Urine mg/g Cr 661.36 (H) 0.00 - 17.00 mg/g Cr   BASIC METABOLIC PANEL     Status: Abnormal   Result Value Ref Range    Sodium 139 133 - 144 mmol/L    Potassium 4.1 3.4 - 5.3 mmol/L    Chloride 107 94 - 109 mmol/L    Carbon Dioxide (CO2) 24 20 - 32 mmol/L    Anion Gap 8 3 - 14 mmol/L    Urea Nitrogen 35 (H) 7 - 30 mg/dL    Creatinine 2.89 (H) 0.66 - 1.25 mg/dL    Calcium 8.8 8.5 - 10.1 mg/dL    Glucose 112 (H) 70 - 99 mg/dL    GFR Estimate 24 (L) >60 mL/min/1.73m2   Hemoglobin     Status: Abnormal   Result Value Ref Range    Hemoglobin 12.5 (L) 13.3 - 17.7 g/dL   AST     Status: Abnormal   Result Value Ref Range    AST 47 (H) 0 - 45 U/L   ALT     Status: Normal   Result Value Ref Range    ALT 47 0 - 70 U/L

## 2022-05-03 LAB
ALT SERPL W P-5'-P-CCNC: 47 U/L (ref 0–70)
ANION GAP SERPL CALCULATED.3IONS-SCNC: 8 MMOL/L (ref 3–14)
AST SERPL W P-5'-P-CCNC: 47 U/L (ref 0–45)
BUN SERPL-MCNC: 35 MG/DL (ref 7–30)
CALCIUM SERPL-MCNC: 8.8 MG/DL (ref 8.5–10.1)
CHLORIDE BLD-SCNC: 107 MMOL/L (ref 94–109)
CO2 SERPL-SCNC: 24 MMOL/L (ref 20–32)
CREAT SERPL-MCNC: 2.89 MG/DL (ref 0.66–1.25)
GFR SERPL CREATININE-BSD FRML MDRD: 24 ML/MIN/1.73M2
GLUCOSE BLD-MCNC: 112 MG/DL (ref 70–99)
HIV 1+2 AB+HIV1 P24 AG SERPL QL IA: NONREACTIVE
POTASSIUM BLD-SCNC: 4.1 MMOL/L (ref 3.4–5.3)
SODIUM SERPL-SCNC: 139 MMOL/L (ref 133–144)

## 2022-05-17 NOTE — TELEPHONE ENCOUNTER
Provider approval needed.   Last OV on 9/1/20   Pt is scheduled in May     BP Readings from Last 3 Encounters:   01/17/20 120/72   12/20/19 (!) 132/90   11/08/19 (!) 138/92     PHQ 12/20/2019 1/24/2020 5/1/2020   PHQ-9 Total Score 20 18 18   Q9: Thoughts of better off dead/self-harm past 2 weeks Several days Several days Several days   F/U: Thoughts of suicide or self-harm No - -   F/U: Self harm-plan - - -   F/U: Self-harm action - - -   F/U: Safety concerns - - -        H Plasty Text: Given the location of the defect, shape of the defect and the proximity to free margins a H-plasty was deemed most appropriate for repair.  Using a sterile surgical marker, the appropriate advancement arms of the H-plasty were drawn incorporating the defect and placing the expected incisions within the relaxed skin tension lines where possible. The area thus outlined was incised deep to adipose tissue with a #15 scalpel blade. The skin margins were undermined to an appropriate distance in all directions utilizing iris scissors.  The opposing advancement arms were then advanced into place in opposite direction and anchored with interrupted buried subcutaneous sutures.

## 2022-05-25 DIAGNOSIS — I10 HTN, GOAL BELOW 140/90: ICD-10-CM

## 2022-05-30 RX ORDER — CARVEDILOL 25 MG/1
TABLET ORAL
Qty: 180 TABLET | Refills: 2 | Status: SHIPPED | OUTPATIENT
Start: 2022-05-30 | End: 2023-09-21

## 2022-08-16 DIAGNOSIS — R42 DIZZINESS: ICD-10-CM

## 2022-08-18 RX ORDER — MECLIZINE HCL 12.5 MG 12.5 MG/1
TABLET ORAL
Qty: 30 TABLET | Refills: 1 | Status: SHIPPED | OUTPATIENT
Start: 2022-08-18 | End: 2023-01-25

## 2022-08-18 NOTE — TELEPHONE ENCOUNTER
Meclizine (Antivert) 12.5 mg tab  Prescription approved per [facility] Refill Protocol.

## 2022-11-07 DIAGNOSIS — F43.10 PTSD (POST-TRAUMATIC STRESS DISORDER): ICD-10-CM

## 2022-11-08 ENCOUNTER — TELEPHONE (OUTPATIENT)
Dept: INTERNAL MEDICINE | Facility: CLINIC | Age: 62
End: 2022-11-08

## 2022-11-08 NOTE — TELEPHONE ENCOUNTER
"RANDALL Macario with [facility] Transplant Team calls. Patient has appointment tomorrow with Dr. Brown and Dr. Vilchis is recommending he have some immunizations updated at the appointment. Amirah is sending a fax to clinic with list of immunizations recommended, but this information should also be in Care Everywhere in Dr. Vilchis's last visit notes. Please call Amirah with any questions - Phone: [phone].     Care Everywhere updated, per Dr. Vilchis's 10/4/22 office visit note:   \"2. Immunizations: Care everywhere and MIIC reviewed, EPIC updated as needed. Recommended when he sees his PCP that he get the flu vaccine, covid bivalent booster and prevnar OR pneumonia 20 valent vaccine.\"     Routed to provider to review. Note added to patient's appointment as well to update immunizations.     Karin Fernandez RN  [facility]    "

## 2022-11-09 ENCOUNTER — OFFICE VISIT (OUTPATIENT)
Dept: INTERNAL MEDICINE | Facility: CLINIC | Age: 62
End: 2022-11-09
Payer: MEDICARE

## 2022-11-09 VITALS
HEIGHT: 66 IN | OXYGEN SATURATION: 100 % | HEART RATE: 55 BPM | RESPIRATION RATE: 18 BRPM | DIASTOLIC BLOOD PRESSURE: 84 MMHG | BODY MASS INDEX: 27.11 KG/M2 | SYSTOLIC BLOOD PRESSURE: 132 MMHG | WEIGHT: 168.7 LBS | TEMPERATURE: 97.4 F

## 2022-11-09 DIAGNOSIS — Z13.220 SCREENING FOR HYPERLIPIDEMIA: ICD-10-CM

## 2022-11-09 DIAGNOSIS — F32.2 SEVERE DEPRESSION (H): ICD-10-CM

## 2022-11-09 DIAGNOSIS — E78.1 HIGH TRIGLYCERIDES: ICD-10-CM

## 2022-11-09 DIAGNOSIS — Z23 NEED FOR VACCINATION: ICD-10-CM

## 2022-11-09 DIAGNOSIS — N18.5 CKD (CHRONIC KIDNEY DISEASE) STAGE 5, GFR LESS THAN 15 ML/MIN (H): ICD-10-CM

## 2022-11-09 DIAGNOSIS — Z00.00 ENCOUNTER FOR PREVENTATIVE ADULT HEALTH CARE EXAMINATION: Primary | ICD-10-CM

## 2022-11-09 DIAGNOSIS — Z12.5 SCREENING FOR PROSTATE CANCER: ICD-10-CM

## 2022-11-09 DIAGNOSIS — I10 HTN, GOAL BELOW 140/90: ICD-10-CM

## 2022-11-09 DIAGNOSIS — F41.1 GAD (GENERALIZED ANXIETY DISORDER): ICD-10-CM

## 2022-11-09 LAB
CREAT UR-MCNC: 52 MG/DL
MICROALBUMIN UR-MCNC: 216 MG/L
MICROALBUMIN/CREAT UR: 415.38 MG/G CR (ref 0–17)

## 2022-11-09 PROCEDURE — 36415 COLL VENOUS BLD VENIPUNCTURE: CPT | Performed by: INTERNAL MEDICINE

## 2022-11-09 PROCEDURE — 80061 LIPID PANEL: CPT | Performed by: INTERNAL MEDICINE

## 2022-11-09 PROCEDURE — 82043 UR ALBUMIN QUANTITATIVE: CPT | Performed by: INTERNAL MEDICINE

## 2022-11-09 PROCEDURE — 80053 COMPREHEN METABOLIC PANEL: CPT | Performed by: INTERNAL MEDICINE

## 2022-11-09 PROCEDURE — G0009 ADMIN PNEUMOCOCCAL VACCINE: HCPCS | Performed by: INTERNAL MEDICINE

## 2022-11-09 PROCEDURE — 99396 PREV VISIT EST AGE 40-64: CPT | Mod: 25 | Performed by: INTERNAL MEDICINE

## 2022-11-09 PROCEDURE — G0103 PSA SCREENING: HCPCS | Performed by: INTERNAL MEDICINE

## 2022-11-09 PROCEDURE — 84443 ASSAY THYROID STIM HORMONE: CPT | Performed by: INTERNAL MEDICINE

## 2022-11-09 PROCEDURE — 90677 PCV20 VACCINE IM: CPT | Performed by: INTERNAL MEDICINE

## 2022-11-09 RX ORDER — QUETIAPINE FUMARATE 200 MG/1
TABLET, FILM COATED ORAL
Qty: 90 TABLET | Refills: 0 | Status: SHIPPED | OUTPATIENT
Start: 2022-11-09 | End: 2023-01-25

## 2022-11-09 RX ORDER — CALCITRIOL 0.25 UG/1
0.25 CAPSULE, LIQUID FILLED ORAL
COMMUNITY
Start: 2022-09-28 | End: 2023-09-28

## 2022-11-09 RX ORDER — PRAZOSIN HYDROCHLORIDE 1 MG/1
CAPSULE ORAL
Qty: 360 CAPSULE | Refills: 0 | Status: SHIPPED | OUTPATIENT
Start: 2022-11-09 | End: 2023-01-25

## 2022-11-09 ASSESSMENT — PATIENT HEALTH QUESTIONNAIRE - PHQ9
SUM OF ALL RESPONSES TO PHQ QUESTIONS 1-9: 11
SUM OF ALL RESPONSES TO PHQ QUESTIONS 1-9: 11

## 2022-11-09 ASSESSMENT — PAIN SCALES - GENERAL: PAINLEVEL: NO PAIN (0)

## 2022-11-09 NOTE — PROGRESS NOTES
SUBJECTIVE:   CC: Gunner is an 62 year old who presents for preventative health visit.       Patient has been advised of split billing requirements and indicates understanding: Yes  HPI  Ability to successfully perform activities of daily living: Yes, no assistance needed  Home safety:  none identified   Hearing impairment: No            Today's PHQ-2 Score:   PHQ-2 ( 1999 [employer]) 4/17/2020   Q1: Little interest or pleasure in doing things 0   Q2: Feeling down, depressed or hopeless 1   PHQ-2 Score 1   PHQ-2 Total Score (12-17 Years)- Positive if 3 or more points; Administer PHQ-A if positive 1       Abuse: Current or Past(Physical, Sexual or Emotional)- No  Do you feel safe in your environment? Yes        Social History     Tobacco Use     Smoking status: Former     Packs/day: 0.00     Types: Cigarettes     Smokeless tobacco: Never   Substance Use Topics     Alcohol use: No     Alcohol/week: 0.0 standard drinks     If you drink alcohol do you typically have >3 drinks per day or >7 drinks per week? No    No flowsheet data found.No flowsheet data found.    Last PSA:   PSA   Date Value Ref Range Status   09/02/2020 0.40 0 - 4 ug/L Final     Comment:     Assay Method:  Chemiluminescence using Siemens Vista analyzer     Prostate Specific Antigen Screen   Date Value Ref Range Status   10/05/2021 0.47 0.00 - 4.00 ug/L Final       Reviewed orders with patient. Reviewed health maintenance and updated orders accordingly - Yes  Lab work is in process  Labs reviewed in EPIC    Reviewed and updated as needed this visit by clinical staff   Tobacco  Allergies  Meds  Problems  Med Hx  Surg Hx  Fam Hx          Reviewed and updated as needed this visit by Provider                     Review of Systems  CONSTITUTIONAL: NEGATIVE for fever, chills, change in weight  INTEGUMENTARY/SKIN: NEGATIVE for worrisome rashes, moles or lesions  EYES: NEGATIVE for vision changes or irritation  ENT: NEGATIVE for ear, mouth and throat  "problems  RESP: NEGATIVE for significant cough or SOB  CV: NEGATIVE for chest pain, palpitations or peripheral edema  GI: NEGATIVE for nausea, abdominal pain, heartburn, or change in bowel habits   male: negative for dysuria, hematuria, decreased urinary stream, erectile dysfunction, urethral discharge  MUSCULOSKELETAL: NEGATIVE for significant arthralgias or myalgia  NEURO: NEGATIVE for weakness, dizziness or paresthesias  PSYCHIATRIC: NEGATIVE for changes in mood or affect    OBJECTIVE:   /84 (BP Location: Left arm, Cuff Size: Adult Regular)   Pulse 55   Temp 97.4  F (36.3  C) (Tympanic)   Resp 18   Ht 1.676 m (5' 6\")   Wt 76.5 kg (168 lb 11.2 oz)   SpO2 100%   BMI 27.23 kg/m      Physical Exam  GENERAL: healthy, alert and no distress  EYES: Eyes grossly normal to inspection, PERRL and conjunctivae and sclerae normal  HENT: ear canals and TM's normal, nose and mouth without ulcers or lesions  NECK: no adenopathy, no asymmetry, masses, or scars and thyroid normal to palpation  RESP: lungs clear to auscultation - no rales, rhonchi or wheezes  CV: regular rate and rhythm, normal S1 S2, no S3 or S4, no murmur, click or rub, no peripheral edema and peripheral pulses strong  ABDOMEN: soft, nontender, no hepatosplenomegaly, no masses and bowel sounds normal  MS: no gross musculoskeletal defects noted, no edema  SKIN: no suspicious lesions or rashes  NEURO: Normal strength and tone, mentation intact and speech normal  PSYCH: mentation appears normal, affect normal/bright    Diagnostic Test Results:  Labs reviewed in Epic    ASSESSMENT/PLAN:       ICD-10-CM    1. Encounter for preventative adult health care examination  Z00.00 Lipid panel reflex to direct LDL Non-fasting     PSA, screen     TSH with free T4 reflex      2. Screening for hyperlipidemia  Z13.220 Lipid panel reflex to direct LDL Non-fasting     Lipid panel reflex to direct LDL Non-fasting      3. High triglycerides  E78.1 Lipid panel reflex to " "direct LDL Non-fasting     Lipid panel reflex to direct LDL Non-fasting      4. Screening for prostate cancer  Z12.5 PSA, screen      5. HTN, goal below 140/90  I10 TSH with free T4 reflex      6. CKD (chronic kidney disease) stage 5, GFR less than 15 ml/min (H)  N18.5       7. MOE (generalized anxiety disorder)  F41.1       8. Severe depression (H)  F32.2           Patient has been advised of split billing requirements and indicates understanding: Yes      COUNSELING:   Reviewed preventive health counseling, as reflected in patient instructions       Regular exercise       Healthy diet/nutrition       Vision screening       Hearing screening       Colorectal cancer screening       Prostate cancer screening    Estimated body mass index is 27.23 kg/m  as calculated from the following:    Height as of this encounter: 1.676 m (5' 6\").    Weight as of this encounter: 76.5 kg (168 lb 11.2 oz).     Weight management plan: Discussed healthy diet and exercise guidelines    He reports that he has quit smoking. His smoking use included cigarettes. He has never used smokeless tobacco.      Counseling Resources:  ATP IV Guidelines  Pooled Cohorts Equation Calculator  FRAX Risk Assessment  ICSI Preventive Guidelines  Dietary Guidelines for Americans, 2010  [employer] MyPlate  ASA Prophylaxis  Lung CA Screening    Vinnie Brown MD  [facility]  Answers for HPI/ROS submitted by the patient on 11/9/2022  PHQ9 TOTAL SCORE: 11      "

## 2022-11-09 NOTE — LETTER
November 11, 2022      Gunner Velázquez  [address]  [place] [address]        Dear ,    We are writing to inform you of your test results.    Your labs show decreased kidney function, please follow up with Nephrology.  The rest of the results were within normal limits.    Resulted Orders   Albumin Random Urine Quantitative with Creat Ratio   Result Value Ref Range    Creatinine Urine mg/dL 52 mg/dL    Albumin Urine mg/L 216 mg/L    Albumin Urine mg/g Cr 415.38 (H) 0.00 - 17.00 mg/g Cr   COMPREHENSIVE METABOLIC PANEL   Result Value Ref Range    Sodium 138 133 - 144 mmol/L    Potassium 4.9 3.4 - 5.3 mmol/L    Chloride 105 94 - 109 mmol/L    Carbon Dioxide (CO2) 27 20 - 32 mmol/L    Anion Gap 6 3 - 14 mmol/L    Urea Nitrogen 51 (H) 7 - 30 mg/dL    Creatinine 3.69 (H) 0.66 - 1.25 mg/dL    Calcium 9.2 8.5 - 10.1 mg/dL    Glucose 95 70 - 99 mg/dL    Alkaline Phosphatase 97 40 - 150 U/L    AST 33 0 - 45 U/L    ALT 36 0 - 70 U/L    Protein Total 8.0 6.8 - 8.8 g/dL    Albumin 3.1 (L) 3.4 - 5.0 g/dL    Bilirubin Total 0.4 0.2 - 1.3 mg/dL    GFR Estimate 18 (L) >60 mL/min/1.73m2      Comment:      Effective December 21, 2021 eGFRcr in adults is calculated using the 2021 CKD-EPI creatinine equation which includes age and gender (Guillermina et al., NEJ, DOI: 10.1056/ZNUVwb5740133)   Lipid panel reflex to direct LDL Non-fasting   Result Value Ref Range    Cholesterol 146 <200 mg/dL    Triglycerides 124 <150 mg/dL    Direct Measure HDL 48 >=40 mg/dL    LDL Cholesterol Calculated 73 <=100 mg/dL    Non HDL Cholesterol 98 <130 mg/dL    Patient Fasting > 8hrs? No     Narrative    Cholesterol  Desirable:  <200 mg/dL    Triglycerides  Normal:  Less than 150 mg/dL  Borderline High:  150-199 mg/dL  High:  200-499 mg/dL  Very High:  Greater than or equal to 500 mg/dL    Direct Measure HDL  Female:  Greater than or equal to 50 mg/dL   Male:  Greater than or equal to 40 mg/dL    LDL Cholesterol  Desirable:  <100mg/dL  Above  Desirable:  100-129 mg/dL   Borderline High:  130-159 mg/dL   High:  160-189 mg/dL   Very High:  >= 190 mg/dL    Non HDL Cholesterol  Desirable:  130 mg/dL  Above Desirable:  130-159 mg/dL  Borderline High:  160-189 mg/dL  High:  190-219 mg/dL  Very High:  Greater than or equal to 220 mg/dL   PSA, screen   Result Value Ref Range    Prostate Specific Antigen Screen 0.51 0.00 - 4.00 ug/L   TSH with free T4 reflex   Result Value Ref Range    TSH 1.95 0.40 - 4.00 mU/L       If you have any questions or concerns, please call the clinic at the number listed above.       Sincerely,      Vinnie Brown MD        [facility]an

## 2022-11-09 NOTE — TELEPHONE ENCOUNTER
Pending Prescriptions:                       Disp   Refills    QUEtiapine (SEROQUEL) 200 MG tablet [Pharm*90 tab*0        Sig: TAKE ONE TABLET BY MOUTH AT BEDTIME    prazosin (MINIPRESS) 1 MG capsule [Pharmac*360 ca*0        Sig: TAKE 2 CAPSULES BY MOUTH IN THE MORNING AND TAKE 2           CAPSULES BY MOUTH IN THE EVENING.    Routing refill request to provider for review/approval because:  Needs provider review

## 2022-11-09 NOTE — PROGRESS NOTES
Assessment & Plan     Screening for hyperlipidemia  Assess lipids  - Lipid panel reflex to direct LDL Non-fasting  - Lipid panel reflex to direct LDL Non-fasting    High triglycerides    - Lipid panel reflex to direct LDL Non-fasting  - Lipid panel reflex to direct LDL Non-fasting    Encounter for preventative adult health care examination  Preventive measures discussed   - Lipid panel reflex to direct LDL Non-fasting  - PSA, screen  - TSH with free T4 reflex    Screening for prostate cancer  Assess PSA   - PSA, screen    HTN, goal below 140/90  Controlled on treatment   - TSH with free T4 reflex    CKD (chronic kidney disease) stage 5, GFR less than 15 ml/min (H)  Follow up with nephrology , assessed for transplant     MOE (generalized anxiety disorder)  Improved on treatment     Severe depression (H)  Controlled.                See Patient Instructions    Return in about 6 months (around 5/9/2023) for Routine Visit.    Vinnie Brown MD  [facility] THA Martino is a 62 year old, presenting for the following health issues:  RECHECK      HPI     Hypertension Follow-up  Has h/o HTN. on medical treatment. BP has been controlled. No side effects from medications. No CP, HA, dizziness. good compliance with medications and low salt diet.      Do you check your blood pressure regularly outside of the clinic? Yes     Are you following a low salt diet? Yes    Are your blood pressures ever more than 140 on the top number (systolic) OR more   than 90 on the bottom number (diastolic), for example 140/90? Yes    Depression and Anxiety Follow-Up  Has h/o depression. On medical treatment, controlled, no side effects. No depressive symptoms or suicidal ideation.      How are you doing with your depression since your last visit? Improved     How are you doing with your anxiety since your last visit?  Improved     Are you having other symptoms that might be associated with depression or  anxiety? No    Have you had a significant life event? No     Do you have any concerns with your use of alcohol or other drugs? No    Social History     Tobacco Use     Smoking status: Former     Packs/day: 0.00     Types: Cigarettes     Smokeless tobacco: Never   Substance Use Topics     Alcohol use: No     Alcohol/week: 0.0 standard drinks     Drug use: No     PHQ 10/5/2021 5/2/2022 11/9/2022   PHQ-9 Total Score 14 12 11   Q9: Thoughts of better off dead/self-harm past 2 weeks Several days Several days Not at all   F/U: Thoughts of suicide or self-harm - - -   F/U: Self harm-plan - - -   F/U: Self-harm action - - -   F/U: Safety concerns - - -     MOE-7 SCORE 12/20/2019 1/24/2020 5/1/2020   Total Score - - -   Total Score - - -   Total Score 12 11 15     Last PHQ-9 11/9/2022   1.  Little interest or pleasure in doing things 1   2.  Feeling down, depressed, or hopeless 1   3.  Trouble falling or staying asleep, or sleeping too much 2   4.  Feeling tired or having little energy 2   5.  Poor appetite or overeating 1   6.  Feeling bad about yourself 2   7.  Trouble concentrating 1   8.  Moving slowly or restless 1   Q9: Thoughts of better off dead/self-harm past 2 weeks 0   PHQ-9 Total Score 11   Difficulty at work, home, or with people -   In the past two weeks have you had thoughts of suicide or self harm? -   Do you have concerns about your personal safety or the safety of others? -   In the past 2 weeks have you thought about a plan or had intention to harm yourself? -   In the past 2 weeks have you acted on these thoughts in any way? -     MOE-7  5/1/2020   1. Feeling nervous, anxious, or on edge 1   2. Not being able to stop or control worrying 2   3. Worrying too much about different things 2   4. Trouble relaxing 3   5. Being so restless that it is hard to sit still 3   6. Becoming easily annoyed or irritable 2   7. Feeling afraid, as if something awful might happen 2   MOE-7 Total Score 15   If you checked any  "problems, how difficult have they made it for you to do your work, take care of things at home, or get along with other people? Somewhat difficult       Suicide Assessment Five-step Evaluation and Treatment (SAFE-T)      How many servings of fruits and vegetables do you eat daily?  0-1    On average, how many sweetened beverages do you drink each day (Examples: soda, juice, sweet tea, etc.  Do NOT count diet or artificially sweetened beverages)?   0    How many days per week do you exercise enough to make your heart beat faster? 3 or less    How many minutes a day do you exercise enough to make your heart beat faster? 20 - 29    How many days per week do you miss taking your medication? 0    Has h/o CRF. Monitoring BP, BG, medications, avoiding OTC NSAIDs. Needs periodic recheck of kidney function.  Has H/O hyperlipidemia. On medical treatment and diet. No side effects. No muscle weakness, myalgias or upset stomach.       Review of Systems   Constitutional, HEENT, cardiovascular, pulmonary, GI, , musculoskeletal, neuro, skin, endocrine and psych systems are negative, except as otherwise noted.      Objective    /84 (BP Location: Left arm, Cuff Size: Adult Regular)   Pulse 55   Temp 97.4  F (36.3  C) (Tympanic)   Resp 18   Ht 1.676 m (5' 6\")   Wt 76.5 kg (168 lb 11.2 oz)   SpO2 100%   BMI 27.23 kg/m    Body mass index is 27.23 kg/m .  Physical Exam   GENERAL: healthy, alert and no distress  EYES: Eyes grossly normal to inspection, PERRL and conjunctivae and sclerae normal  HENT: ear canals and TM's normal, nose and mouth without ulcers or lesions  NECK: no adenopathy, no asymmetry, masses, or scars and thyroid normal to palpation  RESP: lungs clear to auscultation - no rales, rhonchi or wheezes  CV: regular rate and rhythm, normal S1 S2, no S3 or S4, no murmur, click or rub, no peripheral edema and peripheral pulses strong  ABDOMEN: soft, nontender, no hepatosplenomegaly, no masses and bowel sounds " normal  MS: no gross musculoskeletal defects noted, no edema  SKIN: no suspicious lesions or rashes  NEURO: Normal strength and tone, mentation intact and speech normal  PSYCH: mentation appears normal, affect normal/bright    Office Visit on 05/02/2022   Component Date Value Ref Range Status     HIV Antigen Antibody Combo 05/02/2022 Nonreactive  Nonreactive Final    HIV-1 p24 Ag & HIV-1/HIV-2 Ab Not Detected     Creatinine Urine mg/dL 05/02/2022 44  mg/dL Final     Albumin Urine mg/L 05/02/2022 291  mg/L Final     Albumin Urine mg/g Cr 05/02/2022 661.36 (H)  0.00 - 17.00 mg/g Cr Final     Sodium 05/02/2022 139  133 - 144 mmol/L Final     Potassium 05/02/2022 4.1  3.4 - 5.3 mmol/L Final     Chloride 05/02/2022 107  94 - 109 mmol/L Final     Carbon Dioxide (CO2) 05/02/2022 24  20 - 32 mmol/L Final     Anion Gap 05/02/2022 8  3 - 14 mmol/L Final     Urea Nitrogen 05/02/2022 35 (H)  7 - 30 mg/dL Final     Creatinine 05/02/2022 2.89 (H)  0.66 - 1.25 mg/dL Final     Calcium 05/02/2022 8.8  8.5 - 10.1 mg/dL Final     Glucose 05/02/2022 112 (H)  70 - 99 mg/dL Final     GFR Estimate 05/02/2022 24 (L)  >60 mL/min/1.73m2 Final    Effective December 21, 2021 eGFRcr in adults is calculated using the 2021 CKD-EPI creatinine equation which includes age and gender (Guillermina et al., NEJ, DOI: 10.1056/WMNSua3375324)     Hemoglobin 05/02/2022 12.5 (L)  13.3 - 17.7 g/dL Final     AST 05/02/2022 47 (H)  0 - 45 U/L Final     ALT 05/02/2022 47  0 - 70 U/L Final                   Answers for HPI/ROS submitted by the patient on 11/9/2022  PHQ9 TOTAL SCORE: 11

## 2022-11-09 NOTE — NURSING NOTE
Prior to immunization administration, verified patients identity using patient s name and date of birth. Please see Immunization Activity for additional information.     Screening Questionnaire for Adult Immunization    Are you sick today?   No   Do you have allergies to medications, food, a vaccine component or latex?   No   Have you ever had a serious reaction after receiving a vaccination?   No   Do you have a long-term health problem with heart, lung, kidney, or metabolic disease (e.g., diabetes), asthma, a blood disorder, no spleen, complement component deficiency, a cochlear implant, or a spinal fluid leak?  Are you on long-term aspirin therapy?   Yes   Do you have cancer, leukemia, HIV/AIDS, or any other immune system problem?   No   Do you have a parent, brother, or sister with an immune system problem?   No   In the past 3 months, have you taken medications that affect  your immune system, such as prednisone, other steroids, or anticancer drugs; drugs for the treatment of rheumatoid arthritis, Crohn s disease, or psoriasis; or have you had radiation treatments?   No   Have you had a seizure, or a brain or other nervous system problem?   No   During the past year, have you received a transfusion of blood or blood    products, or been given immune (gamma) globulin or antiviral drug?   No   For women: Are you pregnant or is there a chance you could become       pregnant during the next month?   No   Have you received any vaccinations in the past 4 weeks?   Yes     Immunization questionnaire was positive for at least one answer.  Notified provider ordered.        Per orders of Dr. Brown, injection of Prevnar 20 given by Promise Juares CMA. Patient instructed to remain in clinic for 15 minutes afterwards, and to report any adverse reaction to me immediately.       Screening performed by Promise Juares CMA on 11/9/2022 at 11:30 AM.

## 2022-11-10 LAB
ALBUMIN SERPL-MCNC: 3.1 G/DL (ref 3.4–5)
ALP SERPL-CCNC: 97 U/L (ref 40–150)
ALT SERPL W P-5'-P-CCNC: 36 U/L (ref 0–70)
ANION GAP SERPL CALCULATED.3IONS-SCNC: 6 MMOL/L (ref 3–14)
AST SERPL W P-5'-P-CCNC: 33 U/L (ref 0–45)
BILIRUB SERPL-MCNC: 0.4 MG/DL (ref 0.2–1.3)
BUN SERPL-MCNC: 51 MG/DL (ref 7–30)
CALCIUM SERPL-MCNC: 9.2 MG/DL (ref 8.5–10.1)
CHLORIDE BLD-SCNC: 105 MMOL/L (ref 94–109)
CHOLEST SERPL-MCNC: 146 MG/DL
CO2 SERPL-SCNC: 27 MMOL/L (ref 20–32)
CREAT SERPL-MCNC: 3.69 MG/DL (ref 0.66–1.25)
FASTING STATUS PATIENT QL REPORTED: NO
GFR SERPL CREATININE-BSD FRML MDRD: 18 ML/MIN/1.73M2
GLUCOSE BLD-MCNC: 95 MG/DL (ref 70–99)
HDLC SERPL-MCNC: 48 MG/DL
LDLC SERPL CALC-MCNC: 73 MG/DL
NONHDLC SERPL-MCNC: 98 MG/DL
POTASSIUM BLD-SCNC: 4.9 MMOL/L (ref 3.4–5.3)
PROT SERPL-MCNC: 8 G/DL (ref 6.8–8.8)
PSA SERPL-MCNC: 0.51 UG/L (ref 0–4)
SODIUM SERPL-SCNC: 138 MMOL/L (ref 133–144)
TRIGL SERPL-MCNC: 124 MG/DL
TSH SERPL DL<=0.005 MIU/L-ACNC: 1.95 MU/L (ref 0.4–4)

## 2022-12-16 DIAGNOSIS — F32.2 SEVERE DEPRESSION (H): ICD-10-CM

## 2022-12-19 RX ORDER — BUPROPION HYDROCHLORIDE 100 MG/1
TABLET, EXTENDED RELEASE ORAL
Qty: 90 TABLET | Refills: 0 | Status: SHIPPED | OUTPATIENT
Start: 2022-12-19 | End: 2023-01-25

## 2022-12-19 NOTE — TELEPHONE ENCOUNTER
Pending Prescriptions:                       Disp   Refills    buPROPion (WELLBUTRIN SR) 100 MG 12 hr tab*90 tab*0        Sig: TAKE 1 TABLET BY MOUTH EVERY MORNING.    PHQ-9 score:    PHQ 11/9/2022   PHQ-9 Total Score 11   Q9: Thoughts of better off dead/self-harm past 2 weeks Not at all   F/U: Thoughts of suicide or self-harm -   F/U: Self harm-plan -   F/U: Self-harm action -   F/U: Safety concerns -       Routing refill request to provider for review/approval because:  Most recent phq9 is outside protocol parameters.    Please advise, thanks.

## 2023-01-04 ENCOUNTER — TELEPHONE (OUTPATIENT)
Dept: INTERNAL MEDICINE | Facility: CLINIC | Age: 63
End: 2023-01-04

## 2023-01-04 NOTE — TELEPHONE ENCOUNTER
Patient Quality Outreach      Summary:    Patient has the following on his problem list/HM:   Depression / Dysthymia review    6 Month Remission: 4-8 month window range:   12 Month Remission: 10-14 month window range:        PHQ-9 SCORE 10/5/2021 5/2/2022 11/9/2022   PHQ-9 Total Score - - -   PHQ-9 Total Score MyChart - - 11 (Moderate depression)   PHQ-9 Total Score 14 12 11       If PHQ-9 recheck is 5 or more, route to provider for next steps.    Hypertension   Last three blood pressure readings:  BP Readings from Last 3 Encounters:   11/09/22 132/84   05/02/22 122/83   10/05/21 127/84     Blood pressure: Passed    HTN Guidelines:  ? 139/89     Patient is due/failing the following:       Type of outreach:    Nothing is due at this time    Questions for provider review:                                                                                                                                         Promise Early MA       Chart routed to .

## 2023-01-21 DIAGNOSIS — F43.10 PTSD (POST-TRAUMATIC STRESS DISORDER): ICD-10-CM

## 2023-01-21 DIAGNOSIS — F32.2 SEVERE DEPRESSION (H): ICD-10-CM

## 2023-01-21 DIAGNOSIS — R42 DIZZINESS: ICD-10-CM

## 2023-01-25 RX ORDER — BUPROPION HYDROCHLORIDE 100 MG/1
TABLET, EXTENDED RELEASE ORAL
Qty: 90 TABLET | Refills: 0 | Status: SHIPPED | OUTPATIENT
Start: 2023-01-25 | End: 2023-05-16

## 2023-01-25 RX ORDER — QUETIAPINE FUMARATE 200 MG/1
TABLET, FILM COATED ORAL
Qty: 90 TABLET | Refills: 0 | Status: SHIPPED | OUTPATIENT
Start: 2023-01-25 | End: 2023-05-16

## 2023-01-25 RX ORDER — PRAZOSIN HYDROCHLORIDE 1 MG/1
CAPSULE ORAL
Qty: 360 CAPSULE | Refills: 0 | Status: SHIPPED | OUTPATIENT
Start: 2023-01-25 | End: 2023-05-16

## 2023-01-25 RX ORDER — MIRTAZAPINE 45 MG/1
TABLET, FILM COATED ORAL
Qty: 90 TABLET | Refills: 1 | Status: SHIPPED | OUTPATIENT
Start: 2023-01-25 | End: 2023-08-07

## 2023-01-25 RX ORDER — MECLIZINE HCL 12.5 MG 12.5 MG/1
TABLET ORAL
Qty: 30 TABLET | Refills: 0 | Status: SHIPPED | OUTPATIENT
Start: 2023-01-25 | End: 2023-03-27

## 2023-01-25 NOTE — TELEPHONE ENCOUNTER
Pending Prescriptions:                       Disp   Refills    meclizine (ANTIVERT) 12.5 MG tablet [Pharm*30 tab*0        Sig: TAKE 1 TO 2 TABLETS BY MOUTH 4 TIMES DAILY AS NEEDED           FOR DIZZINESS.    buPROPion (WELLBUTRIN SR) 100 MG 12 hr tab*90 tab*0        Sig: TAKE 1 TABLET BY MOUTH EVERY MORNING.    prazosin (MINIPRESS) 1 MG capsule [Pharmac*360 ca*0        Sig: TAKE 2 CAPSULES BY MOUTH IN THE MORNING AND TAKE 2           CAPSULES BY MOUTH IN THE EVENING.    QUEtiapine (SEROQUEL) 200 MG tablet [Pharm*90 tab*0        Sig: TAKE ONE TABLET BY MOUTH AT BEDTIME    Routing refill request to provider for review/approval because:  Needs provider review

## 2023-01-25 NOTE — TELEPHONE ENCOUNTER
Pending Prescriptions:                       Disp   Refills    mirtazapine (REMERON) 45 MG tablet [Pharm*90 tab*1            Sig: TAKE ONE TABLET BY MOUTH AT BEDTIME    Prescription approved per [facility] Refill Protocol.

## 2023-03-24 DIAGNOSIS — R42 DIZZINESS: ICD-10-CM

## 2023-03-27 RX ORDER — MECLIZINE HCL 12.5 MG 12.5 MG/1
TABLET ORAL
Qty: 30 TABLET | Refills: 1 | Status: SHIPPED | OUTPATIENT
Start: 2023-03-27 | End: 2023-08-07

## 2023-04-17 DIAGNOSIS — H10.13 ALLERGIC CONJUNCTIVITIS, BILATERAL: ICD-10-CM

## 2023-04-19 RX ORDER — OLOPATADINE HYDROCHLORIDE 1 MG/ML
1 SOLUTION/ DROPS OPHTHALMIC 2 TIMES DAILY
Qty: 5 ML | Refills: 1 | Status: SHIPPED | OUTPATIENT
Start: 2023-04-19 | End: 2023-08-21

## 2023-05-15 DIAGNOSIS — F32.2 SEVERE DEPRESSION (H): ICD-10-CM

## 2023-05-15 DIAGNOSIS — F32.1 MAJOR DEPRESSIVE DISORDER, SINGLE EPISODE, MODERATE (H): ICD-10-CM

## 2023-05-15 DIAGNOSIS — F43.10 PTSD (POST-TRAUMATIC STRESS DISORDER): ICD-10-CM

## 2023-05-16 RX ORDER — BUPROPION HYDROCHLORIDE 100 MG/1
TABLET, EXTENDED RELEASE ORAL
Qty: 90 TABLET | Refills: 1 | Status: SHIPPED | OUTPATIENT
Start: 2023-05-16 | End: 2023-09-21

## 2023-05-16 RX ORDER — QUETIAPINE FUMARATE 200 MG/1
TABLET, FILM COATED ORAL
Qty: 90 TABLET | Refills: 1 | Status: SHIPPED | OUTPATIENT
Start: 2023-05-16 | End: 2023-10-30

## 2023-05-16 RX ORDER — PRAZOSIN HYDROCHLORIDE 1 MG/1
CAPSULE ORAL
Qty: 360 CAPSULE | Refills: 1 | Status: SHIPPED | OUTPATIENT
Start: 2023-05-16 | End: 2023-05-19

## 2023-05-16 NOTE — TELEPHONE ENCOUNTER
Pt has appt on Friday.   Drug Drug interaction     Provider approval needed          10/5/2021    10:59 AM 5/2/2022    10:46 AM 11/9/2022    10:39 AM   PHQ   PHQ-9 Total Score 14 12 11   Q9: Thoughts of better off dead/self-harm past 2 weeks Several days Several days Not at all

## 2023-05-19 ENCOUNTER — OFFICE VISIT (OUTPATIENT)
Dept: INTERNAL MEDICINE | Facility: CLINIC | Age: 63
End: 2023-05-19
Payer: MEDICARE

## 2023-05-19 VITALS
OXYGEN SATURATION: 97 % | HEIGHT: 66 IN | BODY MASS INDEX: 27.23 KG/M2 | HEART RATE: 68 BPM | DIASTOLIC BLOOD PRESSURE: 80 MMHG | RESPIRATION RATE: 16 BRPM | SYSTOLIC BLOOD PRESSURE: 124 MMHG | TEMPERATURE: 96.9 F | WEIGHT: 169.4 LBS

## 2023-05-19 DIAGNOSIS — F32.2 SEVERE DEPRESSION (H): ICD-10-CM

## 2023-05-19 DIAGNOSIS — N18.5 CKD (CHRONIC KIDNEY DISEASE) STAGE 5, GFR LESS THAN 15 ML/MIN (H): ICD-10-CM

## 2023-05-19 DIAGNOSIS — I10 HTN, GOAL BELOW 140/90: ICD-10-CM

## 2023-05-19 DIAGNOSIS — F43.10 PTSD (POST-TRAUMATIC STRESS DISORDER): ICD-10-CM

## 2023-05-19 DIAGNOSIS — M10.9 ACUTE GOUTY ARTHRITIS: Primary | ICD-10-CM

## 2023-05-19 DIAGNOSIS — E78.5 HYPERLIPIDEMIA WITH TARGET LDL LESS THAN 130: ICD-10-CM

## 2023-05-19 LAB
CREAT UR-MCNC: 40.3 MG/DL
ERYTHROCYTE [DISTWIDTH] IN BLOOD BY AUTOMATED COUNT: 13.3 % (ref 10–15)
HCT VFR BLD AUTO: 38.4 % (ref 40–53)
HGB BLD-MCNC: 12.9 G/DL (ref 13.3–17.7)
MCH RBC QN AUTO: 27.6 PG (ref 26.5–33)
MCHC RBC AUTO-ENTMCNC: 33.6 G/DL (ref 31.5–36.5)
MCV RBC AUTO: 82 FL (ref 78–100)
MICROALBUMIN UR-MCNC: 295 MG/L
MICROALBUMIN/CREAT UR: 732.01 MG/G CR (ref 0–17)
PLATELET # BLD AUTO: 215 10E3/UL (ref 150–450)
RBC # BLD AUTO: 4.68 10E6/UL (ref 4.4–5.9)
WBC # BLD AUTO: 9.2 10E3/UL (ref 4–11)

## 2023-05-19 PROCEDURE — 84550 ASSAY OF BLOOD/URIC ACID: CPT | Performed by: INTERNAL MEDICINE

## 2023-05-19 PROCEDURE — 80053 COMPREHEN METABOLIC PANEL: CPT | Performed by: INTERNAL MEDICINE

## 2023-05-19 PROCEDURE — 82043 UR ALBUMIN QUANTITATIVE: CPT | Performed by: INTERNAL MEDICINE

## 2023-05-19 PROCEDURE — 36415 COLL VENOUS BLD VENIPUNCTURE: CPT | Performed by: INTERNAL MEDICINE

## 2023-05-19 PROCEDURE — 99214 OFFICE O/P EST MOD 30 MIN: CPT | Performed by: INTERNAL MEDICINE

## 2023-05-19 PROCEDURE — 85027 COMPLETE CBC AUTOMATED: CPT | Performed by: INTERNAL MEDICINE

## 2023-05-19 PROCEDURE — 96127 BRIEF EMOTIONAL/BEHAV ASSMT: CPT | Performed by: INTERNAL MEDICINE

## 2023-05-19 PROCEDURE — 82570 ASSAY OF URINE CREATININE: CPT | Performed by: INTERNAL MEDICINE

## 2023-05-19 RX ORDER — PRAZOSIN HYDROCHLORIDE 5 MG/1
5 CAPSULE ORAL 2 TIMES DAILY
Qty: 180 CAPSULE | Refills: 3 | Status: SHIPPED | OUTPATIENT
Start: 2023-05-19 | End: 2024-04-17

## 2023-05-19 RX ORDER — COLCHICINE 0.6 MG/1
0.6 TABLET ORAL DAILY
Qty: 10 TABLET | Refills: 3 | Status: SHIPPED | OUTPATIENT
Start: 2023-05-19 | End: 2023-09-21

## 2023-05-19 ASSESSMENT — PATIENT HEALTH QUESTIONNAIRE - PHQ9
SUM OF ALL RESPONSES TO PHQ QUESTIONS 1-9: 11
10. IF YOU CHECKED OFF ANY PROBLEMS, HOW DIFFICULT HAVE THESE PROBLEMS MADE IT FOR YOU TO DO YOUR WORK, TAKE CARE OF THINGS AT HOME, OR GET ALONG WITH OTHER PEOPLE: SOMEWHAT DIFFICULT
SUM OF ALL RESPONSES TO PHQ QUESTIONS 1-9: 11

## 2023-05-19 ASSESSMENT — PAIN SCALES - GENERAL: PAINLEVEL: MODERATE PAIN (4)

## 2023-05-19 NOTE — LETTER
May 22, 2023      Gunner Velázquez  [address] [place] [address]        Dear ,    We are writing to inform you of your test results. Your results show mild anemia and decreased kidney function, stable.   Elevated test for gout- uric acid. Recommend to start on treatment to reduce the level.     Resulted Orders   Albumin Random Urine Quantitative with Creat Ratio   Result Value Ref Range    Creatinine Urine mg/dL 40.3 mg/dL      Comment:      The reference ranges have not been established in urine creatinine. The results should be integrated into the clinical context for interpretation.    Albumin Urine mg/L 295.0 mg/L      Comment:      The reference ranges have not been established in urine albumin. The results should be integrated into the clinical context for interpretation.    Albumin Urine mg/g Cr 732.01 (H) 0.00 - 17.00 mg/g Cr      Comment:      Microalbuminuria is defined as an albumin:creatinine ratio of 17 to 299 for males and 25 to 299 for females. A ratio of albumin:creatinine of 300 or higher is indicative of overt proteinuria.  Due to biologic variability, positive results should be confirmed by a second, first-morning random or 24-hour timed urine specimen. If there is discrepancy, a third specimen is recommended. When 2 out of 3 results are in the microalbuminuria range, this is evidence for incipient nephropathy and warrants increased efforts at glucose control, blood pressure control, and institution of therapy with an angiotensin-converting-enzyme (ACE) inhibitor (if the patient can tolerate it).     Comprehensive metabolic panel (BMP + Alb, Alk Phos, ALT, AST, Total. Bili, TP)   Result Value Ref Range    Sodium 139 136 - 145 mmol/L    Potassium 4.5 3.4 - 5.3 mmol/L    Chloride 103 98 - 107 mmol/L    Carbon Dioxide (CO2) 20 (L) 22 - 29 mmol/L    Anion Gap 16 (H) 7 - 15 mmol/L    Urea Nitrogen 48.8 (H) 8.0 - 23.0 mg/dL    Creatinine 3.42 (H) 0.67 - 1.17 mg/dL    Calcium 9.7 8.8  - 10.2 mg/dL    Glucose 101 (H) 70 - 99 mg/dL    Alkaline Phosphatase 75 40 - 129 U/L    AST 31 10 - 50 U/L    ALT 24 10 - 50 U/L    Protein Total 7.8 6.4 - 8.3 g/dL    Albumin 4.3 3.5 - 5.2 g/dL    Bilirubin Total 0.6 <=1.2 mg/dL    GFR Estimate 19 (L) >60 mL/min/1.73m2      Comment:      eGFR calculated using 2021 CKD-EPI equation.   CBC with platelets   Result Value Ref Range    WBC Count 9.2 4.0 - 11.0 10e3/uL    RBC Count 4.68 4.40 - 5.90 10e6/uL    Hemoglobin 12.9 (L) 13.3 - 17.7 g/dL    Hematocrit 38.4 (L) 40.0 - 53.0 %    MCV 82 78 - 100 fL    MCH 27.6 26.5 - 33.0 pg    MCHC 33.6 31.5 - 36.5 g/dL    RDW 13.3 10.0 - 15.0 %    Platelet Count 215 150 - 450 10e3/uL   Uric acid   Result Value Ref Range    Uric Acid 8.2 (H) 3.4 - 7.0 mg/dL       If you have any questions or concerns, please call the clinic at the number listed above.       Sincerely,      Vinnie Brown MD

## 2023-05-19 NOTE — PROGRESS NOTES
"  Assessment & Plan     CKD (chronic kidney disease) stage 5, GFR less than 15 ml/min (H)  Monitor renal function  Keep hydration  Avoid NSAID use   BP control   - Albumin Random Urine Quantitative with Creat Ratio  - Comprehensive metabolic panel (BMP + Alb, Alk Phos, ALT, AST, Total. Bili, TP)  - CBC with platelets    PTSD (post-traumatic stress disorder)  Increase Prazosin for better BP control   - prazosin (MINIPRESS) 5 MG capsule; Take 1 capsule (5 mg) by mouth 2 times daily TAKE 2 CAPSULES BY MOUTH IN THE MORNING & TAKE 2 CAPSULES IN THE EVENING.    Severe depression (H)  On treatment, improved symptoms     Acute gouty arthritis  Start PRN Colchicine, assess uric acid   - Uric acid  - colchicine (COLCYRS) 0.6 MG tablet; Take 1 tablet (0.6 mg) by mouth daily Use for gout flare ups    HTN, goal below 140/90  Increase Prazosin , continue Carvedilol   - Comprehensive metabolic panel (BMP + Alb, Alk Phos, ALT, AST, Total. Bili, TP)  - CBC with platelets    Hyperlipidemia with target LDL less than 130  On statin , controlled                BMI:   Estimated body mass index is 27.34 kg/m  as calculated from the following:    Height as of this encounter: 1.676 m (5' 6\").    Weight as of this encounter: 76.8 kg (169 lb 6.4 oz).       Depression Screening Follow Up        5/19/2023     9:37 AM   PHQ   PHQ-9 Total Score 11   Q9: Thoughts of better off dead/self-harm past 2 weeks Several days   F/U: Thoughts of suicide or self-harm No   F/U: Safety concerns No         5/19/2023     9:37 AM   Last PHQ-9   1.  Little interest or pleasure in doing things 1   2.  Feeling down, depressed, or hopeless 2   3.  Trouble falling or staying asleep, or sleeping too much 1   4.  Feeling tired or having little energy 2   5.  Poor appetite or overeating 1   6.  Feeling bad about yourself 1   7.  Trouble concentrating 1   8.  Moving slowly or restless 1   Q9: Thoughts of better off dead/self-harm past 2 weeks 1   PHQ-9 Total Score 11   In " the past two weeks have you had thoughts of suicide or self harm? No   Do you have concerns about your personal safety or the safety of others? No               Follow Up    Follow Up Actions Taken    MD [facility]    Jamilah Martino is a 62 year old, presenting for the following health issues:  RECHECK and Arthritis        5/19/2023     9:45 AM   Additional Questions   Roomed by Promise GARDNER   Accompanied by n/a     Arthritis     Pt states that last month he had two weeks of very bad gout and could not even wear his shoe, right big toe was very painful; it is feeling better now but is still somewhat painful      Hypertension Follow-up  Has h/o HTN. on medical treatment. BP has not been well controlled. No side effects from medications. No CP, HA, dizziness. good compliance with medications and low salt diet.      Do you check your blood pressure regularly outside of the clinic? Yes     Are you following a low salt diet? Yes    Are your blood pressures ever more than 140 on the top number (systolic) OR more   than 90 on the bottom number (diastolic), for example 140/90? Yes, pt thinks the carvedilol should be increased       How many servings of fruits and vegetables do you eat daily?  0-1    On average, how many sweetened beverages do you drink each day (Examples: soda, juice, sweet tea, etc.  Do NOT count diet or artificially sweetened beverages)?   0    How many days per week do you exercise enough to make your heart beat faster? 3 or less    How many minutes a day do you exercise enough to make your heart beat faster? 20 - 29    How many days per week do you miss taking your medication? 0    Has h/o CRF. Monitoring BP, BG, medications, avoiding OTC NSAIDs. Needs periodic recheck of kidney function. Follows with nephrology.     Has h/o depression. On medical treatment, controlled, no side effects. Has mild chronic depressive symptoms , improved, no suicidal  "ideation.    Concern for left foot great toe pain, swelling, redness. On and off. Has had significant pain, now improved, but still painful with ambulation.         Review of Systems   Musculoskeletal: Positive for arthritis.      Constitutional, HEENT, cardiovascular, pulmonary, GI, , musculoskeletal, neuro, skin, endocrine and psych systems are negative, except as otherwise noted.      Objective    /80 (BP Location: Left arm, Cuff Size: Adult Regular)   Pulse 68   Temp 96.9  F (36.1  C) (Tympanic)   Resp 16   Ht 1.676 m (5' 6\")   Wt 76.8 kg (169 lb 6.4 oz)   SpO2 97%   BMI 27.34 kg/m    Body mass index is 27.34 kg/m .  Physical Exam   GENERAL: healthy, alert and no distress  EYES: Eyes grossly normal to inspection, PERRL and conjunctivae and sclerae normal  HENT: ear canals and TM's normal, nose and mouth without ulcers or lesions  NECK: no adenopathy, no asymmetry, masses, or scars and thyroid normal to palpation  RESP: lungs clear to auscultation - no rales, rhonchi or wheezes  CV: regular rate and rhythm, normal S1 S2, no S3 or S4, no murmur, click or rub, no peripheral edema and peripheral pulses strong  ABDOMEN: soft, nontender, no hepatosplenomegaly, no masses and bowel sounds normal  MS: no gross musculoskeletal defects noted, no edema, left foot great toe 1st MTP joint swelling and palpatory pain, no redness   SKIN: no suspicious lesions or rashes  NEURO: Normal strength and tone, mentation intact and speech normal    Office Visit on 11/09/2022   Component Date Value Ref Range Status     Creatinine Urine mg/dL 11/09/2022 52  mg/dL Final     Albumin Urine mg/L 11/09/2022 216  mg/L Final     Albumin Urine mg/g Cr 11/09/2022 415.38 (H)  0.00 - 17.00 mg/g Cr Final     Sodium 11/09/2022 138  133 - 144 mmol/L Final     Potassium 11/09/2022 4.9  3.4 - 5.3 mmol/L Final     Chloride 11/09/2022 105  94 - 109 mmol/L Final     Carbon Dioxide (CO2) 11/09/2022 27  20 - 32 mmol/L Final     Anion Gap " 11/09/2022 6  3 - 14 mmol/L Final     Urea Nitrogen 11/09/2022 51 (H)  7 - 30 mg/dL Final     Creatinine 11/09/2022 3.69 (H)  0.66 - 1.25 mg/dL Final     Calcium 11/09/2022 9.2  8.5 - 10.1 mg/dL Final     Glucose 11/09/2022 95  70 - 99 mg/dL Final     Alkaline Phosphatase 11/09/2022 97  40 - 150 U/L Final     AST 11/09/2022 33  0 - 45 U/L Final     ALT 11/09/2022 36  0 - 70 U/L Final     Protein Total 11/09/2022 8.0  6.8 - 8.8 g/dL Final     Albumin 11/09/2022 3.1 (L)  3.4 - 5.0 g/dL Final     Bilirubin Total 11/09/2022 0.4  0.2 - 1.3 mg/dL Final     GFR Estimate 11/09/2022 18 (L)  >60 mL/min/1.73m2 Final    Effective December 21, 2021 eGFRcr in adults is calculated using the 2021 CKD-EPI creatinine equation which includes age and gender (Guillermina et al., NEJ, DOI: 10.1056/HNPHah5261510)     Cholesterol 11/09/2022 146  <200 mg/dL Final     Triglycerides 11/09/2022 124  <150 mg/dL Final     Direct Measure HDL 11/09/2022 48  >=40 mg/dL Final     LDL Cholesterol Calculated 11/09/2022 73  <=100 mg/dL Final     Non HDL Cholesterol 11/09/2022 98  <130 mg/dL Final     Patient Fasting > 8hrs? 11/09/2022 No   Final     Prostate Specific Antigen Screen 11/09/2022 0.51  0.00 - 4.00 ug/L Final     TSH 11/09/2022 1.95  0.40 - 4.00 mU/L Final                   Answers for HPI/ROS submitted by the patient on 5/19/2023  If you checked off any problems, how difficult have these problems made it for you to do your work, take care of things at home, or get along with other people?: Somewhat difficult  PHQ9 TOTAL SCORE: 11

## 2023-05-20 LAB
ALBUMIN SERPL BCG-MCNC: 4.3 G/DL (ref 3.5–5.2)
ALP SERPL-CCNC: 75 U/L (ref 40–129)
ALT SERPL W P-5'-P-CCNC: 24 U/L (ref 10–50)
ANION GAP SERPL CALCULATED.3IONS-SCNC: 16 MMOL/L (ref 7–15)
AST SERPL W P-5'-P-CCNC: 31 U/L (ref 10–50)
BILIRUB SERPL-MCNC: 0.6 MG/DL
BUN SERPL-MCNC: 48.8 MG/DL (ref 8–23)
CALCIUM SERPL-MCNC: 9.7 MG/DL (ref 8.8–10.2)
CHLORIDE SERPL-SCNC: 103 MMOL/L (ref 98–107)
CREAT SERPL-MCNC: 3.42 MG/DL (ref 0.67–1.17)
DEPRECATED HCO3 PLAS-SCNC: 20 MMOL/L (ref 22–29)
GFR SERPL CREATININE-BSD FRML MDRD: 19 ML/MIN/1.73M2
GLUCOSE SERPL-MCNC: 101 MG/DL (ref 70–99)
POTASSIUM SERPL-SCNC: 4.5 MMOL/L (ref 3.4–5.3)
PROT SERPL-MCNC: 7.8 G/DL (ref 6.4–8.3)
SODIUM SERPL-SCNC: 139 MMOL/L (ref 136–145)
URATE SERPL-MCNC: 8.2 MG/DL (ref 3.4–7)

## 2023-08-06 DIAGNOSIS — R42 DIZZINESS: ICD-10-CM

## 2023-08-06 DIAGNOSIS — F43.10 PTSD (POST-TRAUMATIC STRESS DISORDER): ICD-10-CM

## 2023-08-07 RX ORDER — MIRTAZAPINE 45 MG/1
TABLET, FILM COATED ORAL
Qty: 90 TABLET | Refills: 1 | Status: SHIPPED | OUTPATIENT
Start: 2023-08-07 | End: 2024-01-23

## 2023-08-07 RX ORDER — MECLIZINE HCL 12.5 MG 12.5 MG/1
TABLET ORAL
Qty: 30 TABLET | Refills: 0 | Status: SHIPPED | OUTPATIENT
Start: 2023-08-07 | End: 2023-09-07

## 2023-08-19 DIAGNOSIS — H10.13 ALLERGIC CONJUNCTIVITIS, BILATERAL: ICD-10-CM

## 2023-08-21 RX ORDER — OLOPATADINE HYDROCHLORIDE 1 MG/ML
SOLUTION/ DROPS OPHTHALMIC
Qty: 5 ML | Refills: 0 | Status: SHIPPED | OUTPATIENT
Start: 2023-08-21 | End: 2023-09-21

## 2023-09-06 DIAGNOSIS — R42 DIZZINESS: ICD-10-CM

## 2023-09-07 RX ORDER — MECLIZINE HCL 12.5 MG 12.5 MG/1
TABLET ORAL
Qty: 30 TABLET | Refills: 0 | Status: SHIPPED | OUTPATIENT
Start: 2023-09-07 | End: 2023-10-30

## 2023-09-07 NOTE — TELEPHONE ENCOUNTER
Medication refilled x 1 due to future appointment scheduled.    Appointments in Next Year      Oct 18, 2023 10:00 AM  (Arrive by 9:40 AM)  Provider Visit with Vinnie Brown MD  [facility] ([facility] ) [phone]

## 2023-09-21 ENCOUNTER — OFFICE VISIT (OUTPATIENT)
Dept: INTERNAL MEDICINE | Facility: CLINIC | Age: 63
End: 2023-09-21
Payer: MEDICARE

## 2023-09-21 VITALS
SYSTOLIC BLOOD PRESSURE: 123 MMHG | WEIGHT: 167.4 LBS | OXYGEN SATURATION: 96 % | HEART RATE: 70 BPM | HEIGHT: 66 IN | TEMPERATURE: 98.3 F | RESPIRATION RATE: 22 BRPM | DIASTOLIC BLOOD PRESSURE: 82 MMHG | BODY MASS INDEX: 26.9 KG/M2

## 2023-09-21 DIAGNOSIS — I10 HTN, GOAL BELOW 140/90: ICD-10-CM

## 2023-09-21 DIAGNOSIS — Z59.71 INSURANCE COVERAGE PROBLEMS: ICD-10-CM

## 2023-09-21 DIAGNOSIS — Z87.39 HISTORY OF GOUT: ICD-10-CM

## 2023-09-21 DIAGNOSIS — E78.5 HYPERLIPIDEMIA WITH TARGET LDL LESS THAN 130: ICD-10-CM

## 2023-09-21 DIAGNOSIS — F32.2 SEVERE DEPRESSION (H): ICD-10-CM

## 2023-09-21 DIAGNOSIS — H10.13 ALLERGIC CONJUNCTIVITIS, BILATERAL: ICD-10-CM

## 2023-09-21 DIAGNOSIS — N18.4 CKD (CHRONIC KIDNEY DISEASE) STAGE 4, GFR 15-29 ML/MIN (H): Primary | ICD-10-CM

## 2023-09-21 LAB
ALBUMIN SERPL BCG-MCNC: 4 G/DL (ref 3.5–5.2)
ALP SERPL-CCNC: 72 U/L (ref 40–129)
ALT SERPL W P-5'-P-CCNC: 34 U/L (ref 0–70)
ANION GAP SERPL CALCULATED.3IONS-SCNC: 11 MMOL/L (ref 7–15)
AST SERPL W P-5'-P-CCNC: 38 U/L (ref 0–45)
BILIRUB SERPL-MCNC: 0.4 MG/DL
BUN SERPL-MCNC: 34.7 MG/DL (ref 8–23)
CALCIUM SERPL-MCNC: 9.2 MG/DL (ref 8.8–10.2)
CHLORIDE SERPL-SCNC: 105 MMOL/L (ref 98–107)
CHOLEST SERPL-MCNC: 225 MG/DL
CREAT SERPL-MCNC: 2.89 MG/DL (ref 0.67–1.17)
DEPRECATED HCO3 PLAS-SCNC: 24 MMOL/L (ref 22–29)
EGFRCR SERPLBLD CKD-EPI 2021: 24 ML/MIN/1.73M2
GLUCOSE SERPL-MCNC: 101 MG/DL (ref 70–99)
HDLC SERPL-MCNC: 52 MG/DL
LDLC SERPL CALC-MCNC: 148 MG/DL
NONHDLC SERPL-MCNC: 173 MG/DL
POTASSIUM SERPL-SCNC: 4 MMOL/L (ref 3.4–5.3)
PROT SERPL-MCNC: 7.4 G/DL (ref 6.4–8.3)
SODIUM SERPL-SCNC: 140 MMOL/L (ref 136–145)
TRIGL SERPL-MCNC: 126 MG/DL
URATE SERPL-MCNC: 7.4 MG/DL (ref 3.4–7)

## 2023-09-21 PROCEDURE — 99214 OFFICE O/P EST MOD 30 MIN: CPT

## 2023-09-21 PROCEDURE — 80053 COMPREHEN METABOLIC PANEL: CPT

## 2023-09-21 PROCEDURE — 84550 ASSAY OF BLOOD/URIC ACID: CPT

## 2023-09-21 PROCEDURE — 36415 COLL VENOUS BLD VENIPUNCTURE: CPT

## 2023-09-21 PROCEDURE — 80061 LIPID PANEL: CPT

## 2023-09-21 RX ORDER — ALLOPURINOL 300 MG/1
TABLET ORAL
COMMUNITY

## 2023-09-21 RX ORDER — BUPROPION HYDROCHLORIDE 100 MG/1
TABLET, EXTENDED RELEASE ORAL
Qty: 90 TABLET | Refills: 0 | Status: SHIPPED | OUTPATIENT
Start: 2023-09-21 | End: 2024-01-22

## 2023-09-21 RX ORDER — OLOPATADINE HYDROCHLORIDE 1 MG/ML
SOLUTION/ DROPS OPHTHALMIC
Qty: 5 ML | Refills: 0 | Status: SHIPPED | OUTPATIENT
Start: 2023-09-21 | End: 2024-04-22

## 2023-09-21 RX ORDER — CARVEDILOL 25 MG/1
25 TABLET ORAL 2 TIMES DAILY WITH MEALS
Qty: 180 TABLET | Refills: 2 | Status: SHIPPED | OUTPATIENT
Start: 2023-09-21

## 2023-09-21 NOTE — PROGRESS NOTES
"  Assessment & Plan     CKD (chronic kidney disease) stage 4, GFR 15-29 ml/min (H)  Follows with nephrology. Will update labs to ensure stability.   - Comprehensive metabolic panel (BMP + Alb, Alk Phos, ALT, AST, Total. Bili, TP); Future    HTN, goal below 140/90  Under control today. Re-order medication  - carvedilol (COREG) 25 MG tablet; Take 1 tablet (25 mg) by mouth 2 times daily (with meals)    History of gout  Had a flare a month ago. Was prescribed allopurinol by nephrologist. Will see where uric acid is  - Uric acid; Future    Hyperlipidemia with target LDL less than 130  Update lab  - Lipid panel reflex to direct LDL Non-fasting; Future    Insurance coverage problems  Patient is having difficulty with insurance and he is not sure if he will have it after 10/40456  - Primary Care - Care Coordination Referral; Future    Shorty Agustin NP  [facility] THA Martino is a 63 year old, presenting for the following health issues:  RECHECK (Follow up on kidney function.) and Diabetes (He wants to be checked for diabetes.)      9/21/2023     1:54 PM   Additional Questions   Roomed by Jeny King MA   Accompanied by Himself       HPI     Overall is feeling ok     Is wondering about testing kidney liver, and cholesterol.     Insurance is running out in October     Review of Systems   Constitutional, HEENT, cardiovascular, pulmonary, GI, , musculoskeletal, neuro, skin, endocrine and psych systems are negative, except as otherwise noted.      Objective    /82 (BP Location: Right arm, Patient Position: Sitting, Cuff Size: Adult Regular)   Pulse 70   Temp 98.3  F (36.8  C) (Oral)   Resp 22   Ht 1.676 m (5' 6\")   Wt 75.9 kg (167 lb 6.4 oz)   SpO2 96%   BMI 27.02 kg/m    Body mass index is 27.02 kg/m .  Physical Exam   GENERAL: alert and no distress  EYES: Eyes grossly normal to inspection  NECK: no adenopathy, no asymmetry, masses, or scars and thyroid normal to " palpation  RESP: lungs clear to auscultation - no rales, rhonchi or wheezes  CV: regular rate and rhythm, normal S1 S2, no S3 or S4, no murmur, click or rub, no peripheral edema and peripheral pulses strong  MS: no gross musculoskeletal defects noted, no edema  SKIN: no suspicious lesions or rashes  NEURO: Normal strength and tone, mentation intact and speech normal  PSYCH: mentation appears normal, affect normal/bright

## 2023-09-21 NOTE — LETTER
"September 27, 2023      Gunner Velázquez  [address]  [place] [address]        Dear ,    We are writing to inform you of your test results.      Kidney function is stable.     Uric acid has improved on allopurinol- hopefully this reduces gout flares- if more occur than we can consider increasing medication.    Your cholesterol is elevated to the point where we can consider starting cholesterol lowering medication.     If you would like to discuss this more you can schedule appointment. I can send medication, otherwise I will list ways to lower cholesterol below.    Ways to improve your cholesterol:     1- Eats less saturated fats (including avoiding \"trans\" fats).      2 - Eat more unsaturated fats  - found in vegetables, grains, and tree nuts. Also by replacing butter with canola oil or olive oil.      3 - Eat more nuts. 1-2 ounces (a small handful) of almonds, walnuts, hazelnuts or pecans once a day in place of other less healthy snacks.      4 - Eat more high fiber foods - vegetables and whole grains including oat bran, oats, beans, peas, and flax seed.      5 - Eat more fish - such as salmon, tuna, mackerel, and sardines.  1 or 2 six ounce servings per week is a healthy replacement for other proteins.      6 - Exercise for at least 150 minutes per week.      Resulted Orders   Comprehensive metabolic panel (BMP + Alb, Alk Phos, ALT, AST, Total. Bili, TP)   Result Value Ref Range    Sodium 140 136 - 145 mmol/L    Potassium 4.0 3.4 - 5.3 mmol/L    Chloride 105 98 - 107 mmol/L    Carbon Dioxide (CO2) 24 22 - 29 mmol/L    Anion Gap 11 7 - 15 mmol/L    Urea Nitrogen 34.7 (H) 8.0 - 23.0 mg/dL    Creatinine 2.89 (H) 0.67 - 1.17 mg/dL    Calcium 9.2 8.8 - 10.2 mg/dL    Glucose 101 (H) 70 - 99 mg/dL    Alkaline Phosphatase 72 40 - 129 U/L    AST 38 0 - 45 U/L      Comment:      Reference intervals for this test were updated on 6/12/2023 to more accurately reflect our healthy population. There may be " differences in the flagging of prior results with similar values performed with this method. Interpretation of those prior results can be made in the context of the updated reference intervals.    ALT 34 0 - 70 U/L      Comment:      Reference intervals for this test were updated on 6/12/2023 to more accurately reflect our healthy population. There may be differences in the flagging of prior results with similar values performed with this method. Interpretation of those prior results can be made in the context of the updated reference intervals.      Protein Total 7.4 6.4 - 8.3 g/dL    Albumin 4.0 3.5 - 5.2 g/dL    Bilirubin Total 0.4 <=1.2 mg/dL    GFR Estimate 24 (L) >60 mL/min/1.73m2   Uric acid   Result Value Ref Range    Uric Acid 7.4 (H) 3.4 - 7.0 mg/dL   Lipid panel reflex to direct LDL Non-fasting   Result Value Ref Range    Cholesterol 225 (H) <200 mg/dL    Triglycerides 126 <150 mg/dL    Direct Measure HDL 52 >=40 mg/dL    LDL Cholesterol Calculated 148 (H) <=100 mg/dL    Non HDL Cholesterol 173 (H) <130 mg/dL    Narrative    Cholesterol  Desirable:  <200 mg/dL    Triglycerides  Normal:  Less than 150 mg/dL  Borderline High:  150-199 mg/dL  High:  200-499 mg/dL  Very High:  Greater than or equal to 500 mg/dL    Direct Measure HDL  Female:  Greater than or equal to 50 mg/dL   Male:  Greater than or equal to 40 mg/dL    LDL Cholesterol  Desirable:  <100mg/dL  Above Desirable:  100-129 mg/dL   Borderline High:  130-159 mg/dL   High:  160-189 mg/dL   Very High:  >= 190 mg/dL    Non HDL Cholesterol  Desirable:  130 mg/dL  Above Desirable:  130-159 mg/dL  Borderline High:  160-189 mg/dL  High:  190-219 mg/dL  Very High:  Greater than or equal to 220 mg/dL       If you have any questions or concerns, please call the clinic at the number listed above.       Sincerely,      Shorty Agustin NP

## 2023-09-22 ENCOUNTER — PATIENT OUTREACH (OUTPATIENT)
Dept: CARE COORDINATION | Facility: CLINIC | Age: 63
End: 2023-09-22
Payer: MEDICAID

## 2023-09-22 NOTE — PROGRESS NOTES
Clinic Care Coordination Contact  [facility]/Ghanshyam  Melanie ID: [id number]     Clinical Data: Care Coordinator Outreach  Outreach attempted x 1.  Left message on patient's voicemail with call back information and requested return call.    Plan: Care Coordinator will try to reach patient again in 1-2 business days.      Teagan MORAN [facility]  Community Health Worker  [facility]  Clinic Care Coordination  [phone]

## 2023-09-22 NOTE — LETTER
[facility] CARE COORDINATION  [facility]  September 25, 2023    Gunner Douangchanh  [address] [place] [address]      Dear Gunner,        I am a  clinic community health worker who works with Vinnie Brown MD with the [facility]. I wanted to introduce myself and provide you with my contact information for you to be able to call me with any questions or concerns. Below is a description of clinic care coordination and how I can further assist you.       The clinic care coordination team is made up of a registered nurse, , financial resource worker and community health worker who understand the health care system. The goal of clinic care coordination is to help you manage your health and improve access to the health care system. Our team works alongside your provider to assist you in determining your health and social needs. We can help you obtain health care and community resources, providing you with necessary information and education. We can work with you through any barriers and develop a care plan that helps coordinate and strengthen the communication between you and your care team.  Our services are voluntary and are offered without charge to you personally.    Please feel free to contact me with any questions or concerns regarding care coordination and what we can offer.      We are focused on providing you with the highest-quality healthcare experience possible.    Sincerely,       Teagan MORAN [facility]  Community Health Worker  [facility]  Clinic Care Coordination  [phone]               ?????????????,         ?????????????????????????????????????????????????????????? Vinnie Brown MD ??? [facility] ??????. ??? ?? ????? ??? ?????? ??? ??? ??? ??? ???? ?? ??? ??? ??? ??? ??? ??? ??? ?? ????? ????? ??? ???? ?? ??? ?? ?? ??? ?? ????? ??? ??? ??? ??? ???? ??? ??? ??? .  ???????????????????????????????????????????????????????????????????????????????????????????.       ??????????????????????????????????????????????????????, ??????????????, ???????????????????????????????? ??? ??????????????????????????????????????????????????. ???????????????????????????????????????????????????????????????????????????????????????????????????????????????????????. ?????????????????????????????????????????????????????????????????????????????????????????????????????????????????????. ??????????????????????????????????????????????????????????????, ????????????????????????????????????. ??????????????????????????????????????????????????????????????????????????????????????????????????????????????????????????????????????.  ????????????????????????????????????????????????????????????????????????????????.    ????????????????????????????????????????????????????????????????????????????????????????????????????.      ???????????????????????????????????????????????????????????????????????????????????.    ?????,       Teagan R. B.S. Public Health  Community Health Worker  [facility]  Clinic Care Coordination  [phone]

## 2023-09-25 NOTE — PROGRESS NOTES
Clinic Care Coordination Contact  [facility]/Ghanshyam Moon  ID: [id number]     Clinical Data: Care Coordinator Outreach  Outreach attempted x 2.  Left message on patient's voicemail with call back information and requested return call.    Plan: Care Coordinator will send care coordination introduction letter with care coordinator contact information and explanation of care coordination services via mail. Care Coordinator will do no further outreaches at this time.      Teagan MORAN [facility]  Community Health Worker  [facility]  Clinic Care Coordination  [phone]

## 2023-10-28 DIAGNOSIS — F43.10 PTSD (POST-TRAUMATIC STRESS DISORDER): ICD-10-CM

## 2023-10-28 DIAGNOSIS — R42 DIZZINESS: ICD-10-CM

## 2023-10-30 RX ORDER — MECLIZINE HCL 12.5 MG 12.5 MG/1
TABLET ORAL
Qty: 30 TABLET | Refills: 0 | Status: SHIPPED | OUTPATIENT
Start: 2023-10-30 | End: 2023-12-12

## 2023-10-30 RX ORDER — QUETIAPINE FUMARATE 200 MG/1
TABLET, FILM COATED ORAL
Qty: 90 TABLET | Refills: 0 | Status: SHIPPED | OUTPATIENT
Start: 2023-10-30 | End: 2024-01-22

## 2023-12-12 DIAGNOSIS — R42 DIZZINESS: ICD-10-CM

## 2023-12-12 RX ORDER — MECLIZINE HCL 12.5 MG 12.5 MG/1
TABLET ORAL
Qty: 30 TABLET | Refills: 0 | Status: SHIPPED | OUTPATIENT
Start: 2023-12-12 | End: 2024-01-22

## 2024-01-22 DIAGNOSIS — R42 DIZZINESS: ICD-10-CM

## 2024-01-22 DIAGNOSIS — F43.10 PTSD (POST-TRAUMATIC STRESS DISORDER): ICD-10-CM

## 2024-01-22 DIAGNOSIS — F32.2 SEVERE DEPRESSION (H): ICD-10-CM

## 2024-01-22 RX ORDER — MECLIZINE HCL 12.5 MG 12.5 MG/1
TABLET ORAL
Qty: 30 TABLET | Refills: 0 | Status: SHIPPED | OUTPATIENT
Start: 2024-01-22 | End: 2024-04-17

## 2024-01-22 RX ORDER — QUETIAPINE FUMARATE 200 MG/1
TABLET, FILM COATED ORAL
Qty: 90 TABLET | Refills: 0 | Status: SHIPPED | OUTPATIENT
Start: 2024-01-22 | End: 2024-04-17

## 2024-01-22 RX ORDER — BUPROPION HYDROCHLORIDE 100 MG/1
TABLET, EXTENDED RELEASE ORAL
Qty: 90 TABLET | Refills: 0 | Status: SHIPPED | OUTPATIENT
Start: 2024-01-22 | End: 2024-04-17

## 2024-01-23 RX ORDER — MIRTAZAPINE 45 MG/1
TABLET, FILM COATED ORAL
Qty: 90 TABLET | Refills: 0 | Status: SHIPPED | OUTPATIENT
Start: 2024-01-23 | End: 2024-04-17

## 2024-03-23 NOTE — ED AVS SNAPSHOT
[facility] Emergency Department  [address]  [facility] [address]  Phone:  [phone]  Fax:  [phone]                                    Gunner Velázquez   MRN: [id number]    Department:  [facility] Emergency Department   Date of Visit:  4/24/2019           After Visit Summary Signature Page    I have received my discharge instructions, and my questions have been answered. I have discussed any challenges I see with this plan with the nurse or doctor.    ..........................................................................................................................................  Patient/Patient Representative Signature      ..........................................................................................................................................  Patient Representative Print Name and Relationship to Patient    ..................................................               ................................................  Date                                   Time    ..........................................................................................................................................  Reviewed by Signature/Title    ...................................................              ..............................................  Date                                               Time          22EPIC Rev 08/18        Patient requests all Lab, Cardiology, and Radiology Results on their Discharge Instructions

## 2024-04-16 DIAGNOSIS — R42 DIZZINESS: ICD-10-CM

## 2024-04-16 DIAGNOSIS — F43.10 PTSD (POST-TRAUMATIC STRESS DISORDER): ICD-10-CM

## 2024-04-16 DIAGNOSIS — F32.2 SEVERE DEPRESSION (H): ICD-10-CM

## 2024-04-17 RX ORDER — PRAZOSIN HYDROCHLORIDE 5 MG/1
CAPSULE ORAL
Qty: 180 CAPSULE | Refills: 0 | Status: SHIPPED | OUTPATIENT
Start: 2024-04-17 | End: 2024-07-23

## 2024-04-17 RX ORDER — BUPROPION HYDROCHLORIDE 100 MG/1
TABLET, EXTENDED RELEASE ORAL
Qty: 90 TABLET | Refills: 0 | Status: SHIPPED | OUTPATIENT
Start: 2024-04-17 | End: 2024-07-23

## 2024-04-17 RX ORDER — MECLIZINE HCL 12.5 MG 12.5 MG/1
TABLET ORAL
Qty: 30 TABLET | Refills: 0 | Status: SHIPPED | OUTPATIENT
Start: 2024-04-17 | End: 2024-06-03

## 2024-04-17 RX ORDER — QUETIAPINE FUMARATE 200 MG/1
TABLET, FILM COATED ORAL
Qty: 90 TABLET | Refills: 0 | Status: SHIPPED | OUTPATIENT
Start: 2024-04-17 | End: 2024-07-08

## 2024-04-17 RX ORDER — MIRTAZAPINE 45 MG/1
TABLET, FILM COATED ORAL
Qty: 90 TABLET | Refills: 0 | Status: SHIPPED | OUTPATIENT
Start: 2024-04-17 | End: 2024-07-08

## 2024-04-22 DIAGNOSIS — H10.13 ALLERGIC CONJUNCTIVITIS, BILATERAL: ICD-10-CM

## 2024-04-22 RX ORDER — OLOPATADINE HYDROCHLORIDE 1 MG/ML
SOLUTION/ DROPS OPHTHALMIC
Qty: 5 ML | Refills: 0 | Status: SHIPPED | OUTPATIENT
Start: 2024-04-22

## 2024-04-25 ENCOUNTER — OFFICE VISIT (OUTPATIENT)
Dept: INTERNAL MEDICINE | Facility: CLINIC | Age: 64
End: 2024-04-25
Payer: COMMERCIAL

## 2024-04-25 VITALS
BODY MASS INDEX: 26.93 KG/M2 | DIASTOLIC BLOOD PRESSURE: 88 MMHG | OXYGEN SATURATION: 99 % | HEIGHT: 66 IN | SYSTOLIC BLOOD PRESSURE: 129 MMHG | TEMPERATURE: 97.6 F | HEART RATE: 62 BPM | RESPIRATION RATE: 24 BRPM | WEIGHT: 167.6 LBS

## 2024-04-25 DIAGNOSIS — R73.9 HYPERGLYCEMIA: ICD-10-CM

## 2024-04-25 DIAGNOSIS — I10 HTN, GOAL BELOW 140/90: ICD-10-CM

## 2024-04-25 DIAGNOSIS — Z00.00 ENCOUNTER FOR PREVENTATIVE ADULT HEALTH CARE EXAMINATION: Primary | ICD-10-CM

## 2024-04-25 DIAGNOSIS — F41.1 GAD (GENERALIZED ANXIETY DISORDER): ICD-10-CM

## 2024-04-25 DIAGNOSIS — E55.9 VITAMIN D DEFICIENCY: ICD-10-CM

## 2024-04-25 DIAGNOSIS — F32.2 SEVERE DEPRESSION (H): ICD-10-CM

## 2024-04-25 DIAGNOSIS — N18.5 CKD (CHRONIC KIDNEY DISEASE) STAGE 5, GFR LESS THAN 15 ML/MIN (H): ICD-10-CM

## 2024-04-25 DIAGNOSIS — H10.13 ALLERGIC CONJUNCTIVITIS OF BOTH EYES: ICD-10-CM

## 2024-04-25 DIAGNOSIS — M10.079 IDIOPATHIC GOUT INVOLVING TOE, UNSPECIFIED CHRONICITY, UNSPECIFIED LATERALITY: ICD-10-CM

## 2024-04-25 DIAGNOSIS — Z29.11 NEED FOR VACCINATION AGAINST RESPIRATORY SYNCYTIAL VIRUS: ICD-10-CM

## 2024-04-25 DIAGNOSIS — Z12.5 SCREENING FOR PROSTATE CANCER: ICD-10-CM

## 2024-04-25 DIAGNOSIS — Z12.11 COLON CANCER SCREENING: ICD-10-CM

## 2024-04-25 LAB
ERYTHROCYTE [DISTWIDTH] IN BLOOD BY AUTOMATED COUNT: 14.4 % (ref 10–15)
HBA1C MFR BLD: 5.6 % (ref 0–5.6)
HCT VFR BLD AUTO: 37.6 % (ref 40–53)
HGB BLD-MCNC: 12.5 G/DL (ref 13.3–17.7)
MCH RBC QN AUTO: 27.7 PG (ref 26.5–33)
MCHC RBC AUTO-ENTMCNC: 33.2 G/DL (ref 31.5–36.5)
MCV RBC AUTO: 83 FL (ref 78–100)
PLATELET # BLD AUTO: 212 10E3/UL (ref 150–450)
RBC # BLD AUTO: 4.52 10E6/UL (ref 4.4–5.9)
WBC # BLD AUTO: 7.8 10E3/UL (ref 4–11)

## 2024-04-25 PROCEDURE — 82306 VITAMIN D 25 HYDROXY: CPT | Performed by: INTERNAL MEDICINE

## 2024-04-25 PROCEDURE — 80053 COMPREHEN METABOLIC PANEL: CPT | Performed by: INTERNAL MEDICINE

## 2024-04-25 PROCEDURE — 99214 OFFICE O/P EST MOD 30 MIN: CPT | Mod: 25 | Performed by: INTERNAL MEDICINE

## 2024-04-25 PROCEDURE — 84550 ASSAY OF BLOOD/URIC ACID: CPT | Performed by: INTERNAL MEDICINE

## 2024-04-25 PROCEDURE — 82570 ASSAY OF URINE CREATININE: CPT | Performed by: INTERNAL MEDICINE

## 2024-04-25 PROCEDURE — 83036 HEMOGLOBIN GLYCOSYLATED A1C: CPT | Performed by: INTERNAL MEDICINE

## 2024-04-25 PROCEDURE — 36415 COLL VENOUS BLD VENIPUNCTURE: CPT | Performed by: INTERNAL MEDICINE

## 2024-04-25 PROCEDURE — G0103 PSA SCREENING: HCPCS | Performed by: INTERNAL MEDICINE

## 2024-04-25 PROCEDURE — 99396 PREV VISIT EST AGE 40-64: CPT | Performed by: INTERNAL MEDICINE

## 2024-04-25 PROCEDURE — 85027 COMPLETE CBC AUTOMATED: CPT | Performed by: INTERNAL MEDICINE

## 2024-04-25 PROCEDURE — 82043 UR ALBUMIN QUANTITATIVE: CPT | Performed by: INTERNAL MEDICINE

## 2024-04-25 PROCEDURE — 80061 LIPID PANEL: CPT | Performed by: INTERNAL MEDICINE

## 2024-04-25 PROCEDURE — 84443 ASSAY THYROID STIM HORMONE: CPT | Performed by: INTERNAL MEDICINE

## 2024-04-25 RX ORDER — KETOTIFEN FUMARATE 0.35 MG/ML
1 SOLUTION/ DROPS OPHTHALMIC 2 TIMES DAILY
Qty: 10 ML | Refills: 3 | Status: SHIPPED | OUTPATIENT
Start: 2024-04-25

## 2024-04-25 RX ORDER — RESPIRATORY SYNCYTIAL VIRUS VACCINE 120MCG/0.5
0.5 KIT INTRAMUSCULAR ONCE
Qty: 1 EACH | Refills: 0 | Status: CANCELLED | OUTPATIENT
Start: 2024-04-25 | End: 2024-04-25

## 2024-04-25 ASSESSMENT — PAIN SCALES - GENERAL: PAINLEVEL: NO PAIN (0)

## 2024-04-25 ASSESSMENT — PATIENT HEALTH QUESTIONNAIRE - PHQ9
10. IF YOU CHECKED OFF ANY PROBLEMS, HOW DIFFICULT HAVE THESE PROBLEMS MADE IT FOR YOU TO DO YOUR WORK, TAKE CARE OF THINGS AT HOME, OR GET ALONG WITH OTHER PEOPLE: SOMEWHAT DIFFICULT
SUM OF ALL RESPONSES TO PHQ QUESTIONS 1-9: 11
SUM OF ALL RESPONSES TO PHQ QUESTIONS 1-9: 11

## 2024-04-25 NOTE — PROGRESS NOTES
Assessment & Plan       CKD (chronic kidney disease) stage 5, GFR less than 15 ml/min (H)  Assess lab work  Follow up with nephrology   - Albumin Random Urine Quantitative with Creat Ratio  - Comprehensive metabolic panel (BMP + Alb, Alk Phos, ALT, AST, Total. Bili, TP)  - OFFICE/OUTPT VISIT,EST,LEVL III    Encounter for preventative adult health care examination  advised regular aerobic activity, low cholesterol, low salt diet, wearing seat belt,  self examinations, sunscreen protection.Obtain screening cholesterol, immunizations reviewed.    - Colonoscopy Screening  Referral; Future    Screening for prostate cancer    - PSA, screen    Colon cancer screening    - Colonoscopy Screening  Referral; Future  - Lipid panel reflex to direct LDL Non-fasting  - CBC with platelets  - Comprehensive metabolic panel (BMP + Alb, Alk Phos, ALT, AST, Total. Bili, TP)  - TSH with free T4 reflex  - PSA, screen  - Vitamin D Deficiency  - Uric acid  - Hemoglobin A1c    Vitamin D deficiency    - Vitamin D Deficiency    Hyperglycemia    - Hemoglobin A1c    Idiopathic gout involving toe, unspecified chronicity, unspecified laterality  No flare up on Allopurinol   - OFFICE/OUTPT VISIT,EST,LEVL III    HTN, goal below 140/90  Controlled on treatment   - Lipid panel reflex to direct LDL Non-fasting  - CBC with platelets  - Comprehensive metabolic panel (BMP + Alb, Alk Phos, ALT, AST, Total. Bili, TP)  - TSH with free T4 reflex  - OFFICE/OUTPT VISIT,EST,LEVL III    MOE (generalized anxiety disorder)  Controlled, on treatment   - Uric acid  - OFFICE/OUTPT VISIT,EST,LEVL III    Allergic conjunctivitis of both eyes  Start eye drops   - ketotifen fumarate 0.035% 0.035 % SOLN ophthalmic solution; Place 1 drop into both eyes 2 times daily  - OFFICE/OUTPT VISIT,EST,LEVL III    Severe depression (H)  Controlled             BMI  Estimated body mass index is 27.05 kg/m  as calculated from the following:    Height as of this  "encounter: 1.676 m (5' 6\").    Weight as of this encounter: 76 kg (167 lb 9.6 oz).         See Patient Instructions    Jamilah Martino is a 63 year old, presenting for the following health issues:  RECHECK        4/25/2024     2:11 PM   Additional Questions   Roomed by Promise GARDNER   Accompanied by n/a     History of Present Illness       CKD: He is not using over the counter pain medicine.     Hyperlipidemia:  He presents for follow up of hyperlipidemia.   He is taking medication to lower cholesterol. He is not having myalgia or other side effects to statin medications.    Hypertension: He presents for follow up of hypertension.  He does check blood pressure  regularly outside of the clinic. Outside blood pressures have been over 140/90. He follows a low salt diet.     He eats 2-3 servings of fruits and vegetables daily.He consumes 0 sweetened beverage(s) daily.He exercises with enough effort to increase his heart rate 10 to 19 minutes per day.  He exercises with enough effort to increase his heart rate 4 days per week.   He is taking medications regularly.     Has h/o HTN. on medical treatment. BP has been controlled. No side effects from medications. No CP, HA, dizziness. good compliance with medications and low salt diet.  Has h/o CRF. Monitoring BP, BG, medications, avoiding OTC NSAIDs. Needs periodic recheck of kidney function.  Has H/O hyperlipidemia. On medical treatment and diet. No side effects. No muscle weakness, myalgias or upset stomach.   Has h/o depression. On medical treatment, controlled, no side effects. No depressive symptoms or suicidal ideation.  Concern for eye irritation, redness, itching, worse with weather changes, spring.       Annual Wellness Visit     Patient has been advised of split billing requirements and indicates understanding: Yes        In general, how would you rate your overall physical health? good  Do you have a special diet?  Low salt         No data to display              Do " you see a dentist two times every year?  Yes  Have you been more tired than usual lately?  No  If you drink alcohol do you typically have >3 drinks per day or >7 drinks per week? No  Do you have a current opioid prescription? No  Do you use any other controlled substances or medications that are not prescribed by a provider? None  Social History     Tobacco Use    Smoking status: Former     Types: Cigarettes    Smokeless tobacco: Never   Vaping Use    Vaping status: Never Used   Substance Use Topics    Alcohol use: No     Alcohol/week: 0.0 standard drinks of alcohol    Drug use: No       Needs assistance for the following daily activities: no assistance needed  Which of the following safety concerns are present in your home?  none identified   Do you (or your family members) have any concerns about your safety while driving?  No  Do you have any of the following hearing concerns?: No hearing concerns  In the past 6 months, have you been bothered by leaking of urine? No           9/20/2019   Fall Risk   Fallen 2 or more times in the past year? No        Today's PHQ-9 Score:       4/25/2024     1:57 PM   PHQ-9 SCORE   PHQ-9 Total Score MyChart 11 (Moderate depression)   PHQ-9 Total Score 11     Last PSA:   PSA   Date Value Ref Range Status   09/02/2020 0.40 0 - 4 ug/L Final     Comment:     Assay Method:  Chemiluminescence using Siemens Vista analyzer     Prostate Specific Antigen Screen   Date Value Ref Range Status   11/09/2022 0.51 0.00 - 4.00 ug/L Final     ASCVD Risk   The 10-year ASCVD risk score (Afshan ANDREWS, et al., 2019) is: 13.8%    Values used to calculate the score:      Age: 63 years      Sex: Male      Is Non- : No      Diabetic: No      Tobacco smoker: No      Systolic Blood Pressure: 129 mmHg      Is BP treated: Yes      HDL Cholesterol: 52 mg/dL      Total Cholesterol: 225 mg/dL           Reviewed and updated as needed this visit by Provider                    Lab work  is in process  Labs reviewed in EPIC    Current providers sharing in care for this patient include:  Patient Care Team:  Vinnie Brown MD as PCP - General (Internal Medicine)  Vinnie Brown MD as Assigned PCP  Kathleen Arenas MD  ManojPromise milan NP as Nurse Practitioner (Nurse Practitioner Psych/Mental Health)  Corrina Farooq LPCC as Therapist (COUNSELOR - PROFESSIONAL)    The following health maintenance items are reviewed in Epic and correct as of today:  Health Maintenance   Topic Date Due    LUNG CANCER SCREENING  Never done    RSV VACCINE (Pregnancy & 60+) (1 - 1-dose 60+ series) Never done    ADVANCE CARE PLANNING  06/13/2023    MICROALBUMIN  08/19/2023    COVID-19 Vaccine (5 - 2023-24 season) 09/01/2023    BMP  12/21/2023    HEMOGLOBIN  11/19/2023    CMP  09/21/2024    LIPID  09/21/2024    PHQ-9  10/25/2024    YEARLY PREVENTIVE VISIT  04/25/2025    ANNUAL REVIEW OF HM ORDERS  04/25/2025    GLUCOSE  09/21/2026    COLORECTAL CANCER SCREENING  02/12/2029    DTAP/TDAP/TD IMMUNIZATION (4 - Td or Tdap) 05/02/2032    PARATHYROID  Completed    PHOSPHORUS  Completed    HEPATITIS C SCREENING  Completed    HIV SCREENING  Completed    DEPRESSION ACTION PLAN  Completed    INFLUENZA VACCINE  Completed    URINALYSIS  Completed    ALK PHOS  Completed    ZOSTER IMMUNIZATION  Completed    Pneumococcal Vaccine: Pediatrics (0 to 5 Years) and At-Risk Patients (6 to 64 Years)  Aged Out    IPV IMMUNIZATION  Aged Out    HPV IMMUNIZATION  Aged Out    MENINGITIS IMMUNIZATION  Aged Out    RSV MONOCLONAL ANTIBODY  Aged Out       Appropriate preventive services were discussed with this patient, including applicable screening as appropriate for fall prevention, nutrition, physical activity, Tobacco-use cessation, weight loss and cognition.  Checklist reviewing preventive services available has been given to the patient.            Review of Systems  Constitutional, HEENT, cardiovascular, pulmonary, GI, , musculoskeletal,  "neuro, skin, endocrine and psych systems are negative, except as otherwise noted.      Objective    /88 (BP Location: Left arm, Cuff Size: Adult Regular)   Pulse 62   Temp 97.6  F (36.4  C) (Tympanic)   Resp 24   Ht 1.676 m (5' 6\")   Wt 76 kg (167 lb 9.6 oz)   SpO2 99%   BMI 27.05 kg/m    Body mass index is 27.05 kg/m .  Physical Exam   GENERAL: alert and no distress  EYES: Eyes grossly normal to inspection, PERRL and conjunctivae and sclerae normal  HENT: ear canals and TM's normal, nose and mouth without ulcers or lesions  NECK: no adenopathy, no asymmetry, masses, or scars  RESP: lungs clear to auscultation - no rales, rhonchi or wheezes  CV: regular rate and rhythm, normal S1 S2, no S3 or S4, no murmur, click or rub, no peripheral edema  ABDOMEN: soft, nontender, no hepatosplenomegaly, no masses and bowel sounds normal  MS: no gross musculoskeletal defects noted, no edema  SKIN: no suspicious lesions or rashes  NEURO: Normal strength and tone, mentation intact and speech normal  PSYCH: mentation appears normal, affect normal/bright    Office Visit on 09/21/2023   Component Date Value Ref Range Status    Sodium 09/21/2023 140  136 - 145 mmol/L Final    Potassium 09/21/2023 4.0  3.4 - 5.3 mmol/L Final    Chloride 09/21/2023 105  98 - 107 mmol/L Final    Carbon Dioxide (CO2) 09/21/2023 24  22 - 29 mmol/L Final    Anion Gap 09/21/2023 11  7 - 15 mmol/L Final    Urea Nitrogen 09/21/2023 34.7 (H)  8.0 - 23.0 mg/dL Final    Creatinine 09/21/2023 2.89 (H)  0.67 - 1.17 mg/dL Final    Calcium 09/21/2023 9.2  8.8 - 10.2 mg/dL Final    Glucose 09/21/2023 101 (H)  70 - 99 mg/dL Final    Alkaline Phosphatase 09/21/2023 72  40 - 129 U/L Final    AST 09/21/2023 38  0 - 45 U/L Final    Reference intervals for this test were updated on 6/12/2023 to more accurately reflect our healthy population. There may be differences in the flagging of prior results with similar values performed with this method. Interpretation of " those prior results can be made in the context of the updated reference intervals.    ALT 09/21/2023 34  0 - 70 U/L Final    Reference intervals for this test were updated on 6/12/2023 to more accurately reflect our healthy population. There may be differences in the flagging of prior results with similar values performed with this method. Interpretation of those prior results can be made in the context of the updated reference intervals.      Protein Total 09/21/2023 7.4  6.4 - 8.3 g/dL Final    Albumin 09/21/2023 4.0  3.5 - 5.2 g/dL Final    Bilirubin Total 09/21/2023 0.4  <=1.2 mg/dL Final    GFR Estimate 09/21/2023 24 (L)  >60 mL/min/1.73m2 Final    Uric Acid 09/21/2023 7.4 (H)  3.4 - 7.0 mg/dL Final    Cholesterol 09/21/2023 225 (H)  <200 mg/dL Final    Triglycerides 09/21/2023 126  <150 mg/dL Final    Direct Measure HDL 09/21/2023 52  >=40 mg/dL Final    LDL Cholesterol Calculated 09/21/2023 148 (H)  <=100 mg/dL Final    Non HDL Cholesterol 09/21/2023 173 (H)  <130 mg/dL Final           Signed Electronically by: Vinnie Brown MD

## 2024-04-26 LAB
ALBUMIN SERPL BCG-MCNC: 4.5 G/DL (ref 3.5–5.2)
ALP SERPL-CCNC: 106 U/L (ref 40–150)
ALT SERPL W P-5'-P-CCNC: 54 U/L (ref 0–70)
ANION GAP SERPL CALCULATED.3IONS-SCNC: 11 MMOL/L (ref 7–15)
AST SERPL W P-5'-P-CCNC: 43 U/L (ref 0–45)
BILIRUB SERPL-MCNC: 0.5 MG/DL
BUN SERPL-MCNC: 46.2 MG/DL (ref 8–23)
CALCIUM SERPL-MCNC: 10 MG/DL (ref 8.8–10.2)
CHLORIDE SERPL-SCNC: 104 MMOL/L (ref 98–107)
CHOLEST SERPL-MCNC: 142 MG/DL
CREAT SERPL-MCNC: 3.56 MG/DL (ref 0.67–1.17)
CREAT UR-MCNC: 45.1 MG/DL
DEPRECATED HCO3 PLAS-SCNC: 23 MMOL/L (ref 22–29)
EGFRCR SERPLBLD CKD-EPI 2021: 18 ML/MIN/1.73M2
FASTING STATUS PATIENT QL REPORTED: NO
GLUCOSE SERPL-MCNC: 91 MG/DL (ref 70–99)
HDLC SERPL-MCNC: 59 MG/DL
LDLC SERPL CALC-MCNC: 63 MG/DL
MICROALBUMIN UR-MCNC: 390 MG/L
MICROALBUMIN/CREAT UR: 864.75 MG/G CR (ref 0–17)
NONHDLC SERPL-MCNC: 83 MG/DL
POTASSIUM SERPL-SCNC: 4.7 MMOL/L (ref 3.4–5.3)
PROT SERPL-MCNC: 8.2 G/DL (ref 6.4–8.3)
PSA SERPL DL<=0.01 NG/ML-MCNC: 0.61 NG/ML (ref 0–4.5)
SODIUM SERPL-SCNC: 138 MMOL/L (ref 135–145)
TRIGL SERPL-MCNC: 100 MG/DL
TSH SERPL DL<=0.005 MIU/L-ACNC: 2.09 UIU/ML (ref 0.3–4.2)
URATE SERPL-MCNC: 3.9 MG/DL (ref 3.4–7)
VIT D+METAB SERPL-MCNC: 87 NG/ML (ref 20–50)

## 2024-06-01 DIAGNOSIS — R42 DIZZINESS: ICD-10-CM

## 2024-06-03 RX ORDER — MECLIZINE HCL 12.5 MG 12.5 MG/1
TABLET ORAL
Qty: 30 TABLET | Refills: 3 | Status: SHIPPED | OUTPATIENT
Start: 2024-06-03

## 2024-06-18 ENCOUNTER — HOSPITAL ENCOUNTER (OUTPATIENT)
Facility: CLINIC | Age: 64
End: 2024-06-18
Attending: INTERNAL MEDICINE | Admitting: INTERNAL MEDICINE
Payer: COMMERCIAL

## 2024-06-18 ENCOUNTER — TELEPHONE (OUTPATIENT)
Dept: GASTROENTEROLOGY | Facility: CLINIC | Age: 64
End: 2024-06-18
Payer: COMMERCIAL

## 2024-06-18 DIAGNOSIS — Z12.11 ENCOUNTER FOR SCREENING COLONOSCOPY: Primary | ICD-10-CM

## 2024-06-18 NOTE — TELEPHONE ENCOUNTER
"Endoscopy Scheduling Screen    Have you had a positive Covid test in the last 14 days?  No    What is your communication preference for Instructions and/or Bowel Prep?   Mail/USPS    What insurance is in the chart?  Other:  [employer]    Ordering/Referring Provider:     BANDAR ENCISO      (If ordering provider performs procedure, schedule with ordering provider unless otherwise instructed. )    BMI: Estimated body mass index is 27.05 kg/m  as calculated from the following:    Height as of 4/25/24: 1.676 m (5' 6\").    Weight as of 4/25/24: 76 kg (167 lb 9.6 oz).     Sedation Ordered  moderate sedation.   If patient BMI > 50 do not schedule in [facility].    If patient BMI > 45 do not schedule at [facility].    Are you taking methadone or Suboxone?  No    Have you had difficulties, pain, or discomfort during past endoscopy procedures?  No    Are you taking any prescription medications for pain 3 or more times per week?   NO, No RN review required.    Do you have a history of malignant hyperthermia?  No    (Females) Are you currently pregnant?   No     Have you been diagnosed or told you have pulmonary hypertension?   No    Do you have an LVAD?  No    Have you been told you have moderate to severe sleep apnea?  No    Have you been told you have COPD, asthma, or any other lung disease?  No    Do you have any heart conditions?  No     Have you ever had or are you waiting for an organ transplant?  No. Continue scheduling, no site restrictions.    Have you had a stroke or transient ischemic attack (TIA aka \"mini stroke\" in the last 6 months?   No    Have you been diagnosed with or been told you have cirrhosis of the liver?   No    Are you currently on dialysis?   No    Do you need assistance transferring?   Yes (Hospital Only) patient states he is a little unstable could use assistance with transferring    BMI: Estimated body mass index is 27.05 kg/m  as calculated from the following:    Height as of 4/25/24: 1.676 m (5' 6\").    Weight " as of 4/25/24: 76 kg (167 lb 9.6 oz).     Is patients BMI > 40 and scheduling location [facility]?  No    Do you take an injectable medication for weight loss or diabetes (excluding insulin)?  No    Do you take the medication Naltrexone?  No    Do you take blood thinners?  No       Prep   Are you currently on dialysis or do you have chronic kidney disease?  Yes ([facility] Prep)    Do you have a diagnosis of diabetes?  No    Do you have a diagnosis of cystic fibrosis (CF)?  No    On a regular basis do you go 3 -5 days between bowel movements?  No    BMI > 40?  No    Preferred Pharmacy:    [id number] [place] [id number] [address] [address]  Phone: [phone] Fax: [phone]      Final Scheduling Details     Procedure scheduled  Colonoscopy    Surgeon:  Reggie     Date of procedure:7/5/24     Pre-OP / PAC:   No - Not required for this site.    Location  [facility] - Per order.    Sedation   Moderate Sedation - Per order.      Patient Reminders:   You will receive a call from a Nurse to review instructions and health history.  This assessment must be completed prior to your procedure.  Failure to complete the Nurse assessment may result in the procedure being cancelled.      On the day of your procedure, please designate an adult(s) who can drive you home stay with you for the next 24 hours. The medicines used in the exam will make you sleepy. You will not be able to drive.      You cannot take public transportation, ride share services, or non-medical taxi service without a responsible caregiver.  Medical transport services are allowed with the requirement that a responsible caregiver will receive you at your destination.  We require that drivers and caregivers are confirmed prior to your procedure.

## 2024-06-26 ENCOUNTER — TELEPHONE (OUTPATIENT)
Dept: GASTROENTEROLOGY | Facility: CLINIC | Age: 64
End: 2024-06-26
Payer: COMMERCIAL

## 2024-06-26 RX ORDER — BISACODYL 5 MG/1
TABLET, DELAYED RELEASE ORAL
Qty: 4 TABLET | Refills: 0 | Status: ON HOLD | OUTPATIENT
Start: 2024-06-26 | End: 2024-07-29

## 2024-06-26 NOTE — LETTER
July 18, 2024      Gunner Douangchanh  [address] [place] [address]              Dear Gunner,          Colonoscopy     Procedure date: 7/29/24    Anticipated arrival time: 11:00 AM   (Please note that arrival times may change)    Facility location: [facility]; [address] [place] [address] - Check in location: 1st Floor [place] lobby. Parking information: Self pay parking available in [place] [facility]. The [facility] is  located across [facility] from the hospital and is connected to the  hospital by a skyway. The skyway access is on Level C of the parking ramp.   parking is available Monday through Friday from 7 a.m.-3 p.m.  parking attendants are stationed at Door 2 at the [facility] entrance,  located off of [address]      Important Procedure Reminders:       Prep Instructions:   Instructions on how to prepare for your upcoming procedure are found in the following pages. Please read instructions carefully. Deviation from instructions may result in less than desired outcomes and procedure may need to be rescheduled.   If you have additional questions regarding how to prepare for your upcoming procedure, contact our endoscopy pre assessment nurses at [phone] option 4 Monday through Friday 7:00am-5:00pm       Policy:   The medications used during the procedure will make you sleepy, so you won't be able to drive. On the day of your procedure, please have an adult ready to drive you home and stay with you for the next 24 hours.   You can't use public transportation, ride-share services, or non-medical taxi services without a responsible caregiver. Medical transport services are okay, but a caregiver must be there to receive you at your destination.   Make sure your  and caregiver are confirmed before your procedure.    Day of procedure:  Please keep in mind that arrival times may change. Let your  know there might be a one-hour window for changes.  We ask that you  please check in at the  with your . Your  should remain on campus.  Expect to be at the procedure center for about 1.5-2.5 hours.    Please do not wear jewelry (i.e. earrings, rings, necklaces, watches, etc). Leave your purse, billfold, credit cards, and other valuables at home.   Bring insurance card and ID.     To cancel or reschedule your procedure:   Within 14 days of your procedure if you develop any flu-like symptoms (such as fever, cough, shortness of breath), COVID-19 like symptoms or exposure to COVID-19, contact our endoscopy team at [phone] option 4 to determine if procedure can be completed or needs to be delayed.   If you need to cancel or reschedule, our endoscopy scheduling team can be reached at [phone], option 2. Monday through Friday, 7:00am-5:00pm.    Medication Reminders:    Please note the following medication holding recommendations:   N/A    ----------------------------------------------------------------------------------------------------------------------------------------------                          Standard Golytely (Colyte, Nulytely) Bowel Prep   Prep instructions for your colonoscopy   For prep questions, please call: [facility] - [address] [place] [address] - [phone] option 4    Please read these instructions carefully at least 7 days prior to your colonoscopy procedure. Be sure to follow all directions completely. The inside of your colon must be clean to allow for a complete examination for the presence of any growths, polyps, and/or abnormalities, as well as their biopsy or removal. A number of tips are included in order to make this part of the procedure as comfortable as possible.    Getting ready    prescription at the pharmacy.  Bowel prep scripts were sent on 24 to:    [id number] - [place], [place] - [address] [address]  Included in the kit will be the followin Dulcolax (Bisacodyl) 5mg tablets  1  container of Polyethylene Glycol (Golytely, Colyte, Nulytely, Gavilyte-G)    A nurse will call you to go over your appointment details and prep instructions. Not completing the nurse call could result with your appointment being cancelled.  You must arrange for an adult to drive you home after your exam. Your colonoscopy cannot be done unless you have a ride. If you need to use public transportation, someone must ride with you and stay with you for up to 24 hours.     7 days before procedure   Consult with your prescribing provider about stopping any:     Diabetic/Weight Loss Injectable Medication GLP-1 agonist (such as Exenatide (Byetta, Bydureon),  Mounjaro (Tirzepatide).  Ozempic (Semaglutide). Semaglutide. Symlin (Pamlintide), Tanzeum (Albiglutide). Tirzepatide-Weight Management (Zepbound), Trulicity (Dulaglutide), Victoza (Saxenda, Liraglutide), Wegovy (Semaglutide) please follow below guidelines for holding:  For weekly injection HOLD 7 days before procedure.  For once or twice a day injection HOLD the day before procedure and day of procedure.  For oral, daily dosing (Rybelsus) HOLD 7 days before procedure.  Blood thinning and/or anti platelet medications: such as Coumadin, Plavix, Xarelto, Eliquis, Lovenox or others, these medications may need to be stopped temporarily before your procedure.   If you take insulin for diabetes, ask your prescribing provider for instructions on how to manage this medication while preparing for a colonoscopy.   NSAIDs medications such as Sulindac, Celebrex, Mobic, Relafen or others may need to be stopped before the procedure. This will be discussed during nurse review call or you can reach out to your prescribing provider.     Stop taking iron (ferrous sulfate), multivitamins that contain iron, and/or fiber supplements (Metamucil, Benefiber, Psyllium husk powder, Fibercon, Bran, etc.).     Stop eating whole kernel corn, popcorn, nuts, and foods that contain seeds. These can  stay in the colon for many days and they can clog up the colonoscope.     3 days before procedure     Begin a low-fiber diet (see examples below). No Olestra (a fat substitute).    Consume no more than 10-15 grams of fiber each day.     It is important to stay hydrated. Drink at least eight 8-ounce glasses of water a day.      LOW FIBER DIET   You can have:   Do not have:    Starches: White bread, rolls, biscuits, croissants, Moon toast, white flour tortillas, waffles, pancakes, Syriac toast; white rice, noodles, pasta, macaroni; cooked and peeled potatoes; plain crackers, saltines; cooked farina or cream of rice; puffed rice, corn flakes, Rice Krispies, Special K      Vegetables: tender cooked and canned, vegetable broths     Fruits and fruit juices: Strained fruit juice, canned fruit without seeds or skin (not pineapple), applesauce, pear sauce, ripe bananas, melons (not watermelon)     Milk products: Milk (plain or flavored), cheese, cottage cheese, yogurt (no berries), custard, ice cream       Proteins: Tender, well-cooked ground beef, lamb, veal, ham, pork, chicken, turkey, fish or organ meat, Tofu, eggs, creamy peanut butter      Fats and condiments:  Margarine, butter, oils, mayonnaise, sour cream, salad dressing, plain gravy; spices, cooked herbs; sugar, clear jelly, honey, syrup      Snacks, sweets and drinks: Pretzels, hard candy; plain cakes and cookies (no nuts or seeds); gelatin, plain pudding, sherbet, Popsicles; coffee, tea, carbonated ( fizzy ) drinks    Starches: Breads or rolls that contain nuts, seeds or fruit; whole wheat or whole grain breads that contain more than 2 grams of fiber per serving; cornbread; corn or whole wheat tortillas; potatoes with skin; brown rice, wild rice, quinoa, kasha (buckwheat), and oatmeal      Vegetables: Any raw or steamed vegetables; vegetables with seeds; corn in any form      Fruits and fruit juices: Prunes, prune juice, raisins and other dried fruits, berries  and other fruits with seeds, canned pineapple juices with pulp such as orange, grapefruit, pineapple or tomato juice     Milk products: Any yogurt with nuts, seeds or berries      Proteins: Tough, fibrous meats with gristle; cooked dried beans, peas or lentils; crunchy peanut butter     Fats and condiments: Pickles, olives, relish, horseradish; jam, marmalade, preserves      Snacks, sweets and drinks: Popcorn, nuts, seeds, granola, coconut, candies made with nuts or seeds; all desserts that contain nuts, seeds, raisins and other dried fruits, coconut, whole grains or bran.                                                         1 day before procedure     You can have a light, low-fiber breakfast. But drink only clear liquids after 9 a.m. (see list below).    Drink at least eight to ten 8-ounce glasses of water throughout the day. ? ? ? ? ? ? ? ?    Fill the container of Golytely with a full gallon of warm water. Cover and shake until well mixed. Chill for 3 hours in refrigerator until it is time to drink solution.    Stop taking NSAIDs pain relievers, such as Advil, Ibuprofen, Motrin, etc. You may take Tylenol.    CLEAR LIQUID DIET:  You can have: Do not have:    Water, tea, coffee (no milk or cream)   Soda pop, Gatorade (not red or purple)   Coconut water   Jell-O, Popsicles (no milk or fruit pieces - not red or purple)   Fat-free soup broth or bouillon   Plain hard candy, such as clear life savers (not red or purple)   Clear juices and fruit-flavored drinks, such as apple juice, white grape juice, Hi-C, and Km-Aid (not red or purple)  Milk or milk products such as ice cream, malts or shakes, or coffee creamer   Red or purple drinks of any kind such as cranberry juice, grape juice or Km-Aid. Avoid red or purple Jell-O, Popsicles, sorbet, sherbet and candy   Juices with pulp such as orange, grapefruit, pineapple or tomato juice   Cream soups of any kind   Alcohol and beer   Protein drinks or protein powder      Step 1     At 3 PM, take 2 Dulcolax (Bisacodyl) tablets.   At 6 PM, start drinking one 8-ounce glass of Golytely mixture every 15 minutes until the container is HALF empty. Drink each glass quickly. Store the rest in the refrigerator.   Continue to drink clear liquids    Step 2     If you arrive for your procedure BEFORE 11 AM:  At 11 PM, take 2 Dulcolax (Bisacodyl) tablets  At 11 PM, start drinking the other half of the Golytely container. Drink one 8-ounce glass every 15 minutes until the container is empty. Drink each glass quickly.    If you arrive for your procedure AFTER 11 AM:  At 6 AM, take 2 Dulcolax (Bisacodyl) tablets  At 6 AM, start drinking the other half of the Golytely container. Drink one 8-ounce glass of Golytely mixture every 15 minutes until the container is empty. Drink each glass quickly.               Reminders While Drinking Laxatives:     After you start drinking the solution, stay near a toilet. You may have watery stools (diarrhea), mild cramping, bloating, and nausea. You may want to use Vaseline on the skin around your anus after each bowel movement or use wet wipes to prevent irritation. Bowel movements will be liquid and dark in color at first and then should turn clear yellow in color. Drinking the prepared solution may make you cold, wearing warm clothing can be helpful.    Some find it helpful to:  For added flavor, include a crystal light lemonade packet in each glass of Golytely, rather than mixing it into the entire prepared mixture.   In between each glass, try sucking on a lemon or a piece of hard candy to help diminish the flavor of the preparation.  Drinking from a straw can help minimize the taste of the prepared mixture.    If you have nausea or vomiting during drinking the solution, rinse your mouth with water and take a 15-30 minute break and then continue drinking solution.     Day of procedure     2 hours before your arrival time stop drinking all liquids, including  water.   Do not smoke or swallow anything, including water or gum for at least 2 hours before your arrival time. This is a safety issue. Your procedure could be cancelled if you do not follow directions.  No chewing tobacco 6 hours prior to procedure arrival time.     You may take your necessary morning medications with sips of water (4 ounces).   Do not take diabetes medicine by mouth until after your exam.  If you have asthma, bring your inhalers.  Please perform your nebulizer treatments and airway clearance therapy in the morning prior to the procedure (if applicable).    Arrive with a responsible adult who can drive you home and stay with you for up to 24 hours. The medications used during the procedure will make you sleepy, so you won't be able to drive yourself home.   You cannot use public transportation, ride-share services, or non-medical taxi services without a responsible caregiver. Medical transport services are okay, but a caregiver must be there to receive you at your destination.  Please check in with your  when you arrive. Drivers should stay on campus.    Expect to be at the procedure center for about 1.5-2.5 hours.    Do not wear jewelry (i.e. earrings, rings, necklaces, watches, etc.). Leave your purse, billfold, credit cards, and other valuables at home.      Bring insurance card and ID.             Answers to Commonly Asked Questions     How soon can I eat after the procedure?  You may resume your normal diet when you feel ready, unless advised otherwise by the doctor performing your procedure. We recommend starting with a light meal.   Do not drink alcohol for 24 hours after your procedure.  You may resume normal activities (work, exercise, etc.) after 24 hours.    How will I feel after the procedure?  It is normal to feel bloated and gassy after your procedure. Walking will help move the air through your colon. You can take non-aspirin pain relievers that contain acetaminophen  (Tylenol).  If you are having sedation, we require a responsible adult to take you home for your safety. The sedation medicines used to relax you during the procedure can impair your judgement and reaction time, and make you forgetful and possibly a little unsteady.  Do not drive, make any important decisions, or sign any legal documents for 24 hours after your procedure.    When will I get my test results?  You should have your procedure results and any lab results (if applicable) by letter, [employer] message, or phone call within 2 weeks. If you have any questions, please call the doctor that referred you for the procedure.    How do I know if my colon is cleaned out?   After completing the bowel prep, your bowel movements should be all liquid and yellow. Your bowel movements will look similar to urine in the toilet. If there are pieces of stool (poop) in the toilet, or if you can't see to the bottom of the toilet, please call our office for advice. Call [phone] and ask to speak with a nurse.    Why is the Golytely bowel prep taken in several steps?   The stool is flushed out by a large wave of fluid going through the colon. Just sipping a large volume of the solution will not achieve the desired result. Studies have shown that two smaller waves (or more in some cases) are better than one large one.      What if I need to cancel or reschedule my procedure?  Contact our endoscopy scheduling team at [phone], option 2. Monday through Friday, 7:00am-5:00pm.

## 2024-06-26 NOTE — TELEPHONE ENCOUNTER
Standard Golytely Bowel Prep recommended due to CKD noted.  Instructions were sent via email. Bowel prep was sent 6/26/2024 to [facility][id number] - [place], [place] - [address].     Communication requested via unencrypted email. This information includes prep instructions for upcoming procedure.  as acknowledged and agreed to accept the risks to receive unencrypted communications for this incidence.    [email or web address]     Informed patient that pre assessment team will be calling them this week to review prep instructions and details.     Radha Lam RN  Endoscopy Procedure Pre Assessment   [phone] option 4

## 2024-06-27 NOTE — TELEPHONE ENCOUNTER
"Response received from Dr. Paredes    \"Use MAC\"    ---------------------------------------------------------------------------------------  Writer will send to endoscopy scheduling to reschedule case with MAC.     Radha Lam, RN  Endoscopy Procedure Pre Assessment   [phone] option 4   "

## 2024-06-27 NOTE — TELEPHONE ENCOUNTER
Staff message sent to [facility] provider Dr. Paredes to review sedation. Hx of PTSD and MOE. Patient completed colonoscopy in 2012 with CS and tolerated well.   ---------------------------------------------------------------------------------------       Pre visit planning completed.      Procedure details:    Patient scheduled for Colonoscopy  on 7/5/24.     Arrival time: 0800. Procedure time 0845    Facility location: [facility]; [address] [place] [address]. Check in location: Main entrance, door #1 on the North side of the building under roundabout awning. DO NOT GO TO SURGERY/ED ENTRANCE.     Sedation type: Conscious sedation     Pre op exam needed? N/A    Indication for procedure: screening       Chart review:     Electronic implanted devices? No    Recent diagnosis of diverticulitis within the last 6 weeks? No    Diabetic? No      Medication review:    Anticoagulants? No    NSAIDS? No    Other medication HOLDING recommendations:  N/A      Prep for procedure:     Bowel prep recommendation: Standard Golytely. Bowel prep prescription sent to [id number]  [place] - [address]  Due to: CKD noted.     Prep instructions sent via email (see endoscopy scheduling encounter 6/18/24)        Radha Lam RN  Endoscopy Procedure Pre Assessment RN  [phone] option 4

## 2024-07-01 ENCOUNTER — TELEPHONE (OUTPATIENT)
Dept: GASTROENTEROLOGY | Facility: CLINIC | Age: 64
End: 2024-07-01
Payer: COMMERCIAL

## 2024-07-01 NOTE — TELEPHONE ENCOUNTER
Caller: Gunner    Reason for Reschedule/Cancellation   (please be detailed, any staff messages or encounters to note?): Patient needs MAC due to PTSD and anxiety.      Prior to reschedule please review:  Ordering Provider:     Vinnie Brown MD in [place] [facility]     Sedation Determined: mac  Does patient have any ASC Exclusions, please identify?: y      Notes on Cancelled Procedure:  Procedure: Lower Endoscopy [Colonoscopy]   Date: 07/05/2024  Location: [facility]; [address] [place] [address]  Surgeon: Reggie      Rescheduled: No,   CASE IN DEPOT        Did you cancel or rescheduled an EUS procedure? No.

## 2024-07-02 NOTE — TELEPHONE ENCOUNTER
Patient called back and wants to stay at  but will wait until there is more MAC available at . Patient is going to call us back to reschedule. Case taken out of depot.

## 2024-07-03 NOTE — TELEPHONE ENCOUNTER
Caller: Gunner Velázquez      Rescheduled: Yes,   Procedure: Lower Endoscopy [Colonoscopy]    Date: 7/29/24   Location: [facility]; [facility] [address] [place] [address]    Surgeon: MIGUE   Sedation Level Scheduled  MAC ,  Reason for Sedation Level MD/RN REVIEW   Instructions updated and sent: Y     Does patient need PAC or Pre -Op Rescheduled? : Y       Did you cancel or rescheduled an EUS procedure? No.

## 2024-07-08 ENCOUNTER — TELEPHONE (OUTPATIENT)
Dept: INTERNAL MEDICINE | Facility: CLINIC | Age: 64
End: 2024-07-08

## 2024-07-08 ENCOUNTER — OFFICE VISIT (OUTPATIENT)
Dept: INTERNAL MEDICINE | Facility: CLINIC | Age: 64
End: 2024-07-08
Payer: COMMERCIAL

## 2024-07-08 VITALS
HEIGHT: 66 IN | RESPIRATION RATE: 13 BRPM | OXYGEN SATURATION: 97 % | BODY MASS INDEX: 27.18 KG/M2 | TEMPERATURE: 97.9 F | WEIGHT: 169.1 LBS | HEART RATE: 61 BPM | SYSTOLIC BLOOD PRESSURE: 128 MMHG | DIASTOLIC BLOOD PRESSURE: 70 MMHG

## 2024-07-08 DIAGNOSIS — R82.90 ABNORMAL FINDING ON URINALYSIS: ICD-10-CM

## 2024-07-08 DIAGNOSIS — I10 HTN, GOAL BELOW 140/90: ICD-10-CM

## 2024-07-08 DIAGNOSIS — F43.10 PTSD (POST-TRAUMATIC STRESS DISORDER): ICD-10-CM

## 2024-07-08 DIAGNOSIS — E78.5 HYPERLIPIDEMIA WITH TARGET LDL LESS THAN 130: ICD-10-CM

## 2024-07-08 DIAGNOSIS — N18.5 CKD (CHRONIC KIDNEY DISEASE) STAGE 5, GFR LESS THAN 15 ML/MIN (H): ICD-10-CM

## 2024-07-08 DIAGNOSIS — H02.403 PTOSIS OF EYELID, BILATERAL: ICD-10-CM

## 2024-07-08 DIAGNOSIS — Z01.818 PREOP GENERAL PHYSICAL EXAM: Primary | ICD-10-CM

## 2024-07-08 LAB
ALBUMIN UR-MCNC: 100 MG/DL
APPEARANCE UR: CLEAR
BACTERIA #/AREA URNS HPF: ABNORMAL /HPF
BILIRUB UR QL STRIP: NEGATIVE
COLOR UR AUTO: ABNORMAL
ERYTHROCYTE [DISTWIDTH] IN BLOOD BY AUTOMATED COUNT: 14 % (ref 10–15)
GLUCOSE UR STRIP-MCNC: NEGATIVE MG/DL
HCT VFR BLD AUTO: 34.6 % (ref 40–53)
HGB BLD-MCNC: 11.8 G/DL (ref 13.3–17.7)
HGB UR QL STRIP: ABNORMAL
KETONES UR STRIP-MCNC: NEGATIVE MG/DL
LEUKOCYTE ESTERASE UR QL STRIP: NEGATIVE
MCH RBC QN AUTO: 28.6 PG (ref 26.5–33)
MCHC RBC AUTO-ENTMCNC: 34.1 G/DL (ref 31.5–36.5)
MCV RBC AUTO: 84 FL (ref 78–100)
NITRATE UR QL: NEGATIVE
PH UR STRIP: 6 [PH] (ref 5–7)
PLATELET # BLD AUTO: 216 10E3/UL (ref 150–450)
RBC # BLD AUTO: 4.12 10E6/UL (ref 4.4–5.9)
RBC #/AREA URNS AUTO: ABNORMAL /HPF
SP GR UR STRIP: 1.01 (ref 1–1.03)
SQUAMOUS #/AREA URNS AUTO: ABNORMAL /LPF
UROBILINOGEN UR STRIP-ACNC: 0.2 E.U./DL
WBC # BLD AUTO: 6.3 10E3/UL (ref 4–11)
WBC #/AREA URNS AUTO: ABNORMAL /HPF

## 2024-07-08 PROCEDURE — 93000 ELECTROCARDIOGRAM COMPLETE: CPT | Performed by: INTERNAL MEDICINE

## 2024-07-08 PROCEDURE — 80048 BASIC METABOLIC PNL TOTAL CA: CPT | Performed by: INTERNAL MEDICINE

## 2024-07-08 PROCEDURE — 85027 COMPLETE CBC AUTOMATED: CPT | Performed by: INTERNAL MEDICINE

## 2024-07-08 PROCEDURE — 36415 COLL VENOUS BLD VENIPUNCTURE: CPT | Performed by: INTERNAL MEDICINE

## 2024-07-08 PROCEDURE — 81001 URINALYSIS AUTO W/SCOPE: CPT | Performed by: INTERNAL MEDICINE

## 2024-07-08 PROCEDURE — 99214 OFFICE O/P EST MOD 30 MIN: CPT | Performed by: INTERNAL MEDICINE

## 2024-07-08 RX ORDER — QUETIAPINE FUMARATE 100 MG/1
100 TABLET, FILM COATED ORAL DAILY
Qty: 90 TABLET | Refills: 1 | Status: SHIPPED | OUTPATIENT
Start: 2024-07-08

## 2024-07-08 RX ORDER — MIRTAZAPINE 30 MG/1
TABLET, FILM COATED ORAL
Qty: 90 TABLET | Refills: 1 | Status: SHIPPED | OUTPATIENT
Start: 2024-07-08

## 2024-07-08 ASSESSMENT — PATIENT HEALTH QUESTIONNAIRE - PHQ9
SUM OF ALL RESPONSES TO PHQ QUESTIONS 1-9: 4
10. IF YOU CHECKED OFF ANY PROBLEMS, HOW DIFFICULT HAVE THESE PROBLEMS MADE IT FOR YOU TO DO YOUR WORK, TAKE CARE OF THINGS AT HOME, OR GET ALONG WITH OTHER PEOPLE: SOMEWHAT DIFFICULT
SUM OF ALL RESPONSES TO PHQ QUESTIONS 1-9: 4

## 2024-07-08 NOTE — TELEPHONE ENCOUNTER
Pt calls back with Fax # for [facility] to fax Pre op.     Dr Cecil Brar.     Fax # [phone]    Phone # for [facility], [phone]

## 2024-07-08 NOTE — LETTER
July 9, 2024      Gunner Velázquez  [address] [place] [address]        Dear ,    We are writing to inform you of your test results.    Decreased kidney function and mild anemia, stable. Follow up with nephrology.        Resulted Orders   Basic metabolic panel  (Ca, Cl, CO2, Creat, Gluc, K, Na, BUN)   Result Value Ref Range    Sodium 139 135 - 145 mmol/L    Potassium 4.2 3.4 - 5.3 mmol/L    Chloride 104 98 - 107 mmol/L    Carbon Dioxide (CO2) 24 22 - 29 mmol/L    Anion Gap 11 7 - 15 mmol/L    Urea Nitrogen 51.5 (H) 8.0 - 23.0 mg/dL    Creatinine 3.50 (H) 0.67 - 1.17 mg/dL    GFR Estimate 19 (L) >60 mL/min/1.73m2      Comment:      eGFR calculated using 2021 CKD-EPI equation.    Calcium 9.9 8.8 - 10.2 mg/dL    Glucose 93 70 - 99 mg/dL   CBC with platelets   Result Value Ref Range    WBC Count 6.3 4.0 - 11.0 10e3/uL    RBC Count 4.12 (L) 4.40 - 5.90 10e6/uL    Hemoglobin 11.8 (L) 13.3 - 17.7 g/dL    Hematocrit 34.6 (L) 40.0 - 53.0 %    MCV 84 78 - 100 fL    MCH 28.6 26.5 - 33.0 pg    MCHC 34.1 31.5 - 36.5 g/dL    RDW 14.0 10.0 - 15.0 %    Platelet Count 216 150 - 450 10e3/uL   UA with Microscopic reflex to Culture - lab collect   Result Value Ref Range    Color Urine Light Yellow Colorless, Straw, Light Yellow, Yellow    Appearance Urine Clear Clear    Glucose Urine Negative Negative mg/dL    Bilirubin Urine Negative Negative    Ketones Urine Negative Negative mg/dL    Specific Gravity Urine 1.010 1.003 - 1.035    Blood Urine Trace (A) Negative    pH Urine 6.0 5.0 - 7.0    Protein Albumin Urine 100 (A) Negative mg/dL    Urobilinogen Urine 0.2 0.2, 1.0 E.U./dL    Nitrite Urine Negative Negative    Leukocyte Esterase Urine Negative Negative   UA Microscopic with Reflex to Culture   Result Value Ref Range    Bacteria Urine Few (A) None Seen /HPF    RBC Urine 0-2 0-2 /HPF /HPF    WBC Urine 0-5 0-5 /HPF /HPF    Squamous Epithelials Urine Few (A) None Seen /LPF    Narrative    Urine Culture not  indicated       If you have any questions or concerns, please call the clinic at the number listed above.       Sincerely,      Vinnie Brown MD

## 2024-07-08 NOTE — PROGRESS NOTES
Preoperative Evaluation  [facility]  [address]  [address]  [facility] [address]  Phone: [phone]  Primary Provider: Vinnie Brown MD  Pre-op Performing Provider: Vinnie Brown MD  Jul 8, 2024 7/8/2024   Surgical Information   What procedure is being done? eye lid lift   Facility or Hospital where procedure/surgery [facility] be performed[facility]   Who is doing the procedure / surgery? eye lid lift   Date of surgery / procedure: 7/25/24   Time of surgery / procedure: tbd   Where do you plan to recover after surgery? at home with family        Fax number for surgical facility: [phone]    Assessment & Plan     The proposed surgical procedure is considered LOW risk.    Preop general physical exam  Assess EKG and lab work   - EKG 12-lead complete w/read - Clinics  - Basic metabolic panel  (Ca, Cl, CO2, Creat, Gluc, K, Na, BUN)  - CBC with platelets  - UA with Microscopic reflex to Culture - lab collect    Ptosis of eyelid, bilateral  Planned surgical treatment     CKD (chronic kidney disease) stage 5, GFR less than 15 ml/min (H)  Follow up with nephrology for kidney transplant   - UA with Microscopic reflex to Culture - lab collect    PTSD (post-traumatic stress disorder)  Improved symptoms.   Decrease dose of medications   - mirtazapine (REMERON) 30 MG tablet; TAKE ONE TABLET BY MOUTH AT BEDTIME  - QUEtiapine (SEROQUEL) 100 MG tablet; Take 1 tablet (100 mg) by mouth daily    Abnormal finding on urinalysis  Assess UA   - UA with Microscopic reflex to Culture - lab collect    Hyperlipidemia with target LDL less than 130  On statin     HTN, goal below 140/90  Controlled on treatment             - No identified additional risk factors other than previously addressed         Recommendation  Approval given to proceed with proposed procedure, without further diagnostic evaluation.    Jamilah Martino is a 64 year old, presenting for the  following:  Pre-Op Exam          7/8/2024     9:49 AM   Additional Questions   Roomed by marcus   Accompanied by self         7/8/2024     9:49 AM   Patient Reported Additional Medications   Patient reports taking the following new medications none     HPI related to upcoming procedure: scheduled for upper eyelids surgery for ptosis. Affecting eyesight   Has h/o HTN. on medical treatment. BP has been controlled. No side effects from medications. No CP, HA, dizziness. good compliance with medications and low salt diet.  Has H/O hyperlipidemia. On medical treatment and diet. No side effects. No muscle weakness, myalgias or upset stomach.   Has h/o stage 5 CRF. Symptoms include fagitue. Monitoring BP, BG, medications, avoiding OTC NSAIDs. Needs periodic recheck of kidney function. Planned for transplant.   Has h/o depression. On medical treatment, controlled, no side effects. No depressive symptoms or suicidal ideation.            7/8/2024   Pre-Op Questionnaire   Have you ever had a heart attack or stroke? No   Have you ever had surgery on your heart or blood vessels, such as a stent placement, a coronary artery bypass, or surgery on an artery in your head, neck, heart, or legs? No   Do you have chest pain with activity? No   Do you have a history of heart failure? No   Do you currently have a cold, bronchitis or symptoms of other infection? No   Do you have a cough, shortness of breath, or wheezing? No   Do you or anyone in your family have previous history of blood clots? No   Do you or does anyone in your family have a serious bleeding problem such as prolonged bleeding following surgeries or cuts? No   Have you ever had problems with anemia or been told to take iron pills? No   Have you had any abnormal blood loss such as black, tarry or bloody stools? No   Have you ever had a blood transfusion? No   Are you willing to have a blood transfusion if it is medically needed before, during, or after your surgery? Yes    Have you or any of your relatives ever had problems with anesthesia? No   Do you have sleep apnea, excessive snoring or daytime drowsiness? No   Do you have any artifical heart valves or other implanted medical devices like a pacemaker, defibrillator, or continuous glucose monitor? No   Do you have artificial joints? No   Are you allergic to latex? No        Health Care Directive  Patient does not have a Health Care Directive or Living Will: Discussed advance care planning with patient; however, patient declined at this time.    Preoperative Review of    reviewed - no record of controlled substances prescribed.      Status of Chronic Conditions:  See problem list for active medical problems.  Problems all longstanding and stable, except as noted/documented.  See ROS for pertinent symptoms related to these conditions.    Patient Active Problem List    Diagnosis Date Noted    CKD (chronic kidney disease) stage 5, GFR less than 15 ml/min (H) 11/09/2022     Priority: Medium    PTSD (post-traumatic stress disorder) 05/03/2018     Priority: Medium    Severe depression (H) 05/31/2016     Priority: Medium    High triglycerides 08/11/2015     Priority: Medium    Seasonal allergies 05/04/2015     Priority: Medium    MOE (generalized anxiety disorder) 08/01/2014     Priority: Medium    GERD (gastroesophageal reflux disease) 08/01/2014     Priority: Medium    Dizziness 08/01/2014     Priority: Medium     Thought secondary to anxiety.  Workup with cardiology, ENT, audiology, [facility], physical therapy, MRI, MRA all negative      Hyperlipidemia with target LDL less than 130 02/21/2014     Priority: Medium    HTN, goal below 140/90 02/21/2014     Priority: Medium    CKD (chronic kidney disease) stage 4, GFR 15-29 ml/min (H) 02/21/2014     Priority: Medium     Nephrologist Dr. Arenas at [facility]        Past Medical History:   Diagnosis Date    Anxiety     CKD (chronic kidney disease)      Depression     Dizziness     Dyslipidemia     HTN (hypertension)     Hyperlipidaemia     Shortness of breath      No past surgical history on file.  Current Outpatient Medications   Medication Sig Dispense Refill    ACE/ARB/ARNI NOT PRESCRIBED, INTENTIONAL, Please choose reason not prescribed, below      allopurinol (ZYLOPRIM) 300 MG tablet Take 1 Tablet (300 mg) by mouth daily at bedtime. Do not start before September 6, 2023.*      bisacodyl (DULCOLAX) 5 MG EC tablet Take 2 tablets at 3 pm the day before your procedure. If your procedure is before 11 am, take 2 additional tablets at 11 pm. If your procedure is after 11 am, take 2 additional tablets at 6 am. For additional instructions refer to your colonoscopy prep instructions. 4 tablet 0    buPROPion (WELLBUTRIN SR) 100 MG 12 hr tablet TAKE 1 TABLET BY MOUTH EVERY MORNING 90 tablet 0    calcitRIOL (ROCALTROL) 0.25 MCG capsule Take 0.25 mcg by mouth      carvedilol (COREG) 25 MG tablet Take 1 tablet (25 mg) by mouth 2 times daily (with meals) 180 tablet 2    cholecalciferol (VITAMIN D3) 125 mcg (5000 units) capsule TAKE ONE CAPSULE BY MOUTH EVERY DAY 90 capsule 1    fexofenadine (ALLEGRA) 180 MG tablet Take 1 tablet (180 mg) by mouth daily 90 tablet 3    fish oil-omega-3 fatty acids 1000 MG capsule Take 1 capsule (1 g) by mouth 2 times daily 180 capsule 3    ketotifen fumarate 0.035% 0.035 % SOLN ophthalmic solution Place 1 drop into both eyes 2 times daily 10 mL 3    meclizine (ANTIVERT) 12.5 MG tablet TAKE 1 TO 2 TABLETS BY MOUTH FOUR TIMES DAILY AS NEEDED FOR DIZZINESS 30 tablet 3    mirtazapine (REMERON) 45 MG tablet TAKE ONE TABLET BY MOUTH AT BEDTIME 90 tablet 0    olopatadine (PATANOL) 0.1 % ophthalmic solution INSTILL ONE DROP INTO EACH EYE TWICE DAILY 5 mL 0    polyethylene glycol (GOLYTELY) 236 g suspension The night before the exam at 6 pm drink an 8-ounce glass every 15 minutes until the jug is half empty. If you arrive before 11 AM: Drink the other  "half of the [employer] jug at 11 PM night before procedure. If you arrive after 11 AM: Drink the other half of the [employer] jug at 6 AM day of procedure. For additional instructions refer to your colonoscopy prep instructions. 4000 mL 0    prazosin (MINIPRESS) 5 MG capsule TAKE 2 CAPSULES BY MOUTH IN THE MORNING & TAKE 2 CAPSULES IN THE EVENING. 180 capsule 0    QUEtiapine (SEROQUEL) 200 MG tablet TAKE ONE TABLET BY MOUTH AT BEDTIME 90 tablet 0    simvastatin (ZOCOR) 40 MG tablet Take 1 tablet (40 mg) by mouth At Bedtime 90 tablet 3       Allergies   Allergen Reactions    Rifampin Other (See Comments)     HA, dizziness        Social History     Tobacco Use    Smoking status: Former     Types: Cigarettes    Smokeless tobacco: Never   Substance Use Topics    Alcohol use: No     Alcohol/week: 0.0 standard drinks of alcohol     Family History   Problem Relation Age of Onset    Heart Disease Father 70        tb    Family History Negative No family hx of      History   Drug Use No             Review of Systems  Constitutional, HEENT, cardiovascular, pulmonary, GI, , musculoskeletal, neuro, skin, endocrine and psych systems are negative, except as otherwise noted.    Objective    Pulse 61   Temp 97.9  F (36.6  C) (Tympanic)   Resp 13   Ht 1.676 m (5' 6\")   Wt 76.7 kg (169 lb 1.6 oz)   SpO2 97%   BMI 27.29 kg/m     Estimated body mass index is 27.29 kg/m  as calculated from the following:    Height as of this encounter: 1.676 m (5' 6\").    Weight as of this encounter: 76.7 kg (169 lb 1.6 oz).  Physical Exam  GENERAL: alert and no distress  EYES: Eyes grossly normal to inspection, PERRL and conjunctivae and sclerae normal  HENT: ear canals and TM's normal, nose and mouth without ulcers or lesions  NECK: no adenopathy, no asymmetry, masses, or scars  RESP: lungs clear to auscultation - no rales, rhonchi or wheezes  CV: regular rate and rhythm, normal S1 S2, no S3 or S4, no murmur, click or rub, no peripheral " edema  ABDOMEN: soft, nontender, no hepatosplenomegaly, no masses and bowel sounds normal  MS: no gross musculoskeletal defects noted, no edema  SKIN: no suspicious lesions or rashes  NEURO: Normal strength and tone, mentation intact and speech normal  PSYCH: mentation appears normal, affect normal/bright    Recent Labs   Lab Test 04/25/24  1514 09/21/23  1417   HGB 12.5*  --      --     140   POTASSIUM 4.7 4.0   CR 3.56* 2.89*   A1C 5.6  --         Diagnostics  Labs pending at this time.  Results will be reviewed when available.   EKG: appears normal, NSR, sinus tachycardia, normal axis, normal intervals, no acute ST/T changes c/w ischemia, no LVH by voltage criteria, unchanged from previous tracings    Revised Cardiac Risk Index (RCRI)  The patient has the following serious cardiovascular risks for perioperative complications:   - Serum Creatinine >2.0 mg/dl = 1 point     RCRI Interpretation: 1 point: Class II (low risk - 0.9% complication rate)         Signed Electronically by: Vinnie Brown MD  Copy of this evaluation report is provided to requesting physician.        Answers submitted by the patient for this visit:  Patient Health Questionnaire (Submitted on 7/8/2024)  If you checked off any problems, how difficult have these problems made it for you to do your work, take care of things at home, or get along with other people?: Somewhat difficult  PHQ9 TOTAL SCORE: 4

## 2024-07-08 NOTE — NURSING NOTE
"/70   Pulse 61   Temp 97.9  F (36.6  C) (Tympanic)   Resp 13   Ht 1.676 m (5' 6\")   Wt 76.7 kg (169 lb 1.6 oz)   SpO2 97%   BMI 27.29 kg/m      "

## 2024-07-09 LAB
ANION GAP SERPL CALCULATED.3IONS-SCNC: 11 MMOL/L (ref 7–15)
BUN SERPL-MCNC: 51.5 MG/DL (ref 8–23)
CALCIUM SERPL-MCNC: 9.9 MG/DL (ref 8.8–10.2)
CHLORIDE SERPL-SCNC: 104 MMOL/L (ref 98–107)
CREAT SERPL-MCNC: 3.5 MG/DL (ref 0.67–1.17)
DEPRECATED HCO3 PLAS-SCNC: 24 MMOL/L (ref 22–29)
EGFRCR SERPLBLD CKD-EPI 2021: 19 ML/MIN/1.73M2
GLUCOSE SERPL-MCNC: 93 MG/DL (ref 70–99)
POTASSIUM SERPL-SCNC: 4.2 MMOL/L (ref 3.4–5.3)
SODIUM SERPL-SCNC: 139 MMOL/L (ref 135–145)

## 2024-07-18 NOTE — TELEPHONE ENCOUNTER
Rescheduled Procedure  Patient needed MAC.    Pre visit planning completed.      Procedure details:    Patient scheduled for Colonoscopy on 7/29/24.     Arrival time: 1100. Procedure time 1200    Facility location: [facility]; [facility] [place] [address]. Check in location: 1st [facility].     Sedation type: MAC    Pre op exam needed? Yes. Pre op exam completed on 7/8/24 with Vinnie Brown MD..    Indication for procedure: Screening      Chart review:     Electronic implanted devices? No    Recent diagnosis of diverticulitis within the last 6 weeks? No    Diabetic? No      Medication review:    Anticoagulants? No    NSAIDS? No NSAID medications per patient's medication list.  RN will verify with pre-assessment call.    Other medication HOLDING recommendations:  N/A      Prep for procedure:     Bowel prep recommendation: Standard Golytely. Bowel prep prescription previously sent on 6/26/24 to [id number] - [place], [place] - [address] [address]  Due to: CKD noted.     Prep instructions sent via letter and previously sent via email on 6/26/24.       Corrina Farrell RN  Endoscopy Procedure Pre Assessment RN  [phone] option 4

## 2024-07-19 NOTE — TELEPHONE ENCOUNTER
Pre assessment completed for upcoming procedure.   (Please see previous telephone encounter notes for complete details)    Patient  returned call. Pt called via [employer] .       Procedure details:    Arrival time and facility location reviewed.    Pre op exam needed? Yes. Pre op exam completed on 7/8/24 per below. .    Designated  policy reviewed. Instructed to have someone stay 24  hours post procedure.       Medication review:    Medications reviewed. Please see supporting documentation below. Holding recommendations discussed (if applicable).       Prep for procedure:     Procedure prep instructions reviewed.        Any additional information needed:  N/A      Patient  verbalized understanding and had no questions or concerns at this time.      Yoly Reyes RN  Endoscopy Procedure Pre Assessment   [phone] option 4

## 2024-07-19 NOTE — TELEPHONE ENCOUNTER
[id number] place [facility].    Attempted to contact patient and spouse (consent to communicate on file) in order to complete pre assessment questions.     No answer. Left messages to return call to [phone] option 4.    Callback required communication left via voicemail due to [employer] inactive.      Corrina Farrell RN  Endoscopy Procedure Pre Assessment

## 2024-07-23 DIAGNOSIS — F32.2 SEVERE DEPRESSION (H): ICD-10-CM

## 2024-07-23 DIAGNOSIS — F43.10 PTSD (POST-TRAUMATIC STRESS DISORDER): ICD-10-CM

## 2024-07-23 RX ORDER — PRAZOSIN HYDROCHLORIDE 5 MG/1
CAPSULE ORAL
Qty: 180 CAPSULE | Refills: 0 | Status: SHIPPED | OUTPATIENT
Start: 2024-07-23

## 2024-07-23 RX ORDER — BUPROPION HYDROCHLORIDE 100 MG/1
TABLET, EXTENDED RELEASE ORAL
Qty: 90 TABLET | Refills: 0 | Status: SHIPPED | OUTPATIENT
Start: 2024-07-23

## 2024-07-24 NOTE — TELEPHONE ENCOUNTER
Patient calling to verify timing of when to take dulcolax and golytely.     RN verified timing with patient. Reminded him of CLD and NPO timing as well.     RN did ask if patient would like Red , patient declined.     Opal Manzano RN  Endoscopy Procedure Pre Assessment RN

## 2024-07-28 ENCOUNTER — ANESTHESIA EVENT (OUTPATIENT)
Dept: GASTROENTEROLOGY | Facility: CLINIC | Age: 64
End: 2024-07-28
Payer: COMMERCIAL

## 2024-07-28 RX ORDER — ONDANSETRON 2 MG/ML
4 INJECTION INTRAMUSCULAR; INTRAVENOUS
Status: CANCELLED | OUTPATIENT
Start: 2024-07-28

## 2024-07-28 RX ORDER — LIDOCAINE 40 MG/G
CREAM TOPICAL
Status: CANCELLED | OUTPATIENT
Start: 2024-07-28

## 2024-07-29 ENCOUNTER — ANESTHESIA (OUTPATIENT)
Dept: GASTROENTEROLOGY | Facility: CLINIC | Age: 64
End: 2024-07-29
Payer: COMMERCIAL

## 2024-07-29 ENCOUNTER — HOSPITAL ENCOUNTER (OUTPATIENT)
Facility: CLINIC | Age: 64
Discharge: HOME | End: 2024-07-29
Attending: COLON & RECTAL SURGERY | Admitting: COLON & RECTAL SURGERY
Payer: COMMERCIAL

## 2024-07-29 VITALS
DIASTOLIC BLOOD PRESSURE: 109 MMHG | SYSTOLIC BLOOD PRESSURE: 171 MMHG | OXYGEN SATURATION: 98 % | HEART RATE: 53 BPM | RESPIRATION RATE: 18 BRPM

## 2024-07-29 LAB — COLONOSCOPY: NORMAL

## 2024-07-29 PROCEDURE — G0121 COLON CA SCRN NOT HI RSK IND: HCPCS | Performed by: COLON & RECTAL SURGERY

## 2024-07-29 PROCEDURE — 45378 DIAGNOSTIC COLONOSCOPY: CPT | Performed by: COLON & RECTAL SURGERY

## 2024-07-29 PROCEDURE — G0500 MOD SEDAT ENDO SERVICE >5YRS: HCPCS | Performed by: COLON & RECTAL SURGERY

## 2024-07-29 PROCEDURE — 250N000011 HC RX IP 250 OP 636: Performed by: COLON & RECTAL SURGERY

## 2024-07-29 RX ORDER — FENTANYL CITRATE 50 UG/ML
INJECTION, SOLUTION INTRAMUSCULAR; INTRAVENOUS PRN
Status: DISCONTINUED | OUTPATIENT
Start: 2024-07-29 | End: 2024-07-29 | Stop reason: HOSPADM

## 2024-07-29 ASSESSMENT — ACTIVITIES OF DAILY LIVING (ADL)
ADLS_ACUITY_SCORE: 35
ADLS_ACUITY_SCORE: 35

## 2024-07-29 NOTE — ANESTHESIA PREPROCEDURE EVALUATION
Anesthesia Pre-Procedure Evaluation    Patient: Gunner Velázquez   MRN: [id number] : 1960        Procedure : Procedure(s):  Colonoscopy          Past Medical History:   Diagnosis Date     Anxiety      CKD (chronic kidney disease)      Depression      Dizziness      Dyslipidemia      HTN (hypertension)      Hyperlipidaemia      Shortness of breath       No past surgical history on file.   Allergies   Allergen Reactions     Rifampin Other (See Comments)     HA, dizziness      Social History     Tobacco Use     Smoking status: Former     Types: Cigarettes     Smokeless tobacco: Never   Substance Use Topics     Alcohol use: No     Alcohol/week: 0.0 standard drinks of alcohol      Wt Readings from Last 1 Encounters:   24 76.7 kg (169 lb 1.6 oz)        Anesthesia Evaluation   Pt has had prior anesthetic.         ROS/MED HX  ENT/Pulmonary:  - neg pulmonary ROS     Neurologic:  - neg neurologic ROS     Cardiovascular:     (+) Dyslipidemia hypertension- -   -  - -                                      METS/Exercise Tolerance:     Hematologic:       Musculoskeletal:       GI/Hepatic:     (+) GERD,                   Renal/Genitourinary:     (+) renal disease, type: ESRD, Pt does not require dialysis,           Endo:    (-) Type I DM and Type II DM   Psychiatric/Substance Use:     (+) psychiatric history        Infectious Disease:       Malignancy:       Other:               OUTSIDE LABS:  CBC:   Lab Results   Component Value Date    WBC 6.3 2024    WBC 7.8 2024    HGB 11.8 (L) 2024    HGB 12.5 (L) 2024    HCT 34.6 (L) 2024    HCT 37.6 (L) 2024     2024     2024     BMP:   Lab Results   Component Value Date     2024     2024    POTASSIUM 4.2 2024    POTASSIUM 4.7 2024    CHLORIDE 104 2024    CHLORIDE 104 2024    CO2 24 2024    CO2 23 2024    BUN 51.5 (H) 2024    BUN 46.2 (H) 2024     "CR 3.50 (H) 07/08/2024    CR 3.56 (H) 04/25/2024    GLC 93 07/08/2024    GLC 91 04/25/2024     COAGS:   Lab Results   Component Value Date    PTT 34 04/24/2019    INR 0.90 04/24/2019     POC:   Lab Results   Component Value Date    BGM 87 04/24/2019     HEPATIC:   Lab Results   Component Value Date    ALBUMIN 4.5 04/25/2024    PROTTOTAL 8.2 04/25/2024    ALT 54 04/25/2024    AST 43 04/25/2024    ALKPHOS 106 04/25/2024    BILITOTAL 0.5 04/25/2024     OTHER:   Lab Results   Component Value Date    A1C 5.6 04/25/2024    BLANCA 9.9 07/08/2024    PHOS 4.4 06/13/2018    TSH 2.09 04/25/2024       Anesthesia Plan    ASA Status:  3       Anesthesia Type: MAC.     - Reason for MAC: chronic cardiopulmonary disease              Consents            Postoperative Care            Comments:    Other Comments: After discussion with Endo team and surgeon, care for patient will be performed by the endoscopy unit including anesthesia during his procedure. Discussed high blood pressure likely due to colonoscopy prep causing less absorption of HTN meds, which he did take as scheduled. Patient and team aware that high blood pressure may need to be managed during and after the procedure.            Valentino Laird MD    I have reviewed the pertinent notes and labs in the chart from the past 30 days and (re)examined the patient.  Any updates or changes from those notes are reflected in this note.              # Overweight: Estimated body mass index is 27.29 kg/m  as calculated from the following:    Height as of 7/8/24: 1.676 m (5' 6\").    Weight as of 7/8/24: 76.7 kg (169 lb 1.6 oz).      "

## 2024-11-07 DIAGNOSIS — F43.10 PTSD (POST-TRAUMATIC STRESS DISORDER): ICD-10-CM

## 2024-11-07 DIAGNOSIS — F32.2 SEVERE DEPRESSION (H): ICD-10-CM

## 2024-11-07 RX ORDER — PRAZOSIN HYDROCHLORIDE 5 MG/1
CAPSULE ORAL
Qty: 180 CAPSULE | Refills: 0 | Status: SHIPPED | OUTPATIENT
Start: 2024-11-07

## 2024-11-07 RX ORDER — BUPROPION HYDROCHLORIDE 100 MG/1
TABLET, EXTENDED RELEASE ORAL
Qty: 90 TABLET | Refills: 0 | Status: SHIPPED | OUTPATIENT
Start: 2024-11-07

## 2025-01-05 DIAGNOSIS — F43.10 PTSD (POST-TRAUMATIC STRESS DISORDER): ICD-10-CM

## 2025-01-06 RX ORDER — QUETIAPINE FUMARATE 100 MG/1
TABLET, FILM COATED ORAL
Qty: 90 TABLET | Refills: 0 | Status: SHIPPED | OUTPATIENT
Start: 2025-01-06

## 2025-01-06 RX ORDER — MIRTAZAPINE 30 MG/1
TABLET, FILM COATED ORAL
Qty: 90 TABLET | Refills: 0 | Status: SHIPPED | OUTPATIENT
Start: 2025-01-06

## 2025-01-08 DIAGNOSIS — F32.2 SEVERE DEPRESSION (H): ICD-10-CM

## 2025-01-08 DIAGNOSIS — F43.10 PTSD (POST-TRAUMATIC STRESS DISORDER): ICD-10-CM

## 2025-01-08 RX ORDER — BUPROPION HYDROCHLORIDE 100 MG/1
TABLET, EXTENDED RELEASE ORAL
Qty: 90 TABLET | Refills: 0 | Status: SHIPPED | OUTPATIENT
Start: 2025-01-08

## 2025-01-08 RX ORDER — PRAZOSIN HYDROCHLORIDE 5 MG/1
CAPSULE ORAL
Qty: 360 CAPSULE | Refills: 0 | Status: SHIPPED | OUTPATIENT
Start: 2025-01-08

## 2025-01-29 NOTE — NURSING NOTE
"/88   Pulse 61   Temp 97.7  F (36.5  C) (Oral)   Resp 22   Ht 1.676 m (5' 6\")   Wt 73.2 kg (161 lb 6.4 oz)   SpO2 99%   BMI 26.05 kg/m    Patient in for a return visit.  Karin Rondon CMA    "
Copy of disability form copied and mailed to home CHAVO Singletary LPN    
no

## 2025-02-16 DIAGNOSIS — F32.1 MAJOR DEPRESSIVE DISORDER, SINGLE EPISODE, MODERATE (H): ICD-10-CM

## 2025-02-26 ENCOUNTER — OFFICE VISIT (OUTPATIENT)
Dept: INTERNAL MEDICINE | Facility: CLINIC | Age: 65
End: 2025-02-26
Payer: COMMERCIAL

## 2025-02-26 VITALS
TEMPERATURE: 98.7 F | SYSTOLIC BLOOD PRESSURE: 125 MMHG | WEIGHT: 169.3 LBS | RESPIRATION RATE: 14 BRPM | DIASTOLIC BLOOD PRESSURE: 85 MMHG | BODY MASS INDEX: 27.21 KG/M2 | HEIGHT: 66 IN | OXYGEN SATURATION: 96 % | HEART RATE: 74 BPM

## 2025-02-26 DIAGNOSIS — N18.5 CKD (CHRONIC KIDNEY DISEASE) STAGE 5, GFR LESS THAN 15 ML/MIN (H): ICD-10-CM

## 2025-02-26 DIAGNOSIS — F43.10 PTSD (POST-TRAUMATIC STRESS DISORDER): ICD-10-CM

## 2025-02-26 DIAGNOSIS — I10 HTN, GOAL BELOW 140/90: Primary | ICD-10-CM

## 2025-02-26 LAB
ALBUMIN SERPL BCG-MCNC: 4.1 G/DL (ref 3.5–5.2)
ALBUMIN UR-MCNC: 100 MG/DL
ALP SERPL-CCNC: 80 U/L (ref 40–150)
ALT SERPL W P-5'-P-CCNC: 27 U/L (ref 0–70)
ANION GAP SERPL CALCULATED.3IONS-SCNC: 12 MMOL/L (ref 7–15)
APPEARANCE UR: CLEAR
AST SERPL W P-5'-P-CCNC: 35 U/L (ref 0–45)
BILIRUB SERPL-MCNC: 0.4 MG/DL
BILIRUB UR QL STRIP: NEGATIVE
BUN SERPL-MCNC: 42.8 MG/DL (ref 8–23)
CALCIUM SERPL-MCNC: 9.8 MG/DL (ref 8.8–10.4)
CHLORIDE SERPL-SCNC: 103 MMOL/L (ref 98–107)
COLOR UR AUTO: YELLOW
CREAT SERPL-MCNC: 3.68 MG/DL (ref 0.67–1.17)
CREAT UR-MCNC: 30.8 MG/DL
EGFRCR SERPLBLD CKD-EPI 2021: 18 ML/MIN/1.73M2
ERYTHROCYTE [DISTWIDTH] IN BLOOD BY AUTOMATED COUNT: 13.9 % (ref 10–15)
GLUCOSE SERPL-MCNC: 138 MG/DL (ref 70–99)
GLUCOSE UR STRIP-MCNC: NEGATIVE MG/DL
HCO3 SERPL-SCNC: 24 MMOL/L (ref 22–29)
HCT VFR BLD AUTO: 34.3 % (ref 40–53)
HGB BLD-MCNC: 11.9 G/DL (ref 13.3–17.7)
HGB UR QL STRIP: ABNORMAL
KETONES UR STRIP-MCNC: NEGATIVE MG/DL
LEUKOCYTE ESTERASE UR QL STRIP: NEGATIVE
MCH RBC QN AUTO: 28.7 PG (ref 26.5–33)
MCHC RBC AUTO-ENTMCNC: 34.7 G/DL (ref 31.5–36.5)
MCV RBC AUTO: 83 FL (ref 78–100)
MICROALBUMIN UR-MCNC: 221 MG/L
MICROALBUMIN/CREAT UR: 717.53 MG/G CR (ref 0–17)
NITRATE UR QL: NEGATIVE
PH UR STRIP: 5.5 [PH] (ref 5–7)
PLATELET # BLD AUTO: 192 10E3/UL (ref 150–450)
POTASSIUM SERPL-SCNC: 4.1 MMOL/L (ref 3.4–5.3)
PROT SERPL-MCNC: 7.6 G/DL (ref 6.4–8.3)
RBC # BLD AUTO: 4.14 10E6/UL (ref 4.4–5.9)
RBC #/AREA URNS AUTO: ABNORMAL /HPF
SODIUM SERPL-SCNC: 139 MMOL/L (ref 135–145)
SP GR UR STRIP: <=1.005 (ref 1–1.03)
SQUAMOUS #/AREA URNS AUTO: ABNORMAL /LPF
UROBILINOGEN UR STRIP-ACNC: 0.2 E.U./DL
VIT D+METAB SERPL-MCNC: 78 NG/ML (ref 20–50)
WBC # BLD AUTO: 6 10E3/UL (ref 4–11)
WBC #/AREA URNS AUTO: ABNORMAL /HPF

## 2025-02-26 PROCEDURE — 3074F SYST BP LT 130 MM HG: CPT | Performed by: INTERNAL MEDICINE

## 2025-02-26 PROCEDURE — 82306 VITAMIN D 25 HYDROXY: CPT | Performed by: INTERNAL MEDICINE

## 2025-02-26 PROCEDURE — 36415 COLL VENOUS BLD VENIPUNCTURE: CPT | Performed by: INTERNAL MEDICINE

## 2025-02-26 PROCEDURE — 82043 UR ALBUMIN QUANTITATIVE: CPT | Performed by: INTERNAL MEDICINE

## 2025-02-26 PROCEDURE — 80053 COMPREHEN METABOLIC PANEL: CPT | Performed by: INTERNAL MEDICINE

## 2025-02-26 PROCEDURE — 3079F DIAST BP 80-89 MM HG: CPT | Performed by: INTERNAL MEDICINE

## 2025-02-26 PROCEDURE — 99214 OFFICE O/P EST MOD 30 MIN: CPT | Performed by: INTERNAL MEDICINE

## 2025-02-26 PROCEDURE — 82570 ASSAY OF URINE CREATININE: CPT | Performed by: INTERNAL MEDICINE

## 2025-02-26 PROCEDURE — 81001 URINALYSIS AUTO W/SCOPE: CPT | Performed by: INTERNAL MEDICINE

## 2025-02-26 PROCEDURE — 85027 COMPLETE CBC AUTOMATED: CPT | Performed by: INTERNAL MEDICINE

## 2025-02-26 PROCEDURE — 1126F AMNT PAIN NOTED NONE PRSNT: CPT | Performed by: INTERNAL MEDICINE

## 2025-02-26 RX ORDER — QUETIAPINE FUMARATE 50 MG/1
50 TABLET, FILM COATED ORAL DAILY
Qty: 90 TABLET | Refills: 3 | Status: SHIPPED | OUTPATIENT
Start: 2025-02-26

## 2025-02-26 ASSESSMENT — PAIN SCALES - GENERAL: PAINLEVEL_OUTOF10: NO PAIN (0)

## 2025-02-26 ASSESSMENT — PATIENT HEALTH QUESTIONNAIRE - PHQ9
10. IF YOU CHECKED OFF ANY PROBLEMS, HOW DIFFICULT HAVE THESE PROBLEMS MADE IT FOR YOU TO DO YOUR WORK, TAKE CARE OF THINGS AT HOME, OR GET ALONG WITH OTHER PEOPLE: SOMEWHAT DIFFICULT
5. POOR APPETITE OR OVEREATING: SEVERAL DAYS
SUM OF ALL RESPONSES TO PHQ QUESTIONS 1-9: 7
SUM OF ALL RESPONSES TO PHQ QUESTIONS 1-9: 7

## 2025-02-26 ASSESSMENT — ANXIETY QUESTIONNAIRES
1. FEELING NERVOUS, ANXIOUS, OR ON EDGE: NOT AT ALL
6. BECOMING EASILY ANNOYED OR IRRITABLE: NOT AT ALL
GAD7 TOTAL SCORE: 1
3. WORRYING TOO MUCH ABOUT DIFFERENT THINGS: NOT AT ALL
7. FEELING AFRAID AS IF SOMETHING AWFUL MIGHT HAPPEN: NOT AT ALL
5. BEING SO RESTLESS THAT IT IS HARD TO SIT STILL: NOT AT ALL
GAD7 TOTAL SCORE: 1
2. NOT BEING ABLE TO STOP OR CONTROL WORRYING: NOT AT ALL
IF YOU CHECKED OFF ANY PROBLEMS ON THIS QUESTIONNAIRE, HOW DIFFICULT HAVE THESE PROBLEMS MADE IT FOR YOU TO DO YOUR WORK, TAKE CARE OF THINGS AT HOME, OR GET ALONG WITH OTHER PEOPLE: NOT DIFFICULT AT ALL

## 2025-02-26 ASSESSMENT — ENCOUNTER SYMPTOMS: NERVOUS/ANXIOUS: 1

## 2025-02-26 NOTE — PROGRESS NOTES
"  Assessment & Plan     PTSD (post-traumatic stress disorder)  Improved symptoms. Better controlled anxiety  Decrease Seroquel to 50 mg   - QUEtiapine (SEROQUEL) 50 MG tablet; Take 1 tablet (50 mg) by mouth daily.    HTN, goal below 140/90  Controlled  Continue treatment   - CBC with platelets  - Comprehensive metabolic panel (BMP + Alb, Alk Phos, ALT, AST, Total. Bili, TP)    CKD (chronic kidney disease) stage 5, GFR less than 15 ml/min (H)  Monitor lab  On kidney transplant list   - Comprehensive metabolic panel (BMP + Alb, Alk Phos, ALT, AST, Total. Bili, TP)  - Vitamin D Deficiency  - UA with Microscopic reflex to Culture - lab collect  - Albumin Random Urine Quantitative with Creat Ratio          BMI  Estimated body mass index is 27.33 kg/m  as calculated from the following:    Height as of this encounter: 1.676 m (5' 6\").    Weight as of this encounter: 76.8 kg (169 lb 4.8 oz).         See Patient Instructions    Jamilah Martino is a 64 year old, presenting for the following health issues:  RECHECK, Depression, Anxiety, Hypertension, and Lipids        2/26/2025     2:52 PM   Additional Questions   Roomed by Promise GARDNER   Accompanied by n/a     Anxiety    History of Present Illness       CKD: He is not using over the counter pain medicine.     Mental Health Follow-up:  Patient presents to follow-up on Depression.Patient's depression since last visit has been:  Better  The patient is not having other symptoms associated with depression.      Any significant life events: No  Patient is not feeling anxious or having panic attacks.  Patient has no concerns about alcohol or drug use.    Hyperlipidemia:  He presents for follow up of hyperlipidemia.   He is taking medication to lower cholesterol. He is not having myalgia or other side effects to statin medications.    Hypertension: He presents for follow up of hypertension.  He does check blood pressure  regularly outside of the clinic. Outside blood pressures have been " "over 140/90. He follows a low salt diet.     He eats 2-3 servings of fruits and vegetables daily.He consumes 0 sweetened beverage(s) daily.He exercises with enough effort to increase his heart rate 20 to 29 minutes per day.  He exercises with enough effort to increase his heart rate 3 or less days per week.   He is taking medications regularly.       Patient is seen for a follow up visit.  Has h/o CRF. Monitoring BP, BG, medications, avoiding OTC NSAIDs. Needs periodic recheck of kidney function. Pt is on kidney transplant list.   Has h/o HTN. on medical treatment. BP has been controlled. No side effects from medications. No CP, HA, dizziness. good compliance with medications and low salt diet.  Has h/o depression. On medical treatment, controlled, no side effects. No depressive symptoms or suicidal ideation.  Has H/O hyperlipidemia. On medical treatment and diet. No side effects. No muscle weakness, myalgias or upset stomach.             Review of Systems  Constitutional, HEENT, cardiovascular, pulmonary, gi and gu systems are negative, except as otherwise noted.      Objective    /85 (BP Location: Left arm, Cuff Size: Adult Regular)   Pulse 74   Temp 98.7  F (37.1  C) (Tympanic)   Resp 14   Ht 1.676 m (5' 6\")   Wt 76.8 kg (169 lb 4.8 oz)   SpO2 96%   BMI 27.33 kg/m    Body mass index is 27.33 kg/m .  Physical Exam   GENERAL: alert and no distress  EYES: Eyes grossly normal to inspection, PERRL and conjunctivae and sclerae normal  HENT: ear canals and TM's normal, nose and mouth without ulcers or lesions  NECK: no adenopathy, no asymmetry, masses, or scars  RESP: lungs clear to auscultation - no rales, rhonchi or wheezes  CV: regular rate and rhythm, normal S1 S2, no S3 or S4, no murmur, click or rub, no peripheral edema  ABDOMEN: soft, nontender, no hepatosplenomegaly, no masses and bowel sounds normal  MS: no gross musculoskeletal defects noted, no edema  SKIN: no suspicious lesions or rashes  NEURO: " Normal strength and tone, mentation intact and speech normal    Admission on 07/29/2024, Discharged on 07/29/2024   Component Date Value Ref Range Status    COLONOSCOPY 07/29/2024    Final                    Value:M [facility]  [address] [place]  [address]  _______________________________________________________________________________  Patient Name: Gunner Lopezuangchanbenoit         Procedure Date: 7/29/2024 11:53 AM  MRN: [id number]                       Account Number: [id number]  YOB: 1960              Admit Type: Outpatient  Age: 64                               Room: 2  Note Status: Finalized                Attending MD: RADHA CAMARENA MD,   Instrument Name: 518 [id number] [facility] Colon   _______________________________________________________________________________     Procedure:                Colonoscopy  Indications:              Screening for colorectal malignant neoplasm  Providers:                RADHA CAMARENA MD, Isabel Pearson RN  Referring MD:             BANDAR ENCISO MD  Medicines:                Midazolam 2 mg IV, Fentanyl 100 micrograms IV  Complications:            No immediate complications. Estimated blood loss:                                                       None.  _______________________________________________________________________________  Procedure:                Pre-Anesthesia Assessment:                            - Prior to the procedure, a History and Physical                             was performed, and patient medications and                             allergies were reviewed. The patient is competent.                             The risks and benefits of the procedure and the                             sedation options and risks were discussed with the                             patient. All questions were answered and informed                             consent was obtained. Patient identification and                              proposed procedure were verified by the physician                             in the procedure room. Mental Status Examination:                             alert and oriented. Airway Examination: normal                             oropharyngeal airway and neck mobility. Respirat                          ory                             Examination: clear to auscultation. CV Examination:                             normal. ASA Grade Assessment: III - A patient with                             severe systemic disease. After reviewing the risks                             and benefits, the patient was deemed in                             satisfactory condition to undergo the procedure.                             The anesthesia plan was to use moderate sedation /                             analgesia (conscious sedation). Immediately prior                             to administration of medications, the patient was                             re-assessed for adequacy to receive sedatives. The                             heart rate, respiratory rate, oxygen saturations,                             blood pressure, adequacy of pulmonary ventilation,                             and response to care were monitored throughout the                             procedure. The physical status of t                          he patient was                             re-assessed after the procedure.                            After obtaining informed consent, the colonoscope                             was passed under direct vision. Throughout the                             procedure, the patient's blood pressure, pulse, and                             oxygen saturations were monitored continuously. The                             peds colonoscope 518 was introduced through the                             anus and advanced to the cecum, identified by                             appendiceal orifice and ileocecal valve.  The                             ileocecal valve and the appendiceal orifice were                             photographed. The entire colon was well visualized.                             The colonoscopy was performed with ease. The                             patient tolerated the procedure well. The quality                             of the bowel preparation was excellent.                                                                                                             Findings:       The perianal and digital rectal examinations were normal.       The entire examined colon appeared normal.                                                                                   Impression:               - The entire examined colon is normal.                            - No specimens collected.  Recommendation:           - Discharge patient to home (ambulatory).                            - Repeat colonoscopy in 10 years for screening                             purposes.                                                                                   Procedure Code(s):       --- Professional ---       04011, Colonoscopy, flexible; diagnostic, including collection of        specimen(s) by brushing or washing, when performed (separate procedure)  Diagnosis Code(s):       --- Professional ---       Z12.11, Encounter for screening for malignant neoplasm of colon    CPT copyright 2022 [employer]                           [employer] Association. All rights reserved.    The codes documented in this report are preliminary and upon  review may   be revised to meet current compliance requirements.    _______________________  RADHA CAMARENA MD  7/29/2024 12:19:23 PM  I was physically present for the entire viewing portion of the exam.  RADHA CAMARENA MD  Number of Addenda: 0    Note Initiated On: 7/29/2024 11:53 AM  Scope Withdrawal Time: 0 hours 7 minutes 9 seconds   Total Procedure Duration: 0 hours 10 minutes 0  seconds   Scope In: 12:05:26 PM  Scope Out: 12:15:26 PM             Signed Electronically by: Vinnie Brown MD

## 2025-05-14 DIAGNOSIS — F32.2 SEVERE DEPRESSION (H): ICD-10-CM

## 2025-05-14 DIAGNOSIS — F32.1 MAJOR DEPRESSIVE DISORDER, SINGLE EPISODE, MODERATE (H): ICD-10-CM

## 2025-05-14 DIAGNOSIS — F43.10 PTSD (POST-TRAUMATIC STRESS DISORDER): ICD-10-CM

## 2025-05-14 RX ORDER — QUETIAPINE FUMARATE 100 MG/1
100 TABLET, FILM COATED ORAL
Qty: 90 TABLET | Refills: 0 | Status: SHIPPED | OUTPATIENT
Start: 2025-05-14

## 2025-05-14 RX ORDER — MIRTAZAPINE 30 MG/1
30 TABLET, FILM COATED ORAL
Qty: 90 TABLET | Refills: 0 | Status: SHIPPED | OUTPATIENT
Start: 2025-05-14

## 2025-05-14 RX ORDER — PRAZOSIN HYDROCHLORIDE 5 MG/1
CAPSULE ORAL
Qty: 360 CAPSULE | Refills: 0 | Status: SHIPPED | OUTPATIENT
Start: 2025-05-14

## 2025-05-14 RX ORDER — BUPROPION HYDROCHLORIDE 100 MG/1
100 TABLET, EXTENDED RELEASE ORAL EVERY MORNING
Qty: 90 TABLET | Refills: 0 | Status: SHIPPED | OUTPATIENT
Start: 2025-05-14

## 2025-08-09 DIAGNOSIS — F32.2 SEVERE DEPRESSION (H): ICD-10-CM

## 2025-08-09 DIAGNOSIS — F43.10 PTSD (POST-TRAUMATIC STRESS DISORDER): ICD-10-CM

## 2025-08-11 RX ORDER — MIRTAZAPINE 30 MG/1
30 TABLET, FILM COATED ORAL
Qty: 90 TABLET | Refills: 0 | Status: SHIPPED | OUTPATIENT
Start: 2025-08-11

## 2025-08-11 RX ORDER — BUPROPION HYDROCHLORIDE 100 MG/1
100 TABLET, EXTENDED RELEASE ORAL EVERY MORNING
Qty: 90 TABLET | Refills: 0 | Status: SHIPPED | OUTPATIENT
Start: 2025-08-11

## 2025-08-11 RX ORDER — QUETIAPINE FUMARATE 100 MG/1
100 TABLET, FILM COATED ORAL DAILY
Qty: 90 TABLET | Refills: 0 | Status: SHIPPED | OUTPATIENT
Start: 2025-08-11

## 2025-08-11 RX ORDER — PRAZOSIN HYDROCHLORIDE 5 MG/1
CAPSULE ORAL
Qty: 360 CAPSULE | Refills: 0 | Status: SHIPPED | OUTPATIENT
Start: 2025-08-11

## (undated) RX ORDER — FENTANYL CITRATE 50 UG/ML
INJECTION, SOLUTION INTRAMUSCULAR; INTRAVENOUS
Status: DISPENSED
Start: 2024-07-29